# Patient Record
Sex: MALE | Race: WHITE | NOT HISPANIC OR LATINO | Employment: UNEMPLOYED | URBAN - METROPOLITAN AREA
[De-identification: names, ages, dates, MRNs, and addresses within clinical notes are randomized per-mention and may not be internally consistent; named-entity substitution may affect disease eponyms.]

---

## 2023-01-01 ENCOUNTER — APPOINTMENT (OUTPATIENT)
Dept: CARDIOLOGY | Facility: HOSPITAL | Age: 53
DRG: 276 | End: 2023-01-01
Payer: MEDICAID

## 2023-01-01 ENCOUNTER — HOSPITAL ENCOUNTER (INPATIENT)
Facility: HOSPITAL | Age: 53
LOS: 18 days | DRG: 276 | End: 2023-11-26
Attending: EMERGENCY MEDICINE | Admitting: INTERNAL MEDICINE
Payer: MEDICAID

## 2023-01-01 ENCOUNTER — ANESTHESIA (OUTPATIENT)
Dept: CARDIOLOGY | Facility: HOSPITAL | Age: 53
DRG: 276 | End: 2023-01-01
Payer: MEDICAID

## 2023-01-01 ENCOUNTER — APPOINTMENT (OUTPATIENT)
Dept: ULTRASOUND IMAGING | Facility: HOSPITAL | Age: 53
DRG: 276 | End: 2023-01-01
Payer: MEDICAID

## 2023-01-01 ENCOUNTER — APPOINTMENT (OUTPATIENT)
Dept: GENERAL RADIOLOGY | Facility: HOSPITAL | Age: 53
DRG: 276 | End: 2023-01-01
Payer: MEDICAID

## 2023-01-01 ENCOUNTER — APPOINTMENT (OUTPATIENT)
Dept: CT IMAGING | Facility: HOSPITAL | Age: 53
DRG: 276 | End: 2023-01-01
Payer: MEDICAID

## 2023-01-01 ENCOUNTER — ANESTHESIA EVENT (OUTPATIENT)
Dept: CARDIOLOGY | Facility: HOSPITAL | Age: 53
DRG: 276 | End: 2023-01-01
Payer: MEDICAID

## 2023-01-01 VITALS
BODY MASS INDEX: 24.55 KG/M2 | WEIGHT: 175.38 LBS | RESPIRATION RATE: 20 BRPM | SYSTOLIC BLOOD PRESSURE: 77 MMHG | HEART RATE: 103 BPM | OXYGEN SATURATION: 100 % | DIASTOLIC BLOOD PRESSURE: 43 MMHG | HEIGHT: 71 IN | TEMPERATURE: 99.9 F

## 2023-01-01 DIAGNOSIS — I48.91 ATRIAL FIBRILLATION WITH RVR: ICD-10-CM

## 2023-01-01 DIAGNOSIS — E87.8 HYPOCHLOREMIA: ICD-10-CM

## 2023-01-01 DIAGNOSIS — D72.829 LEUKOCYTOSIS, UNSPECIFIED TYPE: ICD-10-CM

## 2023-01-01 DIAGNOSIS — R79.89 ELEVATED SERUM CREATININE: ICD-10-CM

## 2023-01-01 DIAGNOSIS — I50.23 ACUTE ON CHRONIC SYSTOLIC CONGESTIVE HEART FAILURE: Primary | ICD-10-CM

## 2023-01-01 DIAGNOSIS — R79.89 ELEVATED BRAIN NATRIURETIC PEPTIDE (BNP) LEVEL: ICD-10-CM

## 2023-01-01 DIAGNOSIS — E80.6 HYPERBILIRUBINEMIA: ICD-10-CM

## 2023-01-01 DIAGNOSIS — J81.0 ACUTE PULMONARY EDEMA: ICD-10-CM

## 2023-01-01 DIAGNOSIS — R74.01 ELEVATED TRANSAMINASE LEVEL: ICD-10-CM

## 2023-01-01 DIAGNOSIS — R53.81 PHYSICAL DECONDITIONING: ICD-10-CM

## 2023-01-01 DIAGNOSIS — E87.1 HYPONATREMIA: ICD-10-CM

## 2023-01-01 DIAGNOSIS — I48.20 CHRONIC ATRIAL FIBRILLATION: ICD-10-CM

## 2023-01-01 LAB
ALBUMIN SERPL-MCNC: 1.8 G/DL (ref 3.5–5.2)
ALBUMIN SERPL-MCNC: 2.2 G/DL (ref 3.5–5.2)
ALBUMIN SERPL-MCNC: 2.3 G/DL (ref 3.5–5.2)
ALBUMIN SERPL-MCNC: 2.8 G/DL (ref 3.5–5.2)
ALBUMIN SERPL-MCNC: 2.9 G/DL (ref 3.5–5.2)
ALBUMIN SERPL-MCNC: 2.9 G/DL (ref 3.5–5.2)
ALBUMIN SERPL-MCNC: 3 G/DL (ref 3.5–5.2)
ALBUMIN SERPL-MCNC: 3 G/DL (ref 3.5–5.2)
ALBUMIN SERPL-MCNC: 3.3 G/DL (ref 3.5–5.2)
ALBUMIN SERPL-MCNC: 3.3 G/DL (ref 3.5–5.2)
ALBUMIN SERPL-MCNC: 3.4 G/DL (ref 3.5–5.2)
ALBUMIN/GLOB SERPL: 0.8 G/DL
ALBUMIN/GLOB SERPL: 0.9 G/DL
ALBUMIN/GLOB SERPL: 0.9 G/DL
ALBUMIN/GLOB SERPL: 1 G/DL
ALBUMIN/GLOB SERPL: 1.1 G/DL
ALBUMIN/GLOB SERPL: 1.2 G/DL
ALBUMIN/GLOB SERPL: 1.3 G/DL
ALBUMIN/GLOB SERPL: 1.3 G/DL
ALP SERPL-CCNC: 118 U/L (ref 39–117)
ALP SERPL-CCNC: 125 U/L (ref 39–117)
ALP SERPL-CCNC: 128 U/L (ref 39–117)
ALP SERPL-CCNC: 158 U/L (ref 39–117)
ALP SERPL-CCNC: 160 U/L (ref 39–117)
ALP SERPL-CCNC: 167 U/L (ref 39–117)
ALP SERPL-CCNC: 181 U/L (ref 39–117)
ALP SERPL-CCNC: 183 U/L (ref 39–117)
ALP SERPL-CCNC: 202 U/L (ref 39–117)
ALP SERPL-CCNC: 203 U/L (ref 39–117)
ALT SERPL W P-5'-P-CCNC: 122 U/L (ref 1–41)
ALT SERPL W P-5'-P-CCNC: 145 U/L (ref 1–41)
ALT SERPL W P-5'-P-CCNC: 146 U/L (ref 1–41)
ALT SERPL W P-5'-P-CCNC: 21 U/L (ref 1–41)
ALT SERPL W P-5'-P-CCNC: 25 U/L (ref 1–41)
ALT SERPL W P-5'-P-CCNC: 25 U/L (ref 1–41)
ALT SERPL W P-5'-P-CCNC: 53 U/L (ref 1–41)
ALT SERPL W P-5'-P-CCNC: 72 U/L (ref 1–41)
ALT SERPL W P-5'-P-CCNC: 89 U/L (ref 1–41)
ALT SERPL W P-5'-P-CCNC: 95 U/L (ref 1–41)
AMMONIA BLD-SCNC: 32 UMOL/L (ref 16–60)
ANION GAP SERPL CALCULATED.3IONS-SCNC: 10 MMOL/L (ref 5–15)
ANION GAP SERPL CALCULATED.3IONS-SCNC: 10 MMOL/L (ref 5–15)
ANION GAP SERPL CALCULATED.3IONS-SCNC: 11 MMOL/L (ref 5–15)
ANION GAP SERPL CALCULATED.3IONS-SCNC: 12 MMOL/L (ref 5–15)
ANION GAP SERPL CALCULATED.3IONS-SCNC: 12 MMOL/L (ref 5–15)
ANION GAP SERPL CALCULATED.3IONS-SCNC: 14 MMOL/L (ref 5–15)
ANION GAP SERPL CALCULATED.3IONS-SCNC: 16 MMOL/L (ref 5–15)
ANION GAP SERPL CALCULATED.3IONS-SCNC: 17 MMOL/L (ref 5–15)
ANION GAP SERPL CALCULATED.3IONS-SCNC: 17 MMOL/L (ref 5–15)
ANION GAP SERPL CALCULATED.3IONS-SCNC: 18 MMOL/L (ref 5–15)
ANION GAP SERPL CALCULATED.3IONS-SCNC: 19 MMOL/L (ref 5–15)
ANION GAP SERPL CALCULATED.3IONS-SCNC: 20 MMOL/L (ref 5–15)
ANION GAP SERPL CALCULATED.3IONS-SCNC: 21 MMOL/L (ref 5–15)
ANION GAP SERPL CALCULATED.3IONS-SCNC: 23 MMOL/L (ref 5–15)
ANION GAP SERPL CALCULATED.3IONS-SCNC: 9 MMOL/L (ref 5–15)
ANION GAP SERPL CALCULATED.3IONS-SCNC: 9 MMOL/L (ref 5–15)
ARTERIAL PATENCY WRIST A: ABNORMAL
ARTERIAL PATENCY WRIST A: ABNORMAL
AST SERPL-CCNC: 35 U/L (ref 1–40)
AST SERPL-CCNC: 40 U/L (ref 1–40)
AST SERPL-CCNC: 42 U/L (ref 1–40)
AST SERPL-CCNC: 48 U/L (ref 1–40)
AST SERPL-CCNC: 48 U/L (ref 1–40)
AST SERPL-CCNC: 52 U/L (ref 1–40)
AST SERPL-CCNC: 54 U/L (ref 1–40)
AST SERPL-CCNC: 55 U/L (ref 1–40)
AST SERPL-CCNC: 67 U/L (ref 1–40)
AST SERPL-CCNC: 83 U/L (ref 1–40)
ATMOSPHERIC PRESS: ABNORMAL MM[HG]
B PARAPERT DNA SPEC QL NAA+PROBE: NOT DETECTED
B PERT DNA SPEC QL NAA+PROBE: NOT DETECTED
BACTERIA UR QL AUTO: ABNORMAL /HPF
BASE EXCESS BLDA CALC-SCNC: -14.5 MMOL/L (ref 0–2)
BASE EXCESS BLDA CALC-SCNC: -4 MMOL/L (ref 0–2)
BASE EXCESS BLDV CALC-SCNC: 0.4 MMOL/L (ref -2–2)
BASOPHILS # BLD AUTO: 0.01 10*3/MM3 (ref 0–0.2)
BASOPHILS # BLD AUTO: 0.02 10*3/MM3 (ref 0–0.2)
BASOPHILS # BLD AUTO: 0.02 10*3/MM3 (ref 0–0.2)
BASOPHILS # BLD AUTO: 0.03 10*3/MM3 (ref 0–0.2)
BASOPHILS # BLD AUTO: 0.04 10*3/MM3 (ref 0–0.2)
BASOPHILS # BLD AUTO: 0.04 10*3/MM3 (ref 0–0.2)
BASOPHILS # BLD AUTO: 0.06 10*3/MM3 (ref 0–0.2)
BASOPHILS # BLD MANUAL: 0 10*3/MM3 (ref 0–0.2)
BASOPHILS NFR BLD AUTO: 0.1 % (ref 0–1.5)
BASOPHILS NFR BLD AUTO: 0.2 % (ref 0–1.5)
BASOPHILS NFR BLD AUTO: 0.3 % (ref 0–1.5)
BASOPHILS NFR BLD MANUAL: 0 % (ref 0–1.5)
BDY SITE: ABNORMAL
BH CV ECHO MEAS - AO MAX PG: 2.8 MMHG
BH CV ECHO MEAS - AO MEAN PG: 2 MMHG
BH CV ECHO MEAS - AO ROOT DIAM: 3 CM
BH CV ECHO MEAS - AO V2 MAX: 84.2 CM/SEC
BH CV ECHO MEAS - AO V2 VTI: 12.2 CM
BH CV ECHO MEAS - AVA(I,D): 2.29 CM2
BH CV ECHO MEAS - EDV(CUBED): 205.4 ML
BH CV ECHO MEAS - EDV(MOD-SP2): 234 ML
BH CV ECHO MEAS - EDV(MOD-SP4): 219 ML
BH CV ECHO MEAS - EF(MOD-BP): 8.7 %
BH CV ECHO MEAS - EF(MOD-SP2): 6 %
BH CV ECHO MEAS - EF(MOD-SP4): 13.7 %
BH CV ECHO MEAS - ESV(CUBED): 125 ML
BH CV ECHO MEAS - ESV(MOD-SP2): 220 ML
BH CV ECHO MEAS - ESV(MOD-SP4): 189 ML
BH CV ECHO MEAS - FS: 15.3 %
BH CV ECHO MEAS - IVS/LVPW: 1 CM
BH CV ECHO MEAS - IVSD: 0.9 CM
BH CV ECHO MEAS - LA DIMENSION: 4.9 CM
BH CV ECHO MEAS - LAT PEAK E' VEL: 16 CM/SEC
BH CV ECHO MEAS - LV MASS(C)D: 209.6 GRAMS
BH CV ECHO MEAS - LV MAX PG: 1.6 MMHG
BH CV ECHO MEAS - LV MEAN PG: 1 MMHG
BH CV ECHO MEAS - LV V1 MAX: 63.3 CM/SEC
BH CV ECHO MEAS - LV V1 VTI: 8.9 CM
BH CV ECHO MEAS - LVIDD: 5.9 CM
BH CV ECHO MEAS - LVIDS: 5 CM
BH CV ECHO MEAS - LVOT AREA: 3.1 CM2
BH CV ECHO MEAS - LVOT DIAM: 2 CM
BH CV ECHO MEAS - LVPWD: 0.9 CM
BH CV ECHO MEAS - MED PEAK E' VEL: 4.6 CM/SEC
BH CV ECHO MEAS - MV DEC SLOPE: 967 CM/SEC2
BH CV ECHO MEAS - MV DEC TIME: 0.11 SEC
BH CV ECHO MEAS - MV E MAX VEL: 130 CM/SEC
BH CV ECHO MEAS - MV MAX PG: 7.5 MMHG
BH CV ECHO MEAS - MV MEAN PG: 3 MMHG
BH CV ECHO MEAS - MV P1/2T: 39.7 MSEC
BH CV ECHO MEAS - MV V2 VTI: 13.4 CM
BH CV ECHO MEAS - MVA(P1/2T): 5.5 CM2
BH CV ECHO MEAS - MVA(VTI): 2.08 CM2
BH CV ECHO MEAS - PA ACC TIME: 0.07 SEC
BH CV ECHO MEAS - RAP SYSTOLE: 3 MMHG
BH CV ECHO MEAS - RVSP: 17.8 MMHG
BH CV ECHO MEAS - SV(LVOT): 27.9 ML
BH CV ECHO MEAS - SV(MOD-SP2): 14 ML
BH CV ECHO MEAS - SV(MOD-SP4): 30 ML
BH CV ECHO MEAS - TAPSE (>1.6): 2.46 CM
BH CV ECHO MEAS - TR MAX PG: 14.8 MMHG
BH CV ECHO MEAS - TR MAX VEL: 190 CM/SEC
BH CV ECHO MEASUREMENTS AVERAGE E/E' RATIO: 12.62
BH CV XLRA - RV BASE: 4.7 CM
BH CV XLRA - RV LENGTH: 7 CM
BH CV XLRA - RV MID: 4.1 CM
BH CV XLRA - TDI S': 8.1 CM/SEC
BILIRUB SERPL-MCNC: 2.1 MG/DL (ref 0–1.2)
BILIRUB SERPL-MCNC: 2.2 MG/DL (ref 0–1.2)
BILIRUB SERPL-MCNC: 2.3 MG/DL (ref 0–1.2)
BILIRUB SERPL-MCNC: 2.8 MG/DL (ref 0–1.2)
BILIRUB SERPL-MCNC: 2.9 MG/DL (ref 0–1.2)
BILIRUB SERPL-MCNC: 3 MG/DL (ref 0–1.2)
BILIRUB SERPL-MCNC: 3.6 MG/DL (ref 0–1.2)
BILIRUB SERPL-MCNC: 3.6 MG/DL (ref 0–1.2)
BILIRUB SERPL-MCNC: 4.5 MG/DL (ref 0–1.2)
BILIRUB SERPL-MCNC: 4.7 MG/DL (ref 0–1.2)
BILIRUB UR QL STRIP: ABNORMAL
BODY TEMPERATURE: 37
BODY TEMPERATURE: 37
BODY TEMPERATURE: 37 C
BUN SERPL-MCNC: 104 MG/DL (ref 6–20)
BUN SERPL-MCNC: 53 MG/DL (ref 6–20)
BUN SERPL-MCNC: 55 MG/DL (ref 6–20)
BUN SERPL-MCNC: 59 MG/DL (ref 6–20)
BUN SERPL-MCNC: 60 MG/DL (ref 6–20)
BUN SERPL-MCNC: 62 MG/DL (ref 6–20)
BUN SERPL-MCNC: 72 MG/DL (ref 6–20)
BUN SERPL-MCNC: 75 MG/DL (ref 6–20)
BUN SERPL-MCNC: 77 MG/DL (ref 6–20)
BUN SERPL-MCNC: 79 MG/DL (ref 6–20)
BUN SERPL-MCNC: 79 MG/DL (ref 6–20)
BUN SERPL-MCNC: 81 MG/DL (ref 6–20)
BUN SERPL-MCNC: 82 MG/DL (ref 6–20)
BUN SERPL-MCNC: 82 MG/DL (ref 6–20)
BUN SERPL-MCNC: 83 MG/DL (ref 6–20)
BUN SERPL-MCNC: 85 MG/DL (ref 6–20)
BUN SERPL-MCNC: 87 MG/DL (ref 6–20)
BUN SERPL-MCNC: 93 MG/DL (ref 6–20)
BUN SERPL-MCNC: 93 MG/DL (ref 6–20)
BUN/CREAT SERPL: 26.4 (ref 7–25)
BUN/CREAT SERPL: 29.4 (ref 7–25)
BUN/CREAT SERPL: 34 (ref 7–25)
BUN/CREAT SERPL: 34.8 (ref 7–25)
BUN/CREAT SERPL: 35.3 (ref 7–25)
BUN/CREAT SERPL: 36.2 (ref 7–25)
BUN/CREAT SERPL: 37.3 (ref 7–25)
BUN/CREAT SERPL: 37.7 (ref 7–25)
BUN/CREAT SERPL: 37.8 (ref 7–25)
BUN/CREAT SERPL: 38.1 (ref 7–25)
BUN/CREAT SERPL: 39.9 (ref 7–25)
BUN/CREAT SERPL: 40.1 (ref 7–25)
BUN/CREAT SERPL: 40.7 (ref 7–25)
BUN/CREAT SERPL: 43.2 (ref 7–25)
BUN/CREAT SERPL: 44.4 (ref 7–25)
BUN/CREAT SERPL: 44.5 (ref 7–25)
BUN/CREAT SERPL: 45.8 (ref 7–25)
BUN/CREAT SERPL: 45.9 (ref 7–25)
BUN/CREAT SERPL: 46.8 (ref 7–25)
BUN/CREAT SERPL: 46.8 (ref 7–25)
BUN/CREAT SERPL: 47.2 (ref 7–25)
BUN/CREAT SERPL: 53.7 (ref 7–25)
BURR CELLS BLD QL SMEAR: ABNORMAL
BURR CELLS BLD QL SMEAR: ABNORMAL
C PNEUM DNA NPH QL NAA+NON-PROBE: NOT DETECTED
CA-I SERPL ISE-MCNC: 1.26 MMOL/L (ref 1.12–1.32)
CALCIUM SPEC-SCNC: 10 MG/DL (ref 8.6–10.5)
CALCIUM SPEC-SCNC: 11.3 MG/DL (ref 8.6–10.5)
CALCIUM SPEC-SCNC: 7.6 MG/DL (ref 8.6–10.5)
CALCIUM SPEC-SCNC: 8.3 MG/DL (ref 8.6–10.5)
CALCIUM SPEC-SCNC: 8.5 MG/DL (ref 8.6–10.5)
CALCIUM SPEC-SCNC: 8.6 MG/DL (ref 8.6–10.5)
CALCIUM SPEC-SCNC: 8.6 MG/DL (ref 8.6–10.5)
CALCIUM SPEC-SCNC: 8.7 MG/DL (ref 8.6–10.5)
CALCIUM SPEC-SCNC: 8.7 MG/DL (ref 8.6–10.5)
CALCIUM SPEC-SCNC: 8.8 MG/DL (ref 8.6–10.5)
CALCIUM SPEC-SCNC: 8.9 MG/DL (ref 8.6–10.5)
CALCIUM SPEC-SCNC: 9 MG/DL (ref 8.6–10.5)
CALCIUM SPEC-SCNC: 9.1 MG/DL (ref 8.6–10.5)
CALCIUM SPEC-SCNC: 9.2 MG/DL (ref 8.6–10.5)
CALCIUM SPEC-SCNC: 9.2 MG/DL (ref 8.6–10.5)
CALCIUM SPEC-SCNC: 9.3 MG/DL (ref 8.6–10.5)
CALCIUM SPEC-SCNC: 9.3 MG/DL (ref 8.6–10.5)
CALCIUM SPEC-SCNC: 9.6 MG/DL (ref 8.6–10.5)
CALCIUM SPEC-SCNC: 9.9 MG/DL (ref 8.6–10.5)
CHLORIDE SERPL-SCNC: 80 MMOL/L (ref 98–107)
CHLORIDE SERPL-SCNC: 80 MMOL/L (ref 98–107)
CHLORIDE SERPL-SCNC: 81 MMOL/L (ref 98–107)
CHLORIDE SERPL-SCNC: 82 MMOL/L (ref 98–107)
CHLORIDE SERPL-SCNC: 82 MMOL/L (ref 98–107)
CHLORIDE SERPL-SCNC: 83 MMOL/L (ref 98–107)
CHLORIDE SERPL-SCNC: 84 MMOL/L (ref 98–107)
CHLORIDE SERPL-SCNC: 85 MMOL/L (ref 98–107)
CHLORIDE SERPL-SCNC: 85 MMOL/L (ref 98–107)
CHLORIDE SERPL-SCNC: 86 MMOL/L (ref 98–107)
CHLORIDE SERPL-SCNC: 87 MMOL/L (ref 98–107)
CHLORIDE SERPL-SCNC: 88 MMOL/L (ref 98–107)
CHLORIDE SERPL-SCNC: 89 MMOL/L (ref 98–107)
CHLORIDE SERPL-SCNC: 89 MMOL/L (ref 98–107)
CHLORIDE SERPL-SCNC: 90 MMOL/L (ref 98–107)
CHLORIDE SERPL-SCNC: 90 MMOL/L (ref 98–107)
CHLORIDE SERPL-SCNC: 91 MMOL/L (ref 98–107)
CHLORIDE SERPL-SCNC: 91 MMOL/L (ref 98–107)
CHLORIDE SERPL-SCNC: 92 MMOL/L (ref 98–107)
CHLORIDE SERPL-SCNC: 93 MMOL/L (ref 98–107)
CHLORIDE SERPL-SCNC: 93 MMOL/L (ref 98–107)
CHLORIDE SERPL-SCNC: 96 MMOL/L (ref 98–107)
CHOLEST SERPL-MCNC: 81 MG/DL (ref 0–200)
CLARITY UR: ABNORMAL
CO2 BLDA-SCNC: 15.9 MMOL/L (ref 22–33)
CO2 BLDA-SCNC: 21.5 MMOL/L (ref 22–33)
CO2 BLDA-SCNC: 24.3 MMOL/L (ref 22–33)
CO2 SERPL-SCNC: 17 MMOL/L (ref 22–29)
CO2 SERPL-SCNC: 19 MMOL/L (ref 22–29)
CO2 SERPL-SCNC: 21 MMOL/L (ref 22–29)
CO2 SERPL-SCNC: 23 MMOL/L (ref 22–29)
CO2 SERPL-SCNC: 25 MMOL/L (ref 22–29)
CO2 SERPL-SCNC: 26 MMOL/L (ref 22–29)
CO2 SERPL-SCNC: 26 MMOL/L (ref 22–29)
CO2 SERPL-SCNC: 27 MMOL/L (ref 22–29)
CO2 SERPL-SCNC: 28 MMOL/L (ref 22–29)
CO2 SERPL-SCNC: 30 MMOL/L (ref 22–29)
CO2 SERPL-SCNC: 31 MMOL/L (ref 22–29)
CO2 SERPL-SCNC: 31 MMOL/L (ref 22–29)
CO2 SERPL-SCNC: 32 MMOL/L (ref 22–29)
CO2 SERPL-SCNC: 32 MMOL/L (ref 22–29)
CO2 SERPL-SCNC: 33 MMOL/L (ref 22–29)
COHGB MFR BLD: 2.2 %
COHGB MFR BLD: 2.2 % (ref 0–2)
COHGB MFR BLD: 2.6 % (ref 0–2)
COLOR UR: ABNORMAL
CREAT SERPL-MCNC: 1.2 MG/DL (ref 0.76–1.27)
CREAT SERPL-MCNC: 1.42 MG/DL (ref 0.76–1.27)
CREAT SERPL-MCNC: 1.45 MG/DL (ref 0.76–1.27)
CREAT SERPL-MCNC: 1.47 MG/DL (ref 0.76–1.27)
CREAT SERPL-MCNC: 1.54 MG/DL (ref 0.76–1.27)
CREAT SERPL-MCNC: 1.69 MG/DL (ref 0.76–1.27)
CREAT SERPL-MCNC: 1.73 MG/DL (ref 0.76–1.27)
CREAT SERPL-MCNC: 1.76 MG/DL (ref 0.76–1.27)
CREAT SERPL-MCNC: 1.81 MG/DL (ref 0.76–1.27)
CREAT SERPL-MCNC: 1.92 MG/DL (ref 0.76–1.27)
CREAT SERPL-MCNC: 2.04 MG/DL (ref 0.76–1.27)
CREAT SERPL-MCNC: 2.09 MG/DL (ref 0.76–1.27)
CREAT SERPL-MCNC: 2.11 MG/DL (ref 0.76–1.27)
CREAT SERPL-MCNC: 2.13 MG/DL (ref 0.76–1.27)
CREAT SERPL-MCNC: 2.17 MG/DL (ref 0.76–1.27)
CREAT SERPL-MCNC: 2.27 MG/DL (ref 0.76–1.27)
CREAT SERPL-MCNC: 2.27 MG/DL (ref 0.76–1.27)
CREAT SERPL-MCNC: 2.29 MG/DL (ref 0.76–1.27)
CREAT SERPL-MCNC: 2.32 MG/DL (ref 0.76–1.27)
CREAT SERPL-MCNC: 2.41 MG/DL (ref 0.76–1.27)
CREAT SERPL-MCNC: 2.46 MG/DL (ref 0.76–1.27)
CREAT SERPL-MCNC: 2.73 MG/DL (ref 0.76–1.27)
CREAT UR-MCNC: 49.6 MG/DL
CREAT UR-MCNC: 72 MG/DL
D-LACTATE SERPL-SCNC: 10.6 MMOL/L (ref 0.5–2)
D-LACTATE SERPL-SCNC: 7.2 MMOL/L (ref 0.5–2)
DEPRECATED RDW RBC AUTO: 42.1 FL (ref 37–54)
DEPRECATED RDW RBC AUTO: 42.2 FL (ref 37–54)
DEPRECATED RDW RBC AUTO: 42.3 FL (ref 37–54)
DEPRECATED RDW RBC AUTO: 42.4 FL (ref 37–54)
DEPRECATED RDW RBC AUTO: 42.5 FL (ref 37–54)
DEPRECATED RDW RBC AUTO: 42.5 FL (ref 37–54)
DEPRECATED RDW RBC AUTO: 42.7 FL (ref 37–54)
DEPRECATED RDW RBC AUTO: 42.8 FL (ref 37–54)
DEPRECATED RDW RBC AUTO: 43.2 FL (ref 37–54)
DEPRECATED RDW RBC AUTO: 43.5 FL (ref 37–54)
DEPRECATED RDW RBC AUTO: 43.7 FL (ref 37–54)
DEPRECATED RDW RBC AUTO: 43.8 FL (ref 37–54)
DEPRECATED RDW RBC AUTO: 43.9 FL (ref 37–54)
DEPRECATED RDW RBC AUTO: 46.4 FL (ref 37–54)
DIGOXIN SERPL-MCNC: 1.17 NG/ML (ref 0.6–1.2)
DIGOXIN SERPL-MCNC: 1.29 NG/ML (ref 0.6–1.2)
EGFRCR SERPLBLD CKD-EPI 2021: 27.3 ML/MIN/1.73
EGFRCR SERPLBLD CKD-EPI 2021: 30.9 ML/MIN/1.73
EGFRCR SERPLBLD CKD-EPI 2021: 31.7 ML/MIN/1.73
EGFRCR SERPLBLD CKD-EPI 2021: 33.2 ML/MIN/1.73
EGFRCR SERPLBLD CKD-EPI 2021: 33.7 ML/MIN/1.73
EGFRCR SERPLBLD CKD-EPI 2021: 34.1 ML/MIN/1.73
EGFRCR SERPLBLD CKD-EPI 2021: 34.1 ML/MIN/1.73
EGFRCR SERPLBLD CKD-EPI 2021: 36 ML/MIN/1.73
EGFRCR SERPLBLD CKD-EPI 2021: 36.8 ML/MIN/1.73
EGFRCR SERPLBLD CKD-EPI 2021: 37.2 ML/MIN/1.73
EGFRCR SERPLBLD CKD-EPI 2021: 37.6 ML/MIN/1.73
EGFRCR SERPLBLD CKD-EPI 2021: 38.7 ML/MIN/1.73
EGFRCR SERPLBLD CKD-EPI 2021: 41.7 ML/MIN/1.73
EGFRCR SERPLBLD CKD-EPI 2021: 44.7 ML/MIN/1.73
EGFRCR SERPLBLD CKD-EPI 2021: 46.2 ML/MIN/1.73
EGFRCR SERPLBLD CKD-EPI 2021: 47.2 ML/MIN/1.73
EGFRCR SERPLBLD CKD-EPI 2021: 48.5 ML/MIN/1.73
EGFRCR SERPLBLD CKD-EPI 2021: 54.3 ML/MIN/1.73
EGFRCR SERPLBLD CKD-EPI 2021: 57.4 ML/MIN/1.73
EGFRCR SERPLBLD CKD-EPI 2021: 58.3 ML/MIN/1.73
EGFRCR SERPLBLD CKD-EPI 2021: 59.8 ML/MIN/1.73
EGFRCR SERPLBLD CKD-EPI 2021: 73.2 ML/MIN/1.73
EOSINOPHIL # BLD AUTO: 0 10*3/MM3 (ref 0–0.4)
EOSINOPHIL # BLD AUTO: 0.01 10*3/MM3 (ref 0–0.4)
EOSINOPHIL # BLD AUTO: 0.01 10*3/MM3 (ref 0–0.4)
EOSINOPHIL # BLD AUTO: 0.02 10*3/MM3 (ref 0–0.4)
EOSINOPHIL # BLD AUTO: 0.03 10*3/MM3 (ref 0–0.4)
EOSINOPHIL # BLD AUTO: 0.06 10*3/MM3 (ref 0–0.4)
EOSINOPHIL # BLD AUTO: 0.11 10*3/MM3 (ref 0–0.4)
EOSINOPHIL # BLD AUTO: 0.2 10*3/MM3 (ref 0–0.4)
EOSINOPHIL # BLD AUTO: 0.21 10*3/MM3 (ref 0–0.4)
EOSINOPHIL # BLD AUTO: 0.24 10*3/MM3 (ref 0–0.4)
EOSINOPHIL # BLD MANUAL: 0 10*3/MM3 (ref 0–0.4)
EOSINOPHIL NFR BLD AUTO: 0 % (ref 0.3–6.2)
EOSINOPHIL NFR BLD AUTO: 0 % (ref 0.3–6.2)
EOSINOPHIL NFR BLD AUTO: 0.1 % (ref 0.3–6.2)
EOSINOPHIL NFR BLD AUTO: 0.1 % (ref 0.3–6.2)
EOSINOPHIL NFR BLD AUTO: 0.2 % (ref 0.3–6.2)
EOSINOPHIL NFR BLD AUTO: 0.2 % (ref 0.3–6.2)
EOSINOPHIL NFR BLD AUTO: 0.6 % (ref 0.3–6.2)
EOSINOPHIL NFR BLD AUTO: 1.4 % (ref 0.3–6.2)
EOSINOPHIL NFR BLD AUTO: 1.6 % (ref 0.3–6.2)
EOSINOPHIL NFR BLD AUTO: 2.3 % (ref 0.3–6.2)
EOSINOPHIL NFR BLD MANUAL: 0 % (ref 0.3–6.2)
EPAP: 0
ERYTHROCYTE [DISTWIDTH] IN BLOOD BY AUTOMATED COUNT: 14.6 % (ref 12.3–15.4)
ERYTHROCYTE [DISTWIDTH] IN BLOOD BY AUTOMATED COUNT: 14.7 % (ref 12.3–15.4)
ERYTHROCYTE [DISTWIDTH] IN BLOOD BY AUTOMATED COUNT: 14.9 % (ref 12.3–15.4)
ERYTHROCYTE [DISTWIDTH] IN BLOOD BY AUTOMATED COUNT: 15.2 % (ref 12.3–15.4)
ERYTHROCYTE [DISTWIDTH] IN BLOOD BY AUTOMATED COUNT: 15.2 % (ref 12.3–15.4)
ERYTHROCYTE [DISTWIDTH] IN BLOOD BY AUTOMATED COUNT: 15.3 % (ref 12.3–15.4)
ERYTHROCYTE [DISTWIDTH] IN BLOOD BY AUTOMATED COUNT: 15.3 % (ref 12.3–15.4)
ERYTHROCYTE [DISTWIDTH] IN BLOOD BY AUTOMATED COUNT: 15.4 % (ref 12.3–15.4)
ERYTHROCYTE [DISTWIDTH] IN BLOOD BY AUTOMATED COUNT: 15.9 % (ref 12.3–15.4)
FLUAV SUBTYP SPEC NAA+PROBE: NOT DETECTED
FLUBV RNA ISLT QL NAA+PROBE: NOT DETECTED
GEN 5 2HR TROPONIN T REFLEX: 60 NG/L
GLOBULIN UR ELPH-MCNC: 2.2 GM/DL
GLOBULIN UR ELPH-MCNC: 2.3 GM/DL
GLOBULIN UR ELPH-MCNC: 2.4 GM/DL
GLOBULIN UR ELPH-MCNC: 2.4 GM/DL
GLOBULIN UR ELPH-MCNC: 2.5 GM/DL
GLOBULIN UR ELPH-MCNC: 2.6 GM/DL
GLOBULIN UR ELPH-MCNC: 2.7 GM/DL
GLOBULIN UR ELPH-MCNC: 2.7 GM/DL
GLOBULIN UR ELPH-MCNC: 2.8 GM/DL
GLOBULIN UR ELPH-MCNC: 2.9 GM/DL
GLUCOSE SERPL-MCNC: 106 MG/DL (ref 65–99)
GLUCOSE SERPL-MCNC: 106 MG/DL (ref 65–99)
GLUCOSE SERPL-MCNC: 108 MG/DL (ref 65–99)
GLUCOSE SERPL-MCNC: 108 MG/DL (ref 65–99)
GLUCOSE SERPL-MCNC: 112 MG/DL (ref 65–99)
GLUCOSE SERPL-MCNC: 113 MG/DL (ref 65–99)
GLUCOSE SERPL-MCNC: 115 MG/DL (ref 65–99)
GLUCOSE SERPL-MCNC: 116 MG/DL (ref 65–99)
GLUCOSE SERPL-MCNC: 118 MG/DL (ref 65–99)
GLUCOSE SERPL-MCNC: 118 MG/DL (ref 65–99)
GLUCOSE SERPL-MCNC: 125 MG/DL (ref 65–99)
GLUCOSE SERPL-MCNC: 127 MG/DL (ref 65–99)
GLUCOSE SERPL-MCNC: 132 MG/DL (ref 65–99)
GLUCOSE SERPL-MCNC: 146 MG/DL (ref 65–99)
GLUCOSE SERPL-MCNC: 198 MG/DL (ref 65–99)
GLUCOSE SERPL-MCNC: 72 MG/DL (ref 65–99)
GLUCOSE SERPL-MCNC: 78 MG/DL (ref 65–99)
GLUCOSE SERPL-MCNC: 82 MG/DL (ref 65–99)
GLUCOSE SERPL-MCNC: 88 MG/DL (ref 65–99)
GLUCOSE SERPL-MCNC: 90 MG/DL (ref 65–99)
GLUCOSE SERPL-MCNC: 92 MG/DL (ref 65–99)
GLUCOSE SERPL-MCNC: 95 MG/DL (ref 65–99)
GLUCOSE UR STRIP-MCNC: ABNORMAL MG/DL
HADV DNA SPEC NAA+PROBE: NOT DETECTED
HAV IGM SERPL QL IA: NORMAL
HBA1C MFR BLD: 6 % (ref 4.8–5.6)
HBV CORE IGM SERPL QL IA: NORMAL
HBV SURFACE AG SERPL QL IA: NORMAL
HCO3 BLDA-SCNC: 14.5 MMOL/L (ref 20–26)
HCO3 BLDA-SCNC: 20.4 MMOL/L (ref 20–26)
HCO3 BLDV-SCNC: 23.3 MMOL/L (ref 22–28)
HCOV 229E RNA SPEC QL NAA+PROBE: NOT DETECTED
HCOV HKU1 RNA SPEC QL NAA+PROBE: NOT DETECTED
HCOV NL63 RNA SPEC QL NAA+PROBE: NOT DETECTED
HCOV OC43 RNA SPEC QL NAA+PROBE: NOT DETECTED
HCT VFR BLD AUTO: 31 % (ref 37.5–51)
HCT VFR BLD AUTO: 31.5 % (ref 37.5–51)
HCT VFR BLD AUTO: 32.9 % (ref 37.5–51)
HCT VFR BLD AUTO: 33.5 % (ref 37.5–51)
HCT VFR BLD AUTO: 33.6 % (ref 37.5–51)
HCT VFR BLD AUTO: 34.9 % (ref 37.5–51)
HCT VFR BLD AUTO: 35.1 % (ref 37.5–51)
HCT VFR BLD AUTO: 35.8 % (ref 37.5–51)
HCT VFR BLD AUTO: 35.9 % (ref 37.5–51)
HCT VFR BLD AUTO: 36.7 % (ref 37.5–51)
HCT VFR BLD AUTO: 37.4 % (ref 37.5–51)
HCT VFR BLD AUTO: 37.4 % (ref 37.5–51)
HCT VFR BLD AUTO: 39.3 % (ref 37.5–51)
HCT VFR BLD AUTO: 39.7 % (ref 37.5–51)
HCT VFR BLD AUTO: 39.9 % (ref 37.5–51)
HCT VFR BLD AUTO: 40.3 % (ref 37.5–51)
HCT VFR BLD CALC: 30 % (ref 38–51)
HCT VFR BLD CALC: 34.4 % (ref 38–51)
HCV AB SER DONR QL: NORMAL
HDLC SERPL-MCNC: 22 MG/DL (ref 40–60)
HGB BLD-MCNC: 10.2 G/DL (ref 13–17.7)
HGB BLD-MCNC: 10.9 G/DL (ref 13–17.7)
HGB BLD-MCNC: 10.9 G/DL (ref 13–17.7)
HGB BLD-MCNC: 11 G/DL (ref 13–17.7)
HGB BLD-MCNC: 11.5 G/DL (ref 13–17.7)
HGB BLD-MCNC: 11.6 G/DL (ref 13–17.7)
HGB BLD-MCNC: 11.6 G/DL (ref 13–17.7)
HGB BLD-MCNC: 11.7 G/DL (ref 13–17.7)
HGB BLD-MCNC: 12 G/DL (ref 13–17.7)
HGB BLD-MCNC: 12.1 G/DL (ref 13–17.7)
HGB BLD-MCNC: 12.4 G/DL (ref 13–17.7)
HGB BLD-MCNC: 12.9 G/DL (ref 13–17.7)
HGB BLD-MCNC: 13.1 G/DL (ref 13–17.7)
HGB BLD-MCNC: 13.1 G/DL (ref 13–17.7)
HGB BLD-MCNC: 13.4 G/DL (ref 13–17.7)
HGB BLD-MCNC: 9.8 G/DL (ref 13–17.7)
HGB BLDA-MCNC: 11.2 G/DL (ref 13.5–17.5)
HGB BLDA-MCNC: 12.5 G/DL (ref 13.5–17.5)
HGB BLDA-MCNC: 9.8 G/DL (ref 13.5–17.5)
HGB UR QL STRIP.AUTO: ABNORMAL
HMPV RNA NPH QL NAA+NON-PROBE: NOT DETECTED
HOLD SPECIMEN: NORMAL
HPIV1 RNA ISLT QL NAA+PROBE: NOT DETECTED
HPIV2 RNA SPEC QL NAA+PROBE: NOT DETECTED
HPIV3 RNA NPH QL NAA+PROBE: NOT DETECTED
HPIV4 P GENE NPH QL NAA+PROBE: NOT DETECTED
HYALINE CASTS UR QL AUTO: ABNORMAL /LPF
HYPOCHROMIA BLD QL: ABNORMAL
IMM GRANULOCYTES # BLD AUTO: 0.06 10*3/MM3 (ref 0–0.05)
IMM GRANULOCYTES # BLD AUTO: 0.12 10*3/MM3 (ref 0–0.05)
IMM GRANULOCYTES # BLD AUTO: 0.12 10*3/MM3 (ref 0–0.05)
IMM GRANULOCYTES # BLD AUTO: 0.15 10*3/MM3 (ref 0–0.05)
IMM GRANULOCYTES # BLD AUTO: 0.17 10*3/MM3 (ref 0–0.05)
IMM GRANULOCYTES # BLD AUTO: 0.19 10*3/MM3 (ref 0–0.05)
IMM GRANULOCYTES # BLD AUTO: 0.22 10*3/MM3 (ref 0–0.05)
IMM GRANULOCYTES # BLD AUTO: 0.26 10*3/MM3 (ref 0–0.05)
IMM GRANULOCYTES # BLD AUTO: 0.42 10*3/MM3 (ref 0–0.05)
IMM GRANULOCYTES # BLD AUTO: 0.59 10*3/MM3 (ref 0–0.05)
IMM GRANULOCYTES NFR BLD AUTO: 0.5 % (ref 0–0.5)
IMM GRANULOCYTES NFR BLD AUTO: 0.5 % (ref 0–0.5)
IMM GRANULOCYTES NFR BLD AUTO: 0.7 % (ref 0–0.5)
IMM GRANULOCYTES NFR BLD AUTO: 0.8 % (ref 0–0.5)
IMM GRANULOCYTES NFR BLD AUTO: 1 % (ref 0–0.5)
IMM GRANULOCYTES NFR BLD AUTO: 1 % (ref 0–0.5)
IMM GRANULOCYTES NFR BLD AUTO: 1.5 % (ref 0–0.5)
IMM GRANULOCYTES NFR BLD AUTO: 2.1 % (ref 0–0.5)
IMM GRANULOCYTES NFR BLD AUTO: 2.3 % (ref 0–0.5)
IMM GRANULOCYTES NFR BLD AUTO: 2.4 % (ref 0–0.5)
INHALED O2 CONCENTRATION: 100 %
INHALED O2 CONCENTRATION: 60 %
INHALED O2 CONCENTRATION: 80 %
IPAP: 0
KETONES UR QL STRIP: NEGATIVE
LARGE PLATELETS: ABNORMAL
LDLC SERPL CALC-MCNC: 45 MG/DL (ref 0–100)
LDLC/HDLC SERPL: 2.14 {RATIO}
LEFT ATRIUM VOLUME INDEX: 54.5 ML/M2
LEUKOCYTE ESTERASE UR QL STRIP.AUTO: ABNORMAL
LYMPHOCYTES # BLD AUTO: 0.37 10*3/MM3 (ref 0.7–3.1)
LYMPHOCYTES # BLD AUTO: 0.42 10*3/MM3 (ref 0.7–3.1)
LYMPHOCYTES # BLD AUTO: 0.52 10*3/MM3 (ref 0.7–3.1)
LYMPHOCYTES # BLD AUTO: 0.64 10*3/MM3 (ref 0.7–3.1)
LYMPHOCYTES # BLD AUTO: 0.79 10*3/MM3 (ref 0.7–3.1)
LYMPHOCYTES # BLD AUTO: 0.89 10*3/MM3 (ref 0.7–3.1)
LYMPHOCYTES # BLD AUTO: 0.89 10*3/MM3 (ref 0.7–3.1)
LYMPHOCYTES # BLD AUTO: 0.94 10*3/MM3 (ref 0.7–3.1)
LYMPHOCYTES # BLD AUTO: 1.08 10*3/MM3 (ref 0.7–3.1)
LYMPHOCYTES # BLD AUTO: 1.09 10*3/MM3 (ref 0.7–3.1)
LYMPHOCYTES # BLD MANUAL: 0.25 10*3/MM3 (ref 0.7–3.1)
LYMPHOCYTES # BLD MANUAL: 0.48 10*3/MM3 (ref 0.7–3.1)
LYMPHOCYTES # BLD MANUAL: 1.67 10*3/MM3 (ref 0.7–3.1)
LYMPHOCYTES NFR BLD AUTO: 1.5 % (ref 19.6–45.3)
LYMPHOCYTES NFR BLD AUTO: 10.5 % (ref 19.6–45.3)
LYMPHOCYTES NFR BLD AUTO: 2 % (ref 19.6–45.3)
LYMPHOCYTES NFR BLD AUTO: 2.7 % (ref 19.6–45.3)
LYMPHOCYTES NFR BLD AUTO: 2.9 % (ref 19.6–45.3)
LYMPHOCYTES NFR BLD AUTO: 3.5 % (ref 19.6–45.3)
LYMPHOCYTES NFR BLD AUTO: 5.3 % (ref 19.6–45.3)
LYMPHOCYTES NFR BLD AUTO: 5.4 % (ref 19.6–45.3)
LYMPHOCYTES NFR BLD AUTO: 7.1 % (ref 19.6–45.3)
LYMPHOCYTES NFR BLD AUTO: 8.5 % (ref 19.6–45.3)
LYMPHOCYTES NFR BLD MANUAL: 0 % (ref 5–12)
LYMPHOCYTES NFR BLD MANUAL: 1 % (ref 5–12)
LYMPHOCYTES NFR BLD MANUAL: 2 % (ref 5–12)
Lab: ABNORMAL
M PNEUMO IGG SER IA-ACNC: NOT DETECTED
MAGNESIUM SERPL-MCNC: 1.7 MG/DL (ref 1.6–2.6)
MAGNESIUM SERPL-MCNC: 1.9 MG/DL (ref 1.6–2.6)
MAGNESIUM SERPL-MCNC: 1.9 MG/DL (ref 1.6–2.6)
MAGNESIUM SERPL-MCNC: 2 MG/DL (ref 1.6–2.6)
MAGNESIUM SERPL-MCNC: 2.1 MG/DL (ref 1.6–2.6)
MAGNESIUM SERPL-MCNC: 2.2 MG/DL (ref 1.6–2.6)
MAGNESIUM SERPL-MCNC: 2.2 MG/DL (ref 1.6–2.6)
MAGNESIUM SERPL-MCNC: 2.3 MG/DL (ref 1.6–2.6)
MAGNESIUM SERPL-MCNC: 2.4 MG/DL (ref 1.6–2.6)
MAGNESIUM SERPL-MCNC: 2.4 MG/DL (ref 1.6–2.6)
MAGNESIUM SERPL-MCNC: 2.5 MG/DL (ref 1.6–2.6)
MAGNESIUM SERPL-MCNC: 2.8 MG/DL (ref 1.6–2.6)
MAGNESIUM SERPL-MCNC: 2.9 MG/DL (ref 1.6–2.6)
MAGNESIUM SERPL-MCNC: 3.6 MG/DL (ref 1.6–2.6)
MCH RBC QN AUTO: 25.4 PG (ref 26.6–33)
MCH RBC QN AUTO: 25.7 PG (ref 26.6–33)
MCH RBC QN AUTO: 25.8 PG (ref 26.6–33)
MCH RBC QN AUTO: 25.9 PG (ref 26.6–33)
MCH RBC QN AUTO: 26 PG (ref 26.6–33)
MCH RBC QN AUTO: 26.2 PG (ref 26.6–33)
MCH RBC QN AUTO: 26.4 PG (ref 26.6–33)
MCH RBC QN AUTO: 26.5 PG (ref 26.6–33)
MCH RBC QN AUTO: 26.7 PG (ref 26.6–33)
MCH RBC QN AUTO: 26.7 PG (ref 26.6–33)
MCH RBC QN AUTO: 27.1 PG (ref 26.6–33)
MCH RBC QN AUTO: 27.3 PG (ref 26.6–33)
MCH RBC QN AUTO: 27.3 PG (ref 26.6–33)
MCH RBC QN AUTO: 27.4 PG (ref 26.6–33)
MCHC RBC AUTO-ENTMCNC: 31.1 G/DL (ref 31.5–35.7)
MCHC RBC AUTO-ENTMCNC: 32.3 G/DL (ref 31.5–35.7)
MCHC RBC AUTO-ENTMCNC: 32.4 G/DL (ref 31.5–35.7)
MCHC RBC AUTO-ENTMCNC: 32.5 G/DL (ref 31.5–35.7)
MCHC RBC AUTO-ENTMCNC: 32.7 G/DL (ref 31.5–35.7)
MCHC RBC AUTO-ENTMCNC: 32.8 G/DL (ref 31.5–35.7)
MCHC RBC AUTO-ENTMCNC: 32.8 G/DL (ref 31.5–35.7)
MCHC RBC AUTO-ENTMCNC: 32.9 G/DL (ref 31.5–35.7)
MCHC RBC AUTO-ENTMCNC: 33 G/DL (ref 31.5–35.7)
MCHC RBC AUTO-ENTMCNC: 33 G/DL (ref 31.5–35.7)
MCHC RBC AUTO-ENTMCNC: 33.2 G/DL (ref 31.5–35.7)
MCHC RBC AUTO-ENTMCNC: 33.3 G/DL (ref 31.5–35.7)
MCHC RBC AUTO-ENTMCNC: 33.3 G/DL (ref 31.5–35.7)
MCHC RBC AUTO-ENTMCNC: 33.4 G/DL (ref 31.5–35.7)
MCV RBC AUTO: 77.6 FL (ref 79–97)
MCV RBC AUTO: 78.3 FL (ref 79–97)
MCV RBC AUTO: 79.6 FL (ref 79–97)
MCV RBC AUTO: 79.9 FL (ref 79–97)
MCV RBC AUTO: 80 FL (ref 79–97)
MCV RBC AUTO: 80.1 FL (ref 79–97)
MCV RBC AUTO: 80.5 FL (ref 79–97)
MCV RBC AUTO: 80.6 FL (ref 79–97)
MCV RBC AUTO: 81.2 FL (ref 79–97)
MCV RBC AUTO: 81.6 FL (ref 79–97)
MCV RBC AUTO: 82 FL (ref 79–97)
MCV RBC AUTO: 82.2 FL (ref 79–97)
MCV RBC AUTO: 83.3 FL (ref 79–97)
MCV RBC AUTO: 83.5 FL (ref 79–97)
METHGB BLD QL: 0 % (ref 0–1.5)
METHGB BLD QL: 0.2 %
METHGB BLD QL: 0.5 % (ref 0–1.5)
MICROCYTES BLD QL: ABNORMAL
MODALITY: ABNORMAL
MONOCYTES # BLD AUTO: 0.25 10*3/MM3 (ref 0.1–0.9)
MONOCYTES # BLD AUTO: 0.3 10*3/MM3 (ref 0.1–0.9)
MONOCYTES # BLD AUTO: 0.48 10*3/MM3 (ref 0.1–0.9)
MONOCYTES # BLD AUTO: 0.52 10*3/MM3 (ref 0.1–0.9)
MONOCYTES # BLD AUTO: 0.58 10*3/MM3 (ref 0.1–0.9)
MONOCYTES # BLD AUTO: 0.59 10*3/MM3 (ref 0.1–0.9)
MONOCYTES # BLD AUTO: 0.65 10*3/MM3 (ref 0.1–0.9)
MONOCYTES # BLD AUTO: 0.78 10*3/MM3 (ref 0.1–0.9)
MONOCYTES # BLD AUTO: 0.78 10*3/MM3 (ref 0.1–0.9)
MONOCYTES # BLD AUTO: 1.09 10*3/MM3 (ref 0.1–0.9)
MONOCYTES # BLD: 0 10*3/MM3 (ref 0.1–0.9)
MONOCYTES # BLD: 0.24 10*3/MM3 (ref 0.1–0.9)
MONOCYTES # BLD: 0.42 10*3/MM3 (ref 0.1–0.9)
MONOCYTES NFR BLD AUTO: 1 % (ref 5–12)
MONOCYTES NFR BLD AUTO: 1.4 % (ref 5–12)
MONOCYTES NFR BLD AUTO: 3 % (ref 5–12)
MONOCYTES NFR BLD AUTO: 3.1 % (ref 5–12)
MONOCYTES NFR BLD AUTO: 3.1 % (ref 5–12)
MONOCYTES NFR BLD AUTO: 3.9 % (ref 5–12)
MONOCYTES NFR BLD AUTO: 4 % (ref 5–12)
MONOCYTES NFR BLD AUTO: 4.7 % (ref 5–12)
MONOCYTES NFR BLD AUTO: 6.2 % (ref 5–12)
MONOCYTES NFR BLD AUTO: 6.3 % (ref 5–12)
MRSA DNA SPEC QL NAA+PROBE: NEGATIVE
MYELOPEROXIDASE AB SER IA-ACNC: <0.2 UNITS (ref 0–0.9)
NEUTROPHILS # BLD AUTO: 18.74 10*3/MM3 (ref 1.7–7)
NEUTROPHILS # BLD AUTO: 23.34 10*3/MM3 (ref 1.7–7)
NEUTROPHILS # BLD AUTO: 25.1 10*3/MM3 (ref 1.7–7)
NEUTROPHILS NFR BLD AUTO: 10.64 10*3/MM3 (ref 1.7–7)
NEUTROPHILS NFR BLD AUTO: 11.17 10*3/MM3 (ref 1.7–7)
NEUTROPHILS NFR BLD AUTO: 12.96 10*3/MM3 (ref 1.7–7)
NEUTROPHILS NFR BLD AUTO: 14.4 10*3/MM3 (ref 1.7–7)
NEUTROPHILS NFR BLD AUTO: 15.05 10*3/MM3 (ref 1.7–7)
NEUTROPHILS NFR BLD AUTO: 21.12 10*3/MM3 (ref 1.7–7)
NEUTROPHILS NFR BLD AUTO: 23.21 10*3/MM3 (ref 1.7–7)
NEUTROPHILS NFR BLD AUTO: 24.02 10*3/MM3 (ref 1.7–7)
NEUTROPHILS NFR BLD AUTO: 24.53 10*3/MM3 (ref 1.7–7)
NEUTROPHILS NFR BLD AUTO: 78.5 % (ref 42.7–76)
NEUTROPHILS NFR BLD AUTO: 8.03 10*3/MM3 (ref 1.7–7)
NEUTROPHILS NFR BLD AUTO: 84.9 % (ref 42.7–76)
NEUTROPHILS NFR BLD AUTO: 85.3 % (ref 42.7–76)
NEUTROPHILS NFR BLD AUTO: 86.8 % (ref 42.7–76)
NEUTROPHILS NFR BLD AUTO: 88.1 % (ref 42.7–76)
NEUTROPHILS NFR BLD AUTO: 90.7 % (ref 42.7–76)
NEUTROPHILS NFR BLD AUTO: 93.1 % (ref 42.7–76)
NEUTROPHILS NFR BLD AUTO: 93.9 % (ref 42.7–76)
NEUTROPHILS NFR BLD AUTO: 95.1 % (ref 42.7–76)
NEUTROPHILS NFR BLD AUTO: 96.5 % (ref 42.7–76)
NEUTROPHILS NFR BLD MANUAL: 48 % (ref 42.7–76)
NEUTROPHILS NFR BLD MANUAL: 56 % (ref 42.7–76)
NEUTROPHILS NFR BLD MANUAL: 81 % (ref 42.7–76)
NEUTS BAND NFR BLD MANUAL: 16 % (ref 0–5)
NEUTS BAND NFR BLD MANUAL: 42 % (ref 0–5)
NEUTS BAND NFR BLD MANUAL: 43 % (ref 0–5)
NEUTS VAC BLD QL SMEAR: ABNORMAL
NITRITE UR QL STRIP: NEGATIVE
NOTIFIED BY: ABNORMAL
NOTIFIED WHO: ABNORMAL
NRBC BLD AUTO-RTO: 0.2 /100 WBC (ref 0–0.2)
NRBC BLD AUTO-RTO: 0.3 /100 WBC (ref 0–0.2)
NRBC BLD AUTO-RTO: 0.3 /100 WBC (ref 0–0.2)
NRBC BLD AUTO-RTO: 0.4 /100 WBC (ref 0–0.2)
NRBC BLD AUTO-RTO: 0.6 /100 WBC (ref 0–0.2)
NRBC BLD AUTO-RTO: 1.6 /100 WBC (ref 0–0.2)
NRBC BLD AUTO-RTO: 2 /100 WBC (ref 0–0.2)
NRBC BLD AUTO-RTO: 2.4 /100 WBC (ref 0–0.2)
NRBC SPEC MANUAL: 0 /100 WBC (ref 0–0.2)
NRBC SPEC MANUAL: 5 /100 WBC (ref 0–0.2)
NT-PROBNP SERPL-MCNC: ABNORMAL PG/ML (ref 0–900)
OSMOLALITY SERPL: 293 MOSM/KG (ref 275–295)
OSMOLALITY UR: 467 MOSM/KG (ref 300–1100)
OVALOCYTES BLD QL SMEAR: ABNORMAL
OXYHGB MFR BLDV: 79 %
OXYHGB MFR BLDV: 96.2 % (ref 94–99)
OXYHGB MFR BLDV: 96.8 % (ref 94–99)
PAW @ PEAK INSP FLOW SETTING VENT: 0 CMH2O
PCO2 BLDA: 34 MM HG (ref 35–45)
PCO2 BLDA: 46.5 MM HG (ref 35–45)
PCO2 BLDV: 31.3 MM HG (ref 41–51)
PCO2 TEMP ADJ BLD: 34 MM HG (ref 35–48)
PCO2 TEMP ADJ BLD: 46.5 MM HG (ref 35–48)
PH BLDA: 7.1 PH UNITS (ref 7.35–7.45)
PH BLDA: 7.39 PH UNITS (ref 7.35–7.45)
PH BLDV: 7.48 PH UNITS (ref 7.31–7.41)
PH UR STRIP.AUTO: <=5 [PH] (ref 5–8)
PH, TEMP CORRECTED: 7.1 PH UNITS
PH, TEMP CORRECTED: 7.39 PH UNITS
PHOSPHATE SERPL-MCNC: 3 MG/DL (ref 2.5–4.5)
PHOSPHATE SERPL-MCNC: 4.3 MG/DL (ref 2.5–4.5)
PHOSPHATE SERPL-MCNC: 4.7 MG/DL (ref 2.5–4.5)
PHOSPHATE SERPL-MCNC: 4.9 MG/DL (ref 2.5–4.5)
PHOSPHATE SERPL-MCNC: 5.5 MG/DL (ref 2.5–4.5)
PHOSPHATE SERPL-MCNC: 5.5 MG/DL (ref 2.5–4.5)
PLAT MORPH BLD: NORMAL
PLAT MORPH BLD: NORMAL
PLATELET # BLD AUTO: 124 10*3/MM3 (ref 140–450)
PLATELET # BLD AUTO: 124 10*3/MM3 (ref 140–450)
PLATELET # BLD AUTO: 149 10*3/MM3 (ref 140–450)
PLATELET # BLD AUTO: 149 10*3/MM3 (ref 140–450)
PLATELET # BLD AUTO: 163 10*3/MM3 (ref 140–450)
PLATELET # BLD AUTO: 219 10*3/MM3 (ref 140–450)
PLATELET # BLD AUTO: 238 10*3/MM3 (ref 140–450)
PLATELET # BLD AUTO: 253 10*3/MM3 (ref 140–450)
PLATELET # BLD AUTO: 268 10*3/MM3 (ref 140–450)
PLATELET # BLD AUTO: 278 10*3/MM3 (ref 140–450)
PLATELET # BLD AUTO: 300 10*3/MM3 (ref 140–450)
PLATELET # BLD AUTO: 304 10*3/MM3 (ref 140–450)
PLATELET # BLD AUTO: 334 10*3/MM3 (ref 140–450)
PLATELET # BLD AUTO: 398 10*3/MM3 (ref 140–450)
PLATELET # BLD AUTO: 54 10*3/MM3 (ref 140–450)
PLATELET # BLD AUTO: 78 10*3/MM3 (ref 140–450)
PMV BLD AUTO: 10.5 FL (ref 6–12)
PMV BLD AUTO: 10.6 FL (ref 6–12)
PMV BLD AUTO: 11.6 FL (ref 6–12)
PMV BLD AUTO: 11.6 FL (ref 6–12)
PMV BLD AUTO: 11.7 FL (ref 6–12)
PMV BLD AUTO: 11.8 FL (ref 6–12)
PMV BLD AUTO: 11.9 FL (ref 6–12)
PMV BLD AUTO: 12 FL (ref 6–12)
PMV BLD AUTO: 12.2 FL (ref 6–12)
PMV BLD AUTO: 12.5 FL (ref 6–12)
PMV BLD AUTO: 12.6 FL (ref 6–12)
PMV BLD AUTO: 12.6 FL (ref 6–12)
PO2 BLDA: 113 MM HG (ref 83–108)
PO2 BLDA: 147 MM HG (ref 83–108)
PO2 BLDV: 45.4 MM HG (ref 27–53)
PO2 TEMP ADJ BLD: 113 MM HG (ref 83–108)
PO2 TEMP ADJ BLD: 147 MM HG (ref 83–108)
POTASSIUM SERPL-SCNC: 2.7 MMOL/L (ref 3.5–5.2)
POTASSIUM SERPL-SCNC: 2.9 MMOL/L (ref 3.5–5.2)
POTASSIUM SERPL-SCNC: 3 MMOL/L (ref 3.5–5.2)
POTASSIUM SERPL-SCNC: 3.5 MMOL/L (ref 3.5–5.2)
POTASSIUM SERPL-SCNC: 3.5 MMOL/L (ref 3.5–5.2)
POTASSIUM SERPL-SCNC: 3.6 MMOL/L (ref 3.5–5.2)
POTASSIUM SERPL-SCNC: 3.7 MMOL/L (ref 3.5–5.2)
POTASSIUM SERPL-SCNC: 3.8 MMOL/L (ref 3.5–5.2)
POTASSIUM SERPL-SCNC: 3.9 MMOL/L (ref 3.5–5.2)
POTASSIUM SERPL-SCNC: 4 MMOL/L (ref 3.5–5.2)
POTASSIUM SERPL-SCNC: 4.1 MMOL/L (ref 3.5–5.2)
POTASSIUM SERPL-SCNC: 4.1 MMOL/L (ref 3.5–5.2)
POTASSIUM SERPL-SCNC: 4.4 MMOL/L (ref 3.5–5.2)
POTASSIUM SERPL-SCNC: 4.6 MMOL/L (ref 3.5–5.2)
POTASSIUM SERPL-SCNC: 4.6 MMOL/L (ref 3.5–5.2)
POTASSIUM SERPL-SCNC: 5.3 MMOL/L (ref 3.5–5.2)
POTASSIUM SERPL-SCNC: 5.3 MMOL/L (ref 3.5–5.2)
POTASSIUM SERPL-SCNC: 5.7 MMOL/L (ref 3.5–5.2)
PROCALCITONIN SERPL-MCNC: 17.88 NG/ML (ref 0–0.25)
PROT ?TM UR-MCNC: 8.6 MG/DL
PROT SERPL-MCNC: 4.1 G/DL (ref 6–8.5)
PROT SERPL-MCNC: 4.6 G/DL (ref 6–8.5)
PROT SERPL-MCNC: 4.9 G/DL (ref 6–8.5)
PROT SERPL-MCNC: 5 G/DL (ref 6–8.5)
PROT SERPL-MCNC: 5.3 G/DL (ref 6–8.5)
PROT SERPL-MCNC: 5.5 G/DL (ref 6–8.5)
PROT SERPL-MCNC: 5.6 G/DL (ref 6–8.5)
PROT SERPL-MCNC: 5.9 G/DL (ref 6–8.5)
PROT SERPL-MCNC: 6.1 G/DL (ref 6–8.5)
PROT SERPL-MCNC: 6.1 G/DL (ref 6–8.5)
PROT UR QL STRIP: ABNORMAL
PROTEINASE3 AB SER IA-ACNC: <0.2 UNITS (ref 0–0.9)
QT INTERVAL: 350 MS
QT INTERVAL: 356 MS
QT INTERVAL: 364 MS
QT INTERVAL: 380 MS
QT INTERVAL: 392 MS
QT INTERVAL: 414 MS
QT INTERVAL: 440 MS
QT INTERVAL: 448 MS
QT INTERVAL: 460 MS
QT INTERVAL: 462 MS
QT INTERVAL: 464 MS
QT INTERVAL: 478 MS
QT INTERVAL: 496 MS
QT INTERVAL: 516 MS
QTC INTERVAL: 460 MS
QTC INTERVAL: 479 MS
QTC INTERVAL: 511 MS
QTC INTERVAL: 546 MS
QTC INTERVAL: 559 MS
QTC INTERVAL: 571 MS
QTC INTERVAL: 573 MS
QTC INTERVAL: 580 MS
QTC INTERVAL: 587 MS
QTC INTERVAL: 589 MS
QTC INTERVAL: 596 MS
QTC INTERVAL: 605 MS
QTC INTERVAL: 607 MS
QTC INTERVAL: 634 MS
RBC # BLD AUTO: 3.86 10*6/MM3 (ref 4.14–5.8)
RBC # BLD AUTO: 3.87 10*6/MM3 (ref 4.14–5.8)
RBC # BLD AUTO: 4.16 10*6/MM3 (ref 4.14–5.8)
RBC # BLD AUTO: 4.2 10*6/MM3 (ref 4.14–5.8)
RBC # BLD AUTO: 4.24 10*6/MM3 (ref 4.14–5.8)
RBC # BLD AUTO: 4.38 10*6/MM3 (ref 4.14–5.8)
RBC # BLD AUTO: 4.46 10*6/MM3 (ref 4.14–5.8)
RBC # BLD AUTO: 4.48 10*6/MM3 (ref 4.14–5.8)
RBC # BLD AUTO: 4.51 10*6/MM3 (ref 4.14–5.8)
RBC # BLD AUTO: 4.52 10*6/MM3 (ref 4.14–5.8)
RBC # BLD AUTO: 4.64 10*6/MM3 (ref 4.14–5.8)
RBC # BLD AUTO: 4.67 10*6/MM3 (ref 4.14–5.8)
RBC # BLD AUTO: 4.72 10*6/MM3 (ref 4.14–5.8)
RBC # BLD AUTO: 4.78 10*6/MM3 (ref 4.14–5.8)
RBC # BLD AUTO: 4.84 10*6/MM3 (ref 4.14–5.8)
RBC # BLD AUTO: 4.9 10*6/MM3 (ref 4.14–5.8)
RBC # UR STRIP: ABNORMAL /HPF
REF LAB TEST METHOD: ABNORMAL
RHINOVIRUS RNA SPEC NAA+PROBE: NOT DETECTED
RSV RNA NPH QL NAA+NON-PROBE: NOT DETECTED
SARS-COV-2 RNA NPH QL NAA+NON-PROBE: NOT DETECTED
SCAN SLIDE: NORMAL
SMALL PLATELETS BLD QL SMEAR: ABNORMAL
SODIUM SERPL-SCNC: 122 MMOL/L (ref 136–145)
SODIUM SERPL-SCNC: 124 MMOL/L (ref 136–145)
SODIUM SERPL-SCNC: 124 MMOL/L (ref 136–145)
SODIUM SERPL-SCNC: 126 MMOL/L (ref 136–145)
SODIUM SERPL-SCNC: 127 MMOL/L (ref 136–145)
SODIUM SERPL-SCNC: 127 MMOL/L (ref 136–145)
SODIUM SERPL-SCNC: 128 MMOL/L (ref 136–145)
SODIUM SERPL-SCNC: 128 MMOL/L (ref 136–145)
SODIUM SERPL-SCNC: 129 MMOL/L (ref 136–145)
SODIUM SERPL-SCNC: 130 MMOL/L (ref 136–145)
SODIUM SERPL-SCNC: 131 MMOL/L (ref 136–145)
SODIUM SERPL-SCNC: 131 MMOL/L (ref 136–145)
SODIUM SERPL-SCNC: 132 MMOL/L (ref 136–145)
SODIUM SERPL-SCNC: 133 MMOL/L (ref 136–145)
SODIUM SERPL-SCNC: 133 MMOL/L (ref 136–145)
SODIUM SERPL-SCNC: 134 MMOL/L (ref 136–145)
SODIUM SERPL-SCNC: 134 MMOL/L (ref 136–145)
SODIUM SERPL-SCNC: 136 MMOL/L (ref 136–145)
SODIUM UR-SCNC: <20 MMOL/L
SODIUM UR-SCNC: <20 MMOL/L
SP GR UR STRIP: 1.02 (ref 1–1.03)
SQUAMOUS #/AREA URNS HPF: ABNORMAL /HPF
TOTAL RATE: 0 BREATHS/MINUTE
TOXIC GRANULATION: ABNORMAL
TRIGL SERPL-MCNC: 60 MG/DL (ref 0–150)
TROPONIN T DELTA: -9 NG/L
TROPONIN T SERPL HS-MCNC: 69 NG/L
TSH SERPL DL<=0.05 MIU/L-ACNC: 2.55 UIU/ML (ref 0.27–4.2)
UROBILINOGEN UR QL STRIP: ABNORMAL
UUN 24H UR-MCNC: 530 MG/DL
UUN 24H UR-MCNC: 757 MG/DL
VARIANT LYMPHS NFR BLD MANUAL: 1 % (ref 19.6–45.3)
VARIANT LYMPHS NFR BLD MANUAL: 2 % (ref 19.6–45.3)
VARIANT LYMPHS NFR BLD MANUAL: 8 % (ref 19.6–45.3)
VLDLC SERPL-MCNC: 14 MG/DL (ref 5–40)
WBC # UR STRIP: ABNORMAL /HPF
WBC NRBC COR # BLD AUTO: 10.25 10*3/MM3 (ref 3.4–10.8)
WBC NRBC COR # BLD AUTO: 12.53 10*3/MM3 (ref 3.4–10.8)
WBC NRBC COR # BLD AUTO: 14.72 10*3/MM3 (ref 3.4–10.8)
WBC NRBC COR # BLD AUTO: 15.47 10*3/MM3 (ref 3.4–10.8)
WBC NRBC COR # BLD AUTO: 17.65 10*3/MM3 (ref 3.4–10.8)
WBC NRBC COR # BLD AUTO: 20.82 10*3/MM3 (ref 3.4–10.8)
WBC NRBC COR # BLD AUTO: 22.22 10*3/MM3 (ref 3.4–10.8)
WBC NRBC COR # BLD AUTO: 24.06 10*3/MM3 (ref 3.4–10.8)
WBC NRBC COR # BLD AUTO: 24.89 10*3/MM3 (ref 3.4–10.8)
WBC NRBC COR # BLD AUTO: 25.35 10*3/MM3 (ref 3.4–10.8)
WBC NRBC COR # BLD AUTO: 25.57 10*3/MM3 (ref 3.4–10.8)
WBC NRBC COR # BLD: 12.86 10*3/MM3 (ref 3.4–10.8)
WBC NRBC COR # BLD: 26.12 10*3/MM3 (ref 3.4–10.8)
WBC NRBC COR # BLD: 8.37 10*3/MM3 (ref 3.4–10.8)
WBC NRBC COR # BLD: 9.28 10*3/MM3 (ref 3.4–10.8)
WBC NRBC COR # BLD: 9.92 10*3/MM3 (ref 3.4–10.8)
WHOLE BLOOD HOLD COAG: NORMAL
WHOLE BLOOD HOLD SPECIMEN: NORMAL

## 2023-01-01 PROCEDURE — 74177 CT ABD & PELVIS W/CONTRAST: CPT

## 2023-01-01 PROCEDURE — 92950 HEART/LUNG RESUSCITATION CPR: CPT

## 2023-01-01 PROCEDURE — 83735 ASSAY OF MAGNESIUM: CPT | Performed by: INTERNAL MEDICINE

## 2023-01-01 PROCEDURE — 99255 IP/OBS CONSLTJ NEW/EST HI 80: CPT | Performed by: INTERNAL MEDICINE

## 2023-01-01 PROCEDURE — 71046 X-RAY EXAM CHEST 2 VIEWS: CPT

## 2023-01-01 PROCEDURE — C1900 LEAD, CORONARY VENOUS: HCPCS | Performed by: INTERNAL MEDICINE

## 2023-01-01 PROCEDURE — 93005 ELECTROCARDIOGRAM TRACING: CPT | Performed by: PHYSICIAN ASSISTANT

## 2023-01-01 PROCEDURE — 99285 EMERGENCY DEPT VISIT HI MDM: CPT

## 2023-01-01 PROCEDURE — 99233 SBSQ HOSP IP/OBS HIGH 50: CPT | Performed by: INTERNAL MEDICINE

## 2023-01-01 PROCEDURE — 93005 ELECTROCARDIOGRAM TRACING: CPT | Performed by: INTERNAL MEDICINE

## 2023-01-01 PROCEDURE — 84145 PROCALCITONIN (PCT): CPT | Performed by: HOSPITALIST

## 2023-01-01 PROCEDURE — 83050 HGB METHEMOGLOBIN QUAN: CPT

## 2023-01-01 PROCEDURE — 93005 ELECTROCARDIOGRAM TRACING: CPT | Performed by: STUDENT IN AN ORGANIZED HEALTH CARE EDUCATION/TRAINING PROGRAM

## 2023-01-01 PROCEDURE — 97535 SELF CARE MNGMENT TRAINING: CPT

## 2023-01-01 PROCEDURE — 82805 BLOOD GASES W/O2 SATURATION: CPT

## 2023-01-01 PROCEDURE — 97530 THERAPEUTIC ACTIVITIES: CPT

## 2023-01-01 PROCEDURE — 93010 ELECTROCARDIOGRAM REPORT: CPT | Performed by: INTERNAL MEDICINE

## 2023-01-01 PROCEDURE — 36620 INSERTION CATHETER ARTERY: CPT

## 2023-01-01 PROCEDURE — 25010000002 FUROSEMIDE PER 20 MG: Performed by: HOSPITALIST

## 2023-01-01 PROCEDURE — 99223 1ST HOSP IP/OBS HIGH 75: CPT | Performed by: HOSPITALIST

## 2023-01-01 PROCEDURE — 83516 IMMUNOASSAY NONANTIBODY: CPT | Performed by: INTERNAL MEDICINE

## 2023-01-01 PROCEDURE — 29581 APPL MULTLAYER CMPRN SYS LEG: CPT

## 2023-01-01 PROCEDURE — 25010000002 PHENYLEPHRINE 10 MG/ML SOLUTION 1 ML VIAL: Performed by: NURSE ANESTHETIST, CERTIFIED REGISTERED

## 2023-01-01 PROCEDURE — 97165 OT EVAL LOW COMPLEX 30 MIN: CPT

## 2023-01-01 PROCEDURE — 25510000001 IOPAMIDOL PER 1 ML: Performed by: INTERNAL MEDICINE

## 2023-01-01 PROCEDURE — 25010000002 ALBUMIN HUMAN 25% PER 50 ML: Performed by: INTERNAL MEDICINE

## 2023-01-01 PROCEDURE — 85007 BL SMEAR W/DIFF WBC COUNT: CPT | Performed by: INTERNAL MEDICINE

## 2023-01-01 PROCEDURE — 83880 ASSAY OF NATRIURETIC PEPTIDE: CPT | Performed by: INTERNAL MEDICINE

## 2023-01-01 PROCEDURE — 80061 LIPID PANEL: CPT | Performed by: HOSPITALIST

## 2023-01-01 PROCEDURE — 74018 RADEX ABDOMEN 1 VIEW: CPT

## 2023-01-01 PROCEDURE — 83735 ASSAY OF MAGNESIUM: CPT | Performed by: HOSPITALIST

## 2023-01-01 PROCEDURE — 97164 PT RE-EVAL EST PLAN CARE: CPT

## 2023-01-01 PROCEDURE — 97110 THERAPEUTIC EXERCISES: CPT

## 2023-01-01 PROCEDURE — 94799 UNLISTED PULMONARY SVC/PX: CPT

## 2023-01-01 PROCEDURE — 25810000003 SODIUM CHLORIDE 0.9 % SOLUTION 250 ML FLEX CONT

## 2023-01-01 PROCEDURE — 99233 SBSQ HOSP IP/OBS HIGH 50: CPT | Performed by: FAMILY MEDICINE

## 2023-01-01 PROCEDURE — 25010000002 AMIODARONE IN DEXTROSE 5% 360-4.14 MG/200ML-% SOLUTION: Performed by: INTERNAL MEDICINE

## 2023-01-01 PROCEDURE — 80048 BASIC METABOLIC PNL TOTAL CA: CPT | Performed by: INTERNAL MEDICINE

## 2023-01-01 PROCEDURE — 99232 SBSQ HOSP IP/OBS MODERATE 35: CPT | Performed by: FAMILY MEDICINE

## 2023-01-01 PROCEDURE — 84443 ASSAY THYROID STIM HORMONE: CPT | Performed by: HOSPITALIST

## 2023-01-01 PROCEDURE — 80053 COMPREHEN METABOLIC PANEL: CPT | Performed by: FAMILY MEDICINE

## 2023-01-01 PROCEDURE — 25010000002 BUPIVACAINE 0.5 % SOLUTION: Performed by: INTERNAL MEDICINE

## 2023-01-01 PROCEDURE — 83880 ASSAY OF NATRIURETIC PEPTIDE: CPT

## 2023-01-01 PROCEDURE — 82375 ASSAY CARBOXYHB QUANT: CPT

## 2023-01-01 PROCEDURE — 80048 BASIC METABOLIC PNL TOTAL CA: CPT | Performed by: STUDENT IN AN ORGANIZED HEALTH CARE EDUCATION/TRAINING PROGRAM

## 2023-01-01 PROCEDURE — 94761 N-INVAS EAR/PLS OXIMETRY MLT: CPT

## 2023-01-01 PROCEDURE — 84100 ASSAY OF PHOSPHORUS: CPT | Performed by: INTERNAL MEDICINE

## 2023-01-01 PROCEDURE — 93005 ELECTROCARDIOGRAM TRACING: CPT

## 2023-01-01 PROCEDURE — C1892 INTRO/SHEATH,FIXED,PEEL-AWAY: HCPCS | Performed by: INTERNAL MEDICINE

## 2023-01-01 PROCEDURE — C1887 CATHETER, GUIDING: HCPCS | Performed by: INTERNAL MEDICINE

## 2023-01-01 PROCEDURE — P9047 ALBUMIN (HUMAN), 25%, 50ML: HCPCS

## 2023-01-01 PROCEDURE — 99232 SBSQ HOSP IP/OBS MODERATE 35: CPT | Performed by: INTERNAL MEDICINE

## 2023-01-01 PROCEDURE — 93005 ELECTROCARDIOGRAM TRACING: CPT | Performed by: EMERGENCY MEDICINE

## 2023-01-01 PROCEDURE — 84132 ASSAY OF SERUM POTASSIUM: CPT | Performed by: NURSE PRACTITIONER

## 2023-01-01 PROCEDURE — 63710000001 ONDANSETRON PER 8 MG: Performed by: INTERNAL MEDICINE

## 2023-01-01 PROCEDURE — 71045 X-RAY EXAM CHEST 1 VIEW: CPT

## 2023-01-01 PROCEDURE — 99232 SBSQ HOSP IP/OBS MODERATE 35: CPT | Performed by: NURSE PRACTITIONER

## 2023-01-01 PROCEDURE — 25010000002 MIDAZOLAM PER 1 MG: Performed by: INTERNAL MEDICINE

## 2023-01-01 PROCEDURE — 25010000002 PROPOFOL 10 MG/ML EMULSION: Performed by: INTERNAL MEDICINE

## 2023-01-01 PROCEDURE — 25010000002 VANCOMYCIN 10 G RECONSTITUTED SOLUTION

## 2023-01-01 PROCEDURE — 25010000002 EPINEPHRINE 1 MG/10ML SOLUTION PREFILLED SYRINGE: Performed by: STUDENT IN AN ORGANIZED HEALTH CARE EDUCATION/TRAINING PROGRAM

## 2023-01-01 PROCEDURE — 25810000003 SODIUM CHLORIDE 0.9 % SOLUTION 250 ML FLEX CONT: Performed by: NURSE ANESTHETIST, CERTIFIED REGISTERED

## 2023-01-01 PROCEDURE — 83735 ASSAY OF MAGNESIUM: CPT | Performed by: NURSE PRACTITIONER

## 2023-01-01 PROCEDURE — 25010000002 AMIODARONE IN DEXTROSE 5% 360-4.14 MG/200ML-% SOLUTION: Performed by: PHYSICIAN ASSISTANT

## 2023-01-01 PROCEDURE — 0 DEXTROSE 5 % SOLUTION 250 ML FLEX CONT: Performed by: NURSE ANESTHETIST, CERTIFIED REGISTERED

## 2023-01-01 PROCEDURE — 87147 CULTURE TYPE IMMUNOLOGIC: CPT | Performed by: HOSPITALIST

## 2023-01-01 PROCEDURE — 85025 COMPLETE CBC W/AUTO DIFF WBC: CPT | Performed by: INTERNAL MEDICINE

## 2023-01-01 PROCEDURE — 25010000002 LIDOCAINE 1 % SOLUTION: Performed by: INTERNAL MEDICINE

## 2023-01-01 PROCEDURE — 25010000002 MORPHINE PER 10 MG: Performed by: NURSE PRACTITIONER

## 2023-01-01 PROCEDURE — 0202U NFCT DS 22 TRGT SARS-COV-2: CPT | Performed by: HOSPITALIST

## 2023-01-01 PROCEDURE — 94660 CPAP INITIATION&MGMT: CPT

## 2023-01-01 PROCEDURE — 25010000002 PHENYLEPHRINE 10 MG/ML SOLUTION 5 ML VIAL: Performed by: NURSE PRACTITIONER

## 2023-01-01 PROCEDURE — 80162 ASSAY OF DIGOXIN TOTAL: CPT | Performed by: INTERNAL MEDICINE

## 2023-01-01 PROCEDURE — 80053 COMPREHEN METABOLIC PANEL: CPT | Performed by: INTERNAL MEDICINE

## 2023-01-01 PROCEDURE — 97597 DBRDMT OPN WND 1ST 20 CM/<: CPT

## 2023-01-01 PROCEDURE — 87186 SC STD MICRODIL/AGAR DIL: CPT | Performed by: HOSPITALIST

## 2023-01-01 PROCEDURE — 0JH609Z INSERTION OF CARDIAC RESYNCHRONIZATION DEFIBRILLATOR PULSE GENERATOR INTO CHEST SUBCUTANEOUS TISSUE AND FASCIA, OPEN APPROACH: ICD-10-PCS | Performed by: INTERNAL MEDICINE

## 2023-01-01 PROCEDURE — C1777 LEAD, AICD, ENDO SINGLE COIL: HCPCS | Performed by: INTERNAL MEDICINE

## 2023-01-01 PROCEDURE — 5A1935Z RESPIRATORY VENTILATION, LESS THAN 24 CONSECUTIVE HOURS: ICD-10-PCS | Performed by: NURSE PRACTITIONER

## 2023-01-01 PROCEDURE — 25010000002 VASOPRESSIN 0.2 UNIT/ML SOLUTION: Performed by: NURSE PRACTITIONER

## 2023-01-01 PROCEDURE — 71275 CT ANGIOGRAPHY CHEST: CPT

## 2023-01-01 PROCEDURE — 02H43KZ INSERTION OF DEFIBRILLATOR LEAD INTO CORONARY VEIN, PERCUTANEOUS APPROACH: ICD-10-PCS | Performed by: INTERNAL MEDICINE

## 2023-01-01 PROCEDURE — 92960 CARDIOVERSION ELECTRIC EXT: CPT

## 2023-01-01 PROCEDURE — 25810000003 SODIUM CHLORIDE 0.9 % SOLUTION: Performed by: INTERNAL MEDICINE

## 2023-01-01 PROCEDURE — B548ZZA ULTRASONOGRAPHY OF SUPERIOR VENA CAVA, GUIDANCE: ICD-10-PCS | Performed by: NURSE PRACTITIONER

## 2023-01-01 PROCEDURE — 25010000002 DOBUTAMINE PER 250 MG

## 2023-01-01 PROCEDURE — 25810000003 SODIUM CHLORIDE 0.9 % SOLUTION

## 2023-01-01 PROCEDURE — 81001 URINALYSIS AUTO W/SCOPE: CPT | Performed by: HOSPITALIST

## 2023-01-01 PROCEDURE — C1894 INTRO/SHEATH, NON-LASER: HCPCS | Performed by: INTERNAL MEDICINE

## 2023-01-01 PROCEDURE — 25010000002 AMIODARONE IN DEXTROSE 5% 150-4.21 MG/100ML-% SOLUTION: Performed by: INTERNAL MEDICINE

## 2023-01-01 PROCEDURE — 25810000003 LACTATED RINGERS PER 1000 ML: Performed by: NURSE ANESTHETIST, CERTIFIED REGISTERED

## 2023-01-01 PROCEDURE — 83935 ASSAY OF URINE OSMOLALITY: CPT | Performed by: INTERNAL MEDICINE

## 2023-01-01 PROCEDURE — 36556 INSERT NON-TUNNEL CV CATH: CPT | Performed by: NURSE PRACTITIONER

## 2023-01-01 PROCEDURE — 25010000002 POTASSIUM CHLORIDE 10 MEQ/100ML SOLUTION: Performed by: FAMILY MEDICINE

## 2023-01-01 PROCEDURE — 25010000002 VASOPRESSIN 20 UNIT/ML SOLUTION: Performed by: NURSE ANESTHETIST, CERTIFIED REGISTERED

## 2023-01-01 PROCEDURE — 85027 COMPLETE CBC AUTOMATED: CPT | Performed by: FAMILY MEDICINE

## 2023-01-01 PROCEDURE — 84132 ASSAY OF SERUM POTASSIUM: CPT | Performed by: FAMILY MEDICINE

## 2023-01-01 PROCEDURE — 25010000002 FENTANYL 10 MCG/1 ML NS: Performed by: INTERNAL MEDICINE

## 2023-01-01 PROCEDURE — 83036 HEMOGLOBIN GLYCOSYLATED A1C: CPT | Performed by: HOSPITALIST

## 2023-01-01 PROCEDURE — 25810000003 SODIUM CHLORIDE 0.9 % SOLUTION 250 ML FLEX CONT: Performed by: NURSE PRACTITIONER

## 2023-01-01 PROCEDURE — 25010000002 MIDAZOLAM PER 1 MG

## 2023-01-01 PROCEDURE — 5A2204Z RESTORATION OF CARDIAC RHYTHM, SINGLE: ICD-10-PCS | Performed by: INTERNAL MEDICINE

## 2023-01-01 PROCEDURE — 76775 US EXAM ABDO BACK WALL LIM: CPT

## 2023-01-01 PROCEDURE — 80048 BASIC METABOLIC PNL TOTAL CA: CPT

## 2023-01-01 PROCEDURE — 33225 L VENTRIC PACING LEAD ADD-ON: CPT | Performed by: INTERNAL MEDICINE

## 2023-01-01 PROCEDURE — C1882 AICD, OTHER THAN SING/DUAL: HCPCS | Performed by: INTERNAL MEDICINE

## 2023-01-01 PROCEDURE — 5A12012 PERFORMANCE OF CARDIAC OUTPUT, SINGLE, MANUAL: ICD-10-PCS | Performed by: INTERNAL MEDICINE

## 2023-01-01 PROCEDURE — 99232 SBSQ HOSP IP/OBS MODERATE 35: CPT

## 2023-01-01 PROCEDURE — 80074 ACUTE HEPATITIS PANEL: CPT | Performed by: HOSPITALIST

## 2023-01-01 PROCEDURE — 03HY32Z INSERTION OF MONITORING DEVICE INTO UPPER ARTERY, PERCUTANEOUS APPROACH: ICD-10-PCS

## 2023-01-01 PROCEDURE — 97116 GAIT TRAINING THERAPY: CPT

## 2023-01-01 PROCEDURE — 84132 ASSAY OF SERUM POTASSIUM: CPT | Performed by: INTERNAL MEDICINE

## 2023-01-01 PROCEDURE — 80069 RENAL FUNCTION PANEL: CPT | Performed by: NURSE PRACTITIONER

## 2023-01-01 PROCEDURE — 82140 ASSAY OF AMMONIA: CPT | Performed by: EMERGENCY MEDICINE

## 2023-01-01 PROCEDURE — 87641 MR-STAPH DNA AMP PROBE: CPT | Performed by: HOSPITALIST

## 2023-01-01 PROCEDURE — 25010000002 FUROSEMIDE PER 20 MG: Performed by: INTERNAL MEDICINE

## 2023-01-01 PROCEDURE — 5A09357 ASSISTANCE WITH RESPIRATORY VENTILATION, LESS THAN 24 CONSECUTIVE HOURS, CONTINUOUS POSITIVE AIRWAY PRESSURE: ICD-10-PCS | Performed by: HOSPITALIST

## 2023-01-01 PROCEDURE — C1769 GUIDE WIRE: HCPCS | Performed by: INTERNAL MEDICINE

## 2023-01-01 PROCEDURE — 0BH17EZ INSERTION OF ENDOTRACHEAL AIRWAY INTO TRACHEA, VIA NATURAL OR ARTIFICIAL OPENING: ICD-10-PCS | Performed by: NURSE PRACTITIONER

## 2023-01-01 PROCEDURE — 25010000002 ALBUMIN HUMAN 25% PER 50 ML: Performed by: ANESTHESIOLOGY

## 2023-01-01 PROCEDURE — 84540 ASSAY OF URINE/UREA-N: CPT | Performed by: INTERNAL MEDICINE

## 2023-01-01 PROCEDURE — 84156 ASSAY OF PROTEIN URINE: CPT | Performed by: INTERNAL MEDICINE

## 2023-01-01 PROCEDURE — 02HK3KZ INSERTION OF DEFIBRILLATOR LEAD INTO RIGHT VENTRICLE, PERCUTANEOUS APPROACH: ICD-10-PCS | Performed by: INTERNAL MEDICINE

## 2023-01-01 PROCEDURE — 82330 ASSAY OF CALCIUM: CPT | Performed by: INTERNAL MEDICINE

## 2023-01-01 PROCEDURE — 87040 BLOOD CULTURE FOR BACTERIA: CPT | Performed by: HOSPITALIST

## 2023-01-01 PROCEDURE — 93306 TTE W/DOPPLER COMPLETE: CPT | Performed by: INTERNAL MEDICINE

## 2023-01-01 PROCEDURE — 25010000002 MAGNESIUM SULFATE PER 500 MG OF MAGNESIUM: Performed by: STUDENT IN AN ORGANIZED HEALTH CARE EDUCATION/TRAINING PROGRAM

## 2023-01-01 PROCEDURE — 25010000002 DIGOXIN PER 500 MCG: Performed by: INTERNAL MEDICINE

## 2023-01-01 PROCEDURE — 25010000002 ALBUMIN HUMAN 25% PER 50 ML

## 2023-01-01 PROCEDURE — 82570 ASSAY OF URINE CREATININE: CPT | Performed by: INTERNAL MEDICINE

## 2023-01-01 PROCEDURE — 84300 ASSAY OF URINE SODIUM: CPT | Performed by: INTERNAL MEDICINE

## 2023-01-01 PROCEDURE — 02HV33Z INSERTION OF INFUSION DEVICE INTO SUPERIOR VENA CAVA, PERCUTANEOUS APPROACH: ICD-10-PCS | Performed by: NURSE PRACTITIONER

## 2023-01-01 PROCEDURE — P9047 ALBUMIN (HUMAN), 25%, 50ML: HCPCS | Performed by: ANESTHESIOLOGY

## 2023-01-01 PROCEDURE — 99232 SBSQ HOSP IP/OBS MODERATE 35: CPT | Performed by: PHYSICIAN ASSISTANT

## 2023-01-01 PROCEDURE — 99291 CRITICAL CARE FIRST HOUR: CPT | Performed by: INTERNAL MEDICINE

## 2023-01-01 PROCEDURE — 25010000002 HYDROMORPHONE PER 4 MG: Performed by: INTERNAL MEDICINE

## 2023-01-01 PROCEDURE — 97598 DBRDMT OPN WND ADDL 20CM/<: CPT

## 2023-01-01 PROCEDURE — P9047 ALBUMIN (HUMAN), 25%, 50ML: HCPCS | Performed by: INTERNAL MEDICINE

## 2023-01-01 PROCEDURE — 25010000002 PROPOFOL 10 MG/ML EMULSION: Performed by: NURSE ANESTHETIST, CERTIFIED REGISTERED

## 2023-01-01 PROCEDURE — 36600 WITHDRAWAL OF ARTERIAL BLOOD: CPT

## 2023-01-01 PROCEDURE — 36415 COLL VENOUS BLD VENIPUNCTURE: CPT

## 2023-01-01 PROCEDURE — 25010000002 PHENYLEPHRINE 10 MG/ML SOLUTION 5 ML VIAL

## 2023-01-01 PROCEDURE — 83735 ASSAY OF MAGNESIUM: CPT | Performed by: STUDENT IN AN ORGANIZED HEALTH CARE EDUCATION/TRAINING PROGRAM

## 2023-01-01 PROCEDURE — 80053 COMPREHEN METABOLIC PANEL: CPT | Performed by: PHYSICIAN ASSISTANT

## 2023-01-01 PROCEDURE — 83735 ASSAY OF MAGNESIUM: CPT

## 2023-01-01 PROCEDURE — 93306 TTE W/DOPPLER COMPLETE: CPT

## 2023-01-01 PROCEDURE — 93010 ELECTROCARDIOGRAM REPORT: CPT | Performed by: STUDENT IN AN ORGANIZED HEALTH CARE EDUCATION/TRAINING PROGRAM

## 2023-01-01 PROCEDURE — 92960 CARDIOVERSION ELECTRIC EXT: CPT | Performed by: INTERNAL MEDICINE

## 2023-01-01 PROCEDURE — C1898 LEAD, PMKR, OTHER THAN TRANS: HCPCS | Performed by: INTERNAL MEDICINE

## 2023-01-01 PROCEDURE — 25010000002 AMIODARONE IN DEXTROSE 5% 360-4.14 MG/200ML-% SOLUTION: Performed by: NURSE PRACTITIONER

## 2023-01-01 PROCEDURE — 97161 PT EVAL LOW COMPLEX 20 MIN: CPT

## 2023-01-01 PROCEDURE — 85027 COMPLETE CBC AUTOMATED: CPT | Performed by: HOSPITALIST

## 2023-01-01 PROCEDURE — 83930 ASSAY OF BLOOD OSMOLALITY: CPT | Performed by: INTERNAL MEDICINE

## 2023-01-01 PROCEDURE — 85025 COMPLETE CBC W/AUTO DIFF WBC: CPT | Performed by: EMERGENCY MEDICINE

## 2023-01-01 PROCEDURE — 80048 BASIC METABOLIC PNL TOTAL CA: CPT | Performed by: FAMILY MEDICINE

## 2023-01-01 PROCEDURE — 94002 VENT MGMT INPAT INIT DAY: CPT

## 2023-01-01 PROCEDURE — 97168 OT RE-EVAL EST PLAN CARE: CPT

## 2023-01-01 PROCEDURE — 02H63KZ INSERTION OF DEFIBRILLATOR LEAD INTO RIGHT ATRIUM, PERCUTANEOUS APPROACH: ICD-10-PCS | Performed by: INTERNAL MEDICINE

## 2023-01-01 PROCEDURE — 87086 URINE CULTURE/COLONY COUNT: CPT | Performed by: HOSPITALIST

## 2023-01-01 PROCEDURE — 80053 COMPREHEN METABOLIC PANEL: CPT | Performed by: EMERGENCY MEDICINE

## 2023-01-01 PROCEDURE — 74176 CT ABD & PELVIS W/O CONTRAST: CPT

## 2023-01-01 PROCEDURE — 83605 ASSAY OF LACTIC ACID: CPT | Performed by: HOSPITALIST

## 2023-01-01 PROCEDURE — 25010000002 PIPERACILLIN SOD-TAZOBACTAM PER 1 G: Performed by: HOSPITALIST

## 2023-01-01 PROCEDURE — 25010000002 CEFAZOLIN PER 500 MG: Performed by: INTERNAL MEDICINE

## 2023-01-01 PROCEDURE — 25010000002 ONDANSETRON PER 1 MG: Performed by: INTERNAL MEDICINE

## 2023-01-01 PROCEDURE — 33249 INSJ/RPLCMT DEFIB W/LEAD(S): CPT | Performed by: INTERNAL MEDICINE

## 2023-01-01 PROCEDURE — 25010000002 DIGOXIN PER 500 MCG

## 2023-01-01 PROCEDURE — 83880 ASSAY OF NATRIURETIC PEPTIDE: CPT | Performed by: EMERGENCY MEDICINE

## 2023-01-01 PROCEDURE — 76937 US GUIDE VASCULAR ACCESS: CPT | Performed by: NURSE PRACTITIONER

## 2023-01-01 PROCEDURE — 84484 ASSAY OF TROPONIN QUANT: CPT | Performed by: EMERGENCY MEDICINE

## 2023-01-01 PROCEDURE — 80053 COMPREHEN METABOLIC PANEL: CPT | Performed by: HOSPITALIST

## 2023-01-01 DEVICE — CARDIAC RESYNCHRONIZATION THERAPY DEFIBRILLATOR
Type: IMPLANTABLE DEVICE | Status: FUNCTIONAL
Brand: VIGILANT™ X4 CRT-D

## 2023-01-01 DEVICE — HEMOST ABS SURGICEL PWDR 3GM: Type: IMPLANTABLE DEVICE | Status: FUNCTIONAL

## 2023-01-01 DEVICE — PACE/SENSE LEAD
Type: IMPLANTABLE DEVICE | Status: FUNCTIONAL
Brand: INGEVITY™+

## 2023-01-01 DEVICE — PACE/SENSE LEAD
Type: IMPLANTABLE DEVICE | Status: FUNCTIONAL
Brand: ACUITY™ X4 SPIRAL S

## 2023-01-01 DEVICE — INTEGRATED BIPOLAR PACE/SENSE AND DEFIBRILLATION LEAD
Type: IMPLANTABLE DEVICE | Status: FUNCTIONAL
Brand: RELIANCE 4-FRONT™

## 2023-01-01 RX ORDER — PROPOFOL 10 MG/ML
VIAL (ML) INTRAVENOUS AS NEEDED
Status: DISCONTINUED | OUTPATIENT
Start: 2023-01-01 | End: 2023-01-01 | Stop reason: SURG

## 2023-01-01 RX ORDER — BUMETANIDE 0.25 MG/ML
0.5 INJECTION INTRAMUSCULAR; INTRAVENOUS ONCE
Status: COMPLETED | OUTPATIENT
Start: 2023-01-01 | End: 2023-01-01

## 2023-01-01 RX ORDER — ACETAMINOPHEN 160 MG/5ML
650 SOLUTION ORAL EVERY 4 HOURS PRN
Status: DISCONTINUED | OUTPATIENT
Start: 2023-01-01 | End: 2023-01-01 | Stop reason: HOSPADM

## 2023-01-01 RX ORDER — MIDAZOLAM HYDROCHLORIDE 1 MG/ML
INJECTION INTRAMUSCULAR; INTRAVENOUS
Status: COMPLETED
Start: 2023-01-01 | End: 2023-01-01

## 2023-01-01 RX ORDER — AMIODARONE HYDROCHLORIDE 200 MG/1
200 TABLET ORAL EVERY 12 HOURS
Status: DISCONTINUED | OUTPATIENT
Start: 2023-01-01 | End: 2023-01-01

## 2023-01-01 RX ORDER — SODIUM CHLORIDE 9 MG/ML
40 INJECTION, SOLUTION INTRAVENOUS AS NEEDED
Status: DISCONTINUED | OUTPATIENT
Start: 2023-01-01 | End: 2023-01-01 | Stop reason: HOSPADM

## 2023-01-01 RX ORDER — SODIUM CHLORIDE 0.9 % (FLUSH) 0.9 %
3-10 SYRINGE (ML) INJECTION AS NEEDED
Status: CANCELLED | OUTPATIENT
Start: 2023-01-01

## 2023-01-01 RX ORDER — CASTOR OIL AND BALSAM, PERU 788; 87 MG/G; MG/G
1 OINTMENT TOPICAL EVERY 12 HOURS SCHEDULED
Status: DISCONTINUED | OUTPATIENT
Start: 2023-01-01 | End: 2023-01-01

## 2023-01-01 RX ORDER — MIDAZOLAM HYDROCHLORIDE 1 MG/ML
INJECTION INTRAMUSCULAR; INTRAVENOUS
Status: DISCONTINUED
Start: 2023-01-01 | End: 2023-01-01

## 2023-01-01 RX ORDER — ALBUMIN (HUMAN) 12.5 G/50ML
25 SOLUTION INTRAVENOUS ONCE
Status: COMPLETED | OUTPATIENT
Start: 2023-01-01 | End: 2023-01-01

## 2023-01-01 RX ORDER — METOPROLOL SUCCINATE 25 MG/1
50 TABLET, EXTENDED RELEASE ORAL DAILY
Status: CANCELLED | OUTPATIENT
Start: 2023-01-01

## 2023-01-01 RX ORDER — DIGOXIN 0.25 MG/ML
500 INJECTION INTRAMUSCULAR; INTRAVENOUS ONCE
Status: COMPLETED | OUTPATIENT
Start: 2023-01-01 | End: 2023-01-01

## 2023-01-01 RX ORDER — ACETAMINOPHEN 160 MG/5ML
650 SOLUTION ORAL EVERY 4 HOURS PRN
Status: DISCONTINUED | OUTPATIENT
Start: 2023-01-01 | End: 2023-01-01

## 2023-01-01 RX ORDER — ETOMIDATE 2 MG/ML
INJECTION INTRAVENOUS
Status: COMPLETED
Start: 2023-01-01 | End: 2023-01-01

## 2023-01-01 RX ORDER — POTASSIUM CHLORIDE 20 MEQ/1
40 TABLET, EXTENDED RELEASE ORAL EVERY 4 HOURS
Status: DISCONTINUED | OUTPATIENT
Start: 2023-01-01 | End: 2023-01-01

## 2023-01-01 RX ORDER — ACETAMINOPHEN 650 MG/1
650 SUPPOSITORY RECTAL EVERY 4 HOURS PRN
Status: DISCONTINUED | OUTPATIENT
Start: 2023-01-01 | End: 2023-01-01

## 2023-01-01 RX ORDER — POTASSIUM CHLORIDE 750 MG/1
40 CAPSULE, EXTENDED RELEASE ORAL EVERY 4 HOURS
Status: COMPLETED | OUTPATIENT
Start: 2023-01-01 | End: 2023-01-01

## 2023-01-01 RX ORDER — AMOXICILLIN 250 MG
2 CAPSULE ORAL 2 TIMES DAILY
Status: DISCONTINUED | OUTPATIENT
Start: 2023-01-01 | End: 2023-01-01

## 2023-01-01 RX ORDER — LIDOCAINE HYDROCHLORIDE 10 MG/ML
INJECTION, SOLUTION EPIDURAL; INFILTRATION; INTRACAUDAL; PERINEURAL AS NEEDED
Status: DISCONTINUED | OUTPATIENT
Start: 2023-01-01 | End: 2023-01-01 | Stop reason: SURG

## 2023-01-01 RX ORDER — NOREPINEPHRINE BITARTRATE 0.03 MG/ML
INJECTION, SOLUTION INTRAVENOUS
Status: COMPLETED
Start: 2023-01-01 | End: 2023-01-01

## 2023-01-01 RX ORDER — LORAZEPAM 2 MG/ML
2 INJECTION INTRAMUSCULAR EVERY 4 HOURS
Status: DISCONTINUED | OUTPATIENT
Start: 2023-01-01 | End: 2023-01-01

## 2023-01-01 RX ORDER — ONDANSETRON 4 MG/1
4 TABLET, FILM COATED ORAL EVERY 6 HOURS PRN
Status: DISCONTINUED | OUTPATIENT
Start: 2023-01-01 | End: 2023-01-01

## 2023-01-01 RX ORDER — ONDANSETRON 2 MG/ML
4 INJECTION INTRAMUSCULAR; INTRAVENOUS ONCE AS NEEDED
Status: DISCONTINUED | OUTPATIENT
Start: 2023-01-01 | End: 2023-01-01 | Stop reason: HOSPADM

## 2023-01-01 RX ORDER — SODIUM CHLORIDE 0.9 % (FLUSH) 0.9 %
10 SYRINGE (ML) INJECTION AS NEEDED
Status: DISCONTINUED | OUTPATIENT
Start: 2023-01-01 | End: 2023-01-01 | Stop reason: HOSPADM

## 2023-01-01 RX ORDER — HYDROCODONE BITARTRATE AND ACETAMINOPHEN 5; 325 MG/1; MG/1
1 TABLET ORAL ONCE
Status: COMPLETED | OUTPATIENT
Start: 2023-01-01 | End: 2023-01-01

## 2023-01-01 RX ORDER — PANTOPRAZOLE SODIUM 40 MG/10ML
40 INJECTION, POWDER, LYOPHILIZED, FOR SOLUTION INTRAVENOUS
Status: DISCONTINUED | OUTPATIENT
Start: 2023-01-01 | End: 2023-01-01

## 2023-01-01 RX ORDER — NALOXONE HCL 0.4 MG/ML
0.4 VIAL (ML) INJECTION ONCE AS NEEDED
Status: DISCONTINUED | OUTPATIENT
Start: 2023-01-01 | End: 2023-01-01

## 2023-01-01 RX ORDER — MIDAZOLAM HYDROCHLORIDE 1 MG/ML
2-20 INJECTION INTRAMUSCULAR; INTRAVENOUS ONCE AS NEEDED
Status: DISCONTINUED | OUTPATIENT
Start: 2023-01-01 | End: 2023-01-01

## 2023-01-01 RX ORDER — HYDROMORPHONE HYDROCHLORIDE 1 MG/ML
0.25 INJECTION, SOLUTION INTRAMUSCULAR; INTRAVENOUS; SUBCUTANEOUS
Status: DISCONTINUED | OUTPATIENT
Start: 2023-01-01 | End: 2023-01-01 | Stop reason: HOSPADM

## 2023-01-01 RX ORDER — MIDAZOLAM HYDROCHLORIDE 1 MG/ML
2 INJECTION INTRAMUSCULAR; INTRAVENOUS ONCE
Status: COMPLETED | OUTPATIENT
Start: 2023-01-01 | End: 2023-01-01

## 2023-01-01 RX ORDER — SODIUM CHLORIDE, SODIUM LACTATE, POTASSIUM CHLORIDE, CALCIUM CHLORIDE 600; 310; 30; 20 MG/100ML; MG/100ML; MG/100ML; MG/100ML
INJECTION, SOLUTION INTRAVENOUS CONTINUOUS PRN
Status: DISCONTINUED | OUTPATIENT
Start: 2023-01-01 | End: 2023-01-01 | Stop reason: SURG

## 2023-01-01 RX ORDER — LISINOPRIL 20 MG/1
20 TABLET ORAL DAILY
COMMUNITY

## 2023-01-01 RX ORDER — VANCOMYCIN 2 GRAM/500 ML IN 0.9 % SODIUM CHLORIDE INTRAVENOUS
25 ONCE
Status: DISCONTINUED | OUTPATIENT
Start: 2023-01-01 | End: 2023-01-01

## 2023-01-01 RX ORDER — NITROGLYCERIN 0.4 MG/1
0.4 TABLET SUBLINGUAL
Status: DISCONTINUED | OUTPATIENT
Start: 2023-01-01 | End: 2023-01-01

## 2023-01-01 RX ORDER — BUMETANIDE 0.25 MG/ML
2 INJECTION INTRAMUSCULAR; INTRAVENOUS EVERY 12 HOURS
Status: DISCONTINUED | OUTPATIENT
Start: 2023-01-01 | End: 2023-01-01

## 2023-01-01 RX ORDER — BUMETANIDE 1 MG/1
2 TABLET ORAL DAILY
Status: DISCONTINUED | OUTPATIENT
Start: 2023-01-01 | End: 2023-01-01

## 2023-01-01 RX ORDER — IPRATROPIUM BROMIDE AND ALBUTEROL SULFATE 2.5; .5 MG/3ML; MG/3ML
3 SOLUTION RESPIRATORY (INHALATION) ONCE AS NEEDED
Status: DISCONTINUED | OUTPATIENT
Start: 2023-01-01 | End: 2023-01-01 | Stop reason: HOSPADM

## 2023-01-01 RX ORDER — DILTIAZEM HYDROCHLORIDE 5 MG/ML
10 INJECTION INTRAVENOUS ONCE
Status: COMPLETED | OUTPATIENT
Start: 2023-01-01 | End: 2023-01-01

## 2023-01-01 RX ORDER — DOBUTAMINE HYDROCHLORIDE 100 MG/100ML
2-20 INJECTION INTRAVENOUS
Status: DISCONTINUED | OUTPATIENT
Start: 2023-01-01 | End: 2023-01-01 | Stop reason: HOSPADM

## 2023-01-01 RX ORDER — CHLORHEXIDINE GLUCONATE ORAL RINSE 1.2 MG/ML
15 SOLUTION DENTAL EVERY 12 HOURS SCHEDULED
Status: DISCONTINUED | OUTPATIENT
Start: 2023-01-01 | End: 2023-01-01

## 2023-01-01 RX ORDER — MORPHINE SULFATE 2 MG/ML
2 INJECTION, SOLUTION INTRAMUSCULAR; INTRAVENOUS ONCE
Status: COMPLETED | OUTPATIENT
Start: 2023-01-01 | End: 2023-01-01

## 2023-01-01 RX ORDER — BISACODYL 5 MG/1
5 TABLET, DELAYED RELEASE ORAL DAILY PRN
Status: DISCONTINUED | OUTPATIENT
Start: 2023-01-01 | End: 2023-01-01

## 2023-01-01 RX ORDER — FENTANYL CITRATE 50 UG/ML
25 INJECTION, SOLUTION INTRAMUSCULAR; INTRAVENOUS
Status: DISCONTINUED | OUTPATIENT
Start: 2023-01-01 | End: 2023-01-01 | Stop reason: HOSPADM

## 2023-01-01 RX ORDER — MIDODRINE HYDROCHLORIDE 5 MG/1
5 TABLET ORAL
Status: DISCONTINUED | OUTPATIENT
Start: 2023-01-01 | End: 2023-01-01

## 2023-01-01 RX ORDER — MIDODRINE HYDROCHLORIDE 10 MG/1
10 TABLET ORAL
Status: DISCONTINUED | OUTPATIENT
Start: 2023-01-01 | End: 2023-01-01

## 2023-01-01 RX ORDER — BISACODYL 10 MG
10 SUPPOSITORY, RECTAL RECTAL DAILY PRN
Status: DISCONTINUED | OUTPATIENT
Start: 2023-01-01 | End: 2023-01-01

## 2023-01-01 RX ORDER — VASOPRESSIN 20 [USP'U]/ML
INJECTION, SOLUTION INTRAVENOUS AS NEEDED
Status: DISCONTINUED | OUTPATIENT
Start: 2023-01-01 | End: 2023-01-01 | Stop reason: SURG

## 2023-01-01 RX ORDER — SODIUM CHLORIDE 9 MG/ML
40 INJECTION, SOLUTION INTRAVENOUS AS NEEDED
Status: CANCELLED | OUTPATIENT
Start: 2023-01-01

## 2023-01-01 RX ORDER — AMIODARONE HYDROCHLORIDE 200 MG/1
200 TABLET ORAL 2 TIMES DAILY WITH MEALS
Status: DISCONTINUED | OUTPATIENT
Start: 2023-01-01 | End: 2023-01-01

## 2023-01-01 RX ORDER — DOCUSATE SODIUM 50 MG/5 ML
100 LIQUID (ML) ORAL 2 TIMES DAILY
Status: DISCONTINUED | OUTPATIENT
Start: 2023-01-01 | End: 2023-01-01

## 2023-01-01 RX ORDER — SODIUM CHLORIDE 0.9 % (FLUSH) 0.9 %
10 SYRINGE (ML) INJECTION AS NEEDED
Status: DISCONTINUED | OUTPATIENT
Start: 2023-01-01 | End: 2023-01-01

## 2023-01-01 RX ORDER — ACETAMINOPHEN 325 MG/1
650 TABLET ORAL EVERY 4 HOURS PRN
Status: DISCONTINUED | OUTPATIENT
Start: 2023-01-01 | End: 2023-01-01

## 2023-01-01 RX ORDER — MAGNESIUM SULFATE HEPTAHYDRATE 500 MG/ML
INJECTION, SOLUTION INTRAMUSCULAR; INTRAVENOUS
Status: COMPLETED | OUTPATIENT
Start: 2023-01-01 | End: 2023-01-01

## 2023-01-01 RX ORDER — METOPROLOL SUCCINATE 50 MG/1
50 TABLET, EXTENDED RELEASE ORAL DAILY
COMMUNITY

## 2023-01-01 RX ORDER — POTASSIUM CHLORIDE 7.45 MG/ML
10 INJECTION INTRAVENOUS
Status: DISCONTINUED | OUTPATIENT
Start: 2023-01-01 | End: 2023-01-01

## 2023-01-01 RX ORDER — CALCIUM CHLORIDE 100 MG/ML
INJECTION INTRAVENOUS; INTRAVENTRICULAR
Status: COMPLETED | OUTPATIENT
Start: 2023-01-01 | End: 2023-01-01

## 2023-01-01 RX ORDER — SODIUM CHLORIDE 0.9 % (FLUSH) 0.9 %
10 SYRINGE (ML) INJECTION EVERY 12 HOURS SCHEDULED
Status: DISCONTINUED | OUTPATIENT
Start: 2023-01-01 | End: 2023-01-01

## 2023-01-01 RX ORDER — POTASSIUM CHLORIDE 20 MEQ/1
40 TABLET, EXTENDED RELEASE ORAL EVERY 4 HOURS
Status: COMPLETED | OUTPATIENT
Start: 2023-01-01 | End: 2023-01-01

## 2023-01-01 RX ORDER — ONDANSETRON 2 MG/ML
4 INJECTION INTRAMUSCULAR; INTRAVENOUS EVERY 6 HOURS PRN
Status: DISCONTINUED | OUTPATIENT
Start: 2023-01-01 | End: 2023-01-01 | Stop reason: HOSPADM

## 2023-01-01 RX ORDER — ALBUMIN (HUMAN) 12.5 G/50ML
25 SOLUTION INTRAVENOUS ONCE
Status: DISCONTINUED | OUTPATIENT
Start: 2023-01-01 | End: 2023-01-01

## 2023-01-01 RX ORDER — NOREPINEPHRINE BITARTRATE 0.03 MG/ML
.02-.3 INJECTION, SOLUTION INTRAVENOUS
Status: DISCONTINUED | OUTPATIENT
Start: 2023-01-01 | End: 2023-01-01 | Stop reason: HOSPADM

## 2023-01-01 RX ORDER — AMIODARONE HYDROCHLORIDE 200 MG/1
400 TABLET ORAL 2 TIMES DAILY WITH MEALS
Status: COMPLETED | OUTPATIENT
Start: 2023-01-01 | End: 2023-01-01

## 2023-01-01 RX ORDER — DIGOXIN 125 MCG
125 TABLET ORAL
Status: DISCONTINUED | OUTPATIENT
Start: 2023-01-01 | End: 2023-01-01

## 2023-01-01 RX ORDER — ENALAPRIL MALEATE 2.5 MG/1
2.5 TABLET ORAL
Status: DISCONTINUED | OUTPATIENT
Start: 2023-01-01 | End: 2023-01-01

## 2023-01-01 RX ORDER — ETOMIDATE 2 MG/ML
30 INJECTION INTRAVENOUS ONCE
Status: COMPLETED | OUTPATIENT
Start: 2023-01-01 | End: 2023-01-01

## 2023-01-01 RX ORDER — ROCURONIUM BROMIDE 50 MG/5 ML
50 SYRINGE (ML) INTRAVENOUS ONCE
Status: COMPLETED | OUTPATIENT
Start: 2023-01-01 | End: 2023-01-01

## 2023-01-01 RX ORDER — BUPIVACAINE HYDROCHLORIDE 5 MG/ML
INJECTION, SOLUTION PERINEURAL
Status: DISCONTINUED | OUTPATIENT
Start: 2023-01-01 | End: 2023-01-01 | Stop reason: HOSPADM

## 2023-01-01 RX ORDER — NALOXONE HCL 0.4 MG/ML
0.4 VIAL (ML) INJECTION AS NEEDED
Status: DISCONTINUED | OUTPATIENT
Start: 2023-01-01 | End: 2023-01-01 | Stop reason: HOSPADM

## 2023-01-01 RX ORDER — OXYCODONE HYDROCHLORIDE AND ACETAMINOPHEN 5; 325 MG/1; MG/1
1 TABLET ORAL EVERY 4 HOURS PRN
Status: DISCONTINUED | OUTPATIENT
Start: 2023-01-01 | End: 2023-01-01

## 2023-01-01 RX ORDER — POLYETHYLENE GLYCOL 3350 17 G/17G
17 POWDER, FOR SOLUTION ORAL DAILY PRN
Status: DISCONTINUED | OUTPATIENT
Start: 2023-01-01 | End: 2023-01-01

## 2023-01-01 RX ORDER — MIDAZOLAM HYDROCHLORIDE 1 MG/ML
INJECTION INTRAMUSCULAR; INTRAVENOUS
Status: DISCONTINUED
Start: 2023-01-01 | End: 2023-01-01 | Stop reason: HOSPADM

## 2023-01-01 RX ORDER — AMIODARONE HYDROCHLORIDE 200 MG/1
400 TABLET ORAL EVERY 12 HOURS SCHEDULED
Status: DISCONTINUED | OUTPATIENT
Start: 2023-01-01 | End: 2023-01-01

## 2023-01-01 RX ORDER — FLUMAZENIL 0.1 MG/ML
0.5 INJECTION INTRAVENOUS ONCE AS NEEDED
Status: DISCONTINUED | OUTPATIENT
Start: 2023-01-01 | End: 2023-01-01

## 2023-01-01 RX ORDER — SODIUM CHLORIDE 0.9 % (FLUSH) 0.9 %
3 SYRINGE (ML) INJECTION EVERY 12 HOURS SCHEDULED
Status: CANCELLED | OUTPATIENT
Start: 2023-01-01

## 2023-01-01 RX ORDER — ACETAMINOPHEN 650 MG/1
650 SUPPOSITORY RECTAL EVERY 4 HOURS PRN
Status: DISCONTINUED | OUTPATIENT
Start: 2023-01-01 | End: 2023-01-01 | Stop reason: HOSPADM

## 2023-01-01 RX ORDER — AMIODARONE HYDROCHLORIDE 200 MG/1
400 TABLET ORAL 2 TIMES DAILY WITH MEALS
Status: DISCONTINUED | OUTPATIENT
Start: 2023-01-01 | End: 2023-01-01

## 2023-01-01 RX ORDER — BUMETANIDE 1 MG/1
1 TABLET ORAL DAILY
Status: DISCONTINUED | OUTPATIENT
Start: 2023-01-01 | End: 2023-01-01

## 2023-01-01 RX ORDER — AMIODARONE HYDROCHLORIDE 200 MG/1
200 TABLET ORAL EVERY 8 HOURS
Status: DISCONTINUED | OUTPATIENT
Start: 2023-01-01 | End: 2023-01-01

## 2023-01-01 RX ORDER — METOPROLOL TARTRATE 1 MG/ML
5 INJECTION, SOLUTION INTRAVENOUS
Status: DISCONTINUED | OUTPATIENT
Start: 2023-01-01 | End: 2023-01-01

## 2023-01-01 RX ORDER — SODIUM CHLORIDE 9 MG/ML
40 INJECTION, SOLUTION INTRAVENOUS AS NEEDED
Status: DISCONTINUED | OUTPATIENT
Start: 2023-01-01 | End: 2023-01-01

## 2023-01-01 RX ORDER — FUROSEMIDE 10 MG/ML
40 INJECTION INTRAMUSCULAR; INTRAVENOUS ONCE
Status: COMPLETED | OUTPATIENT
Start: 2023-01-01 | End: 2023-01-01

## 2023-01-01 RX ORDER — NOREPINEPHRINE BITARTRATE 1 MG/ML
INJECTION, SOLUTION INTRAVENOUS AS NEEDED
Status: DISCONTINUED | OUTPATIENT
Start: 2023-01-01 | End: 2023-01-01 | Stop reason: SURG

## 2023-01-01 RX ORDER — GLYCOPYRROLATE 0.2 MG/ML
0.1 INJECTION INTRAMUSCULAR; INTRAVENOUS ONCE
Status: DISCONTINUED | OUTPATIENT
Start: 2023-01-01 | End: 2023-01-01 | Stop reason: HOSPADM

## 2023-01-01 RX ORDER — POTASSIUM CHLORIDE 7.45 MG/ML
10 INJECTION INTRAVENOUS
Status: COMPLETED | OUTPATIENT
Start: 2023-01-01 | End: 2023-01-01

## 2023-01-01 RX ORDER — FENTANYL CITRATE 50 UG/ML
50-100 INJECTION, SOLUTION INTRAMUSCULAR; INTRAVENOUS ONCE AS NEEDED
Status: DISCONTINUED | OUTPATIENT
Start: 2023-01-01 | End: 2023-01-01

## 2023-01-01 RX ORDER — MIDODRINE HYDROCHLORIDE 10 MG/1
10 TABLET ORAL EVERY 8 HOURS SCHEDULED
Status: DISCONTINUED | OUTPATIENT
Start: 2023-01-01 | End: 2023-01-01

## 2023-01-01 RX ORDER — HYDROCODONE BITARTRATE AND ACETAMINOPHEN 7.5; 325 MG/1; MG/1
1 TABLET ORAL EVERY 4 HOURS PRN
Status: DISCONTINUED | OUTPATIENT
Start: 2023-01-01 | End: 2023-01-01

## 2023-01-01 RX ORDER — MIDAZOLAM HYDROCHLORIDE 1 MG/ML
INJECTION INTRAMUSCULAR; INTRAVENOUS
Status: COMPLETED | OUTPATIENT
Start: 2023-01-01 | End: 2023-01-01

## 2023-01-01 RX ORDER — AMIODARONE HYDROCHLORIDE 200 MG/1
400 TABLET ORAL
Status: DISCONTINUED | OUTPATIENT
Start: 2023-11-27 | End: 2023-01-01

## 2023-01-01 RX ORDER — DIGOXIN 0.25 MG/ML
250 INJECTION INTRAMUSCULAR; INTRAVENOUS ONCE
Status: COMPLETED | OUTPATIENT
Start: 2023-01-01 | End: 2023-01-01

## 2023-01-01 RX ORDER — LIDOCAINE HYDROCHLORIDE 10 MG/ML
INJECTION, SOLUTION INFILTRATION; PERINEURAL
Status: DISCONTINUED | OUTPATIENT
Start: 2023-01-01 | End: 2023-01-01 | Stop reason: HOSPADM

## 2023-01-01 RX ORDER — VANCOMYCIN/0.9 % SOD CHLORIDE 1.5G/250ML
20 PLASTIC BAG, INJECTION (ML) INTRAVENOUS ONCE
Status: DISCONTINUED | OUTPATIENT
Start: 2023-01-01 | End: 2023-01-01

## 2023-01-01 RX ORDER — OXYCODONE AND ACETAMINOPHEN 10; 325 MG/1; MG/1
1 TABLET ORAL EVERY 4 HOURS PRN
Status: DISCONTINUED | OUTPATIENT
Start: 2023-01-01 | End: 2023-01-01

## 2023-01-01 RX ORDER — BUMETANIDE 0.25 MG/ML
0.5 INJECTION INTRAMUSCULAR; INTRAVENOUS DAILY
Status: COMPLETED | OUTPATIENT
Start: 2023-01-01 | End: 2023-01-01

## 2023-01-01 RX ORDER — ALBUMIN (HUMAN) 12.5 G/50ML
SOLUTION INTRAVENOUS CONTINUOUS PRN
Status: DISCONTINUED | OUTPATIENT
Start: 2023-01-01 | End: 2023-01-01 | Stop reason: SURG

## 2023-01-01 RX ORDER — AMIODARONE HYDROCHLORIDE 200 MG/1
200 TABLET ORAL DAILY
Status: DISCONTINUED | OUTPATIENT
Start: 2023-11-30 | End: 2023-01-01

## 2023-01-01 RX ORDER — AMIODARONE HYDROCHLORIDE 200 MG/1
200 TABLET ORAL EVERY 12 HOURS SCHEDULED
Status: DISCONTINUED | OUTPATIENT
Start: 2023-01-01 | End: 2023-01-01

## 2023-01-01 RX ORDER — HYDROMORPHONE HYDROCHLORIDE 1 MG/ML
0.25 INJECTION, SOLUTION INTRAMUSCULAR; INTRAVENOUS; SUBCUTANEOUS
Status: DISCONTINUED | OUTPATIENT
Start: 2023-01-01 | End: 2023-01-01

## 2023-01-01 RX ADMIN — SENNOSIDES AND DOCUSATE SODIUM 2 TABLET: 8.6; 5 TABLET ORAL at 21:23

## 2023-01-01 RX ADMIN — VASOPRESSIN 0.03 UNITS/MIN: 0.2 INJECTION INTRAVENOUS at 16:17

## 2023-01-01 RX ADMIN — MIDODRINE HYDROCHLORIDE 10 MG: 10 TABLET ORAL at 11:52

## 2023-01-01 RX ADMIN — SENNOSIDES AND DOCUSATE SODIUM 2 TABLET: 8.6; 5 TABLET ORAL at 20:20

## 2023-01-01 RX ADMIN — Medication 10 ML: at 20:30

## 2023-01-01 RX ADMIN — AMIODARONE HYDROCHLORIDE 200 MG: 200 TABLET ORAL at 09:07

## 2023-01-01 RX ADMIN — ENALAPRIL MALEATE 2.5 MG: 2.5 TABLET ORAL at 11:13

## 2023-01-01 RX ADMIN — Medication 10 ML: at 21:23

## 2023-01-01 RX ADMIN — MIDODRINE HYDROCHLORIDE 5 MG: 5 TABLET ORAL at 08:38

## 2023-01-01 RX ADMIN — AMIODARONE HYDROCHLORIDE 200 MG: 200 TABLET ORAL at 21:29

## 2023-01-01 RX ADMIN — ACETAMINOPHEN 650 MG: 325 TABLET ORAL at 13:12

## 2023-01-01 RX ADMIN — CALCIUM CHLORIDE 1 G: 100 INJECTION INTRAVENOUS; INTRAVENTRICULAR at 21:42

## 2023-01-01 RX ADMIN — APIXABAN 5 MG: 5 TABLET, FILM COATED ORAL at 10:01

## 2023-01-01 RX ADMIN — AMIODARONE HYDROCHLORIDE 200 MG: 200 TABLET ORAL at 17:02

## 2023-01-01 RX ADMIN — DIGOXIN 500 MCG: 250 INJECTION, SOLUTION INTRAMUSCULAR; INTRAVENOUS at 14:22

## 2023-01-01 RX ADMIN — HYDROCODONE BITARTRATE AND ACETAMINOPHEN 1 TABLET: 5; 325 TABLET ORAL at 02:23

## 2023-01-01 RX ADMIN — AMIODARONE HYDROCHLORIDE 400 MG: 200 TABLET ORAL at 20:23

## 2023-01-01 RX ADMIN — POTASSIUM CHLORIDE 40 MEQ: 750 CAPSULE, EXTENDED RELEASE ORAL at 04:07

## 2023-01-01 RX ADMIN — AMIODARONE HYDROCHLORIDE 1 MG/MIN: 1.8 INJECTION, SOLUTION INTRAVENOUS at 14:33

## 2023-01-01 RX ADMIN — AMIODARONE HYDROCHLORIDE 150 MG: 1.5 INJECTION, SOLUTION INTRAVENOUS at 14:18

## 2023-01-01 RX ADMIN — MIDAZOLAM HYDROCHLORIDE 2 MG: 1 INJECTION, SOLUTION INTRAMUSCULAR; INTRAVENOUS at 13:32

## 2023-01-01 RX ADMIN — PHENYLEPHRINE HYDROCHLORIDE 1 MCG/KG/MIN: 10 INJECTION INTRAVENOUS at 11:37

## 2023-01-01 RX ADMIN — APIXABAN 5 MG: 5 TABLET, FILM COATED ORAL at 10:09

## 2023-01-01 RX ADMIN — EMPAGLIFLOZIN 10 MG: 10 TABLET, FILM COATED ORAL at 08:22

## 2023-01-01 RX ADMIN — Medication 10 ML: at 22:33

## 2023-01-01 RX ADMIN — ONDANSETRON 4 MG: 2 INJECTION INTRAMUSCULAR; INTRAVENOUS at 10:35

## 2023-01-01 RX ADMIN — OXYCODONE HYDROCHLORIDE AND ACETAMINOPHEN 1 TABLET: 10; 325 TABLET ORAL at 10:22

## 2023-01-01 RX ADMIN — AMIODARONE HYDROCHLORIDE 200 MG: 200 TABLET ORAL at 21:09

## 2023-01-01 RX ADMIN — SENNOSIDES AND DOCUSATE SODIUM 2 TABLET: 8.6; 5 TABLET ORAL at 09:07

## 2023-01-01 RX ADMIN — Medication 50 MCG/HR: at 14:41

## 2023-01-01 RX ADMIN — NOREPINEPHRINE BITARTRATE 4 MCG: 1 INJECTION, SOLUTION, CONCENTRATE INTRAVENOUS at 12:18

## 2023-01-01 RX ADMIN — OXYCODONE HYDROCHLORIDE AND ACETAMINOPHEN 1 TABLET: 5; 325 TABLET ORAL at 21:17

## 2023-01-01 RX ADMIN — SODIUM CHLORIDE 2 G: 900 INJECTION INTRAVENOUS at 11:58

## 2023-01-01 RX ADMIN — Medication 10 ML: at 10:55

## 2023-01-01 RX ADMIN — OXYCODONE HYDROCHLORIDE AND ACETAMINOPHEN 1 TABLET: 5; 325 TABLET ORAL at 08:11

## 2023-01-01 RX ADMIN — SODIUM CHLORIDE 500 ML: 9 INJECTION, SOLUTION INTRAVENOUS at 16:26

## 2023-01-01 RX ADMIN — MIDODRINE HYDROCHLORIDE 10 MG: 10 TABLET ORAL at 10:08

## 2023-01-01 RX ADMIN — ETOMIDATE 30 MG: 40 INJECTION, SOLUTION INTRAVENOUS at 14:21

## 2023-01-01 RX ADMIN — AMIODARONE HYDROCHLORIDE 0.5 MG/MIN: 1.8 INJECTION, SOLUTION INTRAVENOUS at 09:16

## 2023-01-01 RX ADMIN — NOREPINEPHRINE BITARTRATE 0.04 MCG/KG/MIN: 1 INJECTION, SOLUTION, CONCENTRATE INTRAVENOUS at 12:24

## 2023-01-01 RX ADMIN — MIDODRINE HYDROCHLORIDE 10 MG: 10 TABLET ORAL at 17:46

## 2023-01-01 RX ADMIN — APIXABAN 5 MG: 5 TABLET, FILM COATED ORAL at 20:41

## 2023-01-01 RX ADMIN — DILTIAZEM HYDROCHLORIDE 10 MG: 5 INJECTION INTRAVENOUS at 06:30

## 2023-01-01 RX ADMIN — Medication 10 ML: at 23:03

## 2023-01-01 RX ADMIN — CASTOR OIL AND BALSAM, PERU 1 APPLICATION: 788; 87 OINTMENT TOPICAL at 08:27

## 2023-01-01 RX ADMIN — Medication 10 ML: at 20:41

## 2023-01-01 RX ADMIN — AMIODARONE HYDROCHLORIDE 150 MG: 1.5 INJECTION, SOLUTION INTRAVENOUS at 23:46

## 2023-01-01 RX ADMIN — Medication 10 ML: at 08:28

## 2023-01-01 RX ADMIN — AMIODARONE HYDROCHLORIDE 400 MG: 200 TABLET ORAL at 08:15

## 2023-01-01 RX ADMIN — AMIODARONE HYDROCHLORIDE 400 MG: 200 TABLET ORAL at 17:27

## 2023-01-01 RX ADMIN — SENNOSIDES AND DOCUSATE SODIUM 2 TABLET: 8.6; 5 TABLET ORAL at 22:33

## 2023-01-01 RX ADMIN — HYDROMORPHONE HYDROCHLORIDE 0.25 MG: 1 INJECTION, SOLUTION INTRAMUSCULAR; INTRAVENOUS; SUBCUTANEOUS at 09:36

## 2023-01-01 RX ADMIN — AMIODARONE HYDROCHLORIDE 400 MG: 200 TABLET ORAL at 17:46

## 2023-01-01 RX ADMIN — EMPAGLIFLOZIN 10 MG: 10 TABLET, FILM COATED ORAL at 10:01

## 2023-01-01 RX ADMIN — AMIODARONE HYDROCHLORIDE 200 MG: 200 TABLET ORAL at 08:10

## 2023-01-01 RX ADMIN — APIXABAN 5 MG: 5 TABLET, FILM COATED ORAL at 08:11

## 2023-01-01 RX ADMIN — APIXABAN 5 MG: 5 TABLET, FILM COATED ORAL at 11:21

## 2023-01-01 RX ADMIN — APIXABAN 5 MG: 5 TABLET, FILM COATED ORAL at 20:34

## 2023-01-01 RX ADMIN — APIXABAN 5 MG: 5 TABLET, FILM COATED ORAL at 21:29

## 2023-01-01 RX ADMIN — AMIODARONE HYDROCHLORIDE 400 MG: 200 TABLET ORAL at 20:34

## 2023-01-01 RX ADMIN — OXYCODONE HYDROCHLORIDE AND ACETAMINOPHEN 1 TABLET: 5; 325 TABLET ORAL at 18:52

## 2023-01-01 RX ADMIN — CASTOR OIL AND BALSAM, PERU 1 APPLICATION: 788; 87 OINTMENT TOPICAL at 20:09

## 2023-01-01 RX ADMIN — OXYCODONE HYDROCHLORIDE AND ACETAMINOPHEN 1 TABLET: 10; 325 TABLET ORAL at 10:07

## 2023-01-01 RX ADMIN — APIXABAN 5 MG: 5 TABLET, FILM COATED ORAL at 20:47

## 2023-01-01 RX ADMIN — MIDODRINE HYDROCHLORIDE 10 MG: 10 TABLET ORAL at 17:40

## 2023-01-01 RX ADMIN — AMIODARONE HYDROCHLORIDE 400 MG: 200 TABLET ORAL at 10:01

## 2023-01-01 RX ADMIN — OXYCODONE HYDROCHLORIDE AND ACETAMINOPHEN 1 TABLET: 5; 325 TABLET ORAL at 16:02

## 2023-01-01 RX ADMIN — POTASSIUM CHLORIDE 40 MEQ: 1500 TABLET, EXTENDED RELEASE ORAL at 08:22

## 2023-01-01 RX ADMIN — Medication 10 ML: at 21:42

## 2023-01-01 RX ADMIN — Medication 10 ML: at 10:46

## 2023-01-01 RX ADMIN — APIXABAN 5 MG: 5 TABLET, FILM COATED ORAL at 08:27

## 2023-01-01 RX ADMIN — MIDAZOLAM HYDROCHLORIDE 2 MG: 1 INJECTION, SOLUTION INTRAMUSCULAR; INTRAVENOUS at 14:21

## 2023-01-01 RX ADMIN — MIDODRINE HYDROCHLORIDE 5 MG: 5 TABLET ORAL at 17:07

## 2023-01-01 RX ADMIN — Medication 10 ML: at 09:00

## 2023-01-01 RX ADMIN — POTASSIUM CHLORIDE 40 MEQ: 1500 TABLET, EXTENDED RELEASE ORAL at 00:57

## 2023-01-01 RX ADMIN — PROPOFOL 50 MG: 10 INJECTION, EMULSION INTRAVENOUS at 11:50

## 2023-01-01 RX ADMIN — MIDODRINE HYDROCHLORIDE 10 MG: 10 TABLET ORAL at 08:10

## 2023-01-01 RX ADMIN — AMIODARONE HYDROCHLORIDE 200 MG: 200 TABLET ORAL at 22:03

## 2023-01-01 RX ADMIN — APIXABAN 5 MG: 5 TABLET, FILM COATED ORAL at 08:19

## 2023-01-01 RX ADMIN — OXYCODONE HYDROCHLORIDE AND ACETAMINOPHEN 1 TABLET: 5; 325 TABLET ORAL at 13:26

## 2023-01-01 RX ADMIN — MIDODRINE HYDROCHLORIDE 5 MG: 5 TABLET ORAL at 08:22

## 2023-01-01 RX ADMIN — DIGOXIN 125 MCG: 125 TABLET ORAL at 11:52

## 2023-01-01 RX ADMIN — OXYCODONE HYDROCHLORIDE AND ACETAMINOPHEN 1 TABLET: 5; 325 TABLET ORAL at 00:27

## 2023-01-01 RX ADMIN — EMPAGLIFLOZIN 10 MG: 10 TABLET, FILM COATED ORAL at 08:39

## 2023-01-01 RX ADMIN — APIXABAN 5 MG: 5 TABLET, FILM COATED ORAL at 21:34

## 2023-01-01 RX ADMIN — AMIODARONE HYDROCHLORIDE 1 MG/MIN: 1.8 INJECTION, SOLUTION INTRAVENOUS at 17:42

## 2023-01-01 RX ADMIN — FUROSEMIDE 40 MG: 10 INJECTION, SOLUTION INTRAMUSCULAR; INTRAVENOUS at 09:47

## 2023-01-01 RX ADMIN — NITROGLYCERIN 1 INCH: 20 OINTMENT TOPICAL at 06:30

## 2023-01-01 RX ADMIN — OXYCODONE HYDROCHLORIDE AND ACETAMINOPHEN 1 TABLET: 5; 325 TABLET ORAL at 08:44

## 2023-01-01 RX ADMIN — Medication 10 ML: at 08:23

## 2023-01-01 RX ADMIN — CASTOR OIL AND BALSAM, PERU 1 APPLICATION: 788; 87 OINTMENT TOPICAL at 09:37

## 2023-01-01 RX ADMIN — Medication 10 ML: at 10:03

## 2023-01-01 RX ADMIN — MIDODRINE HYDROCHLORIDE 10 MG: 10 TABLET ORAL at 08:27

## 2023-01-01 RX ADMIN — ONDANSETRON HYDROCHLORIDE 4 MG: 4 TABLET, FILM COATED ORAL at 20:19

## 2023-01-01 RX ADMIN — AMIODARONE HYDROCHLORIDE 200 MG: 200 TABLET ORAL at 17:09

## 2023-01-01 RX ADMIN — VASOPRESSIN 4 UNITS: 20 INJECTION INTRAVENOUS at 12:51

## 2023-01-01 RX ADMIN — OXYCODONE HYDROCHLORIDE AND ACETAMINOPHEN 1 TABLET: 5; 325 TABLET ORAL at 22:49

## 2023-01-01 RX ADMIN — POTASSIUM CHLORIDE 10 MEQ: 7.46 INJECTION, SOLUTION INTRAVENOUS at 11:22

## 2023-01-01 RX ADMIN — DIGOXIN 250 MCG: 0.25 INJECTION INTRAMUSCULAR; INTRAVENOUS at 06:01

## 2023-01-01 RX ADMIN — POTASSIUM CHLORIDE 10 MEQ: 7.46 INJECTION, SOLUTION INTRAVENOUS at 09:14

## 2023-01-01 RX ADMIN — OXYCODONE HYDROCHLORIDE AND ACETAMINOPHEN 1 TABLET: 5; 325 TABLET ORAL at 08:59

## 2023-01-01 RX ADMIN — MIDODRINE HYDROCHLORIDE 10 MG: 10 TABLET ORAL at 11:48

## 2023-01-01 RX ADMIN — APIXABAN 5 MG: 5 TABLET, FILM COATED ORAL at 08:39

## 2023-01-01 RX ADMIN — MIDODRINE HYDROCHLORIDE 10 MG: 10 TABLET ORAL at 18:52

## 2023-01-01 RX ADMIN — SENNOSIDES AND DOCUSATE SODIUM 2 TABLET: 8.6; 5 TABLET ORAL at 08:54

## 2023-01-01 RX ADMIN — Medication 10 ML: at 20:02

## 2023-01-01 RX ADMIN — POTASSIUM CHLORIDE 40 MEQ: 1500 TABLET, EXTENDED RELEASE ORAL at 05:45

## 2023-01-01 RX ADMIN — AMIODARONE HYDROCHLORIDE 1 MG/MIN: 1.8 INJECTION, SOLUTION INTRAVENOUS at 15:44

## 2023-01-01 RX ADMIN — AMIODARONE HYDROCHLORIDE 200 MG: 200 TABLET ORAL at 08:58

## 2023-01-01 RX ADMIN — VASOPRESSIN 4 UNITS: 20 INJECTION INTRAVENOUS at 12:41

## 2023-01-01 RX ADMIN — BUMETANIDE 1 MG: 1 TABLET ORAL at 11:21

## 2023-01-01 RX ADMIN — EPINEPHRINE 1 MG: 0.1 INJECTION INTRACARDIAC; INTRAVENOUS at 21:38

## 2023-01-01 RX ADMIN — AMIODARONE HYDROCHLORIDE 0.5 MG/MIN: 1.8 INJECTION, SOLUTION INTRAVENOUS at 20:53

## 2023-01-01 RX ADMIN — CASTOR OIL AND BALSAM, PERU 1 APPLICATION: 788; 87 OINTMENT TOPICAL at 08:57

## 2023-01-01 RX ADMIN — AMIODARONE HYDROCHLORIDE 400 MG: 200 TABLET ORAL at 10:11

## 2023-01-01 RX ADMIN — BUMETANIDE 0.5 MG: 0.25 INJECTION, SOLUTION INTRAMUSCULAR; INTRAVENOUS at 17:27

## 2023-01-01 RX ADMIN — APIXABAN 5 MG: 5 TABLET, FILM COATED ORAL at 08:54

## 2023-01-01 RX ADMIN — AMIODARONE HYDROCHLORIDE 1 MG/MIN: 1.8 INJECTION, SOLUTION INTRAVENOUS at 05:21

## 2023-01-01 RX ADMIN — LIDOCAINE HYDROCHLORIDE 100 MG: 10 INJECTION, SOLUTION EPIDURAL; INFILTRATION; INTRACAUDAL; PERINEURAL at 11:50

## 2023-01-01 RX ADMIN — APIXABAN 5 MG: 5 TABLET, FILM COATED ORAL at 22:33

## 2023-01-01 RX ADMIN — SENNOSIDES AND DOCUSATE SODIUM 2 TABLET: 8.6; 5 TABLET ORAL at 20:41

## 2023-01-01 RX ADMIN — AMIODARONE HYDROCHLORIDE 1 MG/MIN: 1.8 INJECTION, SOLUTION INTRAVENOUS at 23:26

## 2023-01-01 RX ADMIN — APIXABAN 5 MG: 5 TABLET, FILM COATED ORAL at 08:10

## 2023-01-01 RX ADMIN — SENNOSIDES AND DOCUSATE SODIUM 2 TABLET: 8.6; 5 TABLET ORAL at 20:47

## 2023-01-01 RX ADMIN — OXYCODONE HYDROCHLORIDE AND ACETAMINOPHEN 1 TABLET: 10; 325 TABLET ORAL at 05:10

## 2023-01-01 RX ADMIN — APIXABAN 5 MG: 5 TABLET, FILM COATED ORAL at 08:22

## 2023-01-01 RX ADMIN — PROPOFOL 5 MCG/KG/MIN: 10 INJECTION, EMULSION INTRAVENOUS at 14:42

## 2023-01-01 RX ADMIN — Medication 10 ML: at 08:57

## 2023-01-01 RX ADMIN — EMPAGLIFLOZIN 10 MG: 10 TABLET, FILM COATED ORAL at 08:10

## 2023-01-01 RX ADMIN — HYDROMORPHONE HYDROCHLORIDE 0.25 MG: 1 INJECTION, SOLUTION INTRAMUSCULAR; INTRAVENOUS; SUBCUTANEOUS at 20:41

## 2023-01-01 RX ADMIN — AMIODARONE HYDROCHLORIDE 1 MG/MIN: 1.8 INJECTION, SOLUTION INTRAVENOUS at 08:48

## 2023-01-01 RX ADMIN — AMIODARONE HYDROCHLORIDE 0.5 MG/MIN: 1.8 INJECTION, SOLUTION INTRAVENOUS at 10:35

## 2023-01-01 RX ADMIN — ACETAMINOPHEN 650 MG: 325 TABLET ORAL at 21:33

## 2023-01-01 RX ADMIN — HYDROMORPHONE HYDROCHLORIDE 0.25 MG: 1 INJECTION, SOLUTION INTRAMUSCULAR; INTRAVENOUS; SUBCUTANEOUS at 16:21

## 2023-01-01 RX ADMIN — Medication 10 ML: at 21:01

## 2023-01-01 RX ADMIN — PANTOPRAZOLE SODIUM 40 MG: 40 INJECTION, POWDER, LYOPHILIZED, FOR SOLUTION INTRAVENOUS at 16:11

## 2023-01-01 RX ADMIN — AMIODARONE HYDROCHLORIDE 400 MG: 200 TABLET ORAL at 08:22

## 2023-01-01 RX ADMIN — AMIODARONE HYDROCHLORIDE 200 MG: 200 TABLET ORAL at 08:54

## 2023-01-01 RX ADMIN — Medication 10 ML: at 08:12

## 2023-01-01 RX ADMIN — AMIODARONE HYDROCHLORIDE 200 MG: 200 TABLET ORAL at 13:51

## 2023-01-01 RX ADMIN — ALBUMIN HUMAN: 0.25 SOLUTION INTRAVENOUS at 13:30

## 2023-01-01 RX ADMIN — CASTOR OIL AND BALSAM, PERU 1 APPLICATION: 788; 87 OINTMENT TOPICAL at 21:22

## 2023-01-01 RX ADMIN — AMIODARONE HYDROCHLORIDE 200 MG: 200 TABLET ORAL at 18:16

## 2023-01-01 RX ADMIN — HYDROCODONE BITARTRATE AND ACETAMINOPHEN 1 TABLET: 7.5; 325 TABLET ORAL at 13:11

## 2023-01-01 RX ADMIN — MIDODRINE HYDROCHLORIDE 10 MG: 10 TABLET ORAL at 11:14

## 2023-01-01 RX ADMIN — EMPAGLIFLOZIN 10 MG: 10 TABLET, FILM COATED ORAL at 08:49

## 2023-01-01 RX ADMIN — FUROSEMIDE 5 MG/HR: 10 INJECTION, SOLUTION INTRAMUSCULAR; INTRAVENOUS at 14:23

## 2023-01-01 RX ADMIN — Medication 10 ML: at 20:31

## 2023-01-01 RX ADMIN — MIDODRINE HYDROCHLORIDE 5 MG: 5 TABLET ORAL at 10:22

## 2023-01-01 RX ADMIN — PROPOFOL 15 MCG/KG/MIN: 10 INJECTION, EMULSION INTRAVENOUS at 15:59

## 2023-01-01 RX ADMIN — ETOMIDATE 30 MG: 2 INJECTION INTRAVENOUS at 14:21

## 2023-01-01 RX ADMIN — OXYCODONE HYDROCHLORIDE AND ACETAMINOPHEN 1 TABLET: 5; 325 TABLET ORAL at 14:52

## 2023-01-01 RX ADMIN — MIDODRINE HYDROCHLORIDE 10 MG: 10 TABLET ORAL at 08:49

## 2023-01-01 RX ADMIN — SODIUM CHLORIDE 1000 ML: 9 INJECTION, SOLUTION INTRAVENOUS at 14:15

## 2023-01-01 RX ADMIN — Medication 10 ML: at 09:20

## 2023-01-01 RX ADMIN — AMIODARONE HYDROCHLORIDE 400 MG: 200 TABLET ORAL at 17:24

## 2023-01-01 RX ADMIN — PIPERACILLIN SODIUM AND TAZOBACTAM SODIUM 3.38 G: 3; .375 INJECTION, SOLUTION INTRAVENOUS at 16:24

## 2023-01-01 RX ADMIN — APIXABAN 5 MG: 5 TABLET, FILM COATED ORAL at 20:19

## 2023-01-01 RX ADMIN — MIDODRINE HYDROCHLORIDE 5 MG: 5 TABLET ORAL at 17:09

## 2023-01-01 RX ADMIN — APIXABAN 5 MG: 5 TABLET, FILM COATED ORAL at 21:23

## 2023-01-01 RX ADMIN — Medication 10 ML: at 17:10

## 2023-01-01 RX ADMIN — CASTOR OIL AND BALSAM, PERU 1 APPLICATION: 788; 87 OINTMENT TOPICAL at 20:30

## 2023-01-01 RX ADMIN — APIXABAN 5 MG: 5 TABLET, FILM COATED ORAL at 09:19

## 2023-01-01 RX ADMIN — OXYCODONE HYDROCHLORIDE AND ACETAMINOPHEN 1 TABLET: 10; 325 TABLET ORAL at 01:10

## 2023-01-01 RX ADMIN — POTASSIUM CHLORIDE 10 MEQ: 7.46 INJECTION, SOLUTION INTRAVENOUS at 10:19

## 2023-01-01 RX ADMIN — EMPAGLIFLOZIN 10 MG: 10 TABLET, FILM COATED ORAL at 08:58

## 2023-01-01 RX ADMIN — AMIODARONE HYDROCHLORIDE 200 MG: 200 TABLET ORAL at 21:23

## 2023-01-01 RX ADMIN — SENNOSIDES AND DOCUSATE SODIUM 2 TABLET: 8.6; 5 TABLET ORAL at 21:09

## 2023-01-01 RX ADMIN — MIDODRINE HYDROCHLORIDE 10 MG: 10 TABLET ORAL at 11:51

## 2023-01-01 RX ADMIN — SENNOSIDES AND DOCUSATE SODIUM 2 TABLET: 8.6; 5 TABLET ORAL at 08:10

## 2023-01-01 RX ADMIN — SODIUM BICARBONATE 50 MEQ: 84 INJECTION INTRAVENOUS at 21:41

## 2023-01-01 RX ADMIN — APIXABAN 5 MG: 5 TABLET, FILM COATED ORAL at 21:09

## 2023-01-01 RX ADMIN — OXYCODONE HYDROCHLORIDE AND ACETAMINOPHEN 1 TABLET: 5; 325 TABLET ORAL at 20:01

## 2023-01-01 RX ADMIN — SENNOSIDES AND DOCUSATE SODIUM 2 TABLET: 8.6; 5 TABLET ORAL at 21:34

## 2023-01-01 RX ADMIN — PHENYLEPHRINE HYDROCHLORIDE 0.5 MCG/KG/MIN: 10 INJECTION INTRAVENOUS at 02:21

## 2023-01-01 RX ADMIN — OXYCODONE HYDROCHLORIDE AND ACETAMINOPHEN 1 TABLET: 5; 325 TABLET ORAL at 23:26

## 2023-01-01 RX ADMIN — BUMETANIDE 2 MG: 0.25 INJECTION, SOLUTION INTRAMUSCULAR; INTRAVENOUS at 22:05

## 2023-01-01 RX ADMIN — ALBUMIN (HUMAN) 25 G: 0.25 INJECTION, SOLUTION INTRAVENOUS at 17:42

## 2023-01-01 RX ADMIN — SENNOSIDES AND DOCUSATE SODIUM 2 TABLET: 8.6; 5 TABLET ORAL at 10:01

## 2023-01-01 RX ADMIN — AMIODARONE HYDROCHLORIDE 0.5 MG/MIN: 1.8 INJECTION, SOLUTION INTRAVENOUS at 05:30

## 2023-01-01 RX ADMIN — BUMETANIDE 0.5 MG: 0.25 INJECTION, SOLUTION INTRAMUSCULAR; INTRAVENOUS at 17:06

## 2023-01-01 RX ADMIN — HYDROMORPHONE HYDROCHLORIDE 0.25 MG: 1 INJECTION, SOLUTION INTRAMUSCULAR; INTRAVENOUS; SUBCUTANEOUS at 04:06

## 2023-01-01 RX ADMIN — OXYCODONE HYDROCHLORIDE AND ACETAMINOPHEN 1 TABLET: 10; 325 TABLET ORAL at 17:28

## 2023-01-01 RX ADMIN — APIXABAN 5 MG: 5 TABLET, FILM COATED ORAL at 20:54

## 2023-01-01 RX ADMIN — VASOPRESSIN 2 UNITS: 20 INJECTION INTRAVENOUS at 12:05

## 2023-01-01 RX ADMIN — SODIUM ZIRCONIUM CYCLOSILICATE 10 G: 10 POWDER, FOR SUSPENSION ORAL at 18:52

## 2023-01-01 RX ADMIN — AMIODARONE HYDROCHLORIDE 1 MG/MIN: 1.8 INJECTION, SOLUTION INTRAVENOUS at 23:57

## 2023-01-01 RX ADMIN — SENNOSIDES AND DOCUSATE SODIUM 2 TABLET: 8.6; 5 TABLET ORAL at 20:09

## 2023-01-01 RX ADMIN — SODIUM CHLORIDE, POTASSIUM CHLORIDE, SODIUM LACTATE AND CALCIUM CHLORIDE: 600; 310; 30; 20 INJECTION, SOLUTION INTRAVENOUS at 11:37

## 2023-01-01 RX ADMIN — EMPAGLIFLOZIN 10 MG: 10 TABLET, FILM COATED ORAL at 08:11

## 2023-01-01 RX ADMIN — POTASSIUM CHLORIDE 40 MEQ: 1500 TABLET, EXTENDED RELEASE ORAL at 08:19

## 2023-01-01 RX ADMIN — OXYCODONE HYDROCHLORIDE AND ACETAMINOPHEN 1 TABLET: 5; 325 TABLET ORAL at 17:46

## 2023-01-01 RX ADMIN — MIDAZOLAM HYDROCHLORIDE 2 MG: 1 INJECTION INTRAMUSCULAR; INTRAVENOUS at 14:21

## 2023-01-01 RX ADMIN — APIXABAN 5 MG: 5 TABLET, FILM COATED ORAL at 20:23

## 2023-01-01 RX ADMIN — DIGOXIN 125 MCG: 125 TABLET ORAL at 11:21

## 2023-01-01 RX ADMIN — EMPAGLIFLOZIN 10 MG: 10 TABLET, FILM COATED ORAL at 08:54

## 2023-01-01 RX ADMIN — APIXABAN 5 MG: 5 TABLET, FILM COATED ORAL at 08:00

## 2023-01-01 RX ADMIN — Medication 50 MG: at 16:00

## 2023-01-01 RX ADMIN — VASOPRESSIN 4 UNITS: 20 INJECTION INTRAVENOUS at 12:12

## 2023-01-01 RX ADMIN — SENNOSIDES AND DOCUSATE SODIUM 2 TABLET: 8.6; 5 TABLET ORAL at 08:22

## 2023-01-01 RX ADMIN — Medication 10 ML: at 22:05

## 2023-01-01 RX ADMIN — AMIODARONE HYDROCHLORIDE 0.5 MG/MIN: 1.8 INJECTION, SOLUTION INTRAVENOUS at 20:35

## 2023-01-01 RX ADMIN — MAGNESIUM SULFATE HEPTAHYDRATE 2 G: 500 INJECTION, SOLUTION INTRAMUSCULAR; INTRAVENOUS at 21:39

## 2023-01-01 RX ADMIN — POLYETHYLENE GLYCOL 3350 17 G: 17 POWDER, FOR SOLUTION ORAL at 21:42

## 2023-01-01 RX ADMIN — SENNOSIDES AND DOCUSATE SODIUM 2 TABLET: 8.6; 5 TABLET ORAL at 14:23

## 2023-01-01 RX ADMIN — Medication 10 ML: at 11:21

## 2023-01-01 RX ADMIN — POTASSIUM CHLORIDE 10 MEQ: 7.46 INJECTION, SOLUTION INTRAVENOUS at 13:57

## 2023-01-01 RX ADMIN — Medication 10 ML: at 08:20

## 2023-01-01 RX ADMIN — OXYCODONE HYDROCHLORIDE AND ACETAMINOPHEN 1 TABLET: 10; 325 TABLET ORAL at 21:08

## 2023-01-01 RX ADMIN — DOBUTAMINE HYDROCHLORIDE 2 MCG/KG/MIN: 100 INJECTION INTRAVENOUS at 16:16

## 2023-01-01 RX ADMIN — POTASSIUM CHLORIDE 40 MEQ: 750 CAPSULE, EXTENDED RELEASE ORAL at 23:49

## 2023-01-01 RX ADMIN — APIXABAN 5 MG: 5 TABLET, FILM COATED ORAL at 08:15

## 2023-01-01 RX ADMIN — EMPAGLIFLOZIN 10 MG: 10 TABLET, FILM COATED ORAL at 08:27

## 2023-01-01 RX ADMIN — OXYCODONE HYDROCHLORIDE AND ACETAMINOPHEN 1 TABLET: 5; 325 TABLET ORAL at 04:39

## 2023-01-01 RX ADMIN — METOPROLOL TARTRATE 5 MG: 5 INJECTION INTRAVENOUS at 20:41

## 2023-01-01 RX ADMIN — CASTOR OIL AND BALSAM, PERU 1 APPLICATION: 788; 87 OINTMENT TOPICAL at 18:10

## 2023-01-01 RX ADMIN — BUMETANIDE 2 MG: 0.25 INJECTION, SOLUTION INTRAMUSCULAR; INTRAVENOUS at 12:39

## 2023-01-01 RX ADMIN — AMIODARONE HYDROCHLORIDE 400 MG: 200 TABLET ORAL at 08:27

## 2023-01-01 RX ADMIN — BUMETANIDE 2 MG: 0.25 INJECTION, SOLUTION INTRAMUSCULAR; INTRAVENOUS at 11:13

## 2023-01-01 RX ADMIN — Medication 10 ML: at 14:33

## 2023-01-01 RX ADMIN — APIXABAN 5 MG: 5 TABLET, FILM COATED ORAL at 08:49

## 2023-01-01 RX ADMIN — APIXABAN 5 MG: 5 TABLET, FILM COATED ORAL at 20:29

## 2023-01-01 RX ADMIN — MIDODRINE HYDROCHLORIDE 10 MG: 10 TABLET ORAL at 17:24

## 2023-01-01 RX ADMIN — AMIODARONE HYDROCHLORIDE 1 MG/MIN: 1.8 INJECTION, SOLUTION INTRAVENOUS at 21:11

## 2023-01-01 RX ADMIN — MIDODRINE HYDROCHLORIDE 10 MG: 10 TABLET ORAL at 11:21

## 2023-01-01 RX ADMIN — VASOPRESSIN 2 UNITS: 20 INJECTION INTRAVENOUS at 12:19

## 2023-01-01 RX ADMIN — CASTOR OIL AND BALSAM, PERU 1 APPLICATION: 788; 87 OINTMENT TOPICAL at 15:38

## 2023-01-01 RX ADMIN — SENNOSIDES AND DOCUSATE SODIUM 2 TABLET: 8.6; 5 TABLET ORAL at 20:29

## 2023-01-01 RX ADMIN — MORPHINE SULFATE 2 MG: 2 INJECTION, SOLUTION INTRAMUSCULAR; INTRAVENOUS at 14:09

## 2023-01-01 RX ADMIN — VASOPRESSIN 2 UNITS: 20 INJECTION INTRAVENOUS at 12:15

## 2023-01-01 RX ADMIN — AMIODARONE HYDROCHLORIDE 200 MG: 200 TABLET ORAL at 10:55

## 2023-01-01 RX ADMIN — Medication 10 ML: at 08:49

## 2023-01-01 RX ADMIN — AMIODARONE HYDROCHLORIDE 400 MG: 200 TABLET ORAL at 17:06

## 2023-01-01 RX ADMIN — APIXABAN 5 MG: 5 TABLET, FILM COATED ORAL at 20:01

## 2023-01-01 RX ADMIN — ACETAMINOPHEN 650 MG: 325 TABLET ORAL at 14:34

## 2023-01-01 RX ADMIN — PHENYLEPHRINE HYDROCHLORIDE 3 MCG/KG/MIN: 10 INJECTION INTRAVENOUS at 15:59

## 2023-01-01 RX ADMIN — ACETAMINOPHEN 650 MG: 325 TABLET ORAL at 22:04

## 2023-01-01 RX ADMIN — OXYCODONE HYDROCHLORIDE AND ACETAMINOPHEN 1 TABLET: 10; 325 TABLET ORAL at 23:30

## 2023-01-01 RX ADMIN — AMIODARONE HYDROCHLORIDE 400 MG: 200 TABLET ORAL at 17:40

## 2023-01-01 RX ADMIN — BUMETANIDE 1 MG: 1 TABLET ORAL at 10:08

## 2023-01-01 RX ADMIN — CASTOR OIL AND BALSAM, PERU 1 APPLICATION: 788; 87 OINTMENT TOPICAL at 20:18

## 2023-01-01 RX ADMIN — POTASSIUM CHLORIDE 10 MEQ: 7.46 INJECTION, SOLUTION INTRAVENOUS at 12:35

## 2023-01-01 RX ADMIN — OXYCODONE HYDROCHLORIDE AND ACETAMINOPHEN 1 TABLET: 10; 325 TABLET ORAL at 04:38

## 2023-01-01 RX ADMIN — Medication 10 ML: at 21:09

## 2023-01-01 RX ADMIN — IOPAMIDOL 100 ML: 755 INJECTION, SOLUTION INTRAVENOUS at 15:51

## 2023-01-01 RX ADMIN — SENNOSIDES AND DOCUSATE SODIUM 2 TABLET: 8.6; 5 TABLET ORAL at 08:49

## 2023-01-01 RX ADMIN — DIGOXIN 125 MCG: 125 TABLET ORAL at 11:14

## 2023-01-01 RX ADMIN — AMIODARONE HYDROCHLORIDE 1 MG/MIN: 1.8 INJECTION, SOLUTION INTRAVENOUS at 10:43

## 2023-01-01 RX ADMIN — AMIODARONE HYDROCHLORIDE 200 MG: 200 TABLET ORAL at 10:47

## 2023-01-01 RX ADMIN — POTASSIUM CHLORIDE 40 MEQ: 1500 TABLET, EXTENDED RELEASE ORAL at 10:07

## 2023-01-01 RX ADMIN — APIXABAN 5 MG: 5 TABLET, FILM COATED ORAL at 08:58

## 2023-01-01 RX ADMIN — MIDODRINE HYDROCHLORIDE 10 MG: 10 TABLET ORAL at 06:35

## 2023-01-01 RX ADMIN — EPINEPHRINE 1 MG: 0.1 INJECTION INTRACARDIAC; INTRAVENOUS at 21:40

## 2023-01-01 RX ADMIN — POTASSIUM CHLORIDE 10 MEQ: 7.46 INJECTION, SOLUTION INTRAVENOUS at 08:00

## 2023-01-01 RX ADMIN — Medication 100 MCG/HR: at 15:59

## 2023-01-01 RX ADMIN — BUMETANIDE 2 MG: 1 TABLET ORAL at 17:46

## 2023-01-01 RX ADMIN — NOREPINEPHRINE BITARTRATE 0.02 MCG/KG/MIN: 0.03 INJECTION, SOLUTION INTRAVENOUS at 14:30

## 2023-01-01 RX ADMIN — AMIODARONE HYDROCHLORIDE 400 MG: 200 TABLET ORAL at 11:20

## 2023-01-01 RX ADMIN — Medication 10 ML: at 20:09

## 2023-01-01 RX ADMIN — ALBUMIN (HUMAN) 25 G: 0.25 INJECTION, SOLUTION INTRAVENOUS at 22:59

## 2023-01-01 RX ADMIN — OXYCODONE HYDROCHLORIDE AND ACETAMINOPHEN 1 TABLET: 5; 325 TABLET ORAL at 13:12

## 2023-01-01 RX ADMIN — MIDODRINE HYDROCHLORIDE 5 MG: 5 TABLET ORAL at 17:28

## 2023-01-01 RX ADMIN — Medication 10 ML: at 22:20

## 2023-01-01 RX ADMIN — HYDROMORPHONE HYDROCHLORIDE 0.25 MG: 1 INJECTION, SOLUTION INTRAMUSCULAR; INTRAVENOUS; SUBCUTANEOUS at 09:29

## 2023-01-01 RX ADMIN — APIXABAN 5 MG: 5 TABLET, FILM COATED ORAL at 22:03

## 2023-01-01 RX ADMIN — AMIODARONE HYDROCHLORIDE 400 MG: 200 TABLET ORAL at 08:38

## 2023-01-01 RX ADMIN — NOREPINEPHRINE BITARTRATE 0.3 MCG/KG/MIN: 0.03 INJECTION, SOLUTION INTRAVENOUS at 15:54

## 2023-01-01 RX ADMIN — VASOPRESSIN 2 UNITS: 20 INJECTION INTRAVENOUS at 12:07

## 2023-01-01 RX ADMIN — OXYCODONE HYDROCHLORIDE AND ACETAMINOPHEN 1 TABLET: 10; 325 TABLET ORAL at 06:28

## 2023-01-01 RX ADMIN — SENNOSIDES AND DOCUSATE SODIUM 2 TABLET: 8.6; 5 TABLET ORAL at 20:54

## 2023-01-01 RX ADMIN — CASTOR OIL AND BALSAM, PERU 1 APPLICATION: 788; 87 OINTMENT TOPICAL at 08:23

## 2023-01-01 RX ADMIN — AMIODARONE HYDROCHLORIDE 200 MG: 200 TABLET ORAL at 05:45

## 2023-01-01 RX ADMIN — CASTOR OIL AND BALSAM, PERU 1 APPLICATION: 788; 87 OINTMENT TOPICAL at 20:31

## 2023-01-01 RX ADMIN — AMIODARONE HYDROCHLORIDE 400 MG: 200 TABLET ORAL at 08:11

## 2023-01-01 RX ADMIN — DIGOXIN 500 MCG: 0.25 INJECTION INTRAMUSCULAR; INTRAVENOUS at 11:51

## 2023-01-01 RX ADMIN — APIXABAN 5 MG: 5 TABLET, FILM COATED ORAL at 14:23

## 2023-01-01 RX ADMIN — AMIODARONE HYDROCHLORIDE 200 MG: 200 TABLET ORAL at 17:15

## 2023-01-01 RX ADMIN — PHENYLEPHRINE HYDROCHLORIDE 0.5 MCG/KG/MIN: 10 INJECTION INTRAVENOUS at 14:10

## 2023-01-01 RX ADMIN — HYDROCODONE BITARTRATE AND ACETAMINOPHEN 1 TABLET: 7.5; 325 TABLET ORAL at 17:11

## 2023-01-01 RX ADMIN — EPINEPHRINE 1 MG: 0.1 INJECTION INTRACARDIAC; INTRAVENOUS at 21:35

## 2023-01-01 RX ADMIN — Medication 10 ML: at 21:28

## 2023-01-01 RX ADMIN — OXYCODONE HYDROCHLORIDE AND ACETAMINOPHEN 1 TABLET: 5; 325 TABLET ORAL at 10:12

## 2023-01-01 RX ADMIN — APIXABAN 5 MG: 5 TABLET, FILM COATED ORAL at 20:31

## 2023-01-01 RX ADMIN — AMIODARONE HYDROCHLORIDE 400 MG: 200 TABLET ORAL at 08:49

## 2023-01-01 RX ADMIN — SODIUM CHLORIDE, POTASSIUM CHLORIDE, SODIUM LACTATE AND CALCIUM CHLORIDE: 600; 310; 30; 20 INJECTION, SOLUTION INTRAVENOUS at 13:30

## 2023-01-01 RX ADMIN — MIDODRINE HYDROCHLORIDE 5 MG: 5 TABLET ORAL at 11:22

## 2023-01-01 RX ADMIN — AMIODARONE HYDROCHLORIDE 400 MG: 200 TABLET ORAL at 18:52

## 2023-01-01 RX ADMIN — MIDODRINE HYDROCHLORIDE 5 MG: 5 TABLET ORAL at 12:12

## 2023-01-01 RX ADMIN — APIXABAN 5 MG: 5 TABLET, FILM COATED ORAL at 20:08

## 2023-01-01 RX ADMIN — ACETAMINOPHEN 650 MG: 325 TABLET ORAL at 08:52

## 2023-01-01 RX ADMIN — AMIODARONE HYDROCHLORIDE 1 MG/MIN: 1.8 INJECTION, SOLUTION INTRAVENOUS at 03:04

## 2023-01-01 RX ADMIN — DILTIAZEM HYDROCHLORIDE 5 MG/HR: 5 INJECTION INTRAVENOUS at 06:41

## 2023-01-01 RX ADMIN — CASTOR OIL AND BALSAM, PERU 1 APPLICATION: 788; 87 OINTMENT TOPICAL at 08:39

## 2023-01-01 RX ADMIN — OXYCODONE HYDROCHLORIDE AND ACETAMINOPHEN 1 TABLET: 5; 325 TABLET ORAL at 00:45

## 2023-01-01 RX ADMIN — Medication 10 ML: at 08:01

## 2023-01-01 RX ADMIN — VANCOMYCIN HYDROCHLORIDE 2000 MG: 10 INJECTION, POWDER, LYOPHILIZED, FOR SOLUTION INTRAVENOUS at 16:11

## 2023-11-08 PROBLEM — I10 ESSENTIAL HYPERTENSION: Status: ACTIVE | Noted: 2023-01-01

## 2023-11-08 PROBLEM — E80.6 HYPERBILIRUBINEMIA: Status: ACTIVE | Noted: 2023-01-01

## 2023-11-08 PROBLEM — I50.33 ACUTE ON CHRONIC HEART FAILURE WITH PRESERVED EJECTION FRACTION: Status: ACTIVE | Noted: 2023-01-01

## 2023-11-08 PROBLEM — R74.8 ELEVATED LIVER ENZYMES: Status: ACTIVE | Noted: 2023-01-01

## 2023-11-08 PROBLEM — N17.9 AKI (ACUTE KIDNEY INJURY): Status: ACTIVE | Noted: 2023-01-01

## 2023-11-08 PROBLEM — F41.8 ANXIETY ASSOCIATED WITH DEPRESSION: Status: ACTIVE | Noted: 2023-01-01

## 2023-11-08 PROBLEM — I48.91 ATRIAL FIBRILLATION WITH RVR: Status: ACTIVE | Noted: 2023-01-01

## 2023-11-08 PROBLEM — E87.1 HYPONATREMIA: Status: ACTIVE | Noted: 2023-01-01

## 2023-11-08 PROBLEM — E88.09 HYPOALBUMINEMIA: Status: ACTIVE | Noted: 2023-01-01

## 2023-11-08 PROBLEM — I50.9 ACUTE EXACERBATION OF CONGESTIVE HEART FAILURE: Status: ACTIVE | Noted: 2023-01-01

## 2023-11-08 NOTE — CASE MANAGEMENT/SOCIAL WORK
Discharge Planning Assessment  [facility]     Patient Name: Sundar Reeder  MRN: [id number]  Today's Date: 11/8/2023    Admit Date: 11/8/2023    Plan: Home   Discharge Needs Assessment       Row Name 11/08/23 1011       Living Environment    People in Home alone    Current Living Arrangements home    Primary Care Provided by self    Provides Primary Care For no one    Family Caregiver if Needed sibling(s)    Family Caregiver Names Patient states that he can call on his sister if he needs assistance    Quality of Family Relationships involved    Able to Return to Prior Arrangements yes       Transition Planning    Patient/Family Anticipates Transition to home    Patient/Family Anticipated Services at Transition        Discharge Needs Assessment    Equipment Currently Used at Home none    Concerns to be Addressed denies needs/concerns at this time    Discharge Coordination/Progress Lives in a house alone in [place] [facility], can call on his sister if needed.  No DME or history of home health. Has an advanced directive.                   Discharge Plan       Row Name 11/08/23 1012       Plan    Plan Home    Patient/Family in Agreement with Plan yes    Plan Comments I spoke with Mr Reeder at bedside.  Mr Reeder resides in a house alone in [place] [facility] [place] [facility] [place], but states that he can call his sister if he needs assistance.  He does not use any DME and has not had home health in the past.  He does have an advanced directive.  He does not have any insurance, does not have a PCP.  If he does get any prescriptions, he would like them to be sent to [facility] off of [facility].  At this time, he denies any discharge needs.    Final Discharge Disposition Code 01 - home or self-care                  Continued Care and Services - Admitted Since 11/8/2023    Coordination has not been started for this encounter.          Demographic Summary    No documentation.                  Functional Status       Row Name 11/08/23 1011        Functional Status    Usual Activity Tolerance good    Current Activity Tolerance moderate       Functional Status, IADL    Medications independent    Meal Preparation independent    Housekeeping independent    Laundry independent    Shopping independent       Mental Status    General Appearance WDL WDL                   Psychosocial    No documentation.                  Abuse/Neglect    No documentation.                  Legal    No documentation.                  Substance Abuse    No documentation.                  Patient Forms    No documentation.                     Lynne Mckeon RN

## 2023-11-08 NOTE — Clinical Note
Level of Care: Telemetry [5]   Diagnosis: Acute exacerbation of congestive heart failure [434870]   Admitting Physician: MOE MARRERO [[id number]]

## 2023-11-08 NOTE — Clinical Note
using micropuncture technique with ultrasound guidance into the left subclavian vein. Sheath insertion not delayed.

## 2023-11-08 NOTE — CONSULTS
[facility]   Consult Note    Patient Name: Sundar Reeder  : 1970  MRN: [id number]  Primary Care Physician:  Provider, No Known  Referring Physician: No ref. provider found  Date of admission: 2023    Inpatient Cardiology Consult  Consult performed by: Jacques Basurto MD  Consult ordered by: Jimena Kwok DO  Reason for consult: afib, chf        Subjective   Subjective     Problem List  -Acute systolic heart failure unknown etiology (almost assuredly severe systolic dysfunction given chest x ray findings)  -afib with RVR, on chronic anticoagulation  -adin nearly certainly from renal congestion from heart failure  -expected mild elevation in troponin from CHF and afib with RVR, BNP elevated  -congested hepatopathy with elevated lfts  -LBBB    Mr. Reeder was diagnosed with CHF 10 years ago.  He really has not had issue with it.  Also diagnosed with afib and was on BB, eliquis and Ace.  Meds all stopped with loss of insurance when he left his job about 11 months ago.  He has continued eliquis faithfully and has not missed dose in months.  Over past year worsening fatigue.  Over past few weeks his dyspnea and leg edema and scrotal edema have been worsening.  ROS negative except for the above.      Personal History     See above    Family History: family history is not on file. Otherwise pertinent FHx was reviewed and not pertinent to current issue.    Social History:      Home Medications:   apixaban, lisinopril, and metoprolol succinate XL    Allergies:  Allergies   Allergen Reactions    Sulfa Antibiotics Unknown - High Severity       Objective    Objective     Vitals:  Temp:  [97.7 °F (36.5 °C)] 97.7 °F (36.5 °C)  Heart Rate:  [102-144] 111  Resp:  [24] 24  BP: ()/() 107/93    Physical Exam  HENT:      Head: Normocephalic.   Eyes:      Extraocular Movements: Extraocular movements intact.   Cardiovascular:      Rate and Rhythm: Tachycardia present. Rhythm irregular.      Heart sounds:  No murmur heard.     No gallop.   Pulmonary:      Breath sounds: Normal breath sounds.   Abdominal:      Palpations: Abdomen is soft.   Musculoskeletal:      Right lower leg: Edema present.      Left lower leg: Edema present.   Skin:     General: Skin is warm and dry.   Neurological:      General: No focal deficit present.      Mental Status: He is alert.   Psychiatric:         Mood and Affect: Mood normal.             Assessment & Plan   Assessment / Plan     Assessment  -Acute systolic heart failure unknown etiology (almost assuredly severe systolic dysfunction given chest x ray findings)  -afib with RVR, on chronic anticoagulation  -adin nearly certainly from renal congestion from heart failure  -expected mild elevation in troponin from CHF and afib with RVR, BNP elevated  -congested hepatopathy with elevated lfts  -LBBB    Plan  -stop diltiazem immediately (discussed with nurse).  Likely will potentate cardiogenic shock which he is borderline currently.   -continue eliquis.  Will likely cardiovert in few days when he can lay flat  -start amiodarone by typical protocol.  Rhythm control reasonable strategy given compliance with anticoagulation  -digoxin load over next 24 hours.  Would given 500 mcg now, 250mcg in 12 hours, 250mcg in 24 hours.  Will likely not do chronic dosing tomorrow given adin  -has been given lasix 40mg, would start lasix drip  - echo to confirm systolic dysfunction  -can allow to eat, no plan for procedures.  Expect cardioversion Friday given his fluid status  -monitor QTC  -will follow as consult, he will need admission      Jacques Basurto MD

## 2023-11-08 NOTE — H&P
[facility] Medicine Services  HISTORY AND PHYSICAL    Patient Name: Sundar Reeder  : 1970  MRN: [id number]  Primary Care Physician: Chaka, No Known  Date of admission: 2023      Subjective   Subjective     Chief Complaint:  LE edema and shortness of air    HPI:  Sundar Reeder is a 53 y.o. male with a past medical history of HTN, atrial fibrillation on Eliquis, CHF and CKD presented to the ED complaining of elevated heart rate, worsened lower extremity swelling and scrotal swelling with shortness of breath. The patient states he is compliant with his Eliquis but hasn't been compliant with most of his other medications. He states he has felt weak and tired for the past year and it made it difficult to work. He worked for [employer] for 19 years and states he had to quit 11 months ago due to his health. He states he sees 2 cardiologists at [place] but would like to transfer his care here. He states he has been very depressed due to his ongoing worsening chronic medical conditions and stopped refilling all his home meds except for his Eliquis. He states he has no family other than his sister. He states his leg swelling has gotten so bad that his legs are now weeping and he is having skin breakdown with bleeding. He states he cannot walk well on his own. The patient's lab work indicates an exacerbation of CHF with an elevated BNP and cardiomegaly on his chest xray. His heart rate was elevated into the 150s on arrival in the ED and he was found to be in atrial fibrillation, which has improved s/p Cardizem. The patient will be admitted to the hospitalist service for further evaluation and treatment.      Personal History     PMH: HTN, atrial fibrillation, CHF, CKD      No past surgical history on file.    Family History: family history is not on file.     Social History:    Social History     Social History Narrative    Not on file       Medications:  Available home medication information  reviewed.  (Not in a hospital admission)      Allergies   Allergen Reactions    Sulfa Antibiotics Unknown - High Severity       Objective   Objective     Vital Signs:   Temp:  [97.7 °F (36.5 °C)] 97.7 °F (36.5 °C)  Heart Rate:  [102-144] 111  Resp:  [24] 24  BP: ()/() 107/93       Physical Exam   Constitutional: Awake, alert  Eyes: PERRLA, sclerae anicteric, no conjunctival injection  HENT: NCAT, mucous membranes moist  Neck: Supple, no thyromegaly, no lymphadenopathy, trachea midline  Respiratory: decreased to auscultation bilaterally with wheezing in b/l bases, nonlabored respirations on room air  Cardiovascular: tachycardic, irregularly irregular rhythm, no murmurs, rubs, or gallops, palpable pedal pulses bilaterally  Gastrointestinal: Positive bowel sounds, soft, nontender, nondistended  Musculoskeletal: 3+ pitting bilateral LE edema, no clubbing or cyanosis to extremities  Psychiatric: Appropriate affect, cooperative  Neurologic: Oriented x 3, strength symmetric in all extremities, Cranial Nerves grossly intact to confrontation, speech clear  Skin: No rashes, significant skin breakdown to b/l calves with erythema and weeping    Result Review:  I have personally reviewed the results from the time of this admission to 11/8/2023 11:18 EST and agree with these findings:  [x]  Laboratory list / accordion  []  Microbiology  [x]  Radiology  []  EKG/Telemetry   []  Cardiology/Vascular   []  Pathology  []  Old records  []  Other:    LAB RESULTS:      Lab 11/08/23  0551   WBC 12.86*   HEMOGLOBIN 12.9*   HEMATOCRIT 39.3   PLATELETS 398   NEUTROS ABS 11.17*   IMMATURE GRANS (ABS) 0.06*   LYMPHS ABS 1.09   MONOS ABS 0.52   EOS ABS 0.01   MCV 83.3         Lab 11/08/23  0551   SODIUM 126*   POTASSIUM 4.4   CHLORIDE 85*   CO2 23.0   ANION GAP 18.0*   BUN 87*   CREATININE 2.17*   EGFR 36.0*   GLUCOSE 132*   CALCIUM 9.3         Lab 11/08/23  0551   TOTAL PROTEIN 6.1   ALBUMIN 3.3*   GLOBULIN 2.8   ALT (SGPT) 145*    AST (SGOT) 67*   BILIRUBIN 2.9*   ALK PHOS 203*         Lab 11/08/23  0804 11/08/23  0551   PROBNP  --  35,697.0*   HSTROP T 60* 69*                     Microbiology Results (last 10 days)       ** No results found for the last 240 hours. **            CT Abdomen Pelvis Without Contrast    Result Date: 11/8/2023  CT ABDOMEN PELVIS WO CONTRAST Date of Exam: 11/8/2023 10:00 AM EST Indication: elevated AST/ALT, elevated bilirubin. Comparison: None available. Technique: Axial CT images were obtained of the abdomen and pelvis without the administration of contrast. Reconstructed coronal and sagittal images were also obtained. Automated exposure control and iterative construction methods were used. Findings: Liver: The liver is grossly unremarkable in morphology and heterogeneously decreased in attenuation. Evaluation for focal liver lesions is limited due to lack of IV contrast administration. No biliary dilation is seen. Gallbladder: Intermediate density within the gallbladder may be due to presence of stones or sludge. Gallbladder wall is mildly indistinct. Gallbladder does not appear distended. Pancreas: Unremarkable. Spleen: Unremarkable. Adrenal glands: Unremarkable. Genitourinary tract: Atrophic left kidney. Right kidney appears unremarkable. No hydronephrosis is seen. No urinary tract calculi are seen. The ureters appear unremarkable. Urinary bladder and prostate gland appear unremarkable. Gastrointestinal tract: Visualized hollow viscera demonstrate no focal lesion. There is no evidence of bowel obstruction. Appendix: No findings to suggest acute appendicitis. Other findings: Mild pelvic free fluid. No free air is identified. No pathologically enlarged lymph nodes are seen. Mild vascular calcifications are present. Bones and soft tissues: No acute or suspicious osseous or soft tissue lesion is identified. Sclerotic focus within the right iliac bone may represent a bone island. Large left and small right  hydroceles are noted. Lung bases: Right lower lobe airspace opacity is concerning for infiltrate. Suggestion of cardiomegaly.     Impression: Impression: 1.No acute abnormality identified within the abdomen or pelvis. 2.Heterogeneous hypoattenuation of the liver may be related to differential fatty infiltration. Further evaluation is limited on this noncontrast study. No biliary dilation is seen. 3.Intermediate density within the gallbladder may indicate the presence of stones or sludge. The gallbladder is somewhat indistinct but nondistended. If patient has right upper quadrant pain, further evaluation with right upper quadrant ultrasound may be  considered. 4.Right lower lobe airspace disease is concerning for infiltrate. Please correlate clinically. 5.Additional findings as detailed above. Electronically Signed: Kirk Saleem MD  11/8/2023 10:10 AM EST  Workstation ID: [id number]    XR Chest 1 View    Result Date: 11/8/2023  XR CHEST 1 VW Date of Exam: 11/8/2023 5:57 AM EST Indication: SOA triage protocol Comparison: None available. Findings: There is indistinctness of the pulmonary vasculature. The heart appears significantly enlarged. No significant pleural effusion identified. No significant consolidation. No pneumothorax. No acute osseous abnormality.     Impression: Impression: Mild pulmonary edema pattern with significant cardiomegaly. No significant effusion. Electronically Signed: Violet Hi MD  11/8/2023 6:08 AM EST  Workstation ID: [id number]         Assessment & Plan   Assessment & Plan     Active Hospital Problems    Diagnosis  POA    **Hyponatremia [E87.1]  Yes    STUART (acute kidney injury) [N17.9]  Yes    Acute on chronic heart failure with preserved ejection fraction [I50.33]  Yes    Atrial fibrillation with RVR [I48.91]  Yes    Essential hypertension [I10]  Yes    Anxiety associated with depression [F41.8]  Yes    Hypoalbuminemia [E88.09]  Yes    Hyperbilirubinemia [E80.6]  Yes    Elevated liver  enzymes [R74.8]  Yes    Acute exacerbation of congestive heart failure [I50.9]  Yes     Sundar Reeder is a 53 y.o. male with a past medical history of HTN, atrial fibrillation on Eliquis, CHF and CKD presented to the ED complaining of elevated heart rate, worsened lower extremity swelling and scrotal swelling with shortness of breath.     Atrial Fibrillation with RVR  - heart rate improved after Cardizem in ED  - heart rate now around 102- patient still in a fib  - continue home Metoprolol  - continue home Eliquis  - patient has not been compliant with home meds bedsides Eliquis for the last few months  - consult cardiology- patient has been following with cardiology at [place] but wants to switch his care to [facility].  - start Amiodarone per cardiology   - load digoxin  - cardioversion Friday if patient doesn't convert before then    Elevated troponin  - likely type 2 troponin leak from acute heart failure and atrial fibrillation with RVR  - EKG reviewed- LBBB    Acute exacerbation of heart failure  LE Swelling/skin breakdown  - patient followed with cardiology at [place] but wants to switch care to [facility]  - Lasix 40mg IV in ED  - BNP 35,000 in setting of renal failuure  - significant LE and scrotal edema with weeping  - cardiology consult  - ECHO pending  - lasix gtt started  - strict intake/output  - 1500ml fluid restriction  - daily weights  - WOC for LE weeping/skin breakdown    STUART  - secondary to renal congestion from acute heart failure  - monitor in setting of diuresis  - recheck labs in am  - consult nephrology    Hyponatremia  - Na of 126 on admission  - consult nephrology in setting of ongoing diuresis  - patient with poor diet and overall health since quitting his job 11 months ago    Hyperbilirubinemia  Elevated AST/ALT  - slight jaundice present  - CT A/P without contrast pending  - hepatitis panel pending    HTN  - hold home Lisinopril in setting of STUART  - cardiac meds as above    Depression  - start  antidepressant inpatient once sodium and heart failure are improved    DVT prophylaxis:  Eliquis      CODE STATUS:  Full/CPR  Code Status and Medical Interventions:   Ordered at: 11/08/23 0839     Level Of Support Discussed With:    Patient     Code Status (Patient has no pulse and is not breathing):    CPR (Attempt to Resuscitate)     Medical Interventions (Patient has pulse or is breathing):    Full Support       Expected Discharge   Expected Discharge Date: 11/14/2023; Expected Discharge Time:      Jimena Kwok DO  11/08/23

## 2023-11-08 NOTE — PLAN OF CARE
Goal Outcome Evaluation:        Problem: Adult Inpatient Plan of Care  Goal: Plan of Care Review  Outcome: Ongoing, Progressing  Goal: Patient-Specific Goal (Individualized)  Outcome: Ongoing, Progressing  Goal: Absence of Hospital-Acquired Illness or Injury  Outcome: Ongoing, Progressing  Intervention: Identify and Manage Fall Risk  Recent Flowsheet Documentation  Taken 11/8/2023 1600 by Yessenia Barrios RN  Safety Promotion/Fall Prevention:   activity supervised   assistive device/personal items within reach   clutter free environment maintained   room organization consistent   safety round/check completed   toileting scheduled  Taken 11/8/2023 1400 by Yessenia Barrios RN  Safety Promotion/Fall Prevention:   activity supervised   assistive device/personal items within reach   clutter free environment maintained   room organization consistent   safety round/check completed   toileting scheduled  Taken 11/8/2023 1200 by Yessenia Barrios RN  Safety Promotion/Fall Prevention:   activity supervised   assistive device/personal items within reach   clutter free environment maintained   room organization consistent   safety round/check completed   toileting scheduled  Intervention: Prevent Skin Injury  Recent Flowsheet Documentation  Taken 11/8/2023 1600 by Yessenia Barrios RN  Body Position: position changed independently  Skin Protection:   incontinence pads utilized   transparent dressing maintained  Taken 11/8/2023 1400 by Yessenia Barrios RN  Body Position: position changed independently  Taken 11/8/2023 1200 by Yessenia Barrios RN  Body Position: position changed independently  Skin Protection:   incontinence pads utilized   transparent dressing maintained  Intervention: Prevent and Manage VTE (Venous Thromboembolism) Risk  Recent Flowsheet Documentation  Taken 11/8/2023 1600 by Yessenia Barrios RN  Activity Management: activity encouraged  Taken 11/8/2023 1400 by Yessenia Barrios RN  Activity Management: activity  encouraged  Taken 11/8/2023 1200 by Yessenia Barrios RN  Activity Management: activity encouraged  Range of Motion: active ROM (range of motion) encouraged  Goal: Optimal Comfort and Wellbeing  Outcome: Ongoing, Progressing  Intervention: Monitor Pain and Promote Comfort  Recent Flowsheet Documentation  Taken 11/8/2023 1200 by Yessenia Barrios RN  Pain Management Interventions:   care clustered   see MAR  Intervention: Provide Person-Centered Care  Recent Flowsheet Documentation  Taken 11/8/2023 1200 by Yessenia Barrios RN  Trust Relationship/Rapport:   care explained   choices provided   emotional support provided  Goal: Readiness for Transition of Care  Outcome: Ongoing, Progressing  Intervention: Mutually Develop Transition Plan  Recent Flowsheet Documentation  Taken 11/8/2023 1153 by Yessenia Barrios RN  Transportation Anticipated: family or friend will provide  Patient/Family Anticipated Services at Transition:   Patient/Family Anticipates Transition to: home  Taken 11/8/2023 1151 by Yessenia Barrios RN  Equipment Currently Used at Home: none     Problem: Asthma Comorbidity  Goal: Maintenance of Asthma Control  Outcome: Ongoing, Progressing     Problem: Behavioral Health Comorbidity  Goal: Maintenance of Behavioral Health Symptom Control  Outcome: Ongoing, Progressing     Problem: COPD (Chronic Obstructive Pulmonary Disease) Comorbidity  Goal: Maintenance of COPD Symptom Control  Outcome: Ongoing, Progressing     Problem: Diabetes Comorbidity  Goal: Blood Glucose Level Within Targeted Range  Outcome: Ongoing, Progressing     Problem: Heart Failure Comorbidity  Goal: Maintenance of Heart Failure Symptom Control  Outcome: Ongoing, Progressing     Problem: Hypertension Comorbidity  Goal: Blood Pressure in Desired Range  Outcome: Ongoing, Progressing     Problem: Obstructive Sleep Apnea Risk or Actual Comorbidity Management  Goal: Unobstructed Breathing During Sleep  Outcome: Ongoing, Progressing      Problem: Osteoarthritis Comorbidity  Goal: Maintenance of Osteoarthritis Symptom Control  Outcome: Ongoing, Progressing  Intervention: Maintain Osteoarthritis Symptom Control  Recent Flowsheet Documentation  Taken 11/8/2023 1600 by Yessenia Barrios RN  Activity Management: activity encouraged  Taken 11/8/2023 1400 by Yessenia Barrios RN  Activity Management: activity encouraged  Taken 11/8/2023 1200 by Yessenia Barrios RN  Activity Management: activity encouraged     Problem: Pain Chronic (Persistent) (Comorbidity Management)  Goal: Acceptable Pain Control and Functional Ability  Outcome: Ongoing, Progressing  Intervention: Develop Pain Management Plan  Recent Flowsheet Documentation  Taken 11/8/2023 1200 by Yessenia Barrios RN  Pain Management Interventions:   care clustered   see MAR     Problem: Seizure Disorder Comorbidity  Goal: Maintenance of Seizure Control  Outcome: Ongoing, Progressing     Problem: Skin Injury Risk Increased  Goal: Skin Health and Integrity  Outcome: Ongoing, Progressing  Intervention: Optimize Skin Protection  Recent Flowsheet Documentation  Taken 11/8/2023 1600 by Yessenia Barrios RN  Pressure Reduction Devices:   chair cushion utilized   positioning supports utilized  Skin Protection:   incontinence pads utilized   transparent dressing maintained  Taken 11/8/2023 1400 by Yessenia Barrios RN  Pressure Reduction Devices:   chair cushion utilized   positioning supports utilized  Taken 11/8/2023 1200 by Yessenia Barrios RN  Pressure Reduction Techniques:   frequent weight shift encouraged   weight shift assistance provided  Pressure Reduction Devices:   chair cushion utilized   positioning supports utilized  Skin Protection:   incontinence pads utilized   transparent dressing maintained

## 2023-11-08 NOTE — Clinical Note
Dr. Bhagat notified that EKG leads are not reading. Attempt was made to fix EKG leads, and Dr. Bhagat did not want this to be fixed at this time. VSS and are visible on anesthesia screen.

## 2023-11-08 NOTE — ED PROVIDER NOTES
Subjective   History of Present Illness  This patient is a chronically debilitated 53-year-old gentleman who appears somewhat older than his stated age.  He indicates that he has a history of CHF, kidney failure and atrial fibrillation.  Patient indicates that he is noncompliant with nearly all of his medications.  He does claim that he is relatively compliant with his Eliquis.  He tells me he has atrial fibrillation chronically.  He tells me that over the last several weeks, he has had a marked increase in his edema of the lower extremities as well as shortness of breath.  He was found to be in A-fib with RVR when EMS was called today.  Heart rate was in the 150s upon initial pickup.  Heart rate in the 120s to 130s when he arrived here.  Patient is oriented x4.  Denies any syncope.  He tells me he has felt weak and tired over the last year or so and is had difficulty working because of this.  He worked at [employer] for more than 15 years and had to quit because of his chronic medical conditions.  He sees 2 different cardiologist, both at [facility].  He has not been to [facility] before.  He comes in requesting to transition and Cardiologic care.  Patient denies any chest pain.  Denies any hematemesis.  Denies any nausea vomiting or diarrhea of any type.  He tells me he simply feels winded and tired.  He is able to speak in complete sentences.  He is quite emotional.  He tells me he has no history of anxiety or depression.  He tells me he has not really had any of his medications filled other than Eliquis.  Patient comes in unaccompanied and tells me he has no family other than his sister who is dealing with her own issues.    In summary, we have a 53-year-old gentleman with A-fib and CHF who comes in for shortness of breath, generalized fatigue and weakness as well as an increase in peripheral edema over the last several months, rapidly increasing over the last couple of days.    Past medical  history  CHF, hypertension, atrial fibrillation.  Patient denies CAD or CVA    Family history  Patient denies CVA      Review of Systems   Constitutional:  Negative for chills, fatigue, fever and unexpected weight change.   HENT:  Negative for dental problem, ear pain, hearing loss and sinus pressure.    Eyes:  Negative for pain and visual disturbance.   Respiratory:  Positive for shortness of breath. Negative for chest tightness.    Cardiovascular:  Positive for palpitations and leg swelling. Negative for chest pain.   Gastrointestinal:  Negative for blood in stool, diarrhea, nausea and vomiting.   Genitourinary:  Negative for difficulty urinating, dysuria, frequency, hematuria and urgency.   Musculoskeletal:  Negative for myalgias, neck pain and neck stiffness.   Neurological:  Positive for weakness. Negative for seizures, syncope, speech difficulty, light-headedness and headaches.   Psychiatric/Behavioral:  Negative for confusion.    All other systems reviewed and are negative.      History reviewed. No pertinent past medical history.    Allergies   Allergen Reactions    Sulfa Antibiotics Unknown - High Severity       History reviewed. No pertinent surgical history.    History reviewed. No pertinent family history.    Social History     Socioeconomic History    Marital status: Single   Tobacco Use    Smoking status: Never    Smokeless tobacco: Never   Vaping Use    Vaping Use: Never used   Substance and Sexual Activity    Alcohol use: Never    Drug use: Never    Sexual activity: Defer           Objective   Physical Exam  Vitals and nursing note reviewed.   Constitutional:       General: He is not in acute distress.     Appearance: He is well-developed. He is not toxic-appearing.      Comments: Somewhat emotional sad appearing gentleman who is oriented x4, pleasant   HENT:      Head: Normocephalic and atraumatic.      Jaw: No trismus.      Right Ear: External ear normal.      Left Ear: External ear normal.       Nose: Nose normal.      Mouth/Throat:      Mouth: Mucous membranes are moist. Mucous membranes are not dry. No oral lesions.      Dentition: No dental abscesses.      Pharynx: Oropharynx is clear. No posterior oropharyngeal erythema or uvula swelling.      Tonsils: No tonsillar exudate or tonsillar abscesses.   Eyes:      General:         Right eye: No discharge.         Left eye: No discharge.      Extraocular Movements: Extraocular movements intact.      Conjunctiva/sclera: Conjunctivae normal.      Right eye: Right conjunctiva is not injected.      Left eye: Left conjunctiva is not injected.      Pupils: Pupils are equal, round, and reactive to light.   Neck:      Vascular: No JVD.      Trachea: No tracheal tenderness.   Cardiovascular:      Rate and Rhythm: Tachycardia present. Rhythm irregular.      Heart sounds: Normal heart sounds.      No friction rub. No gallop.   Pulmonary:      Effort: Pulmonary effort is normal.      Breath sounds: Wheezing present. No rales.   Chest:      Chest wall: No tenderness.   Abdominal:      General: Bowel sounds are normal. There is no distension.      Palpations: Abdomen is soft. Abdomen is not rigid. There is no mass or pulsatile mass.      Tenderness: There is no abdominal tenderness. There is no guarding or rebound. Negative signs include McBurney's sign.      Comments: No signs of acute abdomen.  No pain at McBurney's point.  No pulsatile abdominal mass.   Musculoskeletal:         General: No tenderness or deformity. Normal range of motion.      Cervical back: Normal range of motion and neck supple. No rigidity. Normal range of motion.      Right lower leg: Edema present.      Left lower leg: Edema present.      Comments: 3+ edema bilateral lower extremities.  Asymmetric swelling.   Lymphadenopathy:      Cervical: No cervical adenopathy.   Skin:     General: Skin is warm and dry.      Capillary Refill: Capillary refill takes less than 2 seconds.      Findings: No erythema  or rash.      Comments: No diaphoresis, lesions, nevi, petechia, purpura   Neurological:      Mental Status: He is alert and oriented to person, place, and time.      Cranial Nerves: No cranial nerve deficit.      Sensory: No sensory deficit.      Motor: No tremor or abnormal muscle tone.      Comments: 5/5 strength bilaterally with flexion and extension of fingers, wrist, elbows, knees and hips as well as plantar and dorsiflexion of the foot.   Psychiatric:         Attention and Perception: He is attentive.         Speech: Speech normal.         Behavior: Behavior normal.         Thought Content: Thought content normal.         Judgment: Judgment normal.         Critical Care    Performed by: Remington Crawley MD  Authorized by: Remington Crawley MD    Critical care provider statement:     Critical care time (minutes):  60    Critical care start time:  11/8/2023 6:30 AM    Critical care end time:  11/8/2023 7:30 AM    Critical care was necessary to treat or prevent imminent or life-threatening deterioration of the following conditions:  Circulatory failure    Critical care was time spent personally by me on the following activities:  Discussions with consultants, evaluation of patient's response to treatment, examination of patient, ordering and performing treatments and interventions, ordering and review of laboratory studies, ordering and review of radiographic studies, pulse oximetry, re-evaluation of patient's condition and review of old charts    I assumed direction of critical care for this patient from another provider in my specialty: no      Care discussed with: admitting provider      Care discussed with comment:  Sarahi Kwok             ED Course  ED Course as of 11/08/23 1450   Wed Nov 08, 2023   0619 I told the patient we will take good care of him.  My suspicion is that the patient is in atrial fibrillation quite often.  This does not appear to be new for him.  Heart rate is in the 120s.  I have  ordered Cardizem.  We will be gentle with his kidneys given the history of CKD.  I have no labs or comparison.  All of the patient's Cardiologic care has been at [facility].  Patient does appear to be fluid overloaded clinically.  Anticipate his chest x-ray will show edema or effusion.  He certainly has edema peripherally.  Will order nitro paste in addition to Cardizem.  We will get chest x-ray and labs.  I anticipate the patient will need to come into the hospital and I have shared this with him.  I told him we would take good care of him.  He continued to apologize for being here and I told him to stop worrying about anything other than getting better.  Final impression and plan following completion of work-up. [JUSTIN]   0701 Have reevaluated the patient twice.  Most recently, I saw the patient approximate 7 AM Eastern time.  Heart rate is down to 95-1 05 and still irregularly irregular.  Patient is feeling much better.  Cardizem has been provided.  Labs are starting to come back.  Patient resting comfortably and speaking in complete sentences. [JUSTIN]   0727 Labs are starting to come back and look concerning as expected.  BNP is markedly elevated at 35,000 with no comparison.  White count high at 12,000.  Marked lab derangement across the CMP with a creatinine of 2.17, hyponatremia 126 and hypochloremia of 85.  Transaminase elevation noted in addition to hyperbilirubinemia 2.9.  Patient continues to look much better and tells me he feels better as well.  We have to be gentle fluid rehydration even with the patient's blood pressure slightly low.  I have started by removing Nitropaste.  We will keep the Cardizem on board.  Current heart rate 105.  I talked to the patient on his abnormal labs.  I have added an ammonia and a second troponin.  We will be contacting the hospitalist, Dr. Stern for admission for marked lab derangement, medication noncompliance, generalized deconditioning, medication management and  Cardiologic consultation. [JUSTIN]      ED Course User Index  [JUSTIN] Remington Crawley MD     Recent Results (from the past 24 hour(s))   Comprehensive Metabolic Panel    Collection Time: 11/08/23  5:51 AM    Specimen: Blood   Result Value Ref Range    Glucose 132 (H) 65 - 99 mg/dL    BUN 87 (H) 6 - 20 mg/dL    Creatinine 2.17 (H) 0.76 - 1.27 mg/dL    Sodium 126 (L) 136 - 145 mmol/L    Potassium 4.4 3.5 - 5.2 mmol/L    Chloride 85 (L) 98 - 107 mmol/L    CO2 23.0 22.0 - 29.0 mmol/L    Calcium 9.3 8.6 - 10.5 mg/dL    Total Protein 6.1 6.0 - 8.5 g/dL    Albumin 3.3 (L) 3.5 - 5.2 g/dL    ALT (SGPT) 145 (H) 1 - 41 U/L    AST (SGOT) 67 (H) 1 - 40 U/L    Alkaline Phosphatase 203 (H) 39 - 117 U/L    Total Bilirubin 2.9 (H) 0.0 - 1.2 mg/dL    Globulin 2.8 gm/dL    A/G Ratio 1.2 g/dL    BUN/Creatinine Ratio 40.1 (H) 7.0 - 25.0    Anion Gap 18.0 (H) 5.0 - 15.0 mmol/L    eGFR 36.0 (L) >60.0 mL/min/1.73   BNP    Collection Time: 11/08/23  5:51 AM    Specimen: Blood   Result Value Ref Range    proBNP 35,697.0 (H) 0.0 - 900.0 pg/mL   Single High Sensitivity Troponin T    Collection Time: 11/08/23  5:51 AM    Specimen: Blood   Result Value Ref Range    HS Troponin T 69 (C) <22 ng/L   Green Top (Gel)    Collection Time: 11/08/23  5:51 AM   Result Value Ref Range    Extra Tube Hold for add-ons.    Lavender Top    Collection Time: 11/08/23  5:51 AM   Result Value Ref Range    Extra Tube hold for add-on    Gold Top - SST    Collection Time: 11/08/23  5:51 AM   Result Value Ref Range    Extra Tube Hold for add-ons.    Gray Top    Collection Time: 11/08/23  5:51 AM   Result Value Ref Range    Extra Tube Hold for add-ons.    Light Blue Top    Collection Time: 11/08/23  5:51 AM   Result Value Ref Range    Extra Tube Hold for add-ons.    CBC Auto Differential    Collection Time: 11/08/23  5:51 AM    Specimen: Blood   Result Value Ref Range    WBC 12.86 (H) 3.40 - 10.80 10*3/mm3    RBC 4.72 4.14 - 5.80 10*6/mm3    Hemoglobin 12.9 (L) 13.0 - 17.7  g/dL    Hematocrit 39.3 37.5 - 51.0 %    MCV 83.3 79.0 - 97.0 fL    MCH 27.3 26.6 - 33.0 pg    MCHC 32.8 31.5 - 35.7 g/dL    RDW 14.7 12.3 - 15.4 %    RDW-SD 43.8 37.0 - 54.0 fl    MPV 11.7 6.0 - 12.0 fL    Platelets 398 140 - 450 10*3/mm3    Neutrophil % 86.8 (H) 42.7 - 76.0 %    Lymphocyte % 8.5 (L) 19.6 - 45.3 %    Monocyte % 4.0 (L) 5.0 - 12.0 %    Eosinophil % 0.1 (L) 0.3 - 6.2 %    Basophil % 0.1 0.0 - 1.5 %    Immature Grans % 0.5 0.0 - 0.5 %    Neutrophils, Absolute 11.17 (H) 1.70 - 7.00 10*3/mm3    Lymphocytes, Absolute 1.09 0.70 - 3.10 10*3/mm3    Monocytes, Absolute 0.52 0.10 - 0.90 10*3/mm3    Eosinophils, Absolute 0.01 0.00 - 0.40 10*3/mm3    Basophils, Absolute 0.01 0.00 - 0.20 10*3/mm3    Immature Grans, Absolute 0.06 (H) 0.00 - 0.05 10*3/mm3    nRBC 2.4 (H) 0.0 - 0.2 /100 WBC   Hepatitis Panel, Acute    Collection Time: 11/08/23  5:51 AM    Specimen: Blood   Result Value Ref Range    Hepatitis B Surface Ag Non-Reactive Non-Reactive    Hep A IgM Non-Reactive Non-Reactive    Hep B C IgM Non-Reactive Non-Reactive    Hepatitis C Ab Non-Reactive Non-Reactive   ECG 12 Lead ED Triage Standing Order; SOA    Collection Time: 11/08/23  5:55 AM   Result Value Ref Range    QT Interval 364 ms    QTC Interval 559 ms   High Sensitivity Troponin T 2Hr    Collection Time: 11/08/23  8:04 AM    Specimen: Blood   Result Value Ref Range    HS Troponin T 60 (C) <22 ng/L    Troponin T Delta -9 (L) >=-4 - <+4 ng/L   Ammonia    Collection Time: 11/08/23  8:04 AM    Specimen: Blood   Result Value Ref Range    Ammonia 32 16 - 60 umol/L   Magnesium    Collection Time: 11/08/23  8:04 AM    Specimen: Blood   Result Value Ref Range    Magnesium 2.5 1.6 - 2.6 mg/dL   Protein, Urine, Random - Urine, Clean Catch    Collection Time: 11/08/23  1:33 PM    Specimen: Urine, Clean Catch   Result Value Ref Range    Total Protein, Urine 8.6 mg/dL   Sodium, Urine, Random - Urine, Clean Catch    Collection Time: 11/08/23  1:33 PM    Specimen:  Urine, Clean Catch   Result Value Ref Range    Sodium, Urine <20 mmol/L   Adult Transthoracic Echo Complete W/ Cont if Necessary Per Protocol    Collection Time: 11/08/23  2:22 PM   Result Value Ref Range    EF(MOD-bp) 8.7 %    LVIDd 5.9 cm    LVIDs 5.0 cm    IVSd 0.90 cm    LVPWd 0.90 cm    FS 15.3 %    IVS/LVPW 1.00 cm    ESV(cubed) 125.0 ml    EDV(cubed) 205.4 ml    LV mass(C)d 209.6 grams    LVOT area 3.1 cm2    LVOT diam 2.00 cm    EDV(MOD-sp2) 234.0 ml    EDV(MOD-sp4) 219.0 ml    ESV(MOD-sp2) 220.0 ml    ESV(MOD-sp4) 189.0 ml    SV(MOD-sp2) 14.0 ml    SV(MOD-sp4) 30.0 ml    EF(MOD-sp2) 6.0 %    EF(MOD-sp4) 13.7 %    MV E max morris 130.0 cm/sec    MV dec time 0.11 sec    LA ESV Index (BP) 54.5 ml/m2    Med Peak E' Morris 4.6 cm/sec    Lat Peak E' Morris 16.0 cm/sec    Avg E/e' ratio 12.62     SV(LVOT) 27.9 ml    RV Base 4.7 cm    RV Mid 4.1 cm    RV Length 7.0 cm    TAPSE (>1.6) 2.46 cm    RV S' 8.1 cm/sec    LA dimension (2D)  4.9 cm    LV V1 max 63.3 cm/sec    LV V1 max PG 1.60 mmHg    LV V1 mean PG 1.00 mmHg    LV V1 VTI 8.9 cm    Ao pk morris 84.2 cm/sec    Ao max PG 2.8 mmHg    Ao mean PG 2.00 mmHg    Ao V2 VTI 12.2 cm    ROULA(I,D) 2.29 cm2    MV max PG 7.5 mmHg    MV mean PG 3.0 mmHg    MV V2 VTI 13.4 cm    MV P1/2t 39.7 msec    MVA(P1/2t) 5.5 cm2    MVA(VTI) 2.08 cm2    MV dec slope 967.0 cm/sec2    TR max morris 190.0 cm/sec    TR max PG 14.8 mmHg    RVSP(TR) 17.8 mmHg    RAP systole 3.0 mmHg    PA acc time 0.07 sec    Ao root diam 3.0 cm     Note: In addition to lab results from this visit, the labs listed above may include labs taken at another facility or during a different encounter within the last 24 hours. Please correlate lab times with ED admission and discharge times for further clarification of the services performed during this visit.    CT Abdomen Pelvis Without Contrast   Final Result   Impression:   1.No acute abnormality identified within the abdomen or pelvis.   2.Heterogeneous hypoattenuation of the  "liver may be related to differential fatty infiltration. Further evaluation is limited on this noncontrast study. No biliary dilation is seen.   3.Intermediate density within the gallbladder may indicate the presence of stones or sludge. The gallbladder is somewhat indistinct but nondistended. If patient has right upper quadrant pain, further evaluation with right upper quadrant ultrasound may be    considered.    4.Right lower lobe airspace disease is concerning for infiltrate. Please correlate clinically.   5.Additional findings as detailed above.      Electronically Signed: Kirk Saleem MD     11/8/2023 10:10 AM EST     Workstation ID: [id number]      XR Chest 1 View   Final Result   Impression:   Mild pulmonary edema pattern with significant cardiomegaly. No significant effusion.            Electronically Signed: Violet Hi MD     11/8/2023 6:08 AM EST     Workstation ID: [id number]       Renal Limited    (Results Pending)     Vitals:    11/08/23 1115 11/08/23 1130 11/08/23 1145 11/08/23 1150   BP: 98/76 123/89 95/68    BP Location:  Right arm Left arm    Patient Position:   Sitting    Pulse: (!) 125  115    Resp:   20    Temp:   97 °F (36.1 °C) 95 °F (35 °C)   TempSrc:   Oral Oral   SpO2: 97%  96%    Weight:   86 kg (189 lb 8 oz) 82.5 kg (181 lb 14.4 oz)   Height:   180.3 cm (71\")      Medications   sodium chloride 0.9 % flush 10 mL (has no administration in time range)   sodium chloride 0.9 % flush 10 mL (10 mL Intravenous Given 11/8/23 1433)   sodium chloride 0.9 % flush 10 mL (has no administration in time range)   sodium chloride 0.9 % infusion 40 mL (has no administration in time range)   sennosides-docusate (PERICOLACE) 8.6-50 MG per tablet 2 tablet (2 tablets Oral Given 11/8/23 1423)     And   polyethylene glycol (MIRALAX) packet 17 g (has no administration in time range)     And   bisacodyl (DULCOLAX) EC tablet 5 mg (has no administration in time range)     And   bisacodyl (DULCOLAX) suppository 10 " mg (has no administration in time range)   nitroglycerin (NITROSTAT) SL tablet 0.4 mg (has no administration in time range)   acetaminophen (TYLENOL) tablet 650 mg (has no administration in time range)     Or   acetaminophen (TYLENOL) 160 MG/5ML oral solution 650 mg (has no administration in time range)     Or   acetaminophen (TYLENOL) suppository 650 mg (has no administration in time range)   ondansetron (ZOFRAN) tablet 4 mg (has no administration in time range)     Or   ondansetron (ZOFRAN) injection 4 mg (has no administration in time range)   amiodarone 150 mg in 100 mL D5W (loading dose) (150 mg Intravenous New Bag 11/8/23 1418)     Followed by   amiodarone 360 mg in 200 mL D5W infusion (1 mg/min Intravenous New Bag 11/8/23 1433)     Followed by   amiodarone 360 mg in 200 mL D5W infusion (has no administration in time range)   Pharmacy Consult (has no administration in time range)   apixaban (ELIQUIS) tablet 5 mg (5 mg Oral Given 11/8/23 1423)   furosemide (LASIX) 500 mg in 50mL infusion (5 mg/hr Intravenous New Bag 11/8/23 1423)   Pharmacy Consult - MTM (has no administration in time range)   influenza vac split quad (FLUZONE,FLUARIX,AFLURIA,FLULAVAL) injection 0.5 mL (has no administration in time range)   nitroglycerin (NITROSTAT) ointment 1 inch (1 inch Topical Given 11/8/23 0630)   dilTIAZem (CARDIZEM) injection 10 mg (10 mg Intravenous Given 11/8/23 0630)   furosemide (LASIX) injection 40 mg (40 mg Intravenous Given 11/8/23 0947)   digoxin (LANOXIN) injection 500 mcg (500 mcg Intravenous Given 11/8/23 1422)     ECG/EMG Results (last 24 hours)       Procedure Component Value Units Date/Time    ECG 12 Lead ED Triage Standing Order; SOA [[id number]] Collected: 11/08/23 0555     Updated: 11/08/23 1413     QT Interval 364 ms      QTC Interval 559 ms     Narrative:      Test Reason : ED Triage Standing Order~  Blood Pressure :   */*   mmHG  Vent. Rate : 142 BPM     Atrial Rate : 141 BPM     P-R Int :   * ms           QRS Dur : 146 ms      QT Int : 364 ms       P-R-T Axes :   * -51 115 degrees     QTc Int : 559 ms    Undetermined rhythm  Left axis deviation  Left bundle branch block  Abnormal ECG  No previous ECGs available  Confirmed by JOANNA ASH MD ([id number]) on 11/8/2023 2:12:42 PM    Referred By: EDMD           Confirmed By: JOANNA ASH MD    Adult Transthoracic Echo Complete W/ Cont if Necessary Per Protocol [[id number]] Resulted: 11/08/23 1442     Updated: 11/08/23 1448     EF(MOD-bp) 8.7 %      LVIDd 5.9 cm      LVIDs 5.0 cm      IVSd 0.90 cm      LVPWd 0.90 cm      FS 15.3 %      IVS/LVPW 1.00 cm      ESV(cubed) 125.0 ml      EDV(cubed) 205.4 ml      LV mass(C)d 209.6 grams      LVOT area 3.1 cm2      LVOT diam 2.00 cm      EDV(MOD-sp2) 234.0 ml      EDV(MOD-sp4) 219.0 ml      ESV(MOD-sp2) 220.0 ml      ESV(MOD-sp4) 189.0 ml      SV(MOD-sp2) 14.0 ml      SV(MOD-sp4) 30.0 ml      EF(MOD-sp2) 6.0 %      EF(MOD-sp4) 13.7 %      MV E max morris 130.0 cm/sec      MV dec time 0.11 sec      LA ESV Index (BP) 54.5 ml/m2      Med Peak E' Morris 4.6 cm/sec      Lat Peak E' Morris 16.0 cm/sec      Avg E/e' ratio 12.62     SV(LVOT) 27.9 ml      RV Base 4.7 cm      RV Mid 4.1 cm      RV Length 7.0 cm      TAPSE (>1.6) 2.46 cm      RV S' 8.1 cm/sec      LA dimension (2D)  4.9 cm      LV V1 max 63.3 cm/sec      LV V1 max PG 1.60 mmHg      LV V1 mean PG 1.00 mmHg      LV V1 VTI 8.9 cm      Ao pk morris 84.2 cm/sec      Ao max PG 2.8 mmHg      Ao mean PG 2.00 mmHg      Ao V2 VTI 12.2 cm      ROULA(I,D) 2.29 cm2      MV max PG 7.5 mmHg      MV mean PG 3.0 mmHg      MV V2 VTI 13.4 cm      MV P1/2t 39.7 msec      MVA(P1/2t) 5.5 cm2      MVA(VTI) 2.08 cm2      MV dec slope 967.0 cm/sec2      TR max morris 190.0 cm/sec      TR max PG 14.8 mmHg      RVSP(TR) 17.8 mmHg      RAP systole 3.0 mmHg      PA acc time 0.07 sec      Ao root diam 3.0 cm     Narrative:        Left ventricular ejection fraction appears to be less than 20%.    consider limited  contrasted echo to assess for LV thrombus    The left ventricular cavity is dilated.    The right ventricular cavity is mildly dilated.    The left atrial cavity is mildly dilated.    Left atrial volume is moderately increased.    severe MR present    Estimated right ventricular systolic pressure from tricuspid   regurgitation is normal (<35 mmHg).            ECG 12 Lead ED Triage Standing Order; SOA   Final Result   Test Reason : ED Triage Standing Order~   Blood Pressure :   */*   mmHG   Vent. Rate : 142 BPM     Atrial Rate : 141 BPM      P-R Int :   * ms          QRS Dur : 146 ms       QT Int : 364 ms       P-R-T Axes :   * -51 115 degrees      QTc Int : 559 ms      Undetermined rhythm   Left axis deviation   Left bundle branch block   Abnormal ECG   No previous ECGs available   Confirmed by JOANNA ASH MD ([id number]) on 11/8/2023 2:12:42 PM      Referred By: KEVIN           Confirmed By: JOANNA ASH MD                                                  Medical Decision Making  Certainly must consider CHF exacerbation, acute coronary syndrome, atrial fibrillation, atrial flutter and consequences of these.  Must consider pulmonary embolus, DVT as well.  I am concerned about the patient's medication compliance.  I do not believe that cardioversion here would be reasonable given the history.  We will keep a close eye on the patient, get his heart rate slowed down with Cardizem.  Decrease preload with nitroglycerin.  I am worried about Lasix given the history of kidney failure and will get labs to assess.    Problems Addressed:  Acute on chronic systolic congestive heart failure: complicated acute illness or injury  Acute pulmonary edema: complicated acute illness or injury  Atrial fibrillation with RVR: complicated acute illness or injury  Chronic atrial fibrillation: complicated acute illness or injury  Elevated brain natriuretic peptide (BNP) level: complicated acute illness or injury  Elevated serum creatinine:  complicated acute illness or injury  Hyperbilirubinemia: complicated acute illness or injury  Hypochloremia: complicated acute illness or injury  Hyponatremia: complicated acute illness or injury  Physical deconditioning: complicated acute illness or injury    Amount and/or Complexity of Data Reviewed  Labs: ordered. Decision-making details documented in ED Course.  Radiology: ordered. Decision-making details documented in ED Course.  ECG/medicine tests: ordered. Decision-making details documented in ED Course.    Risk  OTC drugs.  Prescription drug management.  Decision regarding hospitalization.        Final diagnoses:   Acute on chronic systolic congestive heart failure   Chronic atrial fibrillation   Hyperbilirubinemia   Atrial fibrillation with RVR   Hyponatremia   Hypochloremia   Elevated serum creatinine   Physical deconditioning   Elevated brain natriuretic peptide (BNP) level   Acute pulmonary edema   Leukocytosis, unspecified type   Elevated transaminase level       ED Disposition  ED Disposition       ED Disposition   Decision to Admit    Condition   --    Comment   Level of Care: Telemetry [5]   Diagnosis: Acute exacerbation of congestive heart failure [009140]   Admitting Physician: MOE MARRERO [[id number]]   Certification: I Certify That Inpatient Hospital Services Are Medically Necessary For Greater Than 2 Midnights                 No follow-up provider specified.       Medication List      No changes were made to your prescriptions during this visit.            Remington Crawley MD  11/08/23 1258

## 2023-11-08 NOTE — CONSULTS
[facility]  Renal Consult Note    Sundar Reeder  1970  [id number]    Date of Admit:  11/8/2023    Date of Consult: 11/8/2023    Requesting Provider: Dr. Hess    Evaluating Physician: Johnson Galicia MD    Reason for Consultation: Elevated creatinine with hyponatremia    Chief Complaint: SOB    History of present illness:    Patient is a 53 y.o.  Yr old male with multiple underlying medical problems including CHF, CKD and atrial fibrillation.  Per report patient is poorly compliant with medications.  Patient comes in with increased edema and shortness of breath.  Was found to be in A-fib RVR on evaluation in ER.  Patient was started on Cardizem for rate control along with nitroglycerin.  Patient admitted for further work-up.  Nephrology consulted to assist with renal failure.  Patient reports he may have seen a kidney doctor many years ago but is not had any medical care for quite some times.  Patient reports he had previously worked at [employer] but noticed increased weakness about a year ago.  Stopped working at that time but never fully recovered.  Patient reports increased swelling about a week or so ago including scrotal edema which prompted evaluation.  Edema remains significant.  Patient was given diuretics this morning.  Urine output starting to improve.      Past Medical History  History reviewed. No pertinent past medical history.    History reviewed. No pertinent surgical history.    Allergies:  Allergies   Allergen Reactions    Sulfa Antibiotics Unknown - High Severity       Medication:   See electronic record    Soc Hx:   Social History     Socioeconomic History    Marital status: Single   Tobacco Use    Smoking status: Never    Smokeless tobacco: Never   Vaping Use    Vaping Use: Never used   Substance and Sexual Activity    Alcohol use: Never    Drug use: Never    Sexual activity: Defer       Fam Hx:  No congenital renal disease      Review of Systems:  Full  "review of systems reviewed and are as above  In HPI or per admitting H&P,otherwise negative for acute complaints    Physical Exam:   Vital Signs   Blood pressure 95/68, pulse 115, temperature 95 °F (35 °C), temperature source Oral, resp. rate 20, height 180.3 cm (71\"), weight 82.5 kg (181 lb 14.4 oz), SpO2 96%.  No intake/output data recorded.    GENERAL: WD WM NAD.  NEURO: Awake and alert, oriented. No focal deficit  PSYCHIATRIC: NMA. Cooperative with PE  EYE: PE, no icterus, no conjunctivitis  ENT: ommm, dentition intact,  Hearing intact  NECK: Supple , No JVD discernable,  Trachea midline  CV: RRR; + edema  LUNGS:  Quiet,  Nonlabored resp.  Symmetrical expansion  ABDOMEN: Soft, NTND.  : no palp bladder, no olivarez + scrotal edema  SKIN: Warm and dry without     Laboratory Data  Results from last 7 days   Lab Units 11/08/23  0551   HEMOGLOBIN g/dL 12.9*   HEMATOCRIT % 39.3     Results from last 7 days   Lab Units 11/08/23  0551   SODIUM mmol/L 126*   POTASSIUM mmol/L 4.4   CHLORIDE mmol/L 85*   CO2 mmol/L 23.0   BUN mg/dL 87*   CREATININE mg/dL 2.17*   CALCIUM mg/dL 9.3   ALBUMIN g/dL 3.3*         Results from last 7 days   Lab Units 11/08/23  0551   ALK PHOS U/L 203*   BILIRUBIN mg/dL 2.9*   ALT (SGPT) U/L 145*   AST (SGOT) U/L 67*         Estimated Creatinine Clearance: 47 mL/min (A) (by C-G formula based on SCr of 2.17 mg/dL (H)).  No results found for: \"CREATININEUR\", \"PROTEINUR\", \"NAUR\", \"UREAUR\"    A/P:      ARF: Creatinine elevated at 2.2.  Unsure of baseline.  Monitor for now.  Continue supportive care.  Strict I's and O's.    CKD: Unsure of baseline creatinine.    HTN: Blood pressure low on Cardizem for rate control.  Support blood pressure for now.    Hyponatremia: Sodium 126 on arrival.  We will get repeat labs.    CHF: Continue diuresis.  Strict I's and O's.  Cardiology evaluating.    Thank you consulting us on Sundar Reeder who is of high risk and complexity.  We will follow along closely    Johnson" Fernando Galicia MD  11/8/2023  12:25 EST

## 2023-11-09 NOTE — PROGRESS NOTES
[facility] Medicine Services  PROGRESS NOTE    Patient Name: Sundar Reeder  : 1970  MRN: [id number]    Date of Admission: 2023  Primary Care Physician: Provider, No Known    Subjective   Subjective     CC:  F/U CHF, A fib    HPI:  Patient seen and examined. No issues overnight. Legs were sore this AM so compression wraps removed. Patient feels much better.       Objective   Objective     Vital Signs:   Temp:  [95 °F (35 °C)-97 °F (36.1 °C)] 96.9 °F (36.1 °C)  Heart Rate:  [] 96  Resp:  [16-20] 16  BP: ()/(58-81) 108/79     Physical Exam:  Constitutional: No acute distress, awake, alert, pleasant   HENT: NCAT, mucous membranes moist  Respiratory: Decreased in bases bilaterally, respiratory effort normal RA  Cardiovascular: Irregularly irregular, no murmurs, rubs, or gallops  Gastrointestinal: Positive bowel sounds, soft, nontender, nondistended  Musculoskeletal: 2-3+ pitting edema LE/feet   Psychiatric: Appropriate affect, cooperative  Neurologic: Oriented x 3, speech clear  Skin: posterior leg excoriations     Results Reviewed:  LAB RESULTS:      Lab 23  0551   WBC 9.92 12.86*   HEMOGLOBIN 13.1 12.9*   HEMATOCRIT 39.7 39.3   PLATELETS 334 398   NEUTROS ABS  --  11.17*   IMMATURE GRANS (ABS)  --  0.06*   LYMPHS ABS  --  1.09   MONOS ABS  --  0.52   EOS ABS  --  0.01   MCV 82.0 83.3         Lab 23  0804 23  0551   SODIUM 130*  --  126*   POTASSIUM 3.8  --  4.4   CHLORIDE 87*  --  85*   CO2 27.0  --  23.0   ANION GAP 16.0*  --  18.0*   BUN 93*  --  87*   CREATININE 2.46*  --  2.17*   EGFR 30.9*  --  36.0*   GLUCOSE 108*  --  132*   CALCIUM 9.2  --  9.3   MAGNESIUM 2.3 2.5  --    HEMOGLOBIN A1C 6.00*  --   --    TSH 2.550  --   --          Lab 23  0551   TOTAL PROTEIN 6.1 6.1   ALBUMIN 3.4* 3.3*   GLOBULIN 2.7 2.8   ALT (SGPT) 122* 145*   AST (SGOT) 52* 67*   BILIRUBIN 2.3* 2.9*   ALK PHOS 202* 203*          Lab 11/08/23  0804 11/08/23  0551   PROBNP  --  35,697.0*   HSTROP T 60* 69*         Lab 11/09/23  0410   CHOLESTEROL 81   LDL CHOL 45   HDL CHOL 22*   TRIGLYCERIDES 60             Brief Urine Lab Results  (Last result in the past 365 days)        Color   Clarity   Blood   Leuk Est   Nitrite   Protein   CREAT   Urine HCG        11/08/23 1333             49.6                 Microbiology Results Abnormal       None            US Renal Limited    Result Date: 11/8/2023  US RENAL LIMITED Date of Exam: 11/8/2023 4:30 PM EST Indication: STUART. Comparison: CT abdomen pelvis 11/8/2023 Technique: Grayscale and color Doppler ultrasound evaluation of the kidneys and urinary bladder was performed. Findings: No hydronephrosis. Right kidney measures 11.4 x 4.2 x 4.9 cm. Left kidney measures 8.3 x 3.6 x 3.7 cm. The cortex thickness on the right is normal. There is increased echogenicity of the renal cortex on the right. The left kidney is small. The renal cortex is thinned and certain areas. There are bladder has minimal distention. Wall thickening could be due to incomplete distention.     Impression: Impression: No hydronephrosis. Small left kidney. Increased echogenicity renal cortex bilaterally with mild thinning on the left. Findings most consistent with chronic renal disease. Electronically Signed: La Whitfield MD  11/8/2023 8:32 PM EST  Workstation ID: [id number]    Adult Transthoracic Echo Complete W/ Cont if Necessary Per Protocol    Result Date: 11/8/2023    Left ventricular ejection fraction appears to be less than 20%.  consider limited contrasted echo to assess for LV thrombus   The left ventricular cavity is dilated.   The right ventricular cavity is mildly dilated.   The left atrial cavity is mildly dilated.   Left atrial volume is moderately increased.   severe MR present   Estimated right ventricular systolic pressure from tricuspid regurgitation is normal (<35 mmHg).     CT Abdomen Pelvis Without  Contrast    Result Date: 11/8/2023  CT ABDOMEN PELVIS WO CONTRAST Date of Exam: 11/8/2023 10:00 AM EST Indication: elevated AST/ALT, elevated bilirubin. Comparison: None available. Technique: Axial CT images were obtained of the abdomen and pelvis without the administration of contrast. Reconstructed coronal and sagittal images were also obtained. Automated exposure control and iterative construction methods were used. Findings: Liver: The liver is grossly unremarkable in morphology and heterogeneously decreased in attenuation. Evaluation for focal liver lesions is limited due to lack of IV contrast administration. No biliary dilation is seen. Gallbladder: Intermediate density within the gallbladder may be due to presence of stones or sludge. Gallbladder wall is mildly indistinct. Gallbladder does not appear distended. Pancreas: Unremarkable. Spleen: Unremarkable. Adrenal glands: Unremarkable. Genitourinary tract: Atrophic left kidney. Right kidney appears unremarkable. No hydronephrosis is seen. No urinary tract calculi are seen. The ureters appear unremarkable. Urinary bladder and prostate gland appear unremarkable. Gastrointestinal tract: Visualized hollow viscera demonstrate no focal lesion. There is no evidence of bowel obstruction. Appendix: No findings to suggest acute appendicitis. Other findings: Mild pelvic free fluid. No free air is identified. No pathologically enlarged lymph nodes are seen. Mild vascular calcifications are present. Bones and soft tissues: No acute or suspicious osseous or soft tissue lesion is identified. Sclerotic focus within the right iliac bone may represent a bone island. Large left and small right hydroceles are noted. Lung bases: Right lower lobe airspace opacity is concerning for infiltrate. Suggestion of cardiomegaly.     Impression: Impression: 1.No acute abnormality identified within the abdomen or pelvis. 2.Heterogeneous hypoattenuation of the liver may be related to  differential fatty infiltration. Further evaluation is limited on this noncontrast study. No biliary dilation is seen. 3.Intermediate density within the gallbladder may indicate the presence of stones or sludge. The gallbladder is somewhat indistinct but nondistended. If patient has right upper quadrant pain, further evaluation with right upper quadrant ultrasound may be  considered. 4.Right lower lobe airspace disease is concerning for infiltrate. Please correlate clinically. 5.Additional findings as detailed above. Electronically Signed: Kirk Saleem MD  11/8/2023 10:10 AM EST  Workstation ID: [id number]    XR Chest 1 View    Result Date: 11/8/2023  XR CHEST 1 VW Date of Exam: 11/8/2023 5:57 AM EST Indication: SOA triage protocol Comparison: None available. Findings: There is indistinctness of the pulmonary vasculature. The heart appears significantly enlarged. No significant pleural effusion identified. No significant consolidation. No pneumothorax. No acute osseous abnormality.     Impression: Impression: Mild pulmonary edema pattern with significant cardiomegaly. No significant effusion. Electronically Signed: Violet Hi MD  11/8/2023 6:08 AM EST  Workstation ID: [id number]     Results for orders placed during the hospital encounter of 11/08/23    Adult Transthoracic Echo Complete W/ Cont if Necessary Per Protocol    Interpretation Summary    Left ventricular ejection fraction appears to be less than 20%.  consider limited contrasted echo to assess for LV thrombus    The left ventricular cavity is dilated.    The right ventricular cavity is mildly dilated.    The left atrial cavity is mildly dilated.    Left atrial volume is moderately increased.    severe MR present    Estimated right ventricular systolic pressure from tricuspid regurgitation is normal (<35 mmHg).      Current medications:  Scheduled Meds:amiodarone, 200 mg, Oral, Q8H   Followed by  [START ON 11/16/2023] amiodarone, 200 mg, Oral, Q12H    Followed by  [START ON 11/30/2023] amiodarone, 200 mg, Oral, Daily  apixaban, 5 mg, Oral, Q12H  pharmacy consult - MTM, , Does not apply, Daily  senna-docusate sodium, 2 tablet, Oral, BID  sodium chloride, 10 mL, Intravenous, Q12H      Continuous Infusions:amiodarone, 0.5 mg/min, Last Rate: 0.5 mg/min (11/09/23 0916)  furosemide, 5 mg/hr, Last Rate: 5 mg/hr (11/08/23 1423)      PRN Meds:.  acetaminophen **OR** acetaminophen **OR** acetaminophen    senna-docusate sodium **AND** polyethylene glycol **AND** bisacodyl **AND** bisacodyl    influenza vaccine    nitroglycerin    ondansetron **OR** ondansetron    sodium chloride    sodium chloride    sodium chloride    Assessment & Plan   Assessment & Plan     Active Hospital Problems    Diagnosis  POA    **Hyponatremia [E87.1]  Yes    STUART (acute kidney injury) [N17.9]  Yes    Acute on chronic heart failure with preserved ejection fraction [I50.33]  Yes    Atrial fibrillation with RVR [I48.91]  Yes    Essential hypertension [I10]  Yes    Anxiety associated with depression [F41.8]  Yes    Hypoalbuminemia [E88.09]  Yes    Hyperbilirubinemia [E80.6]  Yes    Elevated liver enzymes [R74.8]  Yes    Acute exacerbation of congestive heart failure [I50.9]  Yes      Resolved Hospital Problems   No resolved problems to display.        Brief Hospital Course to date:  Sundar Reeder is a 53 y.o. male with a past medical history of HTN, atrial fibrillation on Eliquis, CHF and CKD presented to the ED complaining of elevated heart rate, worsened lower extremity swelling and scrotal swelling with shortness of breath.     This patient's problems and plans were partially entered by my partner and updated as appropriate by me 11/09/23.   All problems are new to me today     Atrial Fibrillation with RVR  -S/P Digoxin 500mcg x1   -Transition IV Amiodarone to PO  -Continue Eliquis  -NPO after midnight for ECV  -Cards follows     CHFrEF  LE Swelling/skin breakdown  -Continue IV Lasix gtt  -Strict  I's/O's, daily weights  -Echo reviewed: LVEF <20%, severe MR  -Did not tolerate compression wraps due to pain     STUART  -Nephrology following, appreciate assistance   -Renal US negative for hydro. Chronic renal dz      Hyponatremia  -Improving      Hyperbilirubinemia  Elevated AST/ALT  - CT with fatty liver, GB stones/sludge  -Hepatitis panel negative  -Trending down  -Likely due to congestive hepatopathy     HTN  - holding home Lisinopril in setting of STUART  - cardiac meds as above     Depression  - start antidepressant inpatient once sodium and heart failure are improved    Expected Discharge Location and Transportation: Home vs Rehab pending clinical course   Expected Discharge   Expected Discharge Date: 11/14/2023; Expected Discharge Time:      DVT prophylaxis:  Medical DVT prophylaxis orders are present.     AM-PAC 6 Clicks Score (PT): 21 (11/09/23 0800)    CODE STATUS:   Code Status and Medical Interventions:   Ordered at: 11/08/23 0839     Level Of Support Discussed With:    Patient     Code Status (Patient has no pulse and is not breathing):    CPR (Attempt to Resuscitate)     Medical Interventions (Patient has pulse or is breathing):    Full Support       Sheron Robertson,   11/09/23

## 2023-11-09 NOTE — PROGRESS NOTES
[place] [facility]  PROGRESS NOTE    Sundar Reeder   [id number]   1970    LOS: 1 day .  Date of Admission: 11/8/2023  Date of Service: 11/09/23    Primary Care Physician: Provider, No Known    Chief Complaint: f/u Acute systolic heart failure, afib with RVR, STUART, elevated BNP, LBBB    Subjective      Patient sitting up in bed. He states that he is feeling much better today and in good spirits. His breathing is improved, his abdomen is not as tight, and his legs are not bothering him as much. They tried to wrap his legs, but he was unable to tolerate it. Denies chest pain. Rate is controlled. No other complaints.     ROS  All systems have been reviewed and are negative with the exception of those mentioned in the HPI and problem list above.     Objective   Vital Sign Min/Max for last 24 hours  Temp  Min: 95 °F (35 °C)  Max: 97 °F (36.1 °C)   BP  Min: 87/69  Max: 123/89   Pulse  Min: 96  Max: 125   Resp  Min: 16  Max: 20   SpO2  Min: 92 %  Max: 100 %   No data recorded   Weight  Min: 82.5 kg (181 lb 14.4 oz)  Max: 86 kg (189 lb 8 oz)     Physical Exam:  GENERAL: Alert, cooperative, in no acute distress.   HEENT: Normocephalic, no jugular venous distention  HEART: irregular irregular, and no murmurs, gallops, or rubs.   LUNGS: Clear to auscultation bilaterally. No wheezing, rales or rhonchi. Room air  NEUROLOGIC: No focal abnormalities involving strength or sensation are noted.   EXTREMITIES: No clubbing, cyanosis. 2+ pitting edema bilaterally and legs are weeping     Results:  Results from last 7 days   Lab Units 11/09/23 0410 11/08/23  0551   WBC 10*3/mm3 9.92 12.86*   HEMOGLOBIN g/dL 13.1 12.9*   HEMATOCRIT % 39.7 39.3   PLATELETS 10*3/mm3 334 398     Results from last 7 days   Lab Units 11/09/23 0410 11/08/23  0551   SODIUM mmol/L 130* 126*   POTASSIUM mmol/L 3.8 4.4   CHLORIDE mmol/L 87* 85*   CO2 mmol/L 27.0 23.0   BUN mg/dL 93* 87*   CREATININE mg/dL 2.46* 2.17*   GLUCOSE  mg/dL 108* 132*      Lab Results   Component Value Date    CHOL 81 11/09/2023    TRIG 60 11/09/2023    HDL 22 (L) 11/09/2023    LDL 45 11/09/2023    AST 52 (H) 11/09/2023     (H) 11/09/2023     Results from last 7 days   Lab Units 11/09/23  0410   HEMOGLOBIN A1C % 6.00*     Results from last 7 days   Lab Units 11/09/23  0410   CHOLESTEROL mg/dL 81   TRIGLYCERIDES mg/dL 60   HDL CHOL mg/dL 22*   LDL CHOL mg/dL 45     Results from last 7 days   Lab Units 11/09/23  0410   TSH uIU/mL 2.550             Results from last 7 days   Lab Units 11/08/23  0804 11/08/23  0551   HSTROP T ng/L 60* 69*     Results from last 7 days   Lab Units 11/08/23  0551   PROBNP pg/mL 35,697.0*       Intake/Output Summary (Last 24 hours) at 11/9/2023 0831  Last data filed at 11/8/2023 2330  Gross per 24 hour   Intake 565.26 ml   Output 725 ml   Net -159.74 ml       EKG/TELE: atrial fibrillation    Radiology Data:   US Renal Limited    Result Date: 11/8/2023  Impression: No hydronephrosis. Small left kidney. Increased echogenicity renal cortex bilaterally with mild thinning on the left. Findings most consistent with chronic renal disease. Electronically Signed: La Whitfield MD  11/8/2023 8:32 PM EST  Workstation ID: [id number]    CT Abdomen Pelvis Without Contrast    Result Date: 11/8/2023  Impression: 1.No acute abnormality identified within the abdomen or pelvis. 2.Heterogeneous hypoattenuation of the liver may be related to differential fatty infiltration. Further evaluation is limited on this noncontrast study. No biliary dilation is seen. 3.Intermediate density within the gallbladder may indicate the presence of stones or sludge. The gallbladder is somewhat indistinct but nondistended. If patient has right upper quadrant pain, further evaluation with right upper quadrant ultrasound may be  considered. 4.Right lower lobe airspace disease is concerning for infiltrate. Please correlate clinically. 5.Additional findings as detailed above.  Electronically Signed: Kirk Saleem MD  11/8/2023 10:10 AM EST  Workstation ID: [id number]    XR Chest 1 View    Result Date: 11/8/2023  Impression: Mild pulmonary edema pattern with significant cardiomegaly. No significant effusion. Electronically Signed: Violet Hi MD  11/8/2023 6:08 AM EST  Workstation ID: [id number]    Results for orders placed during the hospital encounter of 11/08/23    Adult Transthoracic Echo Complete W/ Cont if Necessary Per Protocol    Interpretation Summary    Left ventricular ejection fraction appears to be less than 20%.  consider limited contrasted echo to assess for LV thrombus    The left ventricular cavity is dilated.    The right ventricular cavity is mildly dilated.    The left atrial cavity is mildly dilated.    Left atrial volume is moderately increased.    severe MR present    Estimated right ventricular systolic pressure from tricuspid regurgitation is normal (<35 mmHg).     Current Medications:  apixaban, 5 mg, Oral, Q12H  pharmacy consult - [place], , Does not apply, Daily  senna-docusate sodium, 2 tablet, Oral, BID  sodium chloride, 10 mL, Intravenous, Q12H      amiodarone, 0.5 mg/min, Last Rate: 0.5 mg/min (11/08/23 2053)  furosemide, 5 mg/hr, Last Rate: 5 mg/hr (11/08/23 1423)      Assessment and Plan:   Acute systolic heart failure unknown etiology  ECHO 11/8/23: EF <20%, RV mildly dilated, severe MR  Atrial fibrillation with RVR- anticoagulated with Eliquis  [id number]  STUART   Hyponatremia  LBBB    Plan:  - Continue eliquis, patient has not missed any doses in the last 30 days.   - Cardioversion scheduled for tomorrow at 10am. NPO at midnight. Procedure, risks, and benefits have been discussed with the patient. Patient is able to lie flat for short periods of time.   - Amiodarone protocol. Monitor QTC and liver function.  - Digoxin load . Will defer chronic dosing at this time due to worsening renal function.  - Continue lasix gtt, deferring diuresis to nephrology.  Appreciate their input.   - We will continue to follow.    Electronically signed by NOELLE Boston, 11/09/23, 8:31 AM EST.     Please note that portions of this note were dictated utilizing Dragon dictation.

## 2023-11-09 NOTE — PROGRESS NOTES
"                  Clinical Nutrition   Nutrition Support Assessment  Reason for Visit: MST score 2+, \"Unsure\" unintentional weight loss      Patient Name: Sundar Reeder  YOB: 1970  MRN: [id number]  Date of Encounter: 11/09/23 11:39 EST  Admission date: 11/8/2023    Comments:    Nutrition Assessment   Admission Diagnosis:  Hyponatremia [E87.1]  Acute exacerbation of congestive heart failure [I50.9]      Problem List:    Hyponatremia    STUART (acute kidney injury)    Acute on chronic heart failure with preserved ejection fraction    Atrial fibrillation with RVR    Essential hypertension    Anxiety associated with depression    Hypoalbuminemia    Hyperbilirubinemia    Elevated liver enzymes    Acute exacerbation of congestive heart failure        PMH:   He  has no past medical history on file.    PSH:  He  has no past surgical history on file.    Applicable Nutrition Concerns:   Skin:  Oral:  GI:    Applicable Interval History:         Reported/Observed/Food/Nutrition Related History:     Spoke w/pt at bedside, very pleasant. Pt reports decreased po intake x 2 days PTA, eating <50% of usual 2/2 fluid retention and breathing difficulty. Pt reports doing better now, states he ate well for breakfast this am. Pt reports (dry) UBW of 186-190lbs. Pt declines need for ONS at this time. NKFA.     Anthropometrics     Flowsheet Rows      Flowsheet Row First Filed Value   Admission Height 180.3 cm (71\") Documented at 11/08/2023 0552   Admission Weight 88.5 kg (195 lb) Documented at 11/08/2023 0552          Height: Height: 180.3 cm (71\")  Last Filed Weight: Weight: 82.5 kg (181 lb 14.4 oz) (11/08/23 1150)  Method: Weight Method: Bed scale (bed at zero before PT got to the room)  BMI: BMI (Calculated): 25.4  BMI classification: Overweight: 25.0-29.9kg/m2   IBW:  75kg    UBW:  186-190lbs  Weight change:      Weight      Weight (kg) Weight (lbs) Weight Method   11/8/2023 82.509 kg  181 lb 14.4 oz  Bed scale     85.957 kg  " 189 lb 8 oz  Bed scale     88.451 kg  195 lb  Estimated        Nutrition Focused Physical Exam     Date:     11/9    NFPE completed, patient does not meet criteria for MSA at this time. Some moderate UE muscle wasting, suspect body habitus?    Current Nutrition Prescription   PO: Diet: Regular/House Diet, Fluid Restriction (240 mL/tray) Diets; 1500 mL/day; Texture: Regular Texture (IDDSI 7); Fluid Consistency: Thin (IDDSI 0)  NPO Diet NPO Type: Strict NPO  Oral Nutrition Supplement:   Intake: 75% x 1 meal documented      Nutrition Diagnosis   Date:  11/9            Updated:    Problem No nutrition diagnosis at this time   Etiology    Signs/Symptoms    Status:     Goal:   General: Maintain nutrition  PO: Establish PO  EN/PN: N/A    Nutrition Intervention      Follow treatment progress, Care plan reviewed, Encourage intake, Supplement offered/refused      Monitoring/Evaluation:   Per protocol, PO intake, Weight      Celia Jones, MS,RD,LD  Time Spent: 15

## 2023-11-09 NOTE — PLAN OF CARE
Goal Outcome Evaluation:      Pt is a pleasant 53-y/o that is A&Ox4, on RA. Pt has good urine output on furosemide drip. Reports to this RN that he is already feeling improvements in ability to breath and activity. Pt able to walk from bed to bathroom with stand-by assist and walker. [place] not appear t have got much sleep during this RN's shift.           Problem: Adult Inpatient Plan of Care  Goal: Plan of Care Review  Outcome: Ongoing, Progressing  Goal: Patient-Specific Goal (Individualized)  Outcome: Ongoing, Progressing  Goal: Absence of Hospital-Acquired Illness or Injury  Outcome: Ongoing, Progressing  Intervention: Identify and Manage Fall Risk  Recent Flowsheet Documentation  Taken 11/9/2023 0400 by Aminta Jensen RN  Safety Promotion/Fall Prevention:   activity supervised   assistive device/personal items within reach   clutter free environment maintained   fall prevention program maintained   room organization consistent   safety round/check completed  Taken 11/9/2023 0200 by Aminta Jensen, ANANYA  Safety Promotion/Fall Prevention:   activity supervised   assistive device/personal items within reach   clutter free environment maintained   fall prevention program maintained   nonskid shoes/slippers when out of bed   room organization consistent   safety round/check completed  Taken 11/9/2023 0000 by Aminta Jensen RN  Safety Promotion/Fall Prevention:   activity supervised   assistive device/personal items within reach   clutter free environment maintained   fall prevention program maintained   room organization consistent   safety round/check completed  Taken 11/8/2023 2200 by Aminta Jensen, ANANYA  Safety Promotion/Fall Prevention:   activity supervised   assistive device/personal items within reach   clutter free environment maintained   fall prevention program maintained   nonskid shoes/slippers when out of bed   room organization consistent   safety round/check completed  Taken 11/8/2023 2050 by Aminta Jensen,  RN  Safety Promotion/Fall Prevention:   activity supervised   assistive device/personal items within reach   clutter free environment maintained   fall prevention program maintained   room organization consistent   safety round/check completed  Intervention: Prevent Skin Injury  Recent Flowsheet Documentation  Taken 11/9/2023 0400 by Aminta Jensen RN  Body Position: position changed independently  Skin Protection:   adhesive use limited   incontinence pads utilized  Taken 11/9/2023 0200 by Aminta Jensen RN  Body Position: position changed independently  Skin Protection:   adhesive use limited   incontinence pads utilized  Taken 11/9/2023 0000 by Aminta Jensen RN  Body Position: position changed independently  Skin Protection:   adhesive use limited   incontinence pads utilized  Taken 11/8/2023 2200 by Aminta Jensen RN  Body Position: position changed independently  Skin Protection:   adhesive use limited   incontinence pads utilized  Taken 11/8/2023 2050 by Aminta Jensen RN  Body Position: position changed independently  Skin Protection:   adhesive use limited   incontinence pads utilized  Intervention: Prevent and Manage VTE (Venous Thromboembolism) Risk  Recent Flowsheet Documentation  Taken 11/9/2023 0400 by Aminta Jensen RN  Activity Management:   ambulated to bathroom   back to bed  Taken 11/9/2023 0200 by Aminta Jensen RN  Activity Management: activity encouraged  Taken 11/9/2023 0000 by Aminta Jensen RN  Activity Management: activity encouraged  Taken 11/8/2023 2200 by Aminta Jensen RN  Activity Management: activity encouraged  Taken 11/8/2023 2050 by Aminta Jensen RN  Activity Management: activity encouraged  VTE Prevention/Management: (see MAR) other (see comments)  Intervention: Prevent Infection  Recent Flowsheet Documentation  Taken 11/9/2023 0400 by Aminta Jensen RN  Infection Prevention: environmental surveillance performed  Taken 11/9/2023 0200 by Aminta Jensen RN  Infection Prevention:  environmental surveillance performed  Taken 11/9/2023 0000 by Aminta Jensen RN  Infection Prevention: environmental surveillance performed  Taken 11/8/2023 2200 by Aminta Jensen RN  Infection Prevention: environmental surveillance performed  Taken 11/8/2023 2050 by Aminta Jensen RN  Infection Prevention: environmental surveillance performed  Goal: Optimal Comfort and Wellbeing  Outcome: Ongoing, Progressing  Intervention: Provide Person-Centered Care  Recent Flowsheet Documentation  Taken 11/8/2023 2050 by Aminta Jensen RN  Trust Relationship/Rapport:   care explained   choices provided  Goal: Readiness for Transition of Care  Outcome: Ongoing, Progressing     Problem: Asthma Comorbidity  Goal: Maintenance of Asthma Control  Outcome: Ongoing, Progressing  Intervention: Maintain Asthma Symptom Control  Recent Flowsheet Documentation  Taken 11/9/2023 0400 by Aminta Jensen RN  Medication Review/Management: medications reviewed  Taken 11/9/2023 0200 by Aminta Jensen RN  Medication Review/Management: medications reviewed  Taken 11/9/2023 0000 by Aminta Jensen RN  Medication Review/Management: medications reviewed  Taken 11/8/2023 2200 by Aminta Jensen RN  Medication Review/Management: medications reviewed  Taken 11/8/2023 2050 by Aminta Jensen RN  Medication Review/Management: medications reviewed     Problem: Behavioral Health Comorbidity  Goal: Maintenance of Behavioral Health Symptom Control  Outcome: Ongoing, Progressing  Intervention: Maintain Behavioral Health Symptom Control  Recent Flowsheet Documentation  Taken 11/9/2023 0400 by Aminta Jensen RN  Medication Review/Management: medications reviewed  Taken 11/9/2023 0200 by Aminta Jensen RN  Medication Review/Management: medications reviewed  Taken 11/9/2023 0000 by Aminta Jensen RN  Medication Review/Management: medications reviewed  Taken 11/8/2023 2200 by Aminta Jensne RN  Medication Review/Management: medications reviewed  Taken 11/8/2023 2050 by  Aminta Jensen RN  Medication Review/Management: medications reviewed     Problem: COPD (Chronic Obstructive Pulmonary Disease) Comorbidity  Goal: Maintenance of COPD Symptom Control  Outcome: Ongoing, Progressing  Intervention: Maintain COPD-Symptom Control  Recent Flowsheet Documentation  Taken 11/9/2023 0400 by Aminta Jensen RN  Medication Review/Management: medications reviewed  Taken 11/9/2023 0200 by Aminta Jensen RN  Medication Review/Management: medications reviewed  Taken 11/9/2023 0000 by Aminta Jensen RN  Medication Review/Management: medications reviewed  Taken 11/8/2023 2200 by Aminta Jensen RN  Medication Review/Management: medications reviewed  Taken 11/8/2023 2050 by Aminta Jensen RN  Medication Review/Management: medications reviewed     Problem: Diabetes Comorbidity  Goal: Blood Glucose Level Within Targeted Range  Outcome: Ongoing, Progressing     Problem: Heart Failure Comorbidity  Goal: Maintenance of Heart Failure Symptom Control  Outcome: Ongoing, Progressing  Intervention: Maintain Heart Failure-Management  Recent Flowsheet Documentation  Taken 11/9/2023 0400 by Aminta Jensen RN  Medication Review/Management: medications reviewed  Taken 11/9/2023 0200 by Aminta Jensen RN  Medication Review/Management: medications reviewed  Taken 11/9/2023 0000 by Aminta Jensen RN  Medication Review/Management: medications reviewed  Taken 11/8/2023 2200 by Aminta Jensen RN  Medication Review/Management: medications reviewed  Taken 11/8/2023 2050 by Aminta Jensen RN  Medication Review/Management: medications reviewed     Problem: Hypertension Comorbidity  Goal: Blood Pressure in Desired Range  Outcome: Ongoing, Progressing  Intervention: Maintain Blood Pressure Management  Recent Flowsheet Documentation  Taken 11/9/2023 0400 by Aminta Jensen RN  Medication Review/Management: medications reviewed  Taken 11/9/2023 0200 by Aminta Jensen RN  Medication Review/Management: medications reviewed  Taken  11/9/2023 0000 by Aminta Jensen RN  Medication Review/Management: medications reviewed  Taken 11/8/2023 2200 by Aminta Jensen RN  Medication Review/Management: medications reviewed  Taken 11/8/2023 2050 by Aminta Jensen RN  Medication Review/Management: medications reviewed     Problem: Obstructive Sleep Apnea Risk or Actual Comorbidity Management  Goal: Unobstructed Breathing During Sleep  Outcome: Ongoing, Progressing     Problem: Osteoarthritis Comorbidity  Goal: Maintenance of Osteoarthritis Symptom Control  Outcome: Ongoing, Progressing  Intervention: Maintain Osteoarthritis Symptom Control  Recent Flowsheet Documentation  Taken 11/9/2023 0400 by Aminta Jensen RN  Activity Management:   ambulated to bathroom   back to bed  Assistive Device Utilized: walker  Medication Review/Management: medications reviewed  Taken 11/9/2023 0200 by Aminta Jensen RN  Activity Management: activity encouraged  Medication Review/Management: medications reviewed  Taken 11/9/2023 0000 by Aminta Jensen RN  Activity Management: activity encouraged  Medication Review/Management: medications reviewed  Taken 11/8/2023 2200 by Aminta Jensen RN  Activity Management: activity encouraged  Medication Review/Management: medications reviewed  Taken 11/8/2023 2050 by Aminta Jensen RN  Activity Management: activity encouraged  Assistive Device Utilized: walker  Medication Review/Management: medications reviewed     Problem: Pain Chronic (Persistent) (Comorbidity Management)  Goal: Acceptable Pain Control and Functional Ability  Outcome: Ongoing, Progressing  Intervention: Manage Persistent Pain  Recent Flowsheet Documentation  Taken 11/9/2023 0400 by Aminta Jensen RN  Medication Review/Management: medications reviewed  Taken 11/9/2023 0200 by Aminta Jensen RN  Medication Review/Management: medications reviewed  Taken 11/9/2023 0000 by Aminta Jensen RN  Medication Review/Management: medications reviewed  Taken 11/8/2023 2200 by Camila  ANANYA Lemos  Medication Review/Management: medications reviewed  Taken 11/8/2023 2050 by Aminta Jensen RN  Medication Review/Management: medications reviewed  Intervention: Optimize Psychosocial Wellbeing  Recent Flowsheet Documentation  Taken 11/8/2023 2050 by Aminta Jensen RN  Diversional Activities: television  Family/Support System Care: presence promoted     Problem: Seizure Disorder Comorbidity  Goal: Maintenance of Seizure Control  Outcome: Ongoing, Progressing     Problem: Skin Injury Risk Increased  Goal: Skin Health and Integrity  Outcome: Ongoing, Progressing  Intervention: Optimize Skin Protection  Recent Flowsheet Documentation  Taken 11/9/2023 0400 by Aminta Jensen RN  Pressure Reduction Techniques: frequent weight shift encouraged  Head of Bed (HOB) Positioning: HOB elevated  Pressure Reduction Devices:   positioning supports utilized   pressure-redistributing mattress utilized  Skin Protection:   adhesive use limited   incontinence pads utilized  Taken 11/9/2023 0200 by Aminta Jensen RN  Pressure Reduction Techniques: frequent weight shift encouraged  Head of Bed (HOB) Positioning: HOB elevated  Pressure Reduction Devices: positioning supports utilized  Skin Protection:   adhesive use limited   incontinence pads utilized  Taken 11/9/2023 0000 by Aminta Jensen RN  Pressure Reduction Techniques: frequent weight shift encouraged  Head of Bed (HOB) Positioning: HOB elevated  Pressure Reduction Devices: positioning supports utilized  Skin Protection:   adhesive use limited   incontinence pads utilized  Taken 11/8/2023 2200 by Aminta Jensen RN  Pressure Reduction Techniques: frequent weight shift encouraged  Head of Bed (HOB) Positioning: HOB elevated  Pressure Reduction Devices: positioning supports utilized  Skin Protection:   adhesive use limited   incontinence pads utilized  Taken 11/8/2023 2050 by Aminta Jensen RN  Pressure Reduction Techniques: frequent weight shift encouraged  Head of Bed  (HOB) Positioning: [facility] elevated  Pressure Reduction Devices: positioning supports utilized  Skin Protection:   adhesive use limited   incontinence pads utilized

## 2023-11-09 NOTE — THERAPY EVALUATION
Patient Name: Sundar Reeder  : 1970    MRN: [id number]                              Today's Date: 2023       Admit Date: 2023    Visit Dx:     ICD-10-CM ICD-9-CM   1. Acute on chronic systolic congestive heart failure  I50.23 428.23     428.0   2. Chronic atrial fibrillation  I48.20 427.31   3. Hyperbilirubinemia  E80.6 782.4   4. Atrial fibrillation with RVR  I48.91 427.31   5. Hyponatremia  E87.1 276.1   6. Hypochloremia  E87.8 276.9   7. Elevated serum creatinine  R79.89 790.99   8. Physical deconditioning  R53.81 799.3   9. Elevated brain natriuretic peptide (BNP) level  R79.89 790.99   10. Acute pulmonary edema  J81.0 518.4   11. Leukocytosis, unspecified type  D72.829 288.60   12. Elevated transaminase level  R74.01 790.4     Patient Active Problem List   Diagnosis    Hyponatremia    STUART (acute kidney injury)    Acute on chronic heart failure with preserved ejection fraction    Atrial fibrillation with RVR    Essential hypertension    Anxiety associated with depression    Hypoalbuminemia    Hyperbilirubinemia    Elevated liver enzymes    Acute exacerbation of congestive heart failure     History reviewed. No pertinent past medical history.  History reviewed. No pertinent surgical history.   General Information       Row Name 23 163          Physical Therapy Time and Intention    Document Type evaluation  -KW     Mode of Treatment individual therapy;physical therapy  -       Row Name 23 163          General Information    Patient Profile Reviewed yes  -KW     Prior Level of Function independent:;all household mobility;community mobility;transfer;gait;bed mobility  -KW     Barriers to Rehab none identified  -       Row Name 23 163          Cognition    Orientation Status (Cognition) oriented x 4  -KW               User Key  (r) = Recorded By, (t) = Taken By, (c) = Cosigned By      Initials Name Provider Type    Medina Bridges PT Physical Therapist                    Mobility       Row Name 11/09/23 1630          Bed Mobility    Bed Mobility supine-sit-supine  -KW     Supine-Sit-Supine [place] (Bed Mobility) independent  -KW       Row Name 11/09/23 1630          Bed-Chair Transfer    Bed-Chair [place] (Transfers) independent  -KW       Row Name 11/09/23 1630          Sit-Stand Transfer    Sit-Stand [place] (Transfers) independent  -KW       Row Name 11/09/23 1630          Gait/Stairs (Locomotion)    [place] Level (Gait) independent  -KW     Distance in Feet (Gait) 400  -KW               User Key  (r) = Recorded By, (t) = Taken By, (c) = Cosigned By      Initials Name Provider Type    KW Medina Fernandez PT Physical Therapist                   Obj/Interventions       Row Name 11/09/23 1630          Strength Comprehensive (MMT)    General Manual Muscle Testing (MMT) Assessment no strength deficits identified  -KW       Row Name 11/09/23 1630          Balance    Balance Assessment sitting static balance;sitting dynamic balance;sit to stand dynamic balance;standing static balance;standing dynamic balance  -KW     Static Sitting Balance independent  -KW     Dynamic Sitting Balance independent  -KW     Position, Sitting Balance unsupported;sitting edge of bed  -KW     Sit to Stand Dynamic Balance independent  -KW     Static Standing Balance independent  -KW     Dynamic Standing Balance independent  -KW     Position/Device Used, Standing Balance unsupported  -KW     Balance Interventions sitting;standing;sit to stand;narrowed base of support;single limb stance;tandem standing;eyes closed during activity  -KW               User Key  (r) = Recorded By, (t) = Taken By, (c) = Cosigned By      Initials Name Provider Type    KW Medina Fernandez PT Physical Therapist                   Goals/Plan    No documentation.                  Clinical Impression       Row Name 11/09/23 1630          Pain    Pretreatment Pain Rating 0/10 - no pain  -KW       Row Name  11/09/23 1630          Plan of Care Review    Plan of Care Reviewed With patient  -KW     Progress no change  -KW     Outcome Evaluation PT eval complete. Patient demonstrating complete independence with bed mobility, transfers and gait. Patient demonstrates adequate balance and activity tolerance. Patient does not demonstrate need for skilled PT services at this time as he is completely independent. PT will sign off at this time. Patient provided education about importance of continued mobility to prevent deconditioning. If any changes noted please re-consult PT.  -KW       Row Name 11/09/23 1630          Therapy Assessment/Plan (PT)    Criteria for Skilled Interventions Met (PT) no;no problems identified which require skilled intervention  -KW     Therapy Frequency (PT) evaluation only  -KW       Row Name 11/09/23 1630          Vital Signs    Pre Systolic BP Rehab 87  -KW     Pre Treatment Diastolic BP 63  -KW     Pretreatment Heart Rate (beats/min) 107  -KW     Pre SpO2 (%) 96  -KW       Row Name 11/09/23 1630          Positioning and Restraints    Pre-Treatment Position in bed  -KW     Post Treatment Position bed  -KW     In Bed sitting EOB  -KW               User Key  (r) = Recorded By, (t) = Taken By, (c) = Cosigned By      Initials Name Provider Type    Medina Bridges, PT Physical Therapist                   Outcome Measures       Row Name 11/09/23 1630 11/09/23 0800       How much help from another person do you currently need...    Turning from your back to your side while in flat bed without using bedrails? 4  -KW 4  -SW    Moving from lying on back to sitting on the side of a flat bed without bedrails? 4  -KW 4  -SW    Moving to and from a bed to a chair (including a wheelchair)? 4  -KW 3  -SW    Standing up from a chair using your arms (e.g., wheelchair, bedside chair)? 4  -KW 4  -SW    Climbing 3-5 steps with a railing? 4  -KW 3  -SW    To walk in hospital room? 4  -KW 3  -SW    AM-PAC 6 Clicks  Score (PT) 24  -KW 21  -SW    Highest level of mobility 8 --> Walked 250 feet or more  -KW 6 --> Walked 10 steps or more  -SW      Row Name 11/09/23 1630 11/09/23 0913       Functional Assessment    Outcome Measure Options AM-PAC 6 Clicks Basic Mobility (PT)  -KW AM-PAC 6 Clicks Daily Activity (OT)  -AC      Row Name 11/09/23 1630          Zeng Balance Scale    Sitting to Standing 4  -KW     Standing Unsupported 4  -KW     Sitting with Back Unsupported but Feet Supported on Floor or on Stool 4  -KW     Standing to Sitting 4  -KW     Transfers 4  -KW     Standing Unsupported with Eyes Closed 4  -KW     Standing Unsupported with Feet Together 4  -KW     Reaching Forward with Outstretched Arm While Standing 3  -KW      Object From the Floor From a Standing Position 4  -KW     Turning to Look Behind Over Left and Right Shoulders While Standing 4  -KW     Turn 360 Degrees 4  -KW     Place Alternate Foot on Step or Stool While Standing Unsupported 4  -KW     Standing Unsupported with One Foot in Front 4  -KW     Standing on One Leg 4  -KW     Zeng Total Score 55  -KW               User Key  (r) = Recorded By, (t) = Taken By, (c) = Cosigned By      Initials Name Provider Type    Charla Lin, OT Occupational Therapist    Medina Bridges, PT Physical Therapist    Cherelle Cnatu, RN Registered Nurse                                 Physical Therapy Education       Title: PT OT SLP Therapies (In Progress)       Topic: Physical Therapy (Not Started)       Point: Mobility training (Not Started)       Learner Progress:  Not documented in this visit.              Point: Home exercise program (Not Started)       Learner Progress:  Not documented in this visit.              Point: Body mechanics (Not Started)       Learner Progress:  Not documented in this visit.              Point: Precautions (Not Started)       Learner Progress:  Not documented in this visit.                                  PT  Recommendation and Plan     Plan of Care Reviewed With: patient  Progress: no change  Outcome Evaluation: PT eval complete. Patient demonstrating complete independence with bed mobility, transfers and gait. Patient demonstrates adequate balance and activity tolerance. Patient does not demonstrate need for skilled PT services at this time as he is completely independent. PT will sign off at this time. Patient provided education about importance of continued mobility to prevent deconditioning. If any changes noted please re-consult PT.     Time Calculation:   PT Evaluation Complexity  History, PT Evaluation Complexity: 3 or more personal factors and/or comorbidities  Examination of Body Systems (PT Eval Complexity): total of 3 or more elements  Clinical Presentation (PT Evaluation Complexity): stable  Clinical Decision Making (PT Evaluation Complexity): low complexity  Overall Complexity (PT Evaluation Complexity): low complexity     PT Charges       Row Name 11/09/23 1630             Time Calculation    Start Time 1630  -KW      PT Received On 11/09/23  -KW         Untimed Charges    PT Eval/Re-eval Minutes 48  -KW         Total Minutes    Untimed Charges Total Minutes 48  -KW       Total Minutes 48  -KW                User Key  (r) = Recorded By, (t) = Taken By, (c) = Cosigned By      Initials Name Provider Type    Medina Bridges PT Physical Therapist                  Therapy Charges for Today       Code Description Service Date Service Provider Modifiers Qty    93283429711 HC PT EVAL LOW COMPLEXITY 4 11/9/2023 Medina Fernandez PT GP 1            PT G-Codes  Outcome Measure Options: AM-PAC 6 Clicks Basic Mobility (PT)  AM-PAC 6 Clicks Score (PT): 24  AM-PAC 6 Clicks Score (OT): 24  Zeng Total Score: 55  PT Discharge Summary  Anticipated Discharge Disposition (PT): home    Medina Fernandez PT  11/9/2023

## 2023-11-09 NOTE — PROGRESS NOTES
"   LOS: 1 day    Patient Care Team:  Provider, No Known as PCP - General    Chief Complaint: Shortness of breath    Subjective   Consulted for STUART and hyponatremia.  53-year-old with history of CKD, CHF, atrial fibrillation, poor compliant with medication, increasing edema and shortness of breath, presented with atrial fibs RVR on evaluation in ED started on nitroglycerin and Cardizem.  Renal consulted for STUART and hyponatremia.  Serum sodium was 126  Interval History:   Chart reviewed.  Overall condition improving.  Edema is better.  Electrolytes.  Renal function slightly worse.    Review of Systems:   Lower extremity edema.  Mild shortness of breath which is improved.  Patient denies shortness of breath, chest pain, dysuria, hematuria, nausea, vomiting.      Objective     Vital Sign Min/Max for last 24 hours  Temp  Min: 95.5 °F (35.3 °C)  Max: 96.9 °F (36.1 °C)   BP  Min: 87/69  Max: 108/79   Pulse  Min: 96  Max: 96   Resp  Min: 16  Max: 18   SpO2  Min: 92 %  Max: 98 %   No data recorded   No data recorded     Flowsheet Rows      Flowsheet Row First Filed Value   Admission Height 180.3 cm (71\") Documented at 11/08/2023 0552   Admission Weight 88.5 kg (195 lb) Documented at 11/08/2023 0552            I/O this shift:  In: -   Out: 250 [Urine:250]  I/O last 3 completed shifts:  In: 565.3 [P.O.:360; I.V.:203.8; IV Piggyback:1.5]  Out: 725 [Urine:725]    Physical Exam:  General Appearance: Alert, oriented, no obvious distress.   male.  Eyes: PER, EOMI.  Neck: Supple no JVD.  Lungs: Clear auscultation, no rales rhonchi's, equal chest movement, nonlabored.  Heart: No gallop, murmur, rub, RRR.  Abdomen: Soft, nontender, positive bowel sounds, no organomegaly.  Extremities: Positive lower extremity edema edema, no cyanosis.  Neuro: No focal deficit, moving all extremities, alert oriented X 3  : No suprapubic fullness or tenderness  Skin: Lower extremity skin changes are noted.  WBC WBC   Date Value Ref Range " "Status   11/09/2023 9.92 3.40 - 10.80 10*3/mm3 Final   11/08/2023 12.86 (H) 3.40 - 10.80 10*3/mm3 Final      HGB Hemoglobin   Date Value Ref Range Status   11/09/2023 13.1 13.0 - 17.7 g/dL Final   11/08/2023 12.9 (L) 13.0 - 17.7 g/dL Final      HCT Hematocrit   Date Value Ref Range Status   11/09/2023 39.7 37.5 - 51.0 % Final   11/08/2023 39.3 37.5 - 51.0 % Final      Platlets No results found for: \"LABPLAT\"   MCV MCV   Date Value Ref Range Status   11/09/2023 82.0 79.0 - 97.0 fL Final   11/08/2023 83.3 79.0 - 97.0 fL Final          Sodium Sodium   Date Value Ref Range Status   11/09/2023 130 (L) 136 - 145 mmol/L Final   11/08/2023 126 (L) 136 - 145 mmol/L Final      Potassium Potassium   Date Value Ref Range Status   11/09/2023 3.8 3.5 - 5.2 mmol/L Final   11/08/2023 4.4 3.5 - 5.2 mmol/L Final     Comment:     Slight hemolysis detected by analyzer. Result may be falsely elevated.      Chloride Chloride   Date Value Ref Range Status   11/09/2023 87 (L) 98 - 107 mmol/L Final   11/08/2023 85 (L) 98 - 107 mmol/L Final      CO2 CO2   Date Value Ref Range Status   11/09/2023 27.0 22.0 - 29.0 mmol/L Final   11/08/2023 23.0 22.0 - 29.0 mmol/L Final      BUN BUN   Date Value Ref Range Status   11/09/2023 93 (H) 6 - 20 mg/dL Final   11/08/2023 87 (H) 6 - 20 mg/dL Final      Creatinine Creatinine   Date Value Ref Range Status   11/09/2023 2.46 (H) 0.76 - 1.27 mg/dL Final   11/08/2023 2.17 (H) 0.76 - 1.27 mg/dL Final      Calcium Calcium   Date Value Ref Range Status   11/09/2023 9.2 8.6 - 10.5 mg/dL Final   11/08/2023 9.3 8.6 - 10.5 mg/dL Final      PO4 No results found for: \"CAPO4\"   Albumin Albumin   Date Value Ref Range Status   11/09/2023 3.4 (L) 3.5 - 5.2 g/dL Final   11/08/2023 3.3 (L) 3.5 - 5.2 g/dL Final      Magnesium Magnesium   Date Value Ref Range Status   11/09/2023 2.3 1.6 - 2.6 mg/dL Final   11/08/2023 2.5 1.6 - 2.6 mg/dL Final      Uric Acid No results found for: \"URICACID\"        Results Review:     I " reviewed the patient's new clinical results.    amiodarone, 200 mg, Oral, Q8H   Followed by  [START ON 11/16/2023] amiodarone, 200 mg, Oral, Q12H   Followed by  [START ON 11/30/2023] amiodarone, 200 mg, Oral, Daily  apixaban, 5 mg, Oral, Q12H  pharmacy consult - [facility], , Does not apply, Daily  senna-docusate sodium, 2 tablet, Oral, BID  sodium chloride, 10 mL, Intravenous, Q12H      amiodarone, 0.5 mg/min, Last Rate: 0.5 mg/min (11/09/23 0916)  furosemide, 5 mg/hr, Last Rate: 5 mg/hr (11/08/23 1423)        Medication Review: Reviewed    Assessment & Plan       Hyponatremia    STUART (acute kidney injury)    Acute on chronic heart failure with preserved ejection fraction    Atrial fibrillation with RVR    Essential hypertension    Anxiety associated with depression    Hypoalbuminemia    Hyperbilirubinemia    Elevated liver enzymes    Acute exacerbation of congestive heart failure    1.  STUART: Yesterday creatinine 2.2, baseline unknown.  Acute rise in creatinine most likely secondary to prerenal state.  FENa less than 1%.  2 CKD: Unsure of the baseline.  3.  Hyponatremia: Sodium 126 on arrival.  4.  CHF: Cardiology evaluating.  5.  Volume overload:  6.  Hypertension: Blood pressure low on Cardizem drip yesterday.  7.  Abnormal liver enzymes: Likely secondary to low blood pressure    Recommendations:  STUART most likely prerenal.  Hypotension noted, renal function improved with improvement in cardiac output with control heart rate.  Heart rate control on amiodarone  Serum sodium improved 130 from yesterday.  Normal TSH, magnesium level.  Creatinine remains high at 2.46 with a BUN 93 likely with low blood pressure and heart failure.  Urine labs include U protein 8.6, U creatinine 49.6, U sodium<20  Edema most likely cardiac.  Patient has no proteinuria.  High risk complex patient with multiple medical problems.  Continue with the diuretics.  Juan Blanco MD  11/09/23  12:00 EST

## 2023-11-09 NOTE — THERAPY DISCHARGE NOTE
Acute Care - Occupational Therapy Discharge  [facility]    Patient Name: Sundar Reeder  : 1970    MRN: [id number]                              Today's Date: 2023       Admit Date: 2023    Visit Dx:     ICD-10-CM ICD-9-CM   1. Acute on chronic systolic congestive heart failure  I50.23 428.23     428.0   2. Chronic atrial fibrillation  I48.20 427.31   3. Hyperbilirubinemia  E80.6 782.4   4. Atrial fibrillation with RVR  I48.91 427.31   5. Hyponatremia  E87.1 276.1   6. Hypochloremia  E87.8 276.9   7. Elevated serum creatinine  R79.89 790.99   8. Physical deconditioning  R53.81 799.3   9. Elevated brain natriuretic peptide (BNP) level  R79.89 790.99   10. Acute pulmonary edema  J81.0 518.4   11. Leukocytosis, unspecified type  D72.829 288.60   12. Elevated transaminase level  R74.01 790.4     Patient Active Problem List   Diagnosis    Hyponatremia    STUART (acute kidney injury)    Acute on chronic heart failure with preserved ejection fraction    Atrial fibrillation with RVR    Essential hypertension    Anxiety associated with depression    Hypoalbuminemia    Hyperbilirubinemia    Elevated liver enzymes    Acute exacerbation of congestive heart failure     History reviewed. No pertinent past medical history.  History reviewed. No pertinent surgical history.   General Information       Row Name 23 0735          OT Time and Intention    Document Type evaluation  -AC     Mode of Treatment occupational therapy  -AC       Row Name 23 0735          General Information    Patient Profile Reviewed yes  -AC     Prior Level of Function independent:;all household mobility;community mobility;home management;driving;shopping  -AC     Existing Precautions/Restrictions --  LE edema/weeping  -AC     Barriers to Rehab medically complex  CHF, afib  -AC       Row Name 23 0735          Occupational Profile    Environmental Supports and Barriers (Occupational Profile) tub shower, does not use DME, but has  RW available if needed  -       Row Name 11/09/23 0735          Living Environment    People in Home alone  supportive sister and brother-in-law if needed  -       Row Name 11/09/23 0735          Home Main Entrance    Number of Stairs, Main Entrance other (see comments)  ~20  -AC     Stair Railings, Main Entrance railings on both sides of stairs  -       Row Name 11/09/23 0735          Stairs Within Home, Primary    Number of Stairs, Within Home, Primary twelve  -AC     Stair Railings, Within Home, Primary railing on left side (ascending)  -       Row Name 11/09/23 0735          Cognition    Orientation Status (Cognition) oriented x 4  -       Row Name 11/09/23 0735          Safety Issues, Functional Mobility    Impairments Affecting Function (Mobility) other (see comments)  LE edema/weeping  -               User Key  (r) = Recorded By, (t) = Taken By, (c) = Cosigned By      Initials Name Provider Type     Charla Rausch OT Occupational Therapist                   Mobility/ADL's       Row Name 11/09/23 0821          Bed Mobility    Bed Mobility supine-sit;sit-supine  -AC     Supine-Sit [place] (Bed Mobility) modified independence  -AC     Sit-Supine [place] (Bed Mobility) modified independence  -     Assistive Device (Bed Mobility) head of bed elevated  -       Row Name 11/09/23 0821          Transfers    Transfers sit-stand transfer  -       Row Name 11/09/23 0821          Sit-Stand Transfer    Sit-Stand [place] (Transfers) independent  -       Row Name 11/09/23 0821          Functional Mobility    Functional Mobility- Ind. Level independent  -     Functional Mobility- Device other (see comments)  No AD  -AC     Functional Mobility-Distance (Feet) 24  -       Row Name 11/09/23 0821          Activities of Daily Living    BADL Assessment/Intervention lower body dressing;feeding  -       Row Name 11/09/23 0821          Lower Body Dressing Assessment/Training    [place]  Level (Lower Body Dressing) doff;don;socks;independent  -AC     Position (Lower Body Dressing) edge of bed sitting  -AC       Row Name 11/09/23 0821          Self-Feeding Assessment/Training    [place] Level (Feeding) liquids to mouth;independent  -AC     Position (Self-Feeding) edge of bed sitting  -AC               User Key  (r) = Recorded By, (t) = Taken By, (c) = Cosigned By      Initials Name Provider Type    AC Charla Rausch, OT Occupational Therapist                   Obj/Interventions       Row Name 11/09/23 0823          Sensory Assessment (Somatosensory)    Sensory Assessment (Somatosensory) UE sensation intact  -       Row Name 11/09/23 0823          Vision Assessment/Intervention    Visual Impairment/Limitations corrective lenses full-time  -       Row Name 11/09/23 0823          Range of Motion Comprehensive    General Range of Motion bilateral upper extremity ROM WNL  -[facility] Name 11/09/23 0823          Strength Comprehensive (MMT)    Comment, General Manual Muscle Testing (MMT) Assessment BUE grossly 4+/5  -       Row Name 11/09/23 0823          Balance    Balance Assessment sitting static balance;sitting dynamic balance;standing static balance;standing dynamic balance  -AC     Static Sitting Balance independent  -AC     Dynamic Sitting Balance independent  -AC     Position, Sitting Balance unsupported  -AC     Static Standing Balance independent  -AC     Dynamic Standing Balance independent  -AC     Position/Device Used, Standing Balance unsupported  -AC               User Key  (r) = Recorded By, (t) = Taken By, (c) = Cosigned By      Initials Name Provider Type    AC Charla Rausch, OT Occupational Therapist                   Goals/Plan    No documentation.                  Clinical Impression       Row Name 11/09/23 0824          Pain Assessment    Pretreatment Pain Rating 0/10 - no pain  -AC     Posttreatment Pain Rating 0/10 - no pain  -AC     Pre/Posttreatment Pain Comment  reports back of legs hurt at times  -     Pain Intervention(s) Repositioned  -       Row Name 11/09/23 0824          Plan of Care Review    Plan of Care Reviewed With patient  -AC     Outcome Evaluation Pt with LE edema/ weeping, but presents very close to baseline with self care/mobility.  Pt is ind with LBD, Ind to ambulate in room without AD.  No further skilled OT indicated at this time. Recommend home upon d/c.  -       Row Name 11/09/23 0824          Therapy Assessment/Plan (OT)    Criteria for Skilled Therapeutic Interventions Met (OT) no;no problems identified which require skilled intervention  -     Therapy Frequency (OT) evaluation only  -       Row Name 11/09/23 0824          Therapy Plan Review/Discharge Plan (OT)    Anticipated Discharge Disposition (OT) home  -       Row Name 11/09/23 0824          Vital Signs    Pre Systolic BP Rehab 93  -AC     Pre Treatment Diastolic BP 58  -AC     Post Systolic BP Rehab 112  -AC     Post Treatment Diastolic BP 62  -AC     Pretreatment Heart Rate (beats/min) 96  -AC     Posttreatment Heart Rate (beats/min) 113  -AC     Pre SpO2 (%) 98  -AC     O2 Delivery Pre Treatment room air  -AC     O2 Delivery Intra Treatment room air  -AC     Post SpO2 (%) 98  -AC     O2 Delivery Post Treatment room air  -AC     Pre Patient Position Supine  -AC     Post Patient Position Supine  -       Row Name 11/09/23 0824          Positioning and Restraints    Pre-Treatment Position in bed  -AC     Post Treatment Position bed  -AC     In Bed notified nsg;fowlers;call light within reach  -AC               User Key  (r) = Recorded By, (t) = Taken By, (c) = Cosigned By      Initials Name Provider Type    Charla Lin, OT Occupational Therapist                   Outcome Measures       Row Name 11/09/23 0913          How much help from another is currently needed...    Putting on and taking off regular lower body clothing? 4  -AC     Bathing (including washing, rinsing, and  drying) 4  -AC     Toileting (which includes using toilet bed pan or urinal) 4  -AC     Putting on and taking off regular upper body clothing 4  -AC     Taking care of personal grooming (such as brushing teeth) 4  -AC     Eating meals 4  -AC     AM-PAC 6 Clicks Score (OT) 24  -AC       Row Name 11/09/23 0500          How much help from another person do you currently need...    Turning from your back to your side while in flat bed without using bedrails? 4  -RB     Moving from lying on back to sitting on the side of a flat bed without bedrails? 4  -RB     Moving to and from a bed to a chair (including a wheelchair)? 3  -RB     Standing up from a chair using your arms (e.g., wheelchair, bedside chair)? 4  -RB     Climbing 3-5 steps with a railing? 3  -RB     To walk in hospital room? 3  -RB     AM-PAC 6 Clicks Score (PT) 21  -RB     Highest level of mobility 6 --> Walked 10 steps or more  -RB       Row Name 11/09/23 0913          Functional Assessment    Outcome Measure Options AM-PAC 6 Clicks Daily Activity (OT)  -               User Key  (r) = Recorded By, (t) = Taken By, (c) = Cosigned By      Initials Name Provider Type    Charla Lin, OT Occupational Therapist    Aminta Liu, RN Registered Nurse                  Occupational Therapy Education       Title: PT OT SLP Therapies (In Progress)       Topic: Occupational Therapy (In Progress)       Point: ADL training (Done)       Description:   Instruct learner(s) on proper safety adaptation and remediation techniques during self care or transfers.   Instruct in proper use of assistive devices.                  Learning Progress Summary             Patient Acceptance, E, VU by JOSE E at 11/9/2023 0913                         Point: Home exercise program (Not Started)       Description:   Instruct learner(s) on appropriate technique for monitoring, assisting and/or progressing therapeutic exercises/activities.                  Learner Progress:  Not documented  in this visit.              Point: Precautions (Not Started)       Description:   Instruct learner(s) on prescribed precautions during self-care and functional transfers.                  Learner Progress:  Not documented in this visit.              Point: Body mechanics (Not Started)       Description:   Instruct learner(s) on proper positioning and spine alignment during self-care, functional mobility activities and/or exercises.                  Learner Progress:  Not documented in this visit.                              User Key       Initials Effective Dates Name Provider Type Discipline     02/03/23 -  Charla Rausch, OT Occupational Therapist OT                  OT Recommendation and Plan  Therapy Frequency (OT): evaluation only  Plan of Care Review  Plan of Care Reviewed With: patient  Outcome Evaluation: Pt with LE edema/ weeping, but presents very close to baseline with self care/mobility.  Pt is ind with LBD, Ind to ambulate in room without AD.  No further skilled OT indicated at this time. Recommend home upon d/c.  Plan of Care Reviewed With: patient  Outcome Evaluation: Pt with LE edema/ weeping, but presents very close to baseline with self care/mobility.  Pt is ind with LBD, Ind to ambulate in room without AD.  No further skilled OT indicated at this time. Recommend home upon d/c.     Time Calculation:   Evaluation Complexity (OT)  Review Occupational Profile/Medical/Therapy History Complexity: brief/low complexity  Assessment, Occupational Performance/Identification of Deficit Complexity: 1-3 performance deficits  Clinical Decision Making Complexity (OT): problem focused assessment/low complexity  Overall Complexity of Evaluation (OT): low complexity     Time Calculation- OT       Row Name 11/09/23 0735             Time Calculation- OT    OT Start Time 0735  -AC      OT Received On 11/09/23  -AC         Untimed Charges    OT Eval/Re-eval Minutes 50  -AC         Total Minutes    Untimed Charges  Total Minutes 50  -AC       Total Minutes 50  -AC                User Key  (r) = Recorded By, (t) = Taken By, (c) = Cosigned By      Initials Name Provider Type    AC Charla Rausch, OT Occupational Therapist                  Therapy Charges for Today       Code Description Service Date Service Provider Modifiers Qty    [id number]  OT EVAL LOW COMPLEXITY 4 11/9/2023 Charla Rausch OT GO 1               OT Discharge Summary  Anticipated Discharge Disposition (OT): home    Charla Rausch OT  11/9/2023

## 2023-11-09 NOTE — PLAN OF CARE
Problem: Adult Inpatient Plan of Care  Goal: Plan of Care Review  Outcome: Ongoing, Progressing  Goal: Patient-Specific Goal (Individualized)  Outcome: Ongoing, Progressing  Goal: Absence of Hospital-Acquired Illness or Injury  Outcome: Ongoing, Progressing  Intervention: Identify and Manage Fall Risk  Recent Flowsheet Documentation  Taken 11/9/2023 1400 by Cherelle Aleman RN  Safety Promotion/Fall Prevention:   activity supervised   assistive device/personal items within reach   clutter free environment maintained   toileting scheduled   safety round/check completed   room organization consistent  Taken 11/9/2023 1200 by Cherelle Aleman RN  Safety Promotion/Fall Prevention:   activity supervised   assistive device/personal items within reach   clutter free environment maintained   toileting scheduled   safety round/check completed   room organization consistent  Taken 11/9/2023 1000 by Cherelle Aleman RN  Safety Promotion/Fall Prevention:   activity supervised   assistive device/personal items within reach   clutter free environment maintained   toileting scheduled   safety round/check completed   room organization consistent  Taken 11/9/2023 0800 by Cherelle Aleman RN  Safety Promotion/Fall Prevention:   activity supervised   assistive device/personal items within reach   clutter free environment maintained   toileting scheduled   safety round/check completed   room organization consistent  Intervention: Prevent Skin Injury  Recent Flowsheet Documentation  Taken 11/9/2023 1400 by Cherelle Aleman RN  Body Position: position changed independently  Skin Protection:   adhesive use limited   tubing/devices free from skin contact   transparent dressing maintained   skin-to-skin areas padded   skin-to-device areas padded  Taken 11/9/2023 1200 by Cherelle Aleman RN  Body Position: position changed independently  Skin Protection:   adhesive use limited   tubing/devices free from skin contact   transparent  dressing maintained   skin-to-skin areas padded   skin-to-device areas padded  Taken 11/9/2023 1000 by Cherelle Aleman RN  Body Position: position changed independently  Skin Protection:   adhesive use limited   tubing/devices free from skin contact   transparent dressing maintained   skin-to-skin areas padded   skin-to-device areas padded  Taken 11/9/2023 0800 by Cherelle Aleman RN  Body Position: position changed independently  Skin Protection:   adhesive use limited   tubing/devices free from skin contact   transparent dressing maintained   skin-to-skin areas padded   skin-to-device areas padded  Intervention: Prevent and Manage VTE (Venous Thromboembolism) Risk  Recent Flowsheet Documentation  Taken 11/9/2023 1400 by Cherelle Aleman RN  Activity Management: activity encouraged  Taken 11/9/2023 1200 by Cherelle Aleman RN  Activity Management: activity encouraged  Taken 11/9/2023 1000 by Cherelle Aleman RN  Activity Management: activity encouraged  Taken 11/9/2023 0800 by Cherelle Aleman RN  Activity Management: activity encouraged  Goal: Optimal Comfort and Wellbeing  Outcome: Ongoing, Progressing  Intervention: Provide Person-Centered Care  Recent Flowsheet Documentation  Taken 11/9/2023 0800 by Cherelle Aleman RN  Trust Relationship/Rapport: care explained  Goal: Readiness for Transition of Care  Outcome: Ongoing, Progressing     Problem: Asthma Comorbidity  Goal: Maintenance of Asthma Control  Outcome: Ongoing, Progressing     Problem: Behavioral Health Comorbidity  Goal: Maintenance of Behavioral Health Symptom Control  Outcome: Ongoing, Progressing     Problem: COPD (Chronic Obstructive Pulmonary Disease) Comorbidity  Goal: Maintenance of COPD Symptom Control  Outcome: Ongoing, Progressing     Problem: Diabetes Comorbidity  Goal: Blood Glucose Level Within Targeted Range  Outcome: Ongoing, Progressing     Problem: Heart Failure Comorbidity  Goal: Maintenance of Heart Failure Symptom  Control  Outcome: Ongoing, Progressing     Problem: Hypertension Comorbidity  Goal: Blood Pressure in Desired Range  Outcome: Ongoing, Progressing     Problem: Obstructive Sleep Apnea Risk or Actual Comorbidity Management  Goal: Unobstructed Breathing During Sleep  Outcome: Ongoing, Progressing     Problem: Osteoarthritis Comorbidity  Goal: Maintenance of Osteoarthritis Symptom Control  Outcome: Ongoing, Progressing  Intervention: Maintain Osteoarthritis Symptom Control  Recent Flowsheet Documentation  Taken 11/9/2023 1400 by Cherelle Aleman RN  Activity Management: activity encouraged  Taken 11/9/2023 1200 by Cherelle Aleman RN  Activity Management: activity encouraged  Taken 11/9/2023 1000 by Cherelle Aleman RN  Activity Management: activity encouraged  Taken 11/9/2023 0800 by Cherelle Aleman RN  Activity Management: activity encouraged     Problem: Pain Chronic (Persistent) (Comorbidity Management)  Goal: Acceptable Pain Control and Functional Ability  Outcome: Ongoing, Progressing     Problem: Seizure Disorder Comorbidity  Goal: Maintenance of Seizure Control  Outcome: Ongoing, Progressing     Problem: Skin Injury Risk Increased  Goal: Skin Health and Integrity  Outcome: Ongoing, Progressing  Intervention: Optimize Skin Protection  Recent Flowsheet Documentation  Taken 11/9/2023 1400 by Cherelle Aleman RN  Pressure Reduction Techniques: frequent weight shift encouraged  Head of Bed (HOB) Positioning: HOB elevated  Pressure Reduction Devices: positioning supports utilized  Skin Protection:   adhesive use limited   tubing/devices free from skin contact   transparent dressing maintained   skin-to-skin areas padded   skin-to-device areas padded  Taken 11/9/2023 1200 by Cherelle Aleman RN  Pressure Reduction Techniques: frequent weight shift encouraged  Head of Bed (HOB) Positioning: HOB elevated  Pressure Reduction Devices: positioning supports utilized  Skin Protection:   adhesive use limited    tubing/devices free from skin contact   transparent dressing maintained   skin-to-skin areas padded   skin-to-device areas padded  Taken 11/9/2023 1000 by Cherelle Aleman RN  Pressure Reduction Techniques:   frequent weight shift encouraged   positioned off wounds  Head of Bed ([facility]) Positioning: [facility] elevated  Pressure Reduction Devices: positioning supports utilized  Skin Protection:   adhesive use limited   tubing/devices free from skin contact   transparent dressing maintained   skin-to-skin areas padded   skin-to-device areas padded  Taken 11/9/2023 0800 by Cherelle Aleman RN  Pressure Reduction Techniques:   frequent weight shift encouraged   positioned off wounds  Head of Bed ([facility]) Positioning: [facility] elevated  Pressure Reduction Devices: positioning supports utilized  Skin Protection:   adhesive use limited   tubing/devices free from skin contact   transparent dressing maintained   skin-to-skin areas padded   skin-to-device areas padded     Problem: Fall Injury Risk  Goal: Absence of Fall and Fall-Related Injury  Outcome: Ongoing, Progressing  Intervention: Promote Injury-Free Environment  Recent Flowsheet Documentation  Taken 11/9/2023 1400 by Cherelle Aleman RN  Safety Promotion/Fall Prevention:   activity supervised   assistive device/personal items within reach   clutter free environment maintained   toileting scheduled   safety round/check completed   room organization consistent  Taken 11/9/2023 1200 by Cherelle Aleman RN  Safety Promotion/Fall Prevention:   activity supervised   assistive device/personal items within reach   clutter free environment maintained   toileting scheduled   safety round/check completed   room organization consistent  Taken 11/9/2023 1000 by Cherelle Aleman RN  Safety Promotion/Fall Prevention:   activity supervised   assistive device/personal items within reach   clutter free environment maintained   toileting scheduled   safety round/check completed   room organization  consistent  Taken 11/9/2023 0800 by Cherelle Aleman RN  Safety Promotion/Fall Prevention:   activity supervised   assistive device/personal items within reach   clutter free environment maintained   toileting scheduled   safety round/check completed   room organization consistent   Goal Outcome Evaluation:

## 2023-11-09 NOTE — PLAN OF CARE
Goal Outcome Evaluation:  Plan of Care Reviewed With: patient           Outcome Evaluation: Pt with LE edema/ weeping, but presents very close to baseline with self care/mobility.  Pt is ind with LBD, Ind to ambulate in room without AD.  No further skilled OT indicated at this time. Recommend home upon d/c.      Anticipated Discharge Disposition (OT): home

## 2023-11-09 NOTE — CASE MANAGEMENT/SOCIAL WORK
Continued Stay Note   Peg[facility]     Patient Name: Sundar Reeder  MRN: [id number]  Today's Date: 11/9/2023    Admit Date: 11/8/2023    Plan: home   Discharge Plan       Row Name 11/09/23 0746       Plan    Plan home    Patient/Family in Agreement with Plan yes    Plan Comments I met with this patient at the bedside. He indicated that his plan is to discharge home with family to transport. He stated that he has been set up with Medicaid Pending, but will need a PCP set up for him upon discharge. CM will continue to follow.    Final Discharge Disposition Code 01 - home or self-care                   Discharge Codes    No documentation.                 Expected Discharge Date and Time       Expected Discharge Date Expected Discharge Time    Nov 14, 2023               Angie Walter RN

## 2023-11-10 NOTE — PROGRESS NOTES
Patient tearfully expressed gratitude for the care he's received at our facility, specifically from his nurses and from [facility].    headache

## 2023-11-10 NOTE — PROGRESS NOTES
[facility] Medicine Services  PROGRESS NOTE    Patient Name: Sundar Reeder  : 1970  MRN: [id number]    Date of Admission: 2023  Primary Care Physician: Provider, No Known    Subjective   Subjective     CC:  F/U CHF, A fib    HPI:  Patient seen and examined. No issues overnight. Feeling so much better per his report. Legs less swollen. Good UOP on Lasix gtt. Plan for ECV today.       Objective   Objective     Vital Signs:   Temp:  [96 °F (35.6 °C)-97.7 °F (36.5 °C)] 96.5 °F (35.8 °C)  Heart Rate:  [] 103  Resp:  [16-18] 18  BP: ()/(63-88) 93/82     Physical Exam:  Constitutional: No acute distress, awake, alert, pleasant   HENT: NCAT, mucous membranes moist  Respiratory: Decreased in bases bilaterally, respiratory effort normal RA  Cardiovascular: Irregularly irregular, no murmurs, rubs, or gallops  Gastrointestinal: Positive bowel sounds, soft, nontender, nondistended  Musculoskeletal: 2+ pitting edema LE/feet   Psychiatric: Appropriate affect, cooperative  Neurologic: Oriented x 3, speech clear  Skin: leg excoriations noted b/l     Results Reviewed:  LAB RESULTS:      Lab 11/10/23  0340 11/09/23  0410 11/08/23  0551   WBC 8.37 9.92 12.86*   HEMOGLOBIN 13.1 13.1 12.9*   HEMATOCRIT 39.9 39.7 39.3   PLATELETS 300 334 398   NEUTROS ABS  --   --  11.17*   IMMATURE GRANS (ABS)  --   --  0.06*   LYMPHS ABS  --   --  1.09   MONOS ABS  --   --  0.52   EOS ABS  --   --  0.01   MCV 83.5 82.0 83.3         Lab 11/10/23  0340 11/09/23  0410 11/08/23  0804 23  0551   SODIUM 132* 130*  --  126*   POTASSIUM 2.7* 3.8  --  4.4   CHLORIDE 89* 87*  --  85*   CO2 25.0 27.0  --  23.0   ANION GAP 18.0* 16.0*  --  18.0*   BUN 93* 93*  --  87*   CREATININE 2.09* 2.46*  --  2.17*   EGFR 37.6* 30.9*  --  36.0*   GLUCOSE 92 108*  --  132*   CALCIUM 8.9 9.2  --  9.3   MAGNESIUM  --  2.3 2.5  --    HEMOGLOBIN A1C  --  6.00*  --   --    TSH  --  2.550  --   --          Lab 11/10/23  3662  11/09/23  0410 11/08/23  0551   TOTAL PROTEIN 5.9* 6.1 6.1   ALBUMIN 3.0* 3.4* 3.3*   GLOBULIN 2.9 2.7 2.8   ALT (SGPT) 95* 122* 145*   AST (SGOT) 40 52* 67*   BILIRUBIN 2.1* 2.3* 2.9*   ALK PHOS 183* 202* 203*         Lab 11/08/23  0804 11/08/23  0551   PROBNP  --  35,697.0*   HSTROP T 60* 69*         Lab 11/09/23  0410   CHOLESTEROL 81   LDL CHOL 45   HDL CHOL 22*   TRIGLYCERIDES 60             Brief Urine Lab Results  (Last result in the past 365 days)        Color   Clarity   Blood   Leuk Est   Nitrite   Protein   CREAT   Urine HCG        11/08/23 1333             49.6                 Microbiology Results Abnormal       None            US Renal Limited    Result Date: 11/8/2023  US RENAL LIMITED Date of Exam: 11/8/2023 4:30 PM EST Indication: STUART. Comparison: CT abdomen pelvis 11/8/2023 Technique: Grayscale and color Doppler ultrasound evaluation of the kidneys and urinary bladder was performed. Findings: No hydronephrosis. Right kidney measures 11.4 x 4.2 x 4.9 cm. Left kidney measures 8.3 x 3.6 x 3.7 cm. The cortex thickness on the right is normal. There is increased echogenicity of the renal cortex on the right. The left kidney is small. The renal cortex is thinned and certain areas. There are bladder has minimal distention. Wall thickening could be due to incomplete distention.     Impression: Impression: No hydronephrosis. Small left kidney. Increased echogenicity renal cortex bilaterally with mild thinning on the left. Findings most consistent with chronic renal disease. Electronically Signed: La Whitfield MD  11/8/2023 8:32 PM EST  Workstation ID: [id number]    Adult Transthoracic Echo Complete W/ Cont if Necessary Per Protocol    Result Date: 11/8/2023    Left ventricular ejection fraction appears to be less than 20%.  consider limited contrasted echo to assess for LV thrombus   The left ventricular cavity is dilated.   The right ventricular cavity is mildly dilated.   The left atrial cavity is mildly  dilated.   Left atrial volume is moderately increased.   severe MR present   Estimated right ventricular systolic pressure from tricuspid regurgitation is normal (<35 mmHg).      Results for orders placed during the hospital encounter of 11/08/23    Adult Transthoracic Echo Complete W/ Cont if Necessary Per Protocol    Interpretation Summary    Left ventricular ejection fraction appears to be less than 20%.  consider limited contrasted echo to assess for LV thrombus    The left ventricular cavity is dilated.    The right ventricular cavity is mildly dilated.    The left atrial cavity is mildly dilated.    Left atrial volume is moderately increased.    severe MR present    Estimated right ventricular systolic pressure from tricuspid regurgitation is normal (<35 mmHg).      Current medications:  Scheduled Meds:amiodarone, 200 mg, Oral, Q8H   Followed by  [START ON 11/16/2023] amiodarone, 200 mg, Oral, Q12H   Followed by  [START ON 11/30/2023] amiodarone, 200 mg, Oral, Daily  apixaban, 5 mg, Oral, Q12H  pharmacy consult - MTM, , Does not apply, Daily  potassium chloride, 10 mEq, Intravenous, Q1H  senna-docusate sodium, 2 tablet, Oral, BID  sodium chloride, 10 mL, Intravenous, Q12H      Continuous Infusions:furosemide, 5 mg/hr, Last Rate: 5 mg/hr (11/08/23 1423)      PRN Meds:.  acetaminophen **OR** acetaminophen **OR** acetaminophen    senna-docusate sodium **AND** polyethylene glycol **AND** bisacodyl **AND** bisacodyl    Calcium Replacement - Follow Nurse / BPA Driven Protocol    influenza vaccine    Magnesium Low Dose Replacement - Follow Nurse / BPA Driven Protocol    nitroglycerin    ondansetron **OR** ondansetron    Phosphorus Replacement - Follow Nurse / BPA Driven Protocol    Potassium Replacement - Follow Nurse / BPA Driven Protocol    sodium chloride    sodium chloride    sodium chloride    Assessment & Plan   Assessment & Plan     Active Hospital Problems    Diagnosis  POA    **Hyponatremia [E87.1]  Yes     STUART (acute kidney injury) [N17.9]  Yes    Acute on chronic heart failure with preserved ejection fraction [I50.33]  Yes    Atrial fibrillation with RVR [I48.91]  Yes    Essential hypertension [I10]  Yes    Anxiety associated with depression [F41.8]  Yes    Hypoalbuminemia [E88.09]  Yes    Hyperbilirubinemia [E80.6]  Yes    Elevated liver enzymes [R74.8]  Yes    Acute exacerbation of congestive heart failure [I50.9]  Yes      Resolved Hospital Problems   No resolved problems to display.        Brief Hospital Course to date:  Sundar Reeder is a 53 y.o. male with a past medical history of HTN, atrial fibrillation on Eliquis, CHF and CKD presented to the ED complaining of elevated heart rate, worsened lower extremity swelling and scrotal swelling with shortness of breath.     This patient's problems and plans were partially entered by my partner and updated as appropriate by me 11/10/23.      Atrial Fibrillation with RVR  -S/P Digoxin 500mcg x1 11/9  -Amiodarone PO taper   -Continue Eliquis  -Plan for ECV today  -Cards follows     CHFrEF  LE Swelling/skin breakdown  -Continue IV Lasix gtt  -Strict I's/O's, daily weights  -Echo reviewed: LVEF <20%, severe MR  -Did not tolerate compression wraps due to pain     STUART  -Nephrology following, appreciate assistance   -Renal US negative for hydro. Chronic renal dz   -Cr trending down     Hyponatremia  -Trending up     Hyperbilirubinemia  Elevated AST/ALT  - CT with fatty liver, GB stones/sludge  -Hepatitis panel negative  -Trending down  -Likely due to congestive hepatopathy     HTN  - holding home Lisinopril in setting of STUART  - cardiac meds as above     Depression  - start antidepressant inpatient once sodium and heart failure are improved    Expected Discharge Location and Transportation: Home   Expected Discharge   Expected Discharge Date: 11/14/2023; Expected Discharge Time:      DVT prophylaxis:  Medical DVT prophylaxis orders are present.     AM-PAC 6 Clicks Score (PT): 22  (11/10/23 0800)    CODE STATUS:   Code Status and Medical Interventions:   Ordered at: 11/08/23 0839     Level Of Support Discussed With:    Patient     Code Status (Patient has no pulse and is not breathing):    CPR (Attempt to Resuscitate)     Medical Interventions (Patient has pulse or is breathing):    Full Support       Sheron Robertson, DO  11/10/23

## 2023-11-10 NOTE — CASE MANAGEMENT/SOCIAL WORK
Continued Stay Note   Peggy     Patient Name: Sundar Reeder  MRN: [id number]  Today's Date: 11/10/2023    Admit Date: 11/8/2023    Plan: home   Discharge Plan       Row Name 11/10/23 0812       Plan    Plan home    Patient/Family in Agreement with Plan yes    Plan Comments I met with this patient at the bedside. He is having cardioversion today. His plan is home at discharge with his family to transport. He needs a PCP set up upon discharge. CM will continue to follow.    Final Discharge Disposition Code 01 - home or self-care                   Discharge Codes    No documentation.                 Expected Discharge Date and Time       Expected Discharge Date Expected Discharge Time    Nov 14, 2023               Angie Walter RN

## 2023-11-10 NOTE — PROGRESS NOTES
[facility]  PROGRESS NOTE    Sundar Reeder   [id number]   1970    LOS: 2 days .  Date of Admission: 11/8/2023  Date of Service: 11/10/23    Primary Care Physician: Provider, No Known    Chief Complaint: f/u Acute systolic heart failure, afib with RVR, STUART, elevated BNP, LBBB    Subjective      Feeling much better today.  Shortness of breath improving, responding well to diuresis.  Denies chest pain orthopnea or PND.  Status post successful ECV.    ROS  All systems have been reviewed and are negative with the exception of those mentioned in the HPI and problem list above.     Objective   Vital Sign Min/Max for last 24 hours  Temp  Min: 96 °F (35.6 °C)  Max: 97.7 °F (36.5 °C)   BP  Min: 81/56  Max: 108/88   Pulse  Min: 86  Max: 118   Resp  Min: 16  Max: 18   SpO2  Min: 86 %  Max: 100 %   No data recorded   Weight  Min: 77.7 kg (171 lb 4 oz)  Max: 77.7 kg (171 lb 4 oz)     Physical Exam:  GENERAL: Alert, cooperative, in no acute distress.   HEENT: Normocephalic, no jugular venous distention  HEART: Regular rate and rhythm, and no murmurs, gallops, or rubs.   LUNGS: Clear to auscultation bilaterally. No wheezing, rales or rhonchi. Room air  NEUROLOGIC: No focal abnormalities involving strength or sensation are noted.   EXTREMITIES: No clubbing, cyanosis. 2+ pitting edema bilaterally and legs are weeping     Results:  Results from last 7 days   Lab Units 11/10/23  0340 11/09/23  0410 11/08/23  0551   WBC 10*3/mm3 8.37 9.92 12.86*   HEMOGLOBIN g/dL 13.1 13.1 12.9*   HEMATOCRIT % 39.9 39.7 39.3   PLATELETS 10*3/mm3 300 334 398     Results from last 7 days   Lab Units 11/10/23  0340 11/09/23  0410 11/08/23  0551   SODIUM mmol/L 132* 130* 126*   POTASSIUM mmol/L 2.7* 3.8 4.4   CHLORIDE mmol/L 89* 87* 85*   CO2 mmol/L 25.0 27.0 23.0   BUN mg/dL 93* 93* 87*   CREATININE mg/dL 2.09* 2.46* 2.17*   GLUCOSE mg/dL 92 108* 132*      Lab Results   Component Value Date    CHOL 81 11/09/2023     TRIG 60 11/09/2023    HDL 22 (L) 11/09/2023    LDL 45 11/09/2023    AST 40 11/10/2023    ALT 95 (H) 11/10/2023     Results from last 7 days   Lab Units 11/09/23  0410   HEMOGLOBIN A1C % 6.00*     Results from last 7 days   Lab Units 11/09/23  0410   CHOLESTEROL mg/dL 81   TRIGLYCERIDES mg/dL 60   HDL CHOL mg/dL 22*   LDL CHOL mg/dL 45     Results from last 7 days   Lab Units 11/09/23  0410   TSH uIU/mL 2.550             Results from last 7 days   Lab Units 11/08/23  0804 11/08/23  0551   HSTROP T ng/L 60* 69*     Results from last 7 days   Lab Units 11/08/23  0551   PROBNP pg/mL 35,697.0*       Intake/Output Summary (Last 24 hours) at 11/10/2023 1358  Last data filed at 11/10/2023 1154  Gross per 24 hour   Intake 0 ml   Output 3100 ml   Net -3100 ml       EKG/TELE: atrial fibrillation    Radiology Data:   US Renal Limited    Result Date: 11/8/2023  Impression: No hydronephrosis. Small left kidney. Increased echogenicity renal cortex bilaterally with mild thinning on the left. Findings most consistent with chronic renal disease. Electronically Signed: La Whitfield MD  11/8/2023 8:32 PM EST  Workstation ID: [id number]    Results for orders placed during the hospital encounter of 11/08/23    Adult Transthoracic Echo Complete W/ Cont if Necessary Per Protocol    Interpretation Summary    Left ventricular ejection fraction appears to be less than 20%.  consider limited contrasted echo to assess for LV thrombus    The left ventricular cavity is dilated.    The right ventricular cavity is mildly dilated.    The left atrial cavity is mildly dilated.    Left atrial volume is moderately increased.    severe MR present    Estimated right ventricular systolic pressure from tricuspid regurgitation is normal (<35 mmHg).     Current Medications:  amiodarone, 200 mg, Oral, Q8H   Followed by  [START ON 11/16/2023] amiodarone, 200 mg, Oral, Q12H   Followed by  [START ON 11/30/2023] amiodarone, 200 mg, Oral, Daily  apixaban, 5 mg, Oral,  Q12H  midazolam, , ,   pharmacy consult - [place], , Does not apply, Daily  potassium chloride, 10 mEq, Intravenous, Q1H  senna-docusate sodium, 2 tablet, Oral, BID  sodium chloride, 10 mL, Intravenous, Q12H      furosemide, 5 mg/hr, Last Rate: 5 mg/hr (11/08/23 1423)      Assessment and Plan:   Acute systolic heart failure unknown etiology  ECHO 11/8/23: EF <20%, RV mildly dilated, severe MR  Atrial fibrillation with RVR- anticoagulated with Eliquis  [id number]  Successful ECV 11/10  STUART   Hyponatremia  LBBB    Plan:  - Continue eliquis 5 mg p.o. twice daily for stroke prophylaxis  - Continue amiodarone load per protocol.  - Continue lasix gtt, deferring diuresis to nephrology.  Replacing electrolytes per protocol.  - Starting SGLT2 inhibitor, further titration of guideline directed medical therapy currently being limited by borderline hypotension and renal insufficiency.

## 2023-11-10 NOTE — PROGRESS NOTES
"   LOS: 2 days    Patient Care Team:  Provider, No Known as PCP - General    Chief Complaint: Shortness of breath    Subjective   Consulted for STUART and hyponatremia.  53-year-old with history of CKD, CHF, atrial fibrillation, poor compliant with medication, increasing edema and shortness of breath, presented with atrial fibs RVR on evaluation in ED started on nitroglycerin and Cardizem.  Renal consulted for STUART and hyponatremia.  Serum sodium was 126  Interval History:   Chart reviewed.  Overall condition improving, creatinine improved from 2.46 down to 2.09, sodium up from 130 to 132.  Low potassium has been supplemented at this time.    Review of Systems:   Improve edema and shortness of breath patient denies shortness of breath, chest pain, dysuria, hematuria, nausea, vomiting.      Objective     Vital Sign Min/Max for last 24 hours  Temp  Min: 96 °F (35.6 °C)  Max: 97.5 °F (36.4 °C)   BP  Min: 81/56  Max: 110/83   Pulse  Min: 86  Max: 125   Resp  Min: 16  Max: 18   SpO2  Min: 86 %  Max: 100 %   Flow (L/min)  Min: 2  Max: 2   Weight  Min: 77.7 kg (171 lb 4 oz)  Max: 77.7 kg (171 lb 4 oz)     Flowsheet Rows      Flowsheet Row First Filed Value   Admission Height 180.3 cm (71\") Documented at 11/08/2023 0552   Admission Weight 88.5 kg (195 lb) Documented at 11/08/2023 0552            I/O this shift:  In: 0   Out: 1675 [Urine:1675]  I/O last 3 completed shifts:  In: -   Out: 2600 [Urine:2600]    Physical Exam:  General Appearance:  male awake alert oriented x4.  Eyes: PER, EOMI.  Neck: Supple no JVD.  Lungs: Clear auscultation, no rales rhonchi's, equal chest movement, nonlabored.  Heart: No gallop, murmur, rub, RRR.  Abdomen: Soft, nontender, positive bowel sounds, no organomegaly.  Extremities: Positive lower extremity edema edema, no cyanosis.  Neuro: No focal deficit, moving all extremities, alert oriented X 4  : No suprapubic fullness or tenderness  Skin: Lower extremity skin changes are noted.  WBC " "WBC   Date Value Ref Range Status   11/10/2023 8.37 3.40 - 10.80 10*3/mm3 Final   11/09/2023 9.92 3.40 - 10.80 10*3/mm3 Final   11/08/2023 12.86 (H) 3.40 - 10.80 10*3/mm3 Final      HGB Hemoglobin   Date Value Ref Range Status   11/10/2023 13.1 13.0 - 17.7 g/dL Final   11/09/2023 13.1 13.0 - 17.7 g/dL Final   11/08/2023 12.9 (L) 13.0 - 17.7 g/dL Final      HCT Hematocrit   Date Value Ref Range Status   11/10/2023 39.9 37.5 - 51.0 % Final   11/09/2023 39.7 37.5 - 51.0 % Final   11/08/2023 39.3 37.5 - 51.0 % Final      Platlets No results found for: \"LABPLAT\"   MCV MCV   Date Value Ref Range Status   11/10/2023 83.5 79.0 - 97.0 fL Final   11/09/2023 82.0 79.0 - 97.0 fL Final   11/08/2023 83.3 79.0 - 97.0 fL Final          Sodium Sodium   Date Value Ref Range Status   11/10/2023 132 (L) 136 - 145 mmol/L Final   11/09/2023 130 (L) 136 - 145 mmol/L Final   11/08/2023 126 (L) 136 - 145 mmol/L Final      Potassium Potassium   Date Value Ref Range Status   11/10/2023 2.7 (L) 3.5 - 5.2 mmol/L Final     Comment:     Slight hemolysis detected by analyzer. Result may be falsely elevated.   11/09/2023 3.8 3.5 - 5.2 mmol/L Final   11/08/2023 4.4 3.5 - 5.2 mmol/L Final     Comment:     Slight hemolysis detected by analyzer. Result may be falsely elevated.      Chloride Chloride   Date Value Ref Range Status   11/10/2023 89 (L) 98 - 107 mmol/L Final   11/09/2023 87 (L) 98 - 107 mmol/L Final   11/08/2023 85 (L) 98 - 107 mmol/L Final      CO2 CO2   Date Value Ref Range Status   11/10/2023 25.0 22.0 - 29.0 mmol/L Final   11/09/2023 27.0 22.0 - 29.0 mmol/L Final   11/08/2023 23.0 22.0 - 29.0 mmol/L Final      BUN BUN   Date Value Ref Range Status   11/10/2023 93 (H) 6 - 20 mg/dL Final   11/09/2023 93 (H) 6 - 20 mg/dL Final   11/08/2023 87 (H) 6 - 20 mg/dL Final      Creatinine Creatinine   Date Value Ref Range Status   11/10/2023 2.09 (H) 0.76 - 1.27 mg/dL Final   11/09/2023 2.46 (H) 0.76 - 1.27 mg/dL Final   11/08/2023 2.17 (H) 0.76 - " "1.27 mg/dL Final      Calcium Calcium   Date Value Ref Range Status   11/10/2023 8.9 8.6 - 10.5 mg/dL Final   11/09/2023 9.2 8.6 - 10.5 mg/dL Final   11/08/2023 9.3 8.6 - 10.5 mg/dL Final      PO4 No results found for: \"CAPO4\"   Albumin Albumin   Date Value Ref Range Status   11/10/2023 3.0 (L) 3.5 - 5.2 g/dL Final   11/09/2023 3.4 (L) 3.5 - 5.2 g/dL Final   11/08/2023 3.3 (L) 3.5 - 5.2 g/dL Final      Magnesium Magnesium   Date Value Ref Range Status   11/09/2023 2.3 1.6 - 2.6 mg/dL Final   11/08/2023 2.5 1.6 - 2.6 mg/dL Final      Uric Acid No results found for: \"URICACID\"        Results Review:     I reviewed the patient's new clinical results.    amiodarone, 200 mg, Oral, Q8H   Followed by  [START ON 11/16/2023] amiodarone, 200 mg, Oral, Q12H   Followed by  [START ON 11/30/2023] amiodarone, 200 mg, Oral, Daily  apixaban, 5 mg, Oral, Q12H  midazolam, , ,   pharmacy consult - MTM, , Does not apply, Daily  senna-docusate sodium, 2 tablet, Oral, BID  sodium chloride, 10 mL, Intravenous, Q12H      furosemide, 5 mg/hr, Last Rate: 5 mg/hr (11/08/23 1423)        Medication Review: Reviewed    Assessment & Plan       Hyponatremia    STUART (acute kidney injury)    Acute on chronic heart failure with preserved ejection fraction    Atrial fibrillation with RVR    Essential hypertension    Anxiety associated with depression    Hypoalbuminemia    Hyperbilirubinemia    Elevated liver enzymes    Acute exacerbation of congestive heart failure    1.  STUART: creatinine 2.02, baseline unknown.  Acute rise in creatinine most likely secondary to prerenal state.  FENa less than 1%.  2 CKD: Unsure of the baseline.  3.  Hyponatremia: Sodium 126 on arrival.  4.  CHF: Cardiology evaluating.  5.  Volume overload:  6.  Hypertension: Blood pressure low on Cardizem drip yesterday.  7.  Abnormal liver enzymes: Likely secondary to low blood pressure    Recommendations:  Creatinine slightly improved at this time.  STUART most likely prerenal.  " Hypotension noted, renal function improved with improvement in cardiac output with control heart rate.  Heart rate control on amiodarone  Sodium 132, normal TSH, magnesium level.  Creatinine 2.02 with a BUN 93 likely with low blood pressure and heart failure.  Urine labs include U protein 8.6, U creatinine 49.6, U sodium<20  Edema most likely cardiac.  Patient has no proteinuria.  High risk complex patient with multiple medical problems.  Continue with the diuretics.  Juan Blanco MD  11/10/23  16:53 EST

## 2023-11-10 NOTE — PLAN OF CARE
Problem: Adult Inpatient Plan of Care  Goal: Plan of Care Review  Outcome: Ongoing, Progressing  Goal: Patient-Specific Goal (Individualized)  Outcome: Ongoing, Progressing  Goal: Absence of Hospital-Acquired Illness or Injury  Outcome: Ongoing, Progressing  Intervention: Identify and Manage Fall Risk  Recent Flowsheet Documentation  Taken 11/10/2023 1400 by Cherelle Aleman RN  Safety Promotion/Fall Prevention: patient off unit  Taken 11/10/2023 1200 by Cherelle Aleman RN  Safety Promotion/Fall Prevention:   activity supervised   assistive device/personal items within reach   clutter free environment maintained   toileting scheduled   safety round/check completed   room organization consistent  Taken 11/10/2023 1000 by Cherelle Aleman RN  Safety Promotion/Fall Prevention:   activity supervised   assistive device/personal items within reach   clutter free environment maintained   toileting scheduled   safety round/check completed   room organization consistent  Taken 11/10/2023 0800 by Cherelle Aleman RN  Safety Promotion/Fall Prevention:   activity supervised   assistive device/personal items within reach   clutter free environment maintained   toileting scheduled   safety round/check completed   room organization consistent  Intervention: Prevent Skin Injury  Recent Flowsheet Documentation  Taken 11/10/2023 1200 by Cherelle Aleman RN  Body Position: position changed independently  Skin Protection:   adhesive use limited   tubing/devices free from skin contact   transparent dressing maintained   skin-to-skin areas padded   skin-to-device areas padded  Taken 11/10/2023 1000 by Cherelle Aleman RN  Body Position: position changed independently  Skin Protection:   adhesive use limited   tubing/devices free from skin contact   transparent dressing maintained   skin-to-skin areas padded   skin-to-device areas padded  Taken 11/10/2023 0800 by Cherelle Aleman RN  Body Position: position changed  independently  Skin Protection:   adhesive use limited   tubing/devices free from skin contact   transparent dressing maintained   skin-to-skin areas padded   skin-to-device areas padded  Intervention: Prevent and Manage VTE (Venous Thromboembolism) Risk  Recent Flowsheet Documentation  Taken 11/10/2023 1200 by Cherelle Aleman, RN  Activity Management: activity encouraged  Taken 11/10/2023 1000 by Cherelle Aleman, RN  Activity Management: activity encouraged  Taken 11/10/2023 0800 by Cherelle Aleman RN  Activity Management: activity encouraged  Goal: Optimal Comfort and Wellbeing  Outcome: Ongoing, Progressing  Goal: Readiness for Transition of Care  Outcome: Ongoing, Progressing     Problem: Asthma Comorbidity  Goal: Maintenance of Asthma Control  Outcome: Ongoing, Progressing     Problem: Behavioral Health Comorbidity  Goal: Maintenance of Behavioral Health Symptom Control  Outcome: Ongoing, Progressing     Problem: COPD (Chronic Obstructive Pulmonary Disease) Comorbidity  Goal: Maintenance of COPD Symptom Control  Outcome: Ongoing, Progressing     Problem: Diabetes Comorbidity  Goal: Blood Glucose Level Within Targeted Range  Outcome: Ongoing, Progressing     Problem: Heart Failure Comorbidity  Goal: Maintenance of Heart Failure Symptom Control  Outcome: Ongoing, Progressing     Problem: Hypertension Comorbidity  Goal: Blood Pressure in Desired Range  Outcome: Ongoing, Progressing     Problem: Obstructive Sleep Apnea Risk or Actual Comorbidity Management  Goal: Unobstructed Breathing During Sleep  Outcome: Ongoing, Progressing     Problem: Osteoarthritis Comorbidity  Goal: Maintenance of Osteoarthritis Symptom Control  Outcome: Ongoing, Progressing  Intervention: Maintain Osteoarthritis Symptom Control  Recent Flowsheet Documentation  Taken 11/10/2023 1200 by Cherelle Aleman, RN  Activity Management: activity encouraged  Taken 11/10/2023 1000 by Cherelle Aleman, RN  Activity Management: activity  encouraged  Taken 11/10/2023 0800 by Cherelle Aleman RN  Activity Management: activity encouraged     Problem: Pain Chronic (Persistent) (Comorbidity Management)  Goal: Acceptable Pain Control and Functional Ability  Outcome: Ongoing, Progressing     Problem: Seizure Disorder Comorbidity  Goal: Maintenance of Seizure Control  Outcome: Ongoing, Progressing     Problem: Skin Injury Risk Increased  Goal: Skin Health and Integrity  Outcome: Ongoing, Progressing  Intervention: Optimize Skin Protection  Recent Flowsheet Documentation  Taken 11/10/2023 1200 by Cherelle Aleman RN  Pressure Reduction Techniques: frequent weight shift encouraged  Head of Bed (HOB) Positioning: HOB elevated  Pressure Reduction Devices: positioning supports utilized  Skin Protection:   adhesive use limited   tubing/devices free from skin contact   transparent dressing maintained   skin-to-skin areas padded   skin-to-device areas padded  Taken 11/10/2023 1000 by Cherelle Aleman RN  Pressure Reduction Techniques: frequent weight shift encouraged  Head of Bed (HOB) Positioning: HOB elevated  Pressure Reduction Devices: positioning supports utilized  Skin Protection:   adhesive use limited   tubing/devices free from skin contact   transparent dressing maintained   skin-to-skin areas padded   skin-to-device areas padded  Taken 11/10/2023 0800 by Cherelle Aleman RN  Pressure Reduction Techniques: frequent weight shift encouraged  Head of Bed (HOB) Positioning: HOB elevated  Pressure Reduction Devices: positioning supports utilized  Skin Protection:   adhesive use limited   tubing/devices free from skin contact   transparent dressing maintained   skin-to-skin areas padded   skin-to-device areas padded     Problem: Fall Injury Risk  Goal: Absence of Fall and Fall-Related Injury  Outcome: Ongoing, Progressing  Intervention: Promote Injury-Free Environment  Recent Flowsheet Documentation  Taken 11/10/2023 1400 by Cherelle Aleman RN  Safety  Promotion/Fall Prevention: patient off unit  Taken 11/10/2023 1200 by Cherelle Aleman RN  Safety Promotion/Fall Prevention:   activity supervised   assistive device/personal items within reach   clutter free environment maintained   toileting scheduled   safety round/check completed   room organization consistent  Taken 11/10/2023 1000 by Cherelle Aleman RN  Safety Promotion/Fall Prevention:   activity supervised   assistive device/personal items within reach   clutter free environment maintained   toileting scheduled   safety round/check completed   room organization consistent  Taken 11/10/2023 0800 by Cherelle Aleman RN  Safety Promotion/Fall Prevention:   activity supervised   assistive device/personal items within reach   clutter free environment maintained   toileting scheduled   safety round/check completed   room organization consistent   Goal Outcome Evaluation:

## 2023-11-10 NOTE — PLAN OF CARE
Problem: Adult Inpatient Plan of Care  Goal: Plan of Care Review  Outcome: Ongoing, Progressing  Goal: Patient-Specific Goal (Individualized)  Outcome: Ongoing, Progressing  Goal: Absence of Hospital-Acquired Illness or Injury  Outcome: Ongoing, Progressing  Intervention: Identify and Manage Fall Risk  Recent Flowsheet Documentation  Taken 11/10/2023 0600 by Hannah Rivas, RN  Safety Promotion/Fall Prevention:   activity supervised   assistive device/personal items within reach   clutter free environment maintained   toileting scheduled   safety round/check completed  Taken 11/10/2023 0400 by Hannah Rivas, RN  Safety Promotion/Fall Prevention:   activity supervised   assistive device/personal items within reach   clutter free environment maintained   safety round/check completed   toileting scheduled  Taken 11/10/2023 0200 by Hannah Rivas RN  Safety Promotion/Fall Prevention:   activity supervised   assistive device/personal items within reach   clutter free environment maintained   safety round/check completed   toileting scheduled  Taken 11/10/2023 0000 by Hannah Rivas, RN  Safety Promotion/Fall Prevention:   assistive device/personal items within reach   activity supervised   clutter free environment maintained   safety round/check completed   toileting scheduled  Taken 11/9/2023 2000 by Hannah Rivas RN  Safety Promotion/Fall Prevention:   activity supervised   assistive device/personal items within reach   clutter free environment maintained   safety round/check completed   toileting scheduled  Intervention: Prevent Skin Injury  Recent Flowsheet Documentation  Taken 11/10/2023 0600 by Hannah Rivas, RN  Body Position: position changed independently  Skin Protection:   adhesive use limited   incontinence pads utilized   tubing/devices free from skin contact   transparent dressing maintained  Taken 11/10/2023 0400 by Hannah Rivas, RN  Body Position: position  changed independently  Skin Protection:   adhesive use limited   incontinence pads utilized   transparent dressing maintained   tubing/devices free from skin contact  Taken 11/10/2023 0200 by Hannah Rivas RN  Body Position: position changed independently  Skin Protection:   adhesive use limited   incontinence pads utilized   transparent dressing maintained   tubing/devices free from skin contact  Taken 11/10/2023 0000 by Hannah Rivas, RN  Body Position: position changed independently  Skin Protection:   adhesive use limited   incontinence pads utilized   transparent dressing maintained   tubing/devices free from skin contact  Taken 11/9/2023 2200 by Hannah Rivas RN  Body Position: position changed independently  Skin Protection:   adhesive use limited   incontinence pads utilized   tubing/devices free from skin contact   transparent dressing maintained  Taken 11/9/2023 2000 by Hannah Rivas RN  Body Position: position changed independently  Skin Protection:   adhesive use limited   incontinence pads utilized   tubing/devices free from skin contact   transparent dressing maintained  Intervention: Prevent and Manage VTE (Venous Thromboembolism) Risk  Recent Flowsheet Documentation  Taken 11/10/2023 0600 by Hannah Rivas, RN  Activity Management: activity encouraged  Taken 11/10/2023 0400 by Hannah Rivas RN  Activity Management: activity encouraged  Taken 11/10/2023 0200 by Hannah Rivas, RN  Activity Management: activity encouraged  Taken 11/10/2023 0000 by Hannah Rivas, RN  Activity Management: activity encouraged  Taken 11/9/2023 2200 by Hannah Rivas, RN  Activity Management: activity encouraged  Taken 11/9/2023 2000 by Hannah Rivas, RN  Activity Management: activity encouraged  Intervention: Prevent Infection  Recent Flowsheet Documentation  Taken 11/10/2023 0600 by Hannah Rivas, RN  Infection Prevention: environmental surveillance  performed  Taken 11/10/2023 0400 by Hannah Rivas, RN  Infection Prevention: environmental surveillance performed  Taken 11/10/2023 0200 by Hannah Rivas RN  Infection Prevention: environmental surveillance performed  Taken 11/10/2023 0000 by Hannah Rivas, RN  Infection Prevention: environmental surveillance performed  Goal: Optimal Comfort and Wellbeing  Outcome: Ongoing, Progressing  Goal: Readiness for Transition of Care  Outcome: Ongoing, Progressing   Goal Outcome Evaluation:

## 2023-11-10 NOTE — PAYOR COMM NOTE
"Ref# [id number]  11/8/23 Admission  Medicaid was pending & added today    Utilization Review  Phone [phone]  Fax [phone]    [place] [address]         Barbie Lennon (53 y.o. Male)       Date of Birth   1970    Social Security Number       Address   [address] [place] [address]    Home Phone   [phone]    MRN   [id number]       Mu-ism   Restorationism    Marital Status   Single                            Admission Date   11/8/23    Admission Type   Emergency    Admitting Provider   Sheron Robertson DO    Attending Provider   Sheron Robertson DO    Department, Room/Bed   [facility] 5H, S575/1       Discharge Date       Discharge Disposition       Discharge Destination                                 Attending Provider: Sheron Robertson DO    Allergies: Sulfa Antibiotics    Isolation: None   Infection: None   Code Status: CPR    Ht: 180.3 cm (71\")   Wt: 77.7 kg (171 lb 4 oz)    Admission Cmt: None   Principal Problem: Hyponatremia [E87.1]                   Active Insurance as of 11/8/2023       Primary Coverage       Payor Plan Insurance Group Employer/Plan Group    MEDICAID PENDING MEDICAID PENDING        Payor Plan Address Payor Plan Phone Number Payor Plan Fax Number Effective Dates       11/7/2023 - None Entered      Subscriber Name Subscriber Birth Date Member ID       BARBIE LENNON 1970 PENDING                     Emergency Contacts        (Rel.) Home Phone Work Phone Mobile Phone    Vera Ruvalcaba (Sister) -- -- [phone]    Chapin Ruvalcaba (Brother) -- -- [phone]              [place]: [facility] [id number] Tax ID [id number]  Insurance Information                  MEDICAID PENDING/MEDICAID PENDING Phone: --    Subscriber: Barbie Lennon Subscriber#: PENDING    Group#: -- Precert#: [id number]             History & Physical        Jimena Kwok DO at 11/08/23 [address] [place] [address] " [facility] Medicine Services  HISTORY AND PHYSICAL    Patient Name: Sundar Reeder  : 1970  MRN: [id number]  Primary Care Physician: Provider, No Known  Date of admission: 2023      Subjective  Subjective     Chief Complaint:  LE edema and shortness of air    HPI:  Sundar Reeder is a 53 y.o. male with a past medical history of HTN, atrial fibrillation on Eliquis, CHF and CKD presented to the ED complaining of elevated heart rate, worsened lower extremity swelling and scrotal swelling with shortness of breath. The patient states he is compliant with his Eliquis but hasn't been compliant with most of his other medications. He states he has felt weak and tired for the past year and it made it difficult to work. He worked for [employer] for 19 years and states he had to quit 11 months ago due to his health. He states he sees 2 cardiologists at [facility] but would like to transfer his care here. He states he has been very depressed due to his ongoing worsening chronic medical conditions and stopped refilling all his home meds except for his Eliquis. He states he has no family other than his sister. He states his leg swelling has gotten so bad that his legs are now weeping and he is having skin breakdown with bleeding. He states he cannot walk well on his own. The patient's lab work indicates an exacerbation of CHF with an elevated BNP and cardiomegaly on his chest xray. His heart rate was elevated into the 150s on arrival in the ED and he was found to be in atrial fibrillation, which has improved s/p Cardizem. The patient will be admitted to the hospitalist service for further evaluation and treatment.      Personal History     PMH: HTN, atrial fibrillation, CHF, CKD      No past surgical history on file.    Family History: family history is not on file.     Social History:    Social History     Social History Narrative    Not on file       Medications:  Available home medication information reviewed.  (Not in a  hospital admission)      Allergies   Allergen Reactions    Sulfa Antibiotics Unknown - High Severity       Objective  Objective     Vital Signs:   Temp:  [97.7 °F (36.5 °C)] 97.7 °F (36.5 °C)  Heart Rate:  [102-144] 111  Resp:  [24] 24  BP: ()/() 107/93       Physical Exam   Constitutional: Awake, alert  Eyes: PERRLA, sclerae anicteric, no conjunctival injection  HENT: NCAT, mucous membranes moist  Neck: Supple, no thyromegaly, no lymphadenopathy, trachea midline  Respiratory: decreased to auscultation bilaterally with wheezing in b/l bases, nonlabored respirations on room air  Cardiovascular: tachycardic, irregularly irregular rhythm, no murmurs, rubs, or gallops, palpable pedal pulses bilaterally  Gastrointestinal: Positive bowel sounds, soft, nontender, nondistended  Musculoskeletal: 3+ pitting bilateral LE edema, no clubbing or cyanosis to extremities  Psychiatric: Appropriate affect, cooperative  Neurologic: Oriented x 3, strength symmetric in all extremities, Cranial Nerves grossly intact to confrontation, speech clear  Skin: No rashes, significant skin breakdown to b/l calves with erythema and weeping    Result Review:  I have personally reviewed the results from the time of this admission to 11/8/2023 11:18 EST and agree with these findings:  [x]  Laboratory list / accordion  []  Microbiology  [x]  Radiology  []  EKG/Telemetry   []  Cardiology/Vascular   []  Pathology  []  Old records  []  Other:    LAB RESULTS:      Lab 11/08/23  0551   WBC 12.86*   HEMOGLOBIN 12.9*   HEMATOCRIT 39.3   PLATELETS 398   NEUTROS ABS 11.17*   IMMATURE GRANS (ABS) 0.06*   LYMPHS ABS 1.09   MONOS ABS 0.52   EOS ABS 0.01   MCV 83.3         Lab 11/08/23  0551   SODIUM 126*   POTASSIUM 4.4   CHLORIDE 85*   CO2 23.0   ANION GAP 18.0*   BUN 87*   CREATININE 2.17*   EGFR 36.0*   GLUCOSE 132*   CALCIUM 9.3         Lab 11/08/23  0551   TOTAL PROTEIN 6.1   ALBUMIN 3.3*   GLOBULIN 2.8   ALT (SGPT) 145*   AST (SGOT) 67*    BILIRUBIN 2.9*   ALK PHOS 203*         Lab 11/08/23  0804 11/08/23  0551   PROBNP  --  35,697.0*   HSTROP T 60* 69*                     Microbiology Results (last 10 days)       ** No results found for the last 240 hours. **            CT Abdomen Pelvis Without Contrast    Result Date: 11/8/2023  CT ABDOMEN PELVIS WO CONTRAST Date of Exam: 11/8/2023 10:00 AM EST Indication: elevated AST/ALT, elevated bilirubin. Comparison: None available. Technique: Axial CT images were obtained of the abdomen and pelvis without the administration of contrast. Reconstructed coronal and sagittal images were also obtained. Automated exposure control and iterative construction methods were used. Findings: Liver: The liver is grossly unremarkable in morphology and heterogeneously decreased in attenuation. Evaluation for focal liver lesions is limited due to lack of IV contrast administration. No biliary dilation is seen. Gallbladder: Intermediate density within the gallbladder may be due to presence of stones or sludge. Gallbladder wall is mildly indistinct. Gallbladder does not appear distended. Pancreas: Unremarkable. Spleen: Unremarkable. Adrenal glands: Unremarkable. Genitourinary tract: Atrophic left kidney. Right kidney appears unremarkable. No hydronephrosis is seen. No urinary tract calculi are seen. The ureters appear unremarkable. Urinary bladder and prostate gland appear unremarkable. Gastrointestinal tract: Visualized hollow viscera demonstrate no focal lesion. There is no evidence of bowel obstruction. Appendix: No findings to suggest acute appendicitis. Other findings: Mild pelvic free fluid. No free air is identified. No pathologically enlarged lymph nodes are seen. Mild vascular calcifications are present. Bones and soft tissues: No acute or suspicious osseous or soft tissue lesion is identified. Sclerotic focus within the right iliac bone may represent a bone island. Large left and small right hydroceles are noted.  Lung bases: Right lower lobe airspace opacity is concerning for infiltrate. Suggestion of cardiomegaly.     Impression: Impression: 1.No acute abnormality identified within the abdomen or pelvis. 2.Heterogeneous hypoattenuation of the liver may be related to differential fatty infiltration. Further evaluation is limited on this noncontrast study. No biliary dilation is seen. 3.Intermediate density within the gallbladder may indicate the presence of stones or sludge. The gallbladder is somewhat indistinct but nondistended. If patient has right upper quadrant pain, further evaluation with right upper quadrant ultrasound may be  considered. 4.Right lower lobe airspace disease is concerning for infiltrate. Please correlate clinically. 5.Additional findings as detailed above. Electronically Signed: Kirk Saleem MD  11/8/2023 10:10 AM EST  Workstation ID: [id number]    XR Chest 1 View    Result Date: 11/8/2023  XR CHEST 1 VW Date of Exam: 11/8/2023 5:57 AM EST Indication: SOA triage protocol Comparison: None available. Findings: There is indistinctness of the pulmonary vasculature. The heart appears significantly enlarged. No significant pleural effusion identified. No significant consolidation. No pneumothorax. No acute osseous abnormality.     Impression: Impression: Mild pulmonary edema pattern with significant cardiomegaly. No significant effusion. Electronically Signed: Violet Hi MD  11/8/2023 6:08 AM EST  Workstation ID: [id number]         Assessment & Plan  Assessment & Plan     Active Hospital Problems    Diagnosis  POA    **Hyponatremia [E87.1]  Yes    STUART (acute kidney injury) [N17.9]  Yes    Acute on chronic heart failure with preserved ejection fraction [I50.33]  Yes    Atrial fibrillation with RVR [I48.91]  Yes    Essential hypertension [I10]  Yes    Anxiety associated with depression [F41.8]  Yes    Hypoalbuminemia [E88.09]  Yes    Hyperbilirubinemia [E80.6]  Yes    Elevated liver enzymes [R74.8]  Yes     Acute exacerbation of congestive heart failure [I50.9]  Yes     Sundar Reeder is a 53 y.o. male with a past medical history of HTN, atrial fibrillation on Eliquis, CHF and CKD presented to the ED complaining of elevated heart rate, worsened lower extremity swelling and scrotal swelling with shortness of breath.     Atrial Fibrillation with RVR  - heart rate improved after Cardizem in ED  - heart rate now around 102- patient still in a fib  - continue home Metoprolol  - continue home Eliquis  - patient has not been compliant with home meds bedsides Eliquis for the last few months  - consult cardiology- patient has been following with cardiology at [facility] but wants to switch his care to [facility].  - start Amiodarone per cardiology   - load digoxin  - cardioversion Friday if patient doesn't convert before then    Elevated troponin  - likely type 2 troponin leak from acute heart failure and atrial fibrillation with RVR  - EKG reviewed- LBBB    Acute exacerbation of heart failure  LE Swelling/skin breakdown  - patient followed with cardiology at [facility] but wants to switch care to [facility]  - Lasix 40mg IV in ED  - BNP 35,000 in setting of renal failuure  - significant LE and scrotal edema with weeping  - cardiology consult  - ECHO pending  - lasix gtt started  - strict intake/output  - 1500ml fluid restriction  - daily weights  - WOC for LE weeping/skin breakdown    STUART  - secondary to renal congestion from acute heart failure  - monitor in setting of diuresis  - recheck labs in am  - consult nephrology    Hyponatremia  - Na of 126 on admission  - consult nephrology in setting of ongoing diuresis  - patient with poor diet and overall health since quitting his job 11 months ago    Hyperbilirubinemia  Elevated AST/ALT  - slight jaundice present  - CT A/P without contrast pending  - hepatitis panel pending    HTN  - hold home Lisinopril in setting of STUART  - cardiac meds as above    Depression  - start antidepressant inpatient  once sodium and heart failure are improved    DVT prophylaxis:  Eliquis      CODE STATUS:  Full/CPR  Code Status and Medical Interventions:   Ordered at: 11/08/23 0839     Level Of Support Discussed With:    Patient     Code Status (Patient has no pulse and is not breathing):    CPR (Attempt to Resuscitate)     Medical Interventions (Patient has pulse or is breathing):    Full Support       Expected Discharge   Expected Discharge Date: 11/14/2023; Expected Discharge Time:      Jimena Kwok DO  11/08/23      Electronically signed by Jimena Kwok DO at 11/08/23 1120          Emergency Department Notes        Remington Crawley MD at 11/08/23 0620        Procedure Orders    1. Critical Care [[id number]] ordered by Remington Crawley MD                 Subjective   History of Present Illness  This patient is a chronically debilitated 53-year-old gentleman who appears somewhat older than his stated age.  He indicates that he has a history of CHF, kidney failure and atrial fibrillation.  Patient indicates that he is noncompliant with nearly all of his medications.  He does claim that he is relatively compliant with his Eliquis.  He tells me he has atrial fibrillation chronically.  He tells me that over the last several weeks, he has had a marked increase in his edema of the lower extremities as well as shortness of breath.  He was found to be in A-fib with RVR when EMS was called today.  Heart rate was in the 150s upon initial pickup.  Heart rate in the 120s to 130s when he arrived here.  Patient is oriented x4.  Denies any syncope.  He tells me he has felt weak and tired over the last year or so and is had difficulty working because of this.  He worked at [employer] for more than 15 years and had to quit because of his chronic medical conditions.  He sees 2 different cardiologist, both at [facility].  He has not been to [facility] before.  He comes in requesting to transition and Cardiologic care.   Patient denies any chest pain.  Denies any hematemesis.  Denies any nausea vomiting or diarrhea of any type.  He tells me he simply feels winded and tired.  He is able to speak in complete sentences.  He is quite emotional.  He tells me he has no history of anxiety or depression.  He tells me he has not really had any of his medications filled other than Eliquis.  Patient comes in unaccompanied and tells me he has no family other than his sister who is dealing with her own issues.    In summary, we have a 53-year-old gentleman with A-fib and CHF who comes in for shortness of breath, generalized fatigue and weakness as well as an increase in peripheral edema over the last several months, rapidly increasing over the last couple of days.    Past medical history  CHF, hypertension, atrial fibrillation.  Patient denies CAD or CVA    Family history  Patient denies CVA      Review of Systems   Constitutional:  Negative for chills, fatigue, fever and unexpected weight change.   HENT:  Negative for dental problem, ear pain, hearing loss and sinus pressure.    Eyes:  Negative for pain and visual disturbance.   Respiratory:  Positive for shortness of breath. Negative for chest tightness.    Cardiovascular:  Positive for palpitations and leg swelling. Negative for chest pain.   Gastrointestinal:  Negative for blood in stool, diarrhea, nausea and vomiting.   Genitourinary:  Negative for difficulty urinating, dysuria, frequency, hematuria and urgency.   Musculoskeletal:  Negative for myalgias, neck pain and neck stiffness.   Neurological:  Positive for weakness. Negative for seizures, syncope, speech difficulty, light-headedness and headaches.   Psychiatric/Behavioral:  Negative for confusion.    All other systems reviewed and are negative.      History reviewed. No pertinent past medical history.    Allergies   Allergen Reactions    Sulfa Antibiotics Unknown - High Severity       History reviewed. No pertinent surgical  history.    History reviewed. No pertinent family history.    Social History     Socioeconomic History    Marital status: Single   Tobacco Use    Smoking status: Never    Smokeless tobacco: Never   Vaping Use    Vaping Use: Never used   Substance and Sexual Activity    Alcohol use: Never    Drug use: Never    Sexual activity: Defer           Objective   Physical Exam  Vitals and nursing note reviewed.   Constitutional:       General: He is not in acute distress.     Appearance: He is well-developed. He is not toxic-appearing.      Comments: Somewhat emotional sad appearing gentleman who is oriented x4, pleasant   HENT:      Head: Normocephalic and atraumatic.      Jaw: No trismus.      Right Ear: External ear normal.      Left Ear: External ear normal.      Nose: Nose normal.      Mouth/Throat:      Mouth: Mucous membranes are moist. Mucous membranes are not dry. No oral lesions.      Dentition: No dental abscesses.      Pharynx: Oropharynx is clear. No posterior oropharyngeal erythema or uvula swelling.      Tonsils: No tonsillar exudate or tonsillar abscesses.   Eyes:      General:         Right eye: No discharge.         Left eye: No discharge.      Extraocular Movements: Extraocular movements intact.      Conjunctiva/sclera: Conjunctivae normal.      Right eye: Right conjunctiva is not injected.      Left eye: Left conjunctiva is not injected.      Pupils: Pupils are equal, round, and reactive to light.   Neck:      Vascular: No JVD.      Trachea: No tracheal tenderness.   Cardiovascular:      Rate and Rhythm: Tachycardia present. Rhythm irregular.      Heart sounds: Normal heart sounds.      No friction rub. No gallop.   Pulmonary:      Effort: Pulmonary effort is normal.      Breath sounds: Wheezing present. No rales.   Chest:      Chest wall: No tenderness.   Abdominal:      General: Bowel sounds are normal. There is no distension.      Palpations: Abdomen is soft. Abdomen is not rigid. There is no mass or  pulsatile mass.      Tenderness: There is no abdominal tenderness. There is no guarding or rebound. Negative signs include McBurney's sign.      Comments: No signs of acute abdomen.  No pain at McBurney's point.  No pulsatile abdominal mass.   Musculoskeletal:         General: No tenderness or deformity. Normal range of motion.      Cervical back: Normal range of motion and neck supple. No rigidity. Normal range of motion.      Right lower leg: Edema present.      Left lower leg: Edema present.      Comments: 3+ edema bilateral lower extremities.  Asymmetric swelling.   Lymphadenopathy:      Cervical: No cervical adenopathy.   Skin:     General: Skin is warm and dry.      Capillary Refill: Capillary refill takes less than 2 seconds.      Findings: No erythema or rash.      Comments: No diaphoresis, lesions, nevi, petechia, purpura   Neurological:      Mental Status: He is alert and oriented to person, place, and time.      Cranial Nerves: No cranial nerve deficit.      Sensory: No sensory deficit.      Motor: No tremor or abnormal muscle tone.      Comments: 5/5 strength bilaterally with flexion and extension of fingers, wrist, elbows, knees and hips as well as plantar and dorsiflexion of the foot.   Psychiatric:         Attention and Perception: He is attentive.         Speech: Speech normal.         Behavior: Behavior normal.         Thought Content: Thought content normal.         Judgment: Judgment normal.         Critical Care    Performed by: Remington Crawley MD  Authorized by: Remington Crawley MD    Critical care provider statement:     Critical care time (minutes):  60    Critical care start time:  11/8/2023 6:30 AM    Critical care end time:  11/8/2023 7:30 AM    Critical care was necessary to treat or prevent imminent or life-threatening deterioration of the following conditions:  Circulatory failure    Critical care was time spent personally by me on the following activities:  Discussions with  consultants, evaluation of patient's response to treatment, examination of patient, ordering and performing treatments and interventions, ordering and review of laboratory studies, ordering and review of radiographic studies, pulse oximetry, re-evaluation of patient's condition and review of old charts    I assumed direction of critical care for this patient from another provider in my specialty: no      Care discussed with: admitting provider      Care discussed with comment:  Sarahi Sundar            ED Course  ED Course as of 11/08/23 1450   Wed Nov 08, 2023   0619 I told the patient we will take good care of him.  My suspicion is that the patient is in atrial fibrillation quite often.  This does not appear to be new for him.  Heart rate is in the 120s.  I have ordered Cardizem.  We will be gentle with his kidneys given the history of CKD.  I have no labs or comparison.  All of the patient's Cardiologic care has been at [facility].  Patient does appear to be fluid overloaded clinically.  Anticipate his chest x-ray will show edema or effusion.  He certainly has edema peripherally.  Will order nitro paste in addition to Cardizem.  We will get chest x-ray and labs.  I anticipate the patient will need to come into the hospital and I have shared this with him.  I told him we would take good care of him.  He continued to apologize for being here and I told him to stop worrying about anything other than getting better.  Final impression and plan following completion of work-up. [JUSTIN]   0701 Have reevaluated the patient twice.  Most recently, I saw the patient approximate 7 AM Eastern time.  Heart rate is down to 95-1 05 and still irregularly irregular.  Patient is feeling much better.  Cardizem has been provided.  Labs are starting to come back.  Patient resting comfortably and speaking in complete sentences. [JUSTIN]   0727 Labs are starting to come back and look concerning as expected.  BNP is markedly elevated at  35,000 with no comparison.  White count high at 12,000.  Marked lab derangement across the CMP with a creatinine of 2.17, hyponatremia 126 and hypochloremia of 85.  Transaminase elevation noted in addition to hyperbilirubinemia 2.9.  Patient continues to look much better and tells me he feels better as well.  We have to be gentle fluid rehydration even with the patient's blood pressure slightly low.  I have started by removing Nitropaste.  We will keep the Cardizem on board.  Current heart rate 105.  I talked to the patient on his abnormal labs.  I have added an ammonia and a second troponin.  We will be contacting the hospitalist, Dr. Stern for admission for marked lab derangement, medication noncompliance, generalized deconditioning, medication management and Cardiologic consultation. [JUSTIN]      ED Course User Index  [JUSTIN] Remington Crawley MD     Recent Results (from the past 24 hour(s))   Comprehensive Metabolic Panel    Collection Time: 11/08/23  5:51 AM    Specimen: Blood   Result Value Ref Range    Glucose 132 (H) 65 - 99 mg/dL    BUN 87 (H) 6 - 20 mg/dL    Creatinine 2.17 (H) 0.76 - 1.27 mg/dL    Sodium 126 (L) 136 - 145 mmol/L    Potassium 4.4 3.5 - 5.2 mmol/L    Chloride 85 (L) 98 - 107 mmol/L    CO2 23.0 22.0 - 29.0 mmol/L    Calcium 9.3 8.6 - 10.5 mg/dL    Total Protein 6.1 6.0 - 8.5 g/dL    Albumin 3.3 (L) 3.5 - 5.2 g/dL    ALT (SGPT) 145 (H) 1 - 41 U/L    AST (SGOT) 67 (H) 1 - 40 U/L    Alkaline Phosphatase 203 (H) 39 - 117 U/L    Total Bilirubin 2.9 (H) 0.0 - 1.2 mg/dL    Globulin 2.8 gm/dL    A/G Ratio 1.2 g/dL    BUN/Creatinine Ratio 40.1 (H) 7.0 - 25.0    Anion Gap 18.0 (H) 5.0 - 15.0 mmol/L    eGFR 36.0 (L) >60.0 mL/min/1.73   BNP    Collection Time: 11/08/23  5:51 AM    Specimen: Blood   Result Value Ref Range    proBNP 35,697.0 (H) 0.0 - 900.0 pg/mL   Single High Sensitivity Troponin T    Collection Time: 11/08/23  5:51 AM    Specimen: Blood   Result Value Ref Range    HS Troponin T 69 (C) <22  ng/L   Green Top (Gel)    Collection Time: 11/08/23  5:51 AM   Result Value Ref Range    Extra Tube Hold for add-ons.    Lavender Top    Collection Time: 11/08/23  5:51 AM   Result Value Ref Range    Extra Tube hold for add-on    Gold Top - SST    Collection Time: 11/08/23  5:51 AM   Result Value Ref Range    Extra Tube Hold for add-ons.    Gray Top    Collection Time: 11/08/23  5:51 AM   Result Value Ref Range    Extra Tube Hold for add-ons.    Light Blue Top    Collection Time: 11/08/23  5:51 AM   Result Value Ref Range    Extra Tube Hold for add-ons.    CBC Auto Differential    Collection Time: 11/08/23  5:51 AM    Specimen: Blood   Result Value Ref Range    WBC 12.86 (H) 3.40 - 10.80 10*3/mm3    RBC 4.72 4.14 - 5.80 10*6/mm3    Hemoglobin 12.9 (L) 13.0 - 17.7 g/dL    Hematocrit 39.3 37.5 - 51.0 %    MCV 83.3 79.0 - 97.0 fL    MCH 27.3 26.6 - 33.0 pg    MCHC 32.8 31.5 - 35.7 g/dL    RDW 14.7 12.3 - 15.4 %    RDW-SD 43.8 37.0 - 54.0 fl    MPV 11.7 6.0 - 12.0 fL    Platelets 398 140 - 450 10*3/mm3    Neutrophil % 86.8 (H) 42.7 - 76.0 %    Lymphocyte % 8.5 (L) 19.6 - 45.3 %    Monocyte % 4.0 (L) 5.0 - 12.0 %    Eosinophil % 0.1 (L) 0.3 - 6.2 %    Basophil % 0.1 0.0 - 1.5 %    Immature Grans % 0.5 0.0 - 0.5 %    Neutrophils, Absolute 11.17 (H) 1.70 - 7.00 10*3/mm3    Lymphocytes, Absolute 1.09 0.70 - 3.10 10*3/mm3    Monocytes, Absolute 0.52 0.10 - 0.90 10*3/mm3    Eosinophils, Absolute 0.01 0.00 - 0.40 10*3/mm3    Basophils, Absolute 0.01 0.00 - 0.20 10*3/mm3    Immature Grans, Absolute 0.06 (H) 0.00 - 0.05 10*3/mm3    nRBC 2.4 (H) 0.0 - 0.2 /100 WBC   Hepatitis Panel, Acute    Collection Time: 11/08/23  5:51 AM    Specimen: Blood   Result Value Ref Range    Hepatitis B Surface Ag Non-Reactive Non-Reactive    Hep A IgM Non-Reactive Non-Reactive    Hep B C IgM Non-Reactive Non-Reactive    Hepatitis C Ab Non-Reactive Non-Reactive   ECG 12 Lead ED Triage Standing Order; SOA    Collection Time: 11/08/23  5:55 AM    Result Value Ref Range    QT Interval 364 ms    QTC Interval 559 ms   High Sensitivity Troponin T 2Hr    Collection Time: 11/08/23  8:04 AM    Specimen: Blood   Result Value Ref Range    HS Troponin T 60 (C) <22 ng/L    Troponin T Delta -9 (L) >=-4 - <+4 ng/L   Ammonia    Collection Time: 11/08/23  8:04 AM    Specimen: Blood   Result Value Ref Range    Ammonia 32 16 - 60 umol/L   Magnesium    Collection Time: 11/08/23  8:04 AM    Specimen: Blood   Result Value Ref Range    Magnesium 2.5 1.6 - 2.6 mg/dL   Protein, Urine, Random - Urine, Clean Catch    Collection Time: 11/08/23  1:33 PM    Specimen: Urine, Clean Catch   Result Value Ref Range    Total Protein, Urine 8.6 mg/dL   Sodium, Urine, Random - Urine, Clean Catch    Collection Time: 11/08/23  1:33 PM    Specimen: Urine, Clean Catch   Result Value Ref Range    Sodium, Urine <20 mmol/L   Adult Transthoracic Echo Complete W/ Cont if Necessary Per Protocol    Collection Time: 11/08/23  2:22 PM   Result Value Ref Range    EF(MOD-bp) 8.7 %    LVIDd 5.9 cm    LVIDs 5.0 cm    IVSd 0.90 cm    LVPWd 0.90 cm    FS 15.3 %    IVS/LVPW 1.00 cm    ESV(cubed) 125.0 ml    EDV(cubed) 205.4 ml    LV mass(C)d 209.6 grams    LVOT area 3.1 cm2    LVOT diam 2.00 cm    EDV(MOD-sp2) 234.0 ml    EDV(MOD-sp4) 219.0 ml    ESV(MOD-sp2) 220.0 ml    ESV(MOD-sp4) 189.0 ml    SV(MOD-sp2) 14.0 ml    SV(MOD-sp4) 30.0 ml    EF(MOD-sp2) 6.0 %    EF(MOD-sp4) 13.7 %    MV E max morris 130.0 cm/sec    MV dec time 0.11 sec    LA ESV Index (BP) 54.5 ml/m2    Med Peak E' Morris 4.6 cm/sec    Lat Peak E' Morris 16.0 cm/sec    Avg E/e' ratio 12.62     SV(LVOT) 27.9 ml    RV Base 4.7 cm    RV Mid 4.1 cm    RV Length 7.0 cm    TAPSE (>1.6) 2.46 cm    RV S' 8.1 cm/sec    LA dimension (2D)  4.9 cm    LV V1 max 63.3 cm/sec    LV V1 max PG 1.60 mmHg    LV V1 mean PG 1.00 mmHg    LV V1 VTI 8.9 cm    Ao pk morris 84.2 cm/sec    Ao max PG 2.8 mmHg    Ao mean PG 2.00 mmHg    Ao V2 VTI 12.2 cm    ROULA(I,D) 2.29 cm2    MV max  PG 7.5 mmHg    MV mean PG 3.0 mmHg    MV V2 VTI 13.4 cm    MV P1/2t 39.7 msec    MVA(P1/2t) 5.5 cm2    MVA(VTI) 2.08 cm2    MV dec slope 967.0 cm/sec2    TR max sara 190.0 cm/sec    TR max PG 14.8 mmHg    RVSP(TR) 17.8 mmHg    RAP systole 3.0 mmHg    PA acc time 0.07 sec    Ao root diam 3.0 cm     Note: In addition to lab results from this visit, the labs listed above may include labs taken at another facility or during a different encounter within the last 24 hours. Please correlate lab times with ED admission and discharge times for further clarification of the services performed during this visit.    CT Abdomen Pelvis Without Contrast   Final Result   Impression:   1.No acute abnormality identified within the abdomen or pelvis.   2.Heterogeneous hypoattenuation of the liver may be related to differential fatty infiltration. Further evaluation is limited on this noncontrast study. No biliary dilation is seen.   3.Intermediate density within the gallbladder may indicate the presence of stones or sludge. The gallbladder is somewhat indistinct but nondistended. If patient has right upper quadrant pain, further evaluation with right upper quadrant ultrasound may be    considered.    4.Right lower lobe airspace disease is concerning for infiltrate. Please correlate clinically.   5.Additional findings as detailed above.      Electronically Signed: Kirk Saleem MD     11/8/2023 10:10 AM EST     Workstation ID: [id number]      XR Chest 1 View   Final Result   Impression:   Mild pulmonary edema pattern with significant cardiomegaly. No significant effusion.            Electronically Signed: Violet Hi MD     11/8/2023 6:08 AM EST     Workstation ID: [id number]      US Renal Limited    (Results Pending)     Vitals:    11/08/23 1115 11/08/23 1130 11/08/23 1145 11/08/23 1150   BP: 98/76 123/89 95/68    BP Location:  Right arm Left arm    Patient Position:   Sitting    Pulse: (!) 125  115    Resp:   20    Temp:   97 °F (36.1  "°C) 95 °F (35 °C)   TempSrc:   Oral Oral   SpO2: 97%  96%    Weight:   86 kg (189 lb 8 oz) 82.5 kg (181 lb 14.4 oz)   Height:   180.3 cm (71\")      Medications   sodium chloride 0.9 % flush 10 mL (has no administration in time range)   sodium chloride 0.9 % flush 10 mL (10 mL Intravenous Given 11/8/23 1433)   sodium chloride 0.9 % flush 10 mL (has no administration in time range)   sodium chloride 0.9 % infusion 40 mL (has no administration in time range)   sennosides-docusate (PERICOLACE) 8.6-50 MG per tablet 2 tablet (2 tablets Oral Given 11/8/23 1423)     And   polyethylene glycol (MIRALAX) packet 17 g (has no administration in time range)     And   bisacodyl (DULCOLAX) EC tablet 5 mg (has no administration in time range)     And   bisacodyl (DULCOLAX) suppository 10 mg (has no administration in time range)   nitroglycerin (NITROSTAT) SL tablet 0.4 mg (has no administration in time range)   acetaminophen (TYLENOL) tablet 650 mg (has no administration in time range)     Or   acetaminophen (TYLENOL) 160 MG/5ML oral solution 650 mg (has no administration in time range)     Or   acetaminophen (TYLENOL) suppository 650 mg (has no administration in time range)   ondansetron (ZOFRAN) tablet 4 mg (has no administration in time range)     Or   ondansetron (ZOFRAN) injection 4 mg (has no administration in time range)   amiodarone 150 mg in 100 mL D5W (loading dose) (150 mg Intravenous New Bag 11/8/23 1418)     Followed by   amiodarone 360 mg in 200 mL D5W infusion (1 mg/min Intravenous New Bag 11/8/23 1433)     Followed by   amiodarone 360 mg in 200 mL D5W infusion (has no administration in time range)   Pharmacy Consult (has no administration in time range)   apixaban (ELIQUIS) tablet 5 mg (5 mg Oral Given 11/8/23 1423)   furosemide (LASIX) 500 mg in 50mL infusion (5 mg/hr Intravenous New Bag 11/8/23 1423)   Pharmacy Consult - MTM (has no administration in time range)   influenza vac split quad " (FLUZONE,FLUARIX,AFLURIA,FLULAVAL) injection 0.5 mL (has no administration in time range)   nitroglycerin (NITROSTAT) ointment 1 inch (1 inch Topical Given 11/8/23 0630)   dilTIAZem (CARDIZEM) injection 10 mg (10 mg Intravenous Given 11/8/23 0630)   furosemide (LASIX) injection 40 mg (40 mg Intravenous Given 11/8/23 0947)   digoxin (LANOXIN) injection 500 mcg (500 mcg Intravenous Given 11/8/23 1422)     ECG/EMG Results (last 24 hours)       Procedure Component Value Units Date/Time    ECG 12 Lead ED Triage Standing Order; SOA [[id number]] Collected: 11/08/23 0555     Updated: 11/08/23 1413     QT Interval 364 ms      QTC Interval 559 ms     Narrative:      Test Reason : ED Triage Standing Order~  Blood Pressure :   */*   mmHG  Vent. Rate : 142 BPM     Atrial Rate : 141 BPM     P-R Int :   * ms          QRS Dur : 146 ms      QT Int : 364 ms       P-R-T Axes :   * -51 115 degrees     QTc Int : 559 ms    Undetermined rhythm  Left axis deviation  Left bundle branch block  Abnormal ECG  No previous ECGs available  Confirmed by JOANNA ASH MD ([id number]) on 11/8/2023 2:12:42 PM    Referred By: EDMD           Confirmed By: JOANNA ASH MD    Adult Transthoracic Echo Complete W/ Cont if Necessary Per Protocol [[id number]] Resulted: 11/08/23 1442     Updated: 11/08/23 1448     EF(MOD-bp) 8.7 %      LVIDd 5.9 cm      LVIDs 5.0 cm      IVSd 0.90 cm      LVPWd 0.90 cm      FS 15.3 %      IVS/LVPW 1.00 cm      ESV(cubed) 125.0 ml      EDV(cubed) 205.4 ml      LV mass(C)d 209.6 grams      LVOT area 3.1 cm2      LVOT diam 2.00 cm      EDV(MOD-sp2) 234.0 ml      EDV(MOD-sp4) 219.0 ml      ESV(MOD-sp2) 220.0 ml      ESV(MOD-sp4) 189.0 ml      SV(MOD-sp2) 14.0 ml      SV(MOD-sp4) 30.0 ml      EF(MOD-sp2) 6.0 %      EF(MOD-sp4) 13.7 %      MV E max morris 130.0 cm/sec      MV dec time 0.11 sec      LA ESV Index (BP) 54.5 ml/m2      Med Peak E' Morris 4.6 cm/sec      Lat Peak E' Morris 16.0 cm/sec      Avg E/e' ratio 12.62     SV(LVOT) 27.9 ml       RV Base 4.7 cm      RV Mid 4.1 cm      RV Length 7.0 cm      TAPSE (>1.6) 2.46 cm      RV S' 8.1 cm/sec      LA dimension (2D)  4.9 cm      LV V1 max 63.3 cm/sec      LV V1 max PG 1.60 mmHg      LV V1 mean PG 1.00 mmHg      LV V1 VTI 8.9 cm      Ao pk sara 84.2 cm/sec      Ao max PG 2.8 mmHg      Ao mean PG 2.00 mmHg      Ao V2 VTI 12.2 cm      ROULA(I,D) 2.29 cm2      MV max PG 7.5 mmHg      MV mean PG 3.0 mmHg      MV V2 VTI 13.4 cm      MV P1/2t 39.7 msec      MVA(P1/2t) 5.5 cm2      MVA(VTI) 2.08 cm2      MV dec slope 967.0 cm/sec2      TR max sara 190.0 cm/sec      TR max PG 14.8 mmHg      RVSP(TR) 17.8 mmHg      RAP systole 3.0 mmHg      PA acc time 0.07 sec      Ao root diam 3.0 cm     Narrative:        Left ventricular ejection fraction appears to be less than 20%.    consider limited contrasted echo to assess for LV thrombus    The left ventricular cavity is dilated.    The right ventricular cavity is mildly dilated.    The left atrial cavity is mildly dilated.    Left atrial volume is moderately increased.    severe MR present    Estimated right ventricular systolic pressure from tricuspid   regurgitation is normal (<35 mmHg).            ECG 12 Lead ED Triage Standing Order; SOA   Final Result   Test Reason : ED Triage Standing Order~   Blood Pressure :   */*   mmHG   Vent. Rate : 142 BPM     Atrial Rate : 141 BPM      P-R Int :   * ms          QRS Dur : 146 ms       QT Int : 364 ms       P-R-T Axes :   * -51 115 degrees      QTc Int : 559 ms      Undetermined rhythm   Left axis deviation   Left bundle branch block   Abnormal ECG   No previous ECGs available   Confirmed by JOANNA ASH MD ([id number]) on 11/8/2023 2:12:42 PM      Referred By: EDMD           Confirmed By: JOANNA ASH MD                                                  Medical Decision Making  Certainly must consider CHF exacerbation, acute coronary syndrome, atrial fibrillation, atrial flutter and consequences of these.  Must consider  pulmonary embolus, DVT as well.  I am concerned about the patient's medication compliance.  I do not believe that cardioversion here would be reasonable given the history.  We will keep a close eye on the patient, get his heart rate slowed down with Cardizem.  Decrease preload with nitroglycerin.  I am worried about Lasix given the history of kidney failure and will get labs to assess.    Problems Addressed:  Acute on chronic systolic congestive heart failure: complicated acute illness or injury  Acute pulmonary edema: complicated acute illness or injury  Atrial fibrillation with RVR: complicated acute illness or injury  Chronic atrial fibrillation: complicated acute illness or injury  Elevated brain natriuretic peptide (BNP) level: complicated acute illness or injury  Elevated serum creatinine: complicated acute illness or injury  Hyperbilirubinemia: complicated acute illness or injury  Hypochloremia: complicated acute illness or injury  Hyponatremia: complicated acute illness or injury  Physical deconditioning: complicated acute illness or injury    Amount and/or Complexity of Data Reviewed  Labs: ordered. Decision-making details documented in ED Course.  Radiology: ordered. Decision-making details documented in ED Course.  ECG/medicine tests: ordered. Decision-making details documented in ED Course.    Risk  OTC drugs.  Prescription drug management.  Decision regarding hospitalization.        Final diagnoses:   Acute on chronic systolic congestive heart failure   Chronic atrial fibrillation   Hyperbilirubinemia   Atrial fibrillation with RVR   Hyponatremia   Hypochloremia   Elevated serum creatinine   Physical deconditioning   Elevated brain natriuretic peptide (BNP) level   Acute pulmonary edema   Leukocytosis, unspecified type   Elevated transaminase level       ED Disposition  ED Disposition       ED Disposition   Decision to Admit    Condition   --    Comment   Level of Care: Telemetry [5]   Diagnosis: Acute  "exacerbation of congestive heart failure [881535]   Admitting Physician: MOE MARRERO [[id number]]   Certification: I Certify That Inpatient Hospital Services Are Medically Necessary For Greater Than 2 Midnights                 No follow-up provider specified.       Medication List      No changes were made to your prescriptions during this visit.            Remington Crawley MD  11/08/23 1450      Electronically signed by Remington Crawley MD at 11/08/23 1450          Physician Progress Notes (all)        Juan Blanco MD at 11/09/23 1200             LOS: 1 day    Patient Care Team:  Provider, No Known as PCP - General    Chief Complaint: Shortness of breath    Subjective   Consulted for STUART and hyponatremia.  53-year-old with history of CKD, CHF, atrial fibrillation, poor compliant with medication, increasing edema and shortness of breath, presented with atrial fibs RVR on evaluation in ED started on nitroglycerin and Cardizem.  Renal consulted for STUART and hyponatremia.  Serum sodium was 126  Interval History:   Chart reviewed.  Overall condition improving.  Edema is better.  Electrolytes.  Renal function slightly worse.    Review of Systems:   Lower extremity edema.  Mild shortness of breath which is improved.  Patient denies shortness of breath, chest pain, dysuria, hematuria, nausea, vomiting.      Objective     Vital Sign Min/Max for last 24 hours  Temp  Min: 95.5 °F (35.3 °C)  Max: 96.9 °F (36.1 °C)   BP  Min: 87/69  Max: 108/79   Pulse  Min: 96  Max: 96   Resp  Min: 16  Max: 18   SpO2  Min: 92 %  Max: 98 %   No data recorded   No data recorded     Flowsheet Rows      Flowsheet Row First Filed Value   Admission Height 180.3 cm (71\") Documented at 11/08/2023 0552   Admission Weight 88.5 kg (195 lb) Documented at 11/08/2023 0552            I/O this shift:  In: -   Out: 250 [Urine:250]  I/O last 3 completed shifts:  In: 565.3 [P.O.:360; I.V.:203.8; IV Piggyback:1.5]  Out: 725 [Urine:725]    Physical " "Exam:  General Appearance: Alert, oriented, no obvious distress.   male.  Eyes: PER, EOMI.  Neck: Supple no JVD.  Lungs: Clear auscultation, no rales rhonchi's, equal chest movement, nonlabored.  Heart: No gallop, murmur, rub, RRR.  Abdomen: Soft, nontender, positive bowel sounds, no organomegaly.  Extremities: Positive lower extremity edema edema, no cyanosis.  Neuro: No focal deficit, moving all extremities, alert oriented X 3  : No suprapubic fullness or tenderness  Skin: Lower extremity skin changes are noted.  WBC WBC   Date Value Ref Range Status   11/09/2023 9.92 3.40 - 10.80 10*3/mm3 Final   11/08/2023 12.86 (H) 3.40 - 10.80 10*3/mm3 Final      HGB Hemoglobin   Date Value Ref Range Status   11/09/2023 13.1 13.0 - 17.7 g/dL Final   11/08/2023 12.9 (L) 13.0 - 17.7 g/dL Final      HCT Hematocrit   Date Value Ref Range Status   11/09/2023 39.7 37.5 - 51.0 % Final   11/08/2023 39.3 37.5 - 51.0 % Final      Platlets No results found for: \"LABPLAT\"   MCV MCV   Date Value Ref Range Status   11/09/2023 82.0 79.0 - 97.0 fL Final   11/08/2023 83.3 79.0 - 97.0 fL Final          Sodium Sodium   Date Value Ref Range Status   11/09/2023 130 (L) 136 - 145 mmol/L Final   11/08/2023 126 (L) 136 - 145 mmol/L Final      Potassium Potassium   Date Value Ref Range Status   11/09/2023 3.8 3.5 - 5.2 mmol/L Final   11/08/2023 4.4 3.5 - 5.2 mmol/L Final     Comment:     Slight hemolysis detected by analyzer. Result may be falsely elevated.      Chloride Chloride   Date Value Ref Range Status   11/09/2023 87 (L) 98 - 107 mmol/L Final   11/08/2023 85 (L) 98 - 107 mmol/L Final      CO2 CO2   Date Value Ref Range Status   11/09/2023 27.0 22.0 - 29.0 mmol/L Final   11/08/2023 23.0 22.0 - 29.0 mmol/L Final      BUN BUN   Date Value Ref Range Status   11/09/2023 93 (H) 6 - 20 mg/dL Final   11/08/2023 87 (H) 6 - 20 mg/dL Final      Creatinine Creatinine   Date Value Ref Range Status   11/09/2023 2.46 (H) 0.76 - 1.27 mg/dL Final " "  11/08/2023 2.17 (H) 0.76 - 1.27 mg/dL Final      Calcium Calcium   Date Value Ref Range Status   11/09/2023 9.2 8.6 - 10.5 mg/dL Final   11/08/2023 9.3 8.6 - 10.5 mg/dL Final      PO4 No results found for: \"CAPO4\"   Albumin Albumin   Date Value Ref Range Status   11/09/2023 3.4 (L) 3.5 - 5.2 g/dL Final   11/08/2023 3.3 (L) 3.5 - 5.2 g/dL Final      Magnesium Magnesium   Date Value Ref Range Status   11/09/2023 2.3 1.6 - 2.6 mg/dL Final   11/08/2023 2.5 1.6 - 2.6 mg/dL Final      Uric Acid No results found for: \"URICACID\"        Results Review:     I reviewed the patient's new clinical results.    amiodarone, 200 mg, Oral, Q8H   Followed by  [START ON 11/16/2023] amiodarone, 200 mg, Oral, Q12H   Followed by  [START ON 11/30/2023] amiodarone, 200 mg, Oral, Daily  apixaban, 5 mg, Oral, Q12H  pharmacy consult - MTM, , Does not apply, Daily  senna-docusate sodium, 2 tablet, Oral, BID  sodium chloride, 10 mL, Intravenous, Q12H      amiodarone, 0.5 mg/min, Last Rate: 0.5 mg/min (11/09/23 0916)  furosemide, 5 mg/hr, Last Rate: 5 mg/hr (11/08/23 1423)        Medication Review: Reviewed    Assessment & Plan       Hyponatremia    STUART (acute kidney injury)    Acute on chronic heart failure with preserved ejection fraction    Atrial fibrillation with RVR    Essential hypertension    Anxiety associated with depression    Hypoalbuminemia    Hyperbilirubinemia    Elevated liver enzymes    Acute exacerbation of congestive heart failure    1.  STUART: Yesterday creatinine 2.2, baseline unknown.  Acute rise in creatinine most likely secondary to prerenal state.  FENa less than 1%.  2 CKD: Unsure of the baseline.  3.  Hyponatremia: Sodium 126 on arrival.  4.  CHF: Cardiology evaluating.  5.  Volume overload:  6.  Hypertension: Blood pressure low on Cardizem drip yesterday.  7.  Abnormal liver enzymes: Likely secondary to low blood pressure    Recommendations:  STUART most likely prerenal.  Hypotension noted, renal function improved with " improvement in cardiac output with control heart rate.  Heart rate control on amiodarone  Serum sodium improved 130 from yesterday.  Normal TSH, magnesium level.  Creatinine remains high at 2.46 with a BUN 93 likely with low blood pressure and heart failure.  Urine labs include U protein 8.6, U creatinine 49.6, U sodium<20  Edema most likely cardiac.  Patient has no proteinuria.  High risk complex patient with multiple medical problems.  Continue with the diuretics.  Juan Blanco MD  23  12:00 EST        Electronically signed by Juan Blanco MD at 23 1216       Sheron Robertson DO at 23 1135              [facility] Medicine Services  PROGRESS NOTE    Patient Name: Sundar Reeder  : 1970  MRN: [id number]    Date of Admission: 2023  Primary Care Physician: Provider, No Known    Subjective   Subjective     CC:  F/U CHF, A fib    HPI:  Patient seen and examined. No issues overnight. Legs were sore this AM so compression wraps removed. Patient feels much better.       Objective   Objective     Vital Signs:   Temp:  [95 °F (35 °C)-97 °F (36.1 °C)] 96.9 °F (36.1 °C)  Heart Rate:  [] 96  Resp:  [16-20] 16  BP: ()/(58-81) 108/79     Physical Exam:  Constitutional: No acute distress, awake, alert, pleasant   HENT: NCAT, mucous membranes moist  Respiratory: Decreased in bases bilaterally, respiratory effort normal RA  Cardiovascular: Irregularly irregular, no murmurs, rubs, or gallops  Gastrointestinal: Positive bowel sounds, soft, nontender, nondistended  Musculoskeletal: 2-3+ pitting edema LE/feet   Psychiatric: Appropriate affect, cooperative  Neurologic: Oriented x 3, speech clear  Skin: posterior leg excoriations     Results Reviewed:  LAB RESULTS:      Lab 23  0410 23  0551   WBC 9.92 12.86*   HEMOGLOBIN 13.1 12.9*   HEMATOCRIT 39.7 39.3   PLATELETS 334 398   NEUTROS ABS  --  11.17*   IMMATURE GRANS (ABS)  --  0.06*   LYMPHS ABS  --   1.09   MONOS ABS  --  0.52   EOS ABS  --  0.01   MCV 82.0 83.3         Lab 11/09/23  0410 11/08/23  0804 11/08/23  0551   SODIUM 130*  --  126*   POTASSIUM 3.8  --  4.4   CHLORIDE 87*  --  85*   CO2 27.0  --  23.0   ANION GAP 16.0*  --  18.0*   BUN 93*  --  87*   CREATININE 2.46*  --  2.17*   EGFR 30.9*  --  36.0*   GLUCOSE 108*  --  132*   CALCIUM 9.2  --  9.3   MAGNESIUM 2.3 2.5  --    HEMOGLOBIN A1C 6.00*  --   --    TSH 2.550  --   --          Lab 11/09/23 0410 11/08/23  0551   TOTAL PROTEIN 6.1 6.1   ALBUMIN 3.4* 3.3*   GLOBULIN 2.7 2.8   ALT (SGPT) 122* 145*   AST (SGOT) 52* 67*   BILIRUBIN 2.3* 2.9*   ALK PHOS 202* 203*         Lab 11/08/23  0804 11/08/23  0551   PROBNP  --  35,697.0*   HSTROP T 60* 69*         Lab 11/09/23  0410   CHOLESTEROL 81   LDL CHOL 45   HDL CHOL 22*   TRIGLYCERIDES 60             Brief Urine Lab Results  (Last result in the past 365 days)        Color   Clarity   Blood   Leuk Est   Nitrite   Protein   CREAT   Urine HCG        11/08/23 1333             49.6                 Microbiology Results Abnormal       None            US Renal Limited    Result Date: 11/8/2023  US RENAL LIMITED Date of Exam: 11/8/2023 4:30 PM EST Indication: STUART. Comparison: CT abdomen pelvis 11/8/2023 Technique: Grayscale and color Doppler ultrasound evaluation of the kidneys and urinary bladder was performed. Findings: No hydronephrosis. Right kidney measures 11.4 x 4.2 x 4.9 cm. Left kidney measures 8.3 x 3.6 x 3.7 cm. The cortex thickness on the right is normal. There is increased echogenicity of the renal cortex on the right. The left kidney is small. The renal cortex is thinned and certain areas. There are bladder has minimal distention. Wall thickening could be due to incomplete distention.     Impression: Impression: No hydronephrosis. Small left kidney. Increased echogenicity renal cortex bilaterally with mild thinning on the left. Findings most consistent with chronic renal disease. Electronically  Signed: La Whitfield MD  11/8/2023 8:32 PM EST  Workstation ID: [id number]    Adult Transthoracic Echo Complete W/ Cont if Necessary Per Protocol    Result Date: 11/8/2023    Left ventricular ejection fraction appears to be less than 20%.  consider limited contrasted echo to assess for LV thrombus   The left ventricular cavity is dilated.   The right ventricular cavity is mildly dilated.   The left atrial cavity is mildly dilated.   Left atrial volume is moderately increased.   severe MR present   Estimated right ventricular systolic pressure from tricuspid regurgitation is normal (<35 mmHg).     CT Abdomen Pelvis Without Contrast    Result Date: 11/8/2023  CT ABDOMEN PELVIS WO CONTRAST Date of Exam: 11/8/2023 10:00 AM EST Indication: elevated AST/ALT, elevated bilirubin. Comparison: None available. Technique: Axial CT images were obtained of the abdomen and pelvis without the administration of contrast. Reconstructed coronal and sagittal images were also obtained. Automated exposure control and iterative construction methods were used. Findings: Liver: The liver is grossly unremarkable in morphology and heterogeneously decreased in attenuation. Evaluation for focal liver lesions is limited due to lack of IV contrast administration. No biliary dilation is seen. Gallbladder: Intermediate density within the gallbladder may be due to presence of stones or sludge. Gallbladder wall is mildly indistinct. Gallbladder does not appear distended. Pancreas: Unremarkable. Spleen: Unremarkable. Adrenal glands: Unremarkable. Genitourinary tract: Atrophic left kidney. Right kidney appears unremarkable. No hydronephrosis is seen. No urinary tract calculi are seen. The ureters appear unremarkable. Urinary bladder and prostate gland appear unremarkable. Gastrointestinal tract: Visualized hollow viscera demonstrate no focal lesion. There is no evidence of bowel obstruction. Appendix: No findings to suggest acute appendicitis. Other  findings: Mild pelvic free fluid. No free air is identified. No pathologically enlarged lymph nodes are seen. Mild vascular calcifications are present. Bones and soft tissues: No acute or suspicious osseous or soft tissue lesion is identified. Sclerotic focus within the right iliac bone may represent a bone island. Large left and small right hydroceles are noted. Lung bases: Right lower lobe airspace opacity is concerning for infiltrate. Suggestion of cardiomegaly.     Impression: Impression: 1.No acute abnormality identified within the abdomen or pelvis. 2.Heterogeneous hypoattenuation of the liver may be related to differential fatty infiltration. Further evaluation is limited on this noncontrast study. No biliary dilation is seen. 3.Intermediate density within the gallbladder may indicate the presence of stones or sludge. The gallbladder is somewhat indistinct but nondistended. If patient has right upper quadrant pain, further evaluation with right upper quadrant ultrasound may be  considered. 4.Right lower lobe airspace disease is concerning for infiltrate. Please correlate clinically. 5.Additional findings as detailed above. Electronically Signed: Kirk Saleem MD  11/8/2023 10:10 AM EST  Workstation ID: [id number]    XR Chest 1 View    Result Date: 11/8/2023  XR CHEST 1 VW Date of Exam: 11/8/2023 5:57 AM EST Indication: SOA triage protocol Comparison: None available. Findings: There is indistinctness of the pulmonary vasculature. The heart appears significantly enlarged. No significant pleural effusion identified. No significant consolidation. No pneumothorax. No acute osseous abnormality.     Impression: Impression: Mild pulmonary edema pattern with significant cardiomegaly. No significant effusion. Electronically Signed: Violet Hi MD  11/8/2023 6:08 AM EST  Workstation ID: [id number]     Results for orders placed during the hospital encounter of 11/08/23    Adult Transthoracic Echo Complete W/ Cont if  Necessary Per Protocol    Interpretation Summary    Left ventricular ejection fraction appears to be less than 20%.  consider limited contrasted echo to assess for LV thrombus    The left ventricular cavity is dilated.    The right ventricular cavity is mildly dilated.    The left atrial cavity is mildly dilated.    Left atrial volume is moderately increased.    severe MR present    Estimated right ventricular systolic pressure from tricuspid regurgitation is normal (<35 mmHg).      Current medications:  Scheduled Meds:amiodarone, 200 mg, Oral, Q8H   Followed by  [START ON 11/16/2023] amiodarone, 200 mg, Oral, Q12H   Followed by  [START ON 11/30/2023] amiodarone, 200 mg, Oral, Daily  apixaban, 5 mg, Oral, Q12H  pharmacy consult - [place], , Does not apply, Daily  senna-docusate sodium, 2 tablet, Oral, BID  sodium chloride, 10 mL, Intravenous, Q12H      Continuous Infusions:amiodarone, 0.5 mg/min, Last Rate: 0.5 mg/min (11/09/23 0916)  furosemide, 5 mg/hr, Last Rate: 5 mg/hr (11/08/23 1423)      PRN Meds:.  acetaminophen **OR** acetaminophen **OR** acetaminophen    senna-docusate sodium **AND** polyethylene glycol **AND** bisacodyl **AND** bisacodyl    influenza vaccine    nitroglycerin    ondansetron **OR** ondansetron    sodium chloride    sodium chloride    sodium chloride    Assessment & Plan   Assessment & Plan     Active Hospital Problems    Diagnosis  POA    **Hyponatremia [E87.1]  Yes    STUART (acute kidney injury) [N17.9]  Yes    Acute on chronic heart failure with preserved ejection fraction [I50.33]  Yes    Atrial fibrillation with RVR [I48.91]  Yes    Essential hypertension [I10]  Yes    Anxiety associated with depression [F41.8]  Yes    Hypoalbuminemia [E88.09]  Yes    Hyperbilirubinemia [E80.6]  Yes    Elevated liver enzymes [R74.8]  Yes    Acute exacerbation of congestive heart failure [I50.9]  Yes      Resolved Hospital Problems   No resolved problems to display.        Brief Hospital Course to date:  Sundar  Varinder is a 53 y.o. male with a past medical history of HTN, atrial fibrillation on Eliquis, CHF and CKD presented to the ED complaining of elevated heart rate, worsened lower extremity swelling and scrotal swelling with shortness of breath.     This patient's problems and plans were partially entered by my partner and updated as appropriate by me 11/09/23.   All problems are new to me today     Atrial Fibrillation with RVR  -S/P Digoxin 500mcg x1   -Transition IV Amiodarone to PO  -Continue Eliquis  -NPO after midnight for ECV  -Cards follows     CHFrEF  LE Swelling/skin breakdown  -Continue IV Lasix gtt  -Strict I's/O's, daily weights  -Echo reviewed: LVEF <20%, severe MR  -Did not tolerate compression wraps due to pain     STUART  -Nephrology following, appreciate assistance   -Renal US negative for hydro. Chronic renal dz      Hyponatremia  -Improving      Hyperbilirubinemia  Elevated AST/ALT  - CT with fatty liver, GB stones/sludge  -Hepatitis panel negative  -Trending down  -Likely due to congestive hepatopathy     HTN  - holding home Lisinopril in setting of STUART  - cardiac meds as above     Depression  - start antidepressant inpatient once sodium and heart failure are improved    Expected Discharge Location and Transportation: Home vs Rehab pending clinical course   Expected Discharge   Expected Discharge Date: 11/14/2023; Expected Discharge Time:      DVT prophylaxis:  Medical DVT prophylaxis orders are present.     AM-PAC 6 Clicks Score (PT): 21 (11/09/23 0800)    CODE STATUS:   Code Status and Medical Interventions:   Ordered at: 11/08/23 0839     Level Of Support Discussed With:    Patient     Code Status (Patient has no pulse and is not breathing):    CPR (Attempt to Resuscitate)     Medical Interventions (Patient has pulse or is breathing):    Full Support       Sheron Robertson DO  11/09/23        Electronically signed by Sheron Robertson DO at 11/09/23 8552       Amy Pascual APRN at 11/09/23 8063             [facility]  PROGRESS NOTE    Sundar Reeder   [id number]   1970    LOS: 1 day .  Date of Admission: 11/8/2023  Date of Service: 11/09/23    Primary Care Physician: Provider, No Known    Chief Complaint: f/u Acute systolic heart failure, afib with RVR, STUART, elevated BNP, LBBB    Subjective      Patient sitting up in bed. He states that he is feeling much better today and in good spirits. His breathing is improved, his abdomen is not as tight, and his legs are not bothering him as much. They tried to wrap his legs, but he was unable to tolerate it. Denies chest pain. Rate is controlled. No other complaints.     ROS  All systems have been reviewed and are negative with the exception of those mentioned in the HPI and problem list above.     Objective   Vital Sign Min/Max for last 24 hours  Temp  Min: 95 °F (35 °C)  Max: 97 °F (36.1 °C)   BP  Min: 87/69  Max: 123/89   Pulse  Min: 96  Max: 125   Resp  Min: 16  Max: 20   SpO2  Min: 92 %  Max: 100 %   No data recorded   Weight  Min: 82.5 kg (181 lb 14.4 oz)  Max: 86 kg (189 lb 8 oz)     Physical Exam:  GENERAL: Alert, cooperative, in no acute distress.   HEENT: Normocephalic, no jugular venous distention  HEART: irregular irregular, and no murmurs, gallops, or rubs.   LUNGS: Clear to auscultation bilaterally. No wheezing, rales or rhonchi. Room air  NEUROLOGIC: No focal abnormalities involving strength or sensation are noted.   EXTREMITIES: No clubbing, cyanosis. 2+ pitting edema bilaterally and legs are weeping     Results:  Results from last 7 days   Lab Units 11/09/23 0410 11/08/23  0551   WBC 10*3/mm3 9.92 12.86*   HEMOGLOBIN g/dL 13.1 12.9*   HEMATOCRIT % 39.7 39.3   PLATELETS 10*3/mm3 334 398     Results from last 7 days   Lab Units 11/09/23 0410 11/08/23  0551   SODIUM mmol/L 130* 126*   POTASSIUM mmol/L 3.8 4.4   CHLORIDE mmol/L 87* 85*   CO2 mmol/L 27.0 23.0   BUN mg/dL 93* 87*   CREATININE mg/dL 2.46* 2.17*    GLUCOSE mg/dL 108* 132*      Lab Results   Component Value Date    CHOL 81 11/09/2023    TRIG 60 11/09/2023    HDL 22 (L) 11/09/2023    LDL 45 11/09/2023    AST 52 (H) 11/09/2023     (H) 11/09/2023     Results from last 7 days   Lab Units 11/09/23  0410   HEMOGLOBIN A1C % 6.00*     Results from last 7 days   Lab Units 11/09/23  0410   CHOLESTEROL mg/dL 81   TRIGLYCERIDES mg/dL 60   HDL CHOL mg/dL 22*   LDL CHOL mg/dL 45     Results from last 7 days   Lab Units 11/09/23  0410   TSH uIU/mL 2.550             Results from last 7 days   Lab Units 11/08/23  0804 11/08/23  0551   HSTROP T ng/L 60* 69*     Results from last 7 days   Lab Units 11/08/23  0551   PROBNP pg/mL 35,697.0*       Intake/Output Summary (Last 24 hours) at 11/9/2023 0831  Last data filed at 11/8/2023 2330  Gross per 24 hour   Intake 565.26 ml   Output 725 ml   Net -159.74 ml       EKG/TELE: atrial fibrillation    Radiology Data:   US Renal Limited    Result Date: 11/8/2023  Impression: No hydronephrosis. Small left kidney. Increased echogenicity renal cortex bilaterally with mild thinning on the left. Findings most consistent with chronic renal disease. Electronically Signed: La Whitfield MD  11/8/2023 8:32 PM EST  Workstation ID: [id number]    CT Abdomen Pelvis Without Contrast    Result Date: 11/8/2023  Impression: 1.No acute abnormality identified within the abdomen or pelvis. 2.Heterogeneous hypoattenuation of the liver may be related to differential fatty infiltration. Further evaluation is limited on this noncontrast study. No biliary dilation is seen. 3.Intermediate density within the gallbladder may indicate the presence of stones or sludge. The gallbladder is somewhat indistinct but nondistended. If patient has right upper quadrant pain, further evaluation with right upper quadrant ultrasound may be  considered. 4.Right lower lobe airspace disease is concerning for infiltrate. Please correlate clinically. 5.Additional findings as  detailed above. Electronically Signed: Kirk Saleem MD  11/8/2023 10:10 AM EST  Workstation ID: [id number]    XR Chest 1 View    Result Date: 11/8/2023  Impression: Mild pulmonary edema pattern with significant cardiomegaly. No significant effusion. Electronically Signed: Violet Hi MD  11/8/2023 6:08 AM EST  Workstation ID: [id number]    Results for orders placed during the hospital encounter of 11/08/23    Adult Transthoracic Echo Complete W/ Cont if Necessary Per Protocol    Interpretation Summary    Left ventricular ejection fraction appears to be less than 20%.  consider limited contrasted echo to assess for LV thrombus    The left ventricular cavity is dilated.    The right ventricular cavity is mildly dilated.    The left atrial cavity is mildly dilated.    Left atrial volume is moderately increased.    severe MR present    Estimated right ventricular systolic pressure from tricuspid regurgitation is normal (<35 mmHg).     Current Medications:  apixaban, 5 mg, Oral, Q12H  pharmacy consult - [place], , Does not apply, Daily  senna-docusate sodium, 2 tablet, Oral, BID  sodium chloride, 10 mL, Intravenous, Q12H      amiodarone, 0.5 mg/min, Last Rate: 0.5 mg/min (11/08/23 2053)  furosemide, 5 mg/hr, Last Rate: 5 mg/hr (11/08/23 1423)      Assessment and Plan:   Acute systolic heart failure unknown etiology  ECHO 11/8/23: EF <20%, RV mildly dilated, severe MR  Atrial fibrillation with RVR- anticoagulated with Eliquis  [id number]  STUART   Hyponatremia  LBBB    Plan:  - Continue eliquis, patient has not missed any doses in the last 30 days.   - Cardioversion scheduled for tomorrow at 10am. NPO at midnight. Procedure, risks, and benefits have been discussed with the patient. Patient is able to lie flat for short periods of time.   - Amiodarone protocol. Monitor QTC and liver function.  - Digoxin load . Will defer chronic dosing at this time due to worsening renal function.  - Continue lasix gtt, deferring diuresis to  nephrology. Appreciate their input.   - We will continue to follow.    Electronically signed by NOELLE Boston, 11/09/23, 8:31 AM EST.     Please note that portions of this note were dictated utilizing Dragon dictation.     Electronically signed by Amy Pascual APRN at 11/09/23 1121          Consult Notes (all)        Johnson Galicia MD at 11/08/23 1225        Consult Orders    1. Inpatient Nephrology Consult [[id number]] ordered by Jimena Kwok DO at 11/08/23 0836                       [facility]  Renal Consult Note    Sundar Reeder  1970  [id number]    Date of Admit:  11/8/2023    Date of Consult: 11/8/2023    Requesting Provider: Dr. Hess    Evaluating Physician: Johnson Galicia MD    Reason for Consultation: Elevated creatinine with hyponatremia    Chief Complaint: SOB    History of present illness:    Patient is a 53 y.o.  Yr old male with multiple underlying medical problems including CHF, CKD and atrial fibrillation.  Per report patient is poorly compliant with medications.  Patient comes in with increased edema and shortness of breath.  Was found to be in A-fib RVR on evaluation in ER.  Patient was started on Cardizem for rate control along with nitroglycerin.  Patient admitted for further work-up.  Nephrology consulted to assist with renal failure.  Patient reports he may have seen a kidney doctor many years ago but is not had any medical care for quite some times.  Patient reports he had previously worked at [employer] but noticed increased weakness about a year ago.  Stopped working at that time but never fully recovered.  Patient reports increased swelling about a week or so ago including scrotal edema which prompted evaluation.  Edema remains significant.  Patient was given diuretics this morning.  Urine output starting to improve.      Past Medical History  History reviewed. No pertinent past medical history.    History reviewed. No pertinent surgical  "history.    Allergies:  Allergies   Allergen Reactions    Sulfa Antibiotics Unknown - High Severity       Medication:   See electronic record    Soc Hx:   Social History     Socioeconomic History    Marital status: Single   Tobacco Use    Smoking status: Never    Smokeless tobacco: Never   Vaping Use    Vaping Use: Never used   Substance and Sexual Activity    Alcohol use: Never    Drug use: Never    Sexual activity: Defer       Fam Hx:  No congenital renal disease      Review of Systems:  Full review of systems reviewed and are as above  In HPI or per admitting H&P,otherwise negative for acute complaints    Physical Exam:   Vital Signs   Blood pressure 95/68, pulse 115, temperature 95 °F (35 °C), temperature source Oral, resp. rate 20, height 180.3 cm (71\"), weight 82.5 kg (181 lb 14.4 oz), SpO2 96%.  No intake/output data recorded.    GENERAL: WD WM NAD.  NEURO: Awake and alert, oriented. No focal deficit  PSYCHIATRIC: NMA. Cooperative with PE  EYE: PE, no icterus, no conjunctivitis  ENT: ommm, dentition intact,  Hearing intact  NECK: Supple , No JVD discernable,  Trachea midline  CV: RRR; + edema  LUNGS:  Quiet,  Nonlabored resp.  Symmetrical expansion  ABDOMEN: Soft, NTND.  : no palp bladder, no olivarez + scrotal edema  SKIN: Warm and dry without     Laboratory Data  Results from last 7 days   Lab Units 11/08/23  0551   HEMOGLOBIN g/dL 12.9*   HEMATOCRIT % 39.3     Results from last 7 days   Lab Units 11/08/23  0551   SODIUM mmol/L 126*   POTASSIUM mmol/L 4.4   CHLORIDE mmol/L 85*   CO2 mmol/L 23.0   BUN mg/dL 87*   CREATININE mg/dL 2.17*   CALCIUM mg/dL 9.3   ALBUMIN g/dL 3.3*         Results from last 7 days   Lab Units 11/08/23  0551   ALK PHOS U/L 203*   BILIRUBIN mg/dL 2.9*   ALT (SGPT) U/L 145*   AST (SGOT) U/L 67*         Estimated Creatinine Clearance: 47 mL/min (A) (by C-G formula based on SCr of 2.17 mg/dL (H)).  No results found for: \"CREATININEUR\", \"PROTEINUR\", \"NAUR\", \"UREAUR\"    A/P:      ARF: " Creatinine elevated at 2.2.  Unsure of baseline.  Monitor for now.  Continue supportive care.  Strict I's and O's.    CKD: Unsure of baseline creatinine.    HTN: Blood pressure low on Cardizem for rate control.  Support blood pressure for now.    Hyponatremia: Sodium 126 on arrival.  We will get repeat labs.    CHF: Continue diuresis.  Strict I's and O's.  Cardiology evaluating.    Thank you consulting us on Sundar Reeder who is of high risk and complexity.  We will follow along closely    Johnson Galicia MD  2023  12:25 EST                      Electronically signed by Johnson Galicia MD at 23 1319       Jacques Basurto MD at 23 1000        Consult Orders    1. Inpatient Cardiology Consult [[id number]] ordered by Jimena Kwok DO                 [facility]   Consult Note    Patient Name: Sundar Reeder  : 1970  MRN: [id number]  Primary Care Physician:  Provider, No Known  Referring Physician: No ref. provider found  Date of admission: 2023    Inpatient Cardiology Consult  Consult performed by: Jacques Basurto MD  Consult ordered by: Jimena Kwok DO  Reason for consult: afib, chf        Subjective   Subjective     Problem List  -Acute systolic heart failure unknown etiology (almost assuredly severe systolic dysfunction given chest x ray findings)  -afib with RVR, on chronic anticoagulation  -adin nearly certainly from renal congestion from heart failure  -expected mild elevation in troponin from CHF and afib with RVR, BNP elevated  -congested hepatopathy with elevated lfts  -LBBB    Mr. Reeder was diagnosed with CHF 10 years ago.  He really has not had issue with it.  Also diagnosed with afib and was on BB, eliquis and Ace.  Meds all stopped with loss of insurance when he left his job about 11 months ago.  He has continued eliquis faithfully and has not missed dose in months.  Over past year worsening fatigue.  Over past few weeks his dyspnea and leg  edema and scrotal edema have been worsening.  ROS negative except for the above.      Personal History     See above    Family History: family history is not on file. Otherwise pertinent FHx was reviewed and not pertinent to current issue.    Social History:      Home Medications:   apixaban, lisinopril, and metoprolol succinate XL    Allergies:  Allergies   Allergen Reactions    Sulfa Antibiotics Unknown - High Severity       Objective    Objective     Vitals:  Temp:  [97.7 °F (36.5 °C)] 97.7 °F (36.5 °C)  Heart Rate:  [102-144] 111  Resp:  [24] 24  BP: ()/() 107/93    Physical Exam  HENT:      Head: Normocephalic.   Eyes:      Extraocular Movements: Extraocular movements intact.   Cardiovascular:      Rate and Rhythm: Tachycardia present. Rhythm irregular.      Heart sounds: No murmur heard.     No gallop.   Pulmonary:      Breath sounds: Normal breath sounds.   Abdominal:      Palpations: Abdomen is soft.   Musculoskeletal:      Right lower leg: Edema present.      Left lower leg: Edema present.   Skin:     General: Skin is warm and dry.   Neurological:      General: No focal deficit present.      Mental Status: He is alert.   Psychiatric:         Mood and Affect: Mood normal.             Assessment & Plan   Assessment / Plan     Assessment  -Acute systolic heart failure unknown etiology (almost assuredly severe systolic dysfunction given chest x ray findings)  -afib with RVR, on chronic anticoagulation  -adin nearly certainly from renal congestion from heart failure  -expected mild elevation in troponin from CHF and afib with RVR, BNP elevated  -congested hepatopathy with elevated lfts  -LBBB    Plan  -stop diltiazem immediately (discussed with nurse).  Likely will potentate cardiogenic shock which he is borderline currently.   -continue eliquis.  Will likely cardiovert in few days when he can lay flat  -start amiodarone by typical protocol.  Rhythm control reasonable strategy given compliance with  anticoagulation  -digoxin load over next 24 hours.  Would given 500 mcg now, 250mcg in 12 hours, 250mcg in 24 hours.  Will likely not do chronic dosing tomorrow given adin  -has been given lasix 40mg, would start lasix drip  - echo to confirm systolic dysfunction  -can allow to eat, no plan for procedures.  Expect cardioversion Friday given his fluid status  -monitor QTC  -will follow as consult, he will need admission      Jacques Basurto MD      Electronically signed by Jacques Basurto MD at 11/08/23 7007

## 2023-11-11 NOTE — PROGRESS NOTES
Sundar Reeder  [id number]  1970   LOS: 3 days   Patient Care Team:  Provider, No Known as PCP - General    Chief Complaint: Follow-up on HFrEF, atrial fibrillation with RVR, STUART, LBBB    Subjective Patient is status post successful ECV.  Patient is back in atrial fibrillation this morning but the patient is unaware of any palpitations.  He denies any increased shortness of breath or chest pain. His lower extremities still have edema but it is much less than when he first was admitted.  The patient says he is very thankful for his care here.    Objective     Vital Sign Min/Max for last 24 hours  Temp  Min: 96 °F (35.6 °C)  Max: 96.9 °F (36.1 °C)   BP  Min: 81/56  Max: 110/83   Pulse  Min: 89  Max: 125   Resp  Min: 16  Max: 18   SpO2  Min: 86 %  Max: 100 %   No data recorded   No data recorded         11/08/23  1150 11/10/23  0543   Weight: 82.5 kg (181 lb 14.4 oz) 77.7 kg (171 lb 4 oz)         Intake/Output Summary (Last 24 hours) at 11/11/2023 0719  Last data filed at 11/11/2023 0200  Gross per 24 hour   Intake 300 ml   Output 3075 ml   Net -2775 ml       Physical Exam:     General Appearance:    Alert, cooperative, in no acute distress   Lungs:     Clear to auscultation,respirations regular, even and                unlabored    Heart:    Atrial fibrillation with intermittent RVR, LBBB, normal S1 and S2, grade 2/6 murmur, no gallop, no rub, no click   Abdomen:  Extremities:   Soft, nontender, bowel sounds audible x4    2+ edema, normal range of motion   Pulses:   Pulses palpable and equal bilaterally      Results Review:   Results from last 7 days   Lab Units 11/11/23  0430 11/10/23  2049 11/10/23  0340 11/09/23  0410   SODIUM mmol/L 136  --  132* 130*   POTASSIUM mmol/L 2.9* 3.0* 2.7* 3.8   CHLORIDE mmol/L 89*  --  89* 87*   CO2 mmol/L 33.0*  --  25.0 27.0   BUN mg/dL 75*  --  93* 93*   CREATININE mg/dL 1.69*  --  2.09* 2.46*   GLUCOSE mg/dL 116*  --  92 108*   CALCIUM mg/dL 8.7  --  8.9 9.2     Results from last  7 days   Lab Units 11/11/23  0430 11/10/23  0340 11/09/23  0410   WBC 10*3/mm3 9.28 8.37 9.92   HEMOGLOBIN g/dL 13.4 13.1 13.1   HEMATOCRIT % 40.3 39.9 39.7   PLATELETS 10*3/mm3 304 300 334     Results from last 7 days   Lab Units 11/09/23  0410   CHOLESTEROL mg/dL 81   TRIGLYCERIDES mg/dL 60   HDL CHOL mg/dL 22*   LDL CHOL mg/dL 45     Results from last 7 days   Lab Units 11/09/23  0410   HEMOGLOBIN A1C % 6.00*     Results from last 7 days   Lab Units 11/08/23  0804 11/08/23  0551   HSTROP T ng/L 60* 69*   Magnesium 2.3 on 11/9/2023    ECG 11/11/2023:  Undetermined rhythm  Left axis deviation  Left bundle branch block  Abnormal ECG  When compared with ECG of 10-NOV-2023 14:31,  Current undetermined rhythm precludes rhythm comparison, needs review  T wave inversion more evident in Lateral leads    No new chest x-ray to review    Medication Review: Reviewed, Lasix GTT at 5 mg/h    Assessment & Plan   Patient with acute on chronic systolic heart failure, atrial fibrillation, severe MR, and STUART.  Renal function improving.  Patient needs potassium replacement again today.  Patient is status post successful ECV but the patient is back in atrial fib with intermittent RVR currently.  Would hold amiodarone this morning due to prolonged QTc and recheck ECG later today.  Hypotension and STUART preclude GDMT; limited options.  Would continue Lasix GTT.  Diuretics per nephrology.  Hyponatremia resolved.  Patient has had excellent diuresis over the past 24 hours.  Will be difficult for patient to stay in sinus rhythm with severe MR.      Hyponatremia    STUART (acute kidney injury)    Acute on chronic heart failure with preserved ejection fraction    Atrial fibrillation with RVR    Essential hypertension    Anxiety associated with depression    Hypoalbuminemia    Hyperbilirubinemia    Elevated liver enzymes    Acute exacerbation of congestive heart failure    Electronically signed by NOELLE Nina, 11/11/23, 8:03 AM EST.      11/11/23  07:19 EST

## 2023-11-11 NOTE — PLAN OF CARE
Problem: Adult Inpatient Plan of Care  Goal: Plan of Care Review  Outcome: Ongoing, Progressing  Goal: Patient-Specific Goal (Individualized)  Outcome: Ongoing, Progressing  Goal: Absence of Hospital-Acquired Illness or Injury  Outcome: Ongoing, Progressing  Intervention: Identify and Manage Fall Risk  Recent Flowsheet Documentation  Taken 11/11/2023 1400 by Xavier Bunch RN  Safety Promotion/Fall Prevention:   activity supervised   assistive device/personal items within reach   clutter free environment maintained   lighting adjusted   nonskid shoes/slippers when out of bed   room organization consistent   safety round/check completed  Taken 11/11/2023 1200 by Xavier Bunch RN  Safety Promotion/Fall Prevention:   assistive device/personal items within reach   activity supervised   clutter free environment maintained   lighting adjusted   nonskid shoes/slippers when out of bed   safety round/check completed   room organization consistent  Taken 11/11/2023 1000 by Xavier Bunch RN  Safety Promotion/Fall Prevention:   activity supervised   assistive device/personal items within reach   clutter free environment maintained   lighting adjusted   nonskid shoes/slippers when out of bed   safety round/check completed   room organization consistent  Taken 11/11/2023 0800 by Xavier Bunch RN  Safety Promotion/Fall Prevention:   activity supervised   assistive device/personal items within reach   clutter free environment maintained   lighting adjusted   nonskid shoes/slippers when out of bed   room organization consistent   safety round/check completed  Intervention: Prevent Skin Injury  Recent Flowsheet Documentation  Taken 11/11/2023 1400 by Xavier Bunch RN  Skin Protection:   adhesive use limited   tubing/devices free from skin contact   transparent dressing maintained   skin-to-skin areas padded   skin-to-device areas padded   incontinence pads utilized  Taken 11/11/2023 1200 by Xavier Bunch RN  Skin Protection:   adhesive  use limited   tubing/devices free from skin contact   transparent dressing maintained   skin-to-skin areas padded   skin-to-device areas padded   incontinence pads utilized  Taken 11/11/2023 1000 by Xavier Bunch RN  Skin Protection:   adhesive use limited   tubing/devices free from skin contact   transparent dressing maintained   skin-to-skin areas padded   skin-to-device areas padded   incontinence pads utilized  Taken 11/11/2023 0800 by Xavier Bunch RN  Skin Protection:   adhesive use limited   tubing/devices free from skin contact   transparent dressing maintained   skin-to-skin areas padded   skin-to-device areas padded   incontinence pads utilized  Intervention: Prevent Infection  Recent Flowsheet Documentation  Taken 11/11/2023 1400 by Xavier Bunch RN  Infection Prevention:   environmental surveillance performed   equipment surfaces disinfected   hand hygiene promoted   personal protective equipment utilized   single patient room provided   rest/sleep promoted  Taken 11/11/2023 1200 by Xavier Bunch RN  Infection Prevention:   environmental surveillance performed   equipment surfaces disinfected   personal protective equipment utilized   hand hygiene promoted   rest/sleep promoted   single patient room provided  Taken 11/11/2023 1000 by Xavier Bunch RN  Infection Prevention:   environmental surveillance performed   equipment surfaces disinfected   hand hygiene promoted   personal protective equipment utilized   rest/sleep promoted   single patient room provided  Taken 11/11/2023 0800 by Xavier Bunch RN  Infection Prevention:   environmental surveillance performed   equipment surfaces disinfected   hand hygiene promoted   personal protective equipment utilized   rest/sleep promoted   single patient room provided  Goal: Optimal Comfort and Wellbeing  Outcome: Ongoing, Progressing  Intervention: Provide Person-Centered Care  Recent Flowsheet Documentation  Taken 11/11/2023 1200 by Xavier Bunch RN  Trust  Relationship/Rapport:   care explained   questions encouraged   questions answered  Taken 11/11/2023 1000 by Xavier Bunch RN  Trust Relationship/Rapport:   care explained   questions answered   questions encouraged  Taken 11/11/2023 0800 by Xavier Bunch RN  Trust Relationship/Rapport:   care explained   questions encouraged   questions answered  Goal: Readiness for Transition of Care  Outcome: Ongoing, Progressing     Problem: Asthma Comorbidity  Goal: Maintenance of Asthma Control  Outcome: Ongoing, Progressing  Intervention: Maintain Asthma Symptom Control  Recent Flowsheet Documentation  Taken 11/11/2023 1200 by Xavier Bunch RN  Medication Review/Management: medications reviewed  Taken 11/11/2023 1000 by Xavier Bunch RN  Medication Review/Management: medications reviewed  Taken 11/11/2023 0800 by Xavier Bunch RN  Medication Review/Management: medications reviewed     Problem: Behavioral Health Comorbidity  Goal: Maintenance of Behavioral Health Symptom Control  Outcome: Ongoing, Progressing  Intervention: Maintain Behavioral Health Symptom Control  Recent Flowsheet Documentation  Taken 11/11/2023 1200 by Xavier Bunch RN  Medication Review/Management: medications reviewed  Taken 11/11/2023 1000 by Xavier Bunch RN  Medication Review/Management: medications reviewed  Taken 11/11/2023 0800 by Xavier Bunch RN  Medication Review/Management: medications reviewed     Problem: COPD (Chronic Obstructive Pulmonary Disease) Comorbidity  Goal: Maintenance of COPD Symptom Control  Outcome: Ongoing, Progressing  Intervention: Maintain COPD-Symptom Control  Recent Flowsheet Documentation  Taken 11/11/2023 1200 by Xavier Bunch RN  Medication Review/Management: medications reviewed  Taken 11/11/2023 1000 by Xavier Bunch RN  Medication Review/Management: medications reviewed  Taken 11/11/2023 0800 by Xavier Bunch RN  Medication Review/Management: medications reviewed     Problem: Diabetes Comorbidity  Goal: Blood Glucose Level  Within Targeted Range  Outcome: Ongoing, Progressing     Problem: Heart Failure Comorbidity  Goal: Maintenance of Heart Failure Symptom Control  Outcome: Ongoing, Progressing  Intervention: Maintain Heart Failure-Management  Recent Flowsheet Documentation  Taken 11/11/2023 1200 by Xavier Bunch RN  Medication Review/Management: medications reviewed  Taken 11/11/2023 1000 by Xavier Bunch RN  Medication Review/Management: medications reviewed  Taken 11/11/2023 0800 by Xavier Bunch RN  Medication Review/Management: medications reviewed     Problem: Hypertension Comorbidity  Goal: Blood Pressure in Desired Range  Outcome: Ongoing, Progressing  Intervention: Maintain Blood Pressure Management  Recent Flowsheet Documentation  Taken 11/11/2023 1200 by Xavier Bunch RN  Medication Review/Management: medications reviewed  Taken 11/11/2023 1000 by Xavier Bunch RN  Medication Review/Management: medications reviewed  Taken 11/11/2023 0800 by Xavier Bunch RN  Medication Review/Management: medications reviewed     Problem: Obstructive Sleep Apnea Risk or Actual Comorbidity Management  Goal: Unobstructed Breathing During Sleep  Outcome: Ongoing, Progressing     Problem: Osteoarthritis Comorbidity  Goal: Maintenance of Osteoarthritis Symptom Control  Outcome: Ongoing, Progressing  Intervention: Maintain Osteoarthritis Symptom Control  Recent Flowsheet Documentation  Taken 11/11/2023 1200 by Xavier Bunch RN  Medication Review/Management: medications reviewed  Taken 11/11/2023 1000 by Xavier Bunch RN  Medication Review/Management: medications reviewed  Taken 11/11/2023 0800 by Xavier Bunch RN  Medication Review/Management: medications reviewed     Problem: Pain Chronic (Persistent) (Comorbidity Management)  Goal: Acceptable Pain Control and Functional Ability  Outcome: Ongoing, Progressing  Intervention: Manage Persistent Pain  Recent Flowsheet Documentation  Taken 11/11/2023 1200 by Xavier Bunch RN  Medication Review/Management:  medications reviewed  Taken 11/11/2023 1000 by Xavier Bunch RN  Medication Review/Management: medications reviewed  Taken 11/11/2023 0800 by Xavier Bunch RN  Medication Review/Management: medications reviewed     Problem: Seizure Disorder Comorbidity  Goal: Maintenance of Seizure Control  Outcome: Ongoing, Progressing     Problem: Skin Injury Risk Increased  Goal: Skin Health and Integrity  Outcome: Ongoing, Progressing  Intervention: Optimize Skin Protection  Recent Flowsheet Documentation  Taken 11/11/2023 1400 by Xavier Bunch RN  Pressure Reduction Techniques:   frequent weight shift encouraged   weight shift assistance provided  Pressure Reduction Devices:   pressure-redistributing mattress utilized   positioning supports utilized  Skin Protection:   adhesive use limited   tubing/devices free from skin contact   transparent dressing maintained   skin-to-skin areas padded   skin-to-device areas padded   incontinence pads utilized  Taken 11/11/2023 1200 by Xavier Bunch RN  Pressure Reduction Techniques:   frequent weight shift encouraged   weight shift assistance provided  Pressure Reduction Devices:   pressure-redistributing mattress utilized   positioning supports utilized  Skin Protection:   adhesive use limited   tubing/devices free from skin contact   transparent dressing maintained   skin-to-skin areas padded   skin-to-device areas padded   incontinence pads utilized  Taken 11/11/2023 1000 by Xavier Bunch RN  Pressure Reduction Techniques:   frequent weight shift encouraged   weight shift assistance provided  Pressure Reduction Devices:   pressure-redistributing mattress utilized   positioning supports utilized  Skin Protection:   adhesive use limited   tubing/devices free from skin contact   transparent dressing maintained   skin-to-skin areas padded   skin-to-device areas padded   incontinence pads utilized  Taken 11/11/2023 0800 by Xavier Bunch RN  Pressure Reduction Techniques:   frequent weight shift  encouraged   weight shift assistance provided  Pressure Reduction Devices:   pressure-redistributing mattress utilized   positioning supports utilized  Skin Protection:   adhesive use limited   tubing/devices free from skin contact   transparent dressing maintained   skin-to-skin areas padded   skin-to-device areas padded   incontinence pads utilized     Problem: Fall Injury Risk  Goal: Absence of Fall and Fall-Related Injury  Outcome: Ongoing, Progressing  Intervention: Identify and Manage Contributors  Recent Flowsheet Documentation  Taken 11/11/2023 1200 by Xavier Bunch RN  Medication Review/Management: medications reviewed  Taken 11/11/2023 1000 by Xavier Bunch RN  Medication Review/Management: medications reviewed  Taken 11/11/2023 0800 by Xavier Bunch RN  Medication Review/Management: medications reviewed  Intervention: Promote Injury-Free Environment  Recent Flowsheet Documentation  Taken 11/11/2023 1400 by Xavier Bunch RN  Safety Promotion/Fall Prevention:   activity supervised   assistive device/personal items within reach   clutter free environment maintained   lighting adjusted   nonskid shoes/slippers when out of bed   room organization consistent   safety round/check completed  Taken 11/11/2023 1200 by Xavier Bunch RN  Safety Promotion/Fall Prevention:   assistive device/personal items within reach   activity supervised   clutter free environment maintained   lighting adjusted   nonskid shoes/slippers when out of bed   safety round/check completed   room organization consistent  Taken 11/11/2023 1000 by Xavier Bunch RN  Safety Promotion/Fall Prevention:   activity supervised   assistive device/personal items within reach   clutter free environment maintained   lighting adjusted   nonskid shoes/slippers when out of bed   safety round/check completed   room organization consistent  Taken 11/11/2023 0800 by Xavier Bunch RN  Safety Promotion/Fall Prevention:   activity supervised   assistive device/personal  items within reach   clutter free environment maintained   lighting adjusted   nonskid shoes/slippers when out of bed   room organization consistent   safety round/check completed   Goal Outcome Evaluation:

## 2023-11-11 NOTE — PROGRESS NOTES
"   LOS: 3 days    Patient Care Team:  Provider, No Known as PCP - General    Chief Complaint: Shortness of breath    Subjective   Consulted for STUART and hyponatremia.  53-year-old with history of CKD, CHF, atrial fibrillation, poor compliant with medication, increasing edema and shortness of breath, presented with atrial fibs RVR on evaluation in ED started on nitroglycerin and Cardizem.  Renal consulted for STUART and hyponatremia.  Serum sodium was 126  Interval History:   Chart reviewed.  Hypotension noted.  Slightly worse.  Hemoglobin hematocrit within normal limits.  Renal function slightly improved creatinine 1.69 from 2.02, potassium remains low 2.9.  Review of Systems:   No new complaints.  Edema has improved.      Objective     Vital Sign Min/Max for last 24 hours  Temp  Min: 96.9 °F (36.1 °C)  Max: 97 °F (36.1 °C)   BP  Min: 81/56  Max: 110/83   Pulse  Min: 89  Max: 125   Resp  Min: 16  Max: 16   SpO2  Min: 86 %  Max: 100 %   Flow (L/min)  Min: 2  Max: 2   No data recorded     Flowsheet Rows      Flowsheet Row First Filed Value   Admission Height 180.3 cm (71\") Documented at 11/08/2023 0552   Admission Weight 88.5 kg (195 lb) Documented at 11/08/2023 0552            I/O this shift:  In: -   Out: 300 [Urine:300]  I/O last 3 completed shifts:  In: 300 [P.O.:300]  Out: 5000 [Urine:5000]    Physical Exam:  General Appearance:  male awake alert no obvious distress.  Eyes: PER, EOMI.  Neck: Supple no JVD.  Lungs: Clear auscultation, no rales rhonchi's, equal chest movement, nonlabored.  Heart: No gallop, murmur, rub, RRR.  Abdomen: Soft, nontender, positive bowel sounds, no organomegaly.  Extremities: Lower extremity edema markedly improved still 2+, no cyanosis.  Neuro: No focal deficit, moving all extremities, alert oriented X 4  : No suprapubic fullness or tenderness  Skin: Lower extremity skin changes are noted.  WBC WBC   Date Value Ref Range Status   11/11/2023 9.28 3.40 - 10.80 10*3/mm3 Final " "  11/10/2023 8.37 3.40 - 10.80 10*3/mm3 Final   11/09/2023 9.92 3.40 - 10.80 10*3/mm3 Final      HGB Hemoglobin   Date Value Ref Range Status   11/11/2023 13.4 13.0 - 17.7 g/dL Final   11/10/2023 13.1 13.0 - 17.7 g/dL Final   11/09/2023 13.1 13.0 - 17.7 g/dL Final      HCT Hematocrit   Date Value Ref Range Status   11/11/2023 40.3 37.5 - 51.0 % Final   11/10/2023 39.9 37.5 - 51.0 % Final   11/09/2023 39.7 37.5 - 51.0 % Final      Platlets No results found for: \"LABPLAT\"   MCV MCV   Date Value Ref Range Status   11/11/2023 82.2 79.0 - 97.0 fL Final   11/10/2023 83.5 79.0 - 97.0 fL Final   11/09/2023 82.0 79.0 - 97.0 fL Final          Sodium Sodium   Date Value Ref Range Status   11/11/2023 136 136 - 145 mmol/L Final   11/10/2023 132 (L) 136 - 145 mmol/L Final   11/09/2023 130 (L) 136 - 145 mmol/L Final      Potassium Potassium   Date Value Ref Range Status   11/11/2023 2.9 (L) 3.5 - 5.2 mmol/L Final     Comment:     Slight hemolysis detected by analyzer. Result may be falsely elevated.   11/10/2023 3.0 (L) 3.5 - 5.2 mmol/L Final   11/10/2023 2.7 (L) 3.5 - 5.2 mmol/L Final     Comment:     Slight hemolysis detected by analyzer. Result may be falsely elevated.   11/09/2023 3.8 3.5 - 5.2 mmol/L Final      Chloride Chloride   Date Value Ref Range Status   11/11/2023 89 (L) 98 - 107 mmol/L Final   11/10/2023 89 (L) 98 - 107 mmol/L Final   11/09/2023 87 (L) 98 - 107 mmol/L Final      CO2 CO2   Date Value Ref Range Status   11/11/2023 33.0 (H) 22.0 - 29.0 mmol/L Final   11/10/2023 25.0 22.0 - 29.0 mmol/L Final   11/09/2023 27.0 22.0 - 29.0 mmol/L Final      BUN BUN   Date Value Ref Range Status   11/11/2023 75 (H) 6 - 20 mg/dL Final   11/10/2023 93 (H) 6 - 20 mg/dL Final   11/09/2023 93 (H) 6 - 20 mg/dL Final      Creatinine Creatinine   Date Value Ref Range Status   11/11/2023 1.69 (H) 0.76 - 1.27 mg/dL Final   11/10/2023 2.09 (H) 0.76 - 1.27 mg/dL Final   11/09/2023 2.46 (H) 0.76 - 1.27 mg/dL Final      Calcium Calcium " "  Date Value Ref Range Status   11/11/2023 8.7 8.6 - 10.5 mg/dL Final   11/10/2023 8.9 8.6 - 10.5 mg/dL Final   11/09/2023 9.2 8.6 - 10.5 mg/dL Final      PO4 No results found for: \"CAPO4\"   Albumin Albumin   Date Value Ref Range Status   11/10/2023 3.0 (L) 3.5 - 5.2 g/dL Final   11/09/2023 3.4 (L) 3.5 - 5.2 g/dL Final      Magnesium Magnesium   Date Value Ref Range Status   11/09/2023 2.3 1.6 - 2.6 mg/dL Final      Uric Acid No results found for: \"URICACID\"        Results Review:     I reviewed the patient's new clinical results.    amiodarone, 200 mg, Oral, Q8H   Followed by  [START ON 11/16/2023] amiodarone, 200 mg, Oral, Q12H   Followed by  [START ON 11/30/2023] amiodarone, 200 mg, Oral, Daily  apixaban, 5 mg, Oral, Q12H  midazolam, , ,   pharmacy consult - MTM, , Does not apply, Daily  senna-docusate sodium, 2 tablet, Oral, BID  sodium chloride, 10 mL, Intravenous, Q12H      furosemide, 5 mg/hr, Last Rate: 5 mg/hr (11/08/23 1423)        Medication Review: Reviewed    Assessment & Plan       Hyponatremia    STUART (acute kidney injury)    Acute on chronic heart failure with preserved ejection fraction    Atrial fibrillation with RVR    Essential hypertension    Anxiety associated with depression    Hypoalbuminemia    Hyperbilirubinemia    Elevated liver enzymes    Acute exacerbation of congestive heart failure    1.  STUART: creatinine 2.02, baseline unknown.  Acute rise in creatinine most likely secondary to prerenal state.  FENa less than 1%.  2 CKD: Unsure of the baseline.  3.  Hyponatremia: Sodium 126 on arrival.  4.  CHF: Cardiology evaluating.  5.  Volume overload:  6.  Hypertension: Blood pressure low on Cardizem drip yesterday.  7.  Abnormal liver enzymes: Likely secondary to low blood pressure    Recommendations:  Creatinine improved, good urine output, hypokalemia is noted with diuresis.  Replacement ordered.  Heart rate control on amiodarone  Sodium 132, normal TSH, magnesium level.  Urine labs include U " protein 8.6, U creatinine 49.6, U sodium<20  Edema most likely cardiac.  Patient has no proteinuria.  High risk complex patient with multiple medical problems.  Continue with the diuretics.  Juan Blanco MD  11/11/23  12:25 EST

## 2023-11-11 NOTE — PROGRESS NOTES
[facility]  PROGRESS NOTE    Patient Name: Sundar Reeder  : 1970  MRN: [id number]    Date of Admission: 2023  Primary Care Physician: Provider, No Known    Subjective   Subjective     CC:  Afib    HPI:  Patient is doing well this morning. Swelling greatly improved from admission. Kept his feet elevated and pumped his calves for 3 hours today. Not requiring oxygen. 2.7L output yesterday.       Objective   Objective     Vital Signs:   Temp:  [96.5 °F (35.8 °C)-97 °F (36.1 °C)] 97 °F (36.1 °C)  Heart Rate:  [] 110  Resp:  [16-18] 16  BP: ()/(56-88) 85/75  Flow (L/min):  [2] 2     Physical Exam:  Constitutional: No acute distress, awake, alert  Respiratory: Clear to auscultation bilaterally, respiratory effort normal on room air  Cardiovascular: Irregularly irregular rhythm   Gastrointestinal: Positive bowel sounds, soft, nontender, nondistended  Musculoskeletal: +3LE edema  Psychiatric: Appropriate affect, cooperative  Neurologic: Oriented x 3, strength symmetric in all extremities, Cranial Nerves grossly intact to confrontation, speech clear  Skin: healing excoriations over bilateral distal extremities      Results Reviewed:  LAB RESULTS:      Lab 11/11/23  0430 11/10/23  0340 11/09/23  0410 11/08/23  0551   WBC 9.28 8.37 9.92 12.86*   HEMOGLOBIN 13.4 13.1 13.1 12.9*   HEMATOCRIT 40.3 39.9 39.7 39.3   PLATELETS 304 300 334 398   NEUTROS ABS  --   --   --  11.17*   IMMATURE GRANS (ABS)  --   --   --  0.06*   LYMPHS ABS  --   --   --  1.09   MONOS ABS  --   --   --  0.52   EOS ABS  --   --   --  0.01   MCV 82.2 83.5 82.0 83.3         Lab 11/11/23  0430 11/10/23  2049 11/10/23  0340 11/09/23  0410 11/08/23  0804 23  0551   SODIUM 136  --  132* 130*  --  126*   POTASSIUM 2.9* 3.0* 2.7* 3.8  --  4.4   CHLORIDE 89*  --  89* 87*  --  85*   CO2 33.0*  --  25.0 27.0  --  23.0   ANION GAP 14.0  --  18.0* 16.0*  --  18.0*   BUN 75*  --  93* 93*  --  87*    CREATININE 1.69*  --  2.09* 2.46*  --  2.17*   EGFR 48.5*  --  37.6* 30.9*  --  36.0*   GLUCOSE 116*  --  92 108*  --  132*   CALCIUM 8.7  --  8.9 9.2  --  9.3   MAGNESIUM  --   --   --  2.3 2.5  --    HEMOGLOBIN A1C  --   --   --  6.00*  --   --    TSH  --   --   --  2.550  --   --          Lab 11/10/23  0340 11/09/23  0410 11/08/23  0551   TOTAL PROTEIN 5.9* 6.1 6.1   ALBUMIN 3.0* 3.4* 3.3*   GLOBULIN 2.9 2.7 2.8   ALT (SGPT) 95* 122* 145*   AST (SGOT) 40 52* 67*   BILIRUBIN 2.1* 2.3* 2.9*   ALK PHOS 183* 202* 203*         Lab 11/08/23  0804 11/08/23  0551   PROBNP  --  35,697.0*   HSTROP T 60* 69*         Lab 11/09/23  0410   CHOLESTEROL 81   LDL CHOL 45   HDL CHOL 22*   TRIGLYCERIDES 60             Brief Urine Lab Results  (Last result in the past 365 days)        Color   Clarity   Blood   Leuk Est   Nitrite   Protein   CREAT   Urine HCG        11/08/23 1333             49.6                 Microbiology Results Abnormal       None            Cardioversion External in Cardiology Department    Result Date: 11/10/2023    Post cardioversion the patient displayed a sinus rhythm.   The cardioversion was successful.      Results for orders placed during the hospital encounter of 11/08/23    Adult Transthoracic Echo Complete W/ Cont if Necessary Per Protocol    Interpretation Summary    Left ventricular ejection fraction appears to be less than 20%.  consider limited contrasted echo to assess for LV thrombus    The left ventricular cavity is dilated.    The right ventricular cavity is mildly dilated.    The left atrial cavity is mildly dilated.    Left atrial volume is moderately increased.    severe MR present    Estimated right ventricular systolic pressure from tricuspid regurgitation is normal (<35 mmHg).      Current medications:  Scheduled Meds:amiodarone, 200 mg, Oral, Q8H   Followed by  [START ON 11/16/2023] amiodarone, 200 mg, Oral, Q12H   Followed by  [START ON 11/30/2023] amiodarone, 200 mg, Oral,  Daily  apixaban, 5 mg, Oral, Q12H  midazolam, , ,   pharmacy consult - MTM, , Does not apply, Daily  senna-docusate sodium, 2 tablet, Oral, BID  sodium chloride, 10 mL, Intravenous, Q12H      Continuous Infusions:furosemide, 5 mg/hr, Last Rate: 5 mg/hr (11/08/23 1423)      PRN Meds:.  acetaminophen **OR** acetaminophen **OR** acetaminophen    senna-docusate sodium **AND** polyethylene glycol **AND** bisacodyl **AND** bisacodyl    Calcium Replacement - Follow Nurse / BPA Driven Protocol    fentaNYL citrate (PF)    flumazenil    influenza vaccine    Magnesium Low Dose Replacement - Follow Nurse / BPA Driven Protocol    methohexital    midazolam    midazolam    naloxone    nitroglycerin    ondansetron **OR** ondansetron    Phosphorus Replacement - Follow Nurse / BPA Driven Protocol    Potassium Replacement - Follow Nurse / BPA Driven Protocol    sodium chloride    sodium chloride    sodium chloride    Assessment & Plan   Assessment & Plan     Active Hospital Problems    Diagnosis  POA    **Hyponatremia [E87.1]  Yes    STUART (acute kidney injury) [N17.9]  Yes    Acute on chronic heart failure with preserved ejection fraction [I50.33]  Yes    Atrial fibrillation with RVR [I48.91]  Yes    Essential hypertension [I10]  Yes    Anxiety associated with depression [F41.8]  Yes    Hypoalbuminemia [E88.09]  Yes    Hyperbilirubinemia [E80.6]  Yes    Elevated liver enzymes [R74.8]  Yes    Acute exacerbation of congestive heart failure [I50.9]  Yes      Resolved Hospital Problems   No resolved problems to display.        Brief Hospital Course to date:  Sundar Reeder is a 53 y.o. male with a past medical history of HTN, atrial fibrillation on Eliquis, CHF and CKD presented to the ED complaining of elevated heart rate, worsened lower extremity swelling and scrotal swelling with shortness of breath.      Atrial Fibrillation with RVR  -S/P Digoxin 500mcg x1 11/9  -holding amiodarone this morning due to QTc  -Continue Eliquis  -ECV on 11/10,  back in afib today. He is asymptomatic.  -Cards following. Discussed with NOELLE Simpson. Limited options due to blood pressure.      CHFrEF  LE Swelling/skin breakdown  -Continue IV Lasix gtt. Net Output of 2.7L yesterday  -Strict I's/O's, daily weights  -Echo reviewed: LVEF <20%, severe MR  -Did not tolerate compression wraps due to pain     STUART  -Nephrology following, appreciate assistance   -Renal US negative for hydro. Chronic renal dz   -Cr trending down. At 1.69 this morning. Repeat creatinine in the am.     Hypokalemia  - currently getting replacement, repeat level after replacement.      Hyponatremia, resolved  -Sodium WNL this morning     Hyperbilirubinemia  Elevated AST/ALT  - CT with fatty liver, GB stones/sludge  -Hepatitis panel negative  -Likely due to congestive hepatopathy     HTN  - holding home Lisinopril in setting of STUART  - cardiac meds as above     Depression  - start antidepressant inpatient once sodium and heart failure are improved     Expected Discharge Location and Transportation: Home   Expected Discharge   Expected Discharge Date: 11/14/2023; Expected Discharge Time:      DVT prophylaxis:  Medical DVT prophylaxis orders are present.     AM-PAC 6 Clicks Score (PT): 22 (11/10/23 2000)    CODE STATUS:   Code Status and Medical Interventions:   Ordered at: 11/08/23 0839     Level Of Support Discussed With:    Patient     Code Status (Patient has no pulse and is not breathing):    CPR (Attempt to Resuscitate)     Medical Interventions (Patient has pulse or is breathing):    Full Support     This patient's problems and plans were partially entered by my partner and updated as appropriate by me 11/11/23. Today is my first day evaluating this patient's active medical problems. I Personally reviewed chart and adjusted note to reflect daily changes in management/clinical condition. Copied text in this note has been reviewed and is accurate as of  11/11/23      Bree Lerner,  MD  11/11/23

## 2023-11-12 NOTE — PROGRESS NOTES
"   LOS: 4 days    Patient Care Team:  Provider, No Known as PCP - General    Chief Complaint: Shortness of breath    Subjective   Consulted for STUART and hyponatremia.  53-year-old with history of CKD, CHF, atrial fibrillation, poor compliant with medication, increasing edema and shortness of breath, presented with atrial fibs RVR on evaluation in ED started on nitroglycerin and Cardizem.  Renal consulted for STUART and hyponatremia.  Serum sodium was 126  Interval History:   Chart reviewed.  Hypotension and irregular heartbeat.  Renal function slightly 1.73 from 1.69   Review of Systems:   No new complaints.  Edema has improved.  Complains of epistaxis      Objective     Vital Sign Min/Max for last 24 hours  Temp  Min: 97.7 °F (36.5 °C)  Max: 98 °F (36.7 °C)   BP  Min: 87/71  Max: 107/81   Pulse  Min: 87  Max: 120   Resp  Min: 16  Max: 18   SpO2  Min: 81 %  Max: 98 %   Flow (L/min)  Min: 2  Max: 2   No data recorded     Flowsheet Rows      Flowsheet Row First Filed Value   Admission Height 180.3 cm (71\") Documented at 11/08/2023 0552   Admission Weight 88.5 kg (195 lb) Documented at 11/08/2023 0552            I/O this shift:  In: 120 [P.O.:120]  Out: 400 [Urine:400]  I/O last 3 completed shifts:  In: 800 [P.O.:800]  Out: 3100 [Urine:3100]    Physical Exam:  General Appearance:  male in no obvious distress.  Eyes: PER, EOMI.  Neck: Supple no JVD.  Lungs: Clear auscultation, no rales rhonchi's, equal chest movement, nonlabored.  Heart: No gallop, murmur, rub, RRR.  Abdomen: Soft, nontender, positive bowel sounds, no organomegaly.  Extremities: Lower extremity edema markedly improved still 2+, no cyanosis.  Neuro: No focal deficit, moving all extremities, alert oriented X 4  : No suprapubic fullness or tenderness  Skin: Lower extremity skin changes are noted.  WBC WBC   Date Value Ref Range Status   11/11/2023 9.28 3.40 - 10.80 10*3/mm3 Final   11/10/2023 8.37 3.40 - 10.80 10*3/mm3 Final      HGB Hemoglobin " "  Date Value Ref Range Status   11/11/2023 13.4 13.0 - 17.7 g/dL Final   11/10/2023 13.1 13.0 - 17.7 g/dL Final      HCT Hematocrit   Date Value Ref Range Status   11/11/2023 40.3 37.5 - 51.0 % Final   11/10/2023 39.9 37.5 - 51.0 % Final      Platlets No results found for: \"LABPLAT\"   MCV MCV   Date Value Ref Range Status   11/11/2023 82.2 79.0 - 97.0 fL Final   11/10/2023 83.5 79.0 - 97.0 fL Final          Sodium Sodium   Date Value Ref Range Status   11/12/2023 134 (L) 136 - 145 mmol/L Final   11/11/2023 136 136 - 145 mmol/L Final   11/10/2023 132 (L) 136 - 145 mmol/L Final      Potassium Potassium   Date Value Ref Range Status   11/12/2023 3.9 3.5 - 5.2 mmol/L Final     Comment:     Slight hemolysis detected by analyzer. Result may be falsely elevated.   11/11/2023 4.4 3.5 - 5.2 mmol/L Final     Comment:     Slight hemolysis detected by analyzer. Result may be falsely elevated.   11/11/2023 2.9 (L) 3.5 - 5.2 mmol/L Final     Comment:     Slight hemolysis detected by analyzer. Result may be falsely elevated.   11/10/2023 3.0 (L) 3.5 - 5.2 mmol/L Final   11/10/2023 2.7 (L) 3.5 - 5.2 mmol/L Final     Comment:     Slight hemolysis detected by analyzer. Result may be falsely elevated.      Chloride Chloride   Date Value Ref Range Status   11/12/2023 91 (L) 98 - 107 mmol/L Final   11/11/2023 89 (L) 98 - 107 mmol/L Final   11/10/2023 89 (L) 98 - 107 mmol/L Final      CO2 CO2   Date Value Ref Range Status   11/12/2023 32.0 (H) 22.0 - 29.0 mmol/L Final   11/11/2023 33.0 (H) 22.0 - 29.0 mmol/L Final   11/10/2023 25.0 22.0 - 29.0 mmol/L Final      BUN BUN   Date Value Ref Range Status   11/12/2023 81 (H) 6 - 20 mg/dL Final   11/11/2023 75 (H) 6 - 20 mg/dL Final   11/10/2023 93 (H) 6 - 20 mg/dL Final      Creatinine Creatinine   Date Value Ref Range Status   11/12/2023 1.73 (H) 0.76 - 1.27 mg/dL Final   11/11/2023 1.69 (H) 0.76 - 1.27 mg/dL Final   11/10/2023 2.09 (H) 0.76 - 1.27 mg/dL Final      Calcium Calcium   Date Value " "Ref Range Status   11/12/2023 9.0 8.6 - 10.5 mg/dL Final   11/11/2023 8.7 8.6 - 10.5 mg/dL Final   11/10/2023 8.9 8.6 - 10.5 mg/dL Final      PO4 No results found for: \"CAPO4\"   Albumin Albumin   Date Value Ref Range Status   11/10/2023 3.0 (L) 3.5 - 5.2 g/dL Final      Magnesium Magnesium   Date Value Ref Range Status   11/12/2023 1.7 1.6 - 2.6 mg/dL Final      Uric Acid No results found for: \"URICACID\"        Results Review:     I reviewed the patient's new clinical results.    amiodarone, 200 mg, Oral, Q12H  apixaban, 5 mg, Oral, Q12H  midazolam, , ,   pharmacy consult - MTM, , Does not apply, Daily  senna-docusate sodium, 2 tablet, Oral, BID  sodium chloride, 10 mL, Intravenous, Q12H      amiodarone, 1 mg/min        Medication Review: Reviewed    Assessment & Plan       Hyponatremia    STUART (acute kidney injury)    Acute on chronic heart failure with preserved ejection fraction    Atrial fibrillation with RVR    Essential hypertension    Anxiety associated with depression    Hypoalbuminemia    Hyperbilirubinemia    Elevated liver enzymes    Acute exacerbation of congestive heart failure    1.  STUART: creatinine 2.02, baseline unknown.  Acute rise in creatinine most likely secondary to prerenal state.  FENa less than 1%.  Ejection fraction 20% most likely cardiorenal syndrome.  2 CKD: Unsure of the baseline.  3.  Hyponatremia: Sodium 126 on arrival.  4.  CHF: Cardiology evaluating.  5.  Volume overload:  6.  Hypertension: Blood pressure low on Cardizem drip yesterday.  7.  Abnormal liver enzymes: Likely secondary to low blood pressure    Recommendations:  Creatinine stable, sodium potassium in range.  Heart rate control on amiodarone  Urine labs include U protein 8.6, U creatinine 49.6, U sodium<20  Edema most likely cardiac.  Patient has no proteinuria.  Epistaxis: We will check serologies ND-3 and MPO  High risk complex patient with multiple medical problems.  Continue with the diuretics.  Juan Blanco, " MD  11/12/23  10:08 EST

## 2023-11-12 NOTE — PLAN OF CARE
During the night, pts heart rate was ranging from 100-150's. Pt was asymptomatic. Cardiology paged, amiodarone was d/c'ed.   Problem: Adult Inpatient Plan of Care  Goal: Plan of Care Review  Outcome: Ongoing, Progressing  Goal: Patient-Specific Goal (Individualized)  Outcome: Ongoing, Progressing  Goal: Absence of Hospital-Acquired Illness or Injury  Outcome: Ongoing, Progressing  Intervention: Identify and Manage Fall Risk  Recent Flowsheet Documentation  Taken 11/12/2023 0600 by Hannah Rivas RN  Safety Promotion/Fall Prevention:   activity supervised   assistive device/personal items within reach   clutter free environment maintained   safety round/check completed   toileting scheduled  Taken 11/12/2023 0400 by Hannah Rivas, RN  Safety Promotion/Fall Prevention:   assistive device/personal items within reach   activity supervised   clutter free environment maintained   safety round/check completed   toileting scheduled  Taken 11/12/2023 0200 by Hannah Rivas RN  Safety Promotion/Fall Prevention:   activity supervised   assistive device/personal items within reach   clutter free environment maintained   safety round/check completed   toileting scheduled  Taken 11/12/2023 0000 by Hannah Rivas, RN  Safety Promotion/Fall Prevention:   activity supervised   assistive device/personal items within reach   clutter free environment maintained   safety round/check completed   toileting scheduled  Taken 11/11/2023 2200 by Hannah Rivas, RN  Safety Promotion/Fall Prevention:   activity supervised   clutter free environment maintained   assistive device/personal items within reach   safety round/check completed   toileting scheduled  Intervention: Prevent Skin Injury  Recent Flowsheet Documentation  Taken 11/12/2023 0600 by Hannah Rivas, RN  Body Position: position changed independently  Skin Protection:   adhesive use limited   incontinence pads utilized   transparent dressing  maintained   tubing/devices free from skin contact  Taken 11/12/2023 0400 by Hannah Rivas RN  Body Position: position changed independently  Skin Protection:   adhesive use limited   incontinence pads utilized   tubing/devices free from skin contact   transparent dressing maintained  Taken 11/12/2023 0200 by Hannah Rivas RN  Body Position: position changed independently  Skin Protection:   adhesive use limited   incontinence pads utilized   transparent dressing maintained   tubing/devices free from skin contact  Taken 11/12/2023 0000 by Hannah Rivas RN  Body Position: position changed independently  Skin Protection:   adhesive use limited   incontinence pads utilized   transparent dressing maintained   tubing/devices free from skin contact  Taken 11/11/2023 2200 by Hannah Rivas, RN  Body Position: position changed independently  Skin Protection:   adhesive use limited   incontinence pads utilized   transparent dressing maintained   tubing/devices free from skin contact  Intervention: Prevent and Manage VTE (Venous Thromboembolism) Risk  Recent Flowsheet Documentation  Taken 11/12/2023 0600 by Hannah Rivas, RN  Activity Management: activity encouraged  Taken 11/12/2023 0400 by Hannah Rivas RN  Activity Management: activity encouraged  Taken 11/12/2023 0200 by Hannah Rivas RN  Activity Management: activity encouraged  Taken 11/12/2023 0000 by Hannah Rivas, RN  Activity Management: activity encouraged  Taken 11/11/2023 2200 by Hannah Rivas RN  Activity Management: activity encouraged  Intervention: Prevent Infection  Recent Flowsheet Documentation  Taken 11/12/2023 0600 by Hannah Rivas, RN  Infection Prevention: environmental surveillance performed  Taken 11/12/2023 0400 by Hannah Rivas RN  Infection Prevention: environmental surveillance performed  Taken 11/12/2023 0200 by Hannah Rivas RN  Infection Prevention: environmental  surveillance performed  Taken 11/12/2023 0000 by Hannah Rivas, RN  Infection Prevention: environmental surveillance performed  Taken 11/11/2023 2200 by Hannah Rivas, RN  Infection Prevention: environmental surveillance performed  Goal: Optimal Comfort and Wellbeing  Outcome: Ongoing, Progressing  Intervention: Provide Person-Centered Care  Recent Flowsheet Documentation  Taken 11/11/2023 2000 by Hannah Rivas, RN  Trust Relationship/Rapport:   care explained   choices provided  Goal: Readiness for Transition of Care  Outcome: Ongoing, Progressing   Goal Outcome Evaluation:

## 2023-11-12 NOTE — PLAN OF CARE
Problem: Adult Inpatient Plan of Care  Goal: Plan of Care Review  Outcome: Ongoing, Progressing  Goal: Patient-Specific Goal (Individualized)  Outcome: Ongoing, Progressing  Goal: Absence of Hospital-Acquired Illness or Injury  Outcome: Ongoing, Progressing  Intervention: Identify and Manage Fall Risk  Description: Perform standard risk assessment on admission using a validated tool or comprehensive approach appropriate to the patient; reassess fall risk frequently, with change in status or transfer to another level of care.  Communicate fall injury risk to interprofessional healthcare team.  Determine need for increased observation, equipment and environmental modification, such as low bed, signage and supportive, nonskid footwear.  Adjust safety measures to individual developmental age, stage and identified risk factors.  Reinforce the importance of safety and physical activity with patient and family.  Perform regular intentional rounding to assess need for position change, pain assessment and personal needs, including assistance with toileting.  Recent Flowsheet Documentation  Taken 11/12/2023 1828 by Hannah Christensen, RN  Safety Promotion/Fall Prevention:   activity supervised   assistive device/personal items within reach   clutter free environment maintained   nonskid shoes/slippers when out of bed   room organization consistent   safety round/check completed  Taken 11/12/2023 1616 by Hannah Christensen, RN  Safety Promotion/Fall Prevention:   activity supervised   assistive device/personal items within reach   clutter free environment maintained   fall prevention program maintained   nonskid shoes/slippers when out of bed   room organization consistent   safety round/check completed  Taken 11/12/2023 1400 by Hannah Christensen, RN  Safety Promotion/Fall Prevention:   activity supervised   assistive device/personal items within reach   clutter free environment maintained   fall prevention program maintained    nonskid shoes/slippers when out of bed   room organization consistent   safety round/check completed  Taken 11/12/2023 1253 by Hannah Christensen RN  Safety Promotion/Fall Prevention:   activity supervised   assistive device/personal items within reach   clutter free environment maintained   fall prevention program maintained   nonskid shoes/slippers when out of bed   room organization consistent   safety round/check completed  Taken 11/12/2023 1000 by Hannah Christensen RN  Safety Promotion/Fall Prevention:   activity supervised   assistive device/personal items within reach   clutter free environment maintained   fall prevention program maintained   nonskid shoes/slippers when out of bed   room organization consistent   safety round/check completed  Taken 11/12/2023 0800 by Hannah Christensen RN  Safety Promotion/Fall Prevention:   activity supervised   assistive device/personal items within reach   clutter free environment maintained   fall prevention program maintained   nonskid shoes/slippers when out of bed   room organization consistent   safety round/check completed  Intervention: Prevent Skin Injury  Description: Perform a screening for skin injury risk, such as pressure or moisture associated skin damage on admission and at regular intervals throughout hospital stay.  Keep all areas of skin (especially folds) clean and dry.  Maintain adequate skin hydration.  Relieve and redistribute pressure and protect bony prominences; implement measures based on patient-specific risk factors.  Match turning and repositioning schedule to clinical condition.  Encourage weight shift frequently; assist with reposition if unable to complete independently.  Float heels off bed; avoid pressure on the Achilles tendon.  Keep skin free from extended contact with medical devices.  Encourage functional activity and mobility, as early as tolerated.  Use aids (e.g., slide boards, mechanical lift) during transfer.  Recent Flowsheet  Documentation  Taken 11/12/2023 1828 by Hannah Christensen RN  Body Position: position changed independently  Taken 11/12/2023 1616 by Hannah Christensen RN  Body Position: position changed independently  Taken 11/12/2023 1400 by Hannah Christensen RN  Body Position: position changed independently  Taken 11/12/2023 1253 by Hannah Christensen RN  Body Position: position changed independently  Taken 11/12/2023 1000 by Hannah Christensen RN  Body Position: position changed independently  Skin Protection:   adhesive use limited   incontinence pads utilized   pectin skin barriers applied   silicone foam dressing in place   skin sealant/moisture barrier applied   skin-to-device areas padded   transparent dressing maintained   tubing/devices free from skin contact  Taken 11/12/2023 0915 by Hannah Christensen RN  Skin Protection:   adhesive use limited   incontinence pads utilized   transparent dressing maintained   tubing/devices free from skin contact  Taken 11/12/2023 0800 by Hannah Christensen RN  Body Position: position changed independently  Intervention: Prevent and Manage VTE (Venous Thromboembolism) Risk  Description: Assess for VTE (venous thromboembolism) risk.  Encourage and assist with early ambulation.  Initiate and maintain compression or other therapy, as indicated, based on identified risk in accordance with organizational protocol and provider order.  Encourage both active and passive leg exercises while in bed, if unable to ambulate.  Recent Flowsheet Documentation  Taken 11/12/2023 1828 by Hannah Christensen RN  Activity Management: activity encouraged  Taken 11/12/2023 1616 by Hannah Christensen RN  Activity Management: activity encouraged  Taken 11/12/2023 1400 by Hannah Christensen RN  Activity Management: activity encouraged  Taken 11/12/2023 1253 by Hannah Christensen RN  Activity Management: activity encouraged  Taken 11/12/2023 1000 by Hannah Christensen RN  Activity Management: activity encouraged  Taken 11/12/2023 0915  by Christensen, Hannah, RN  Range of Motion: active ROM (range of motion) encouraged  Taken 11/12/2023 0800 by Hannah Christensen RN  Activity Management: activity encouraged  Intervention: Prevent Infection  Description: Maintain skin and mucous membrane integrity; promote hand, oral and pulmonary hygiene.  Optimize fluid balance, nutrition, sleep and glycemic control to maximize infection resistance.  Identify potential sources of infection early to prevent or mitigate progression of infection (e.g., wound, lines, devices).  Evaluate ongoing need for invasive devices; remove promptly when no longer indicated.  Recent Flowsheet Documentation  Taken 11/12/2023 1828 by Hannah Christensen RN  Infection Prevention:   equipment surfaces disinfected   hand hygiene promoted   personal protective equipment utilized   rest/sleep promoted  Taken 11/12/2023 1616 by Hannah Christensen RN  Infection Prevention:   equipment surfaces disinfected   hand hygiene promoted   personal protective equipment utilized   rest/sleep promoted   single patient room provided  Taken 11/12/2023 1400 by Hannah Christensen RN  Infection Prevention:   equipment surfaces disinfected   hand hygiene promoted   personal protective equipment utilized   rest/sleep promoted   single patient room provided  Taken 11/12/2023 1253 by Hannah Christensen RN  Infection Prevention:   equipment surfaces disinfected   hand hygiene promoted   personal protective equipment utilized   rest/sleep promoted   single patient room provided  Taken 11/12/2023 1000 by Hannah Christensen RN  Infection Prevention:   hand hygiene promoted   personal protective equipment utilized   rest/sleep promoted   single patient room provided  Taken 11/12/2023 0800 by Hannah Christensen RN  Infection Prevention:   equipment surfaces disinfected   hand hygiene promoted   personal protective equipment utilized   rest/sleep promoted   single patient room provided  Goal: Optimal Comfort and Wellbeing  Outcome:  Ongoing, Progressing  Intervention: Provide Person-Centered Care  Description: Use a family-focused approach to care.  Develop trust and rapport by proactively providing information, encouraging questions, addressing concerns and offering reassurance.  Acknowledge emotional response to hospitalization.  Recognize and utilize personal coping strategies.  Honor spiritual and cultural preferences.  Recent Flowsheet Documentation  Taken 11/12/2023 0915 by Hannah Christensen RN  Trust Relationship/Rapport:   care explained   choices provided   questions answered   questions encouraged   thoughts/feelings acknowledged  Taken 11/12/2023 0800 by Hannah Christensen RN  Trust Relationship/Rapport:   care explained   choices provided   questions encouraged   reassurance provided   thoughts/feelings acknowledged   questions answered  Goal: Readiness for Transition of Care  Outcome: Ongoing, Progressing   Goal Outcome Evaluation:

## 2023-11-12 NOTE — PROGRESS NOTES
Sundar Reeder  [id number]  1970   LOS: 4 days   Patient Care Team:  Provider, No Known as PCP - General    Chief Complaint: Follow-up on HFrEF, atrial fibrillation with RVR, STUART, LBBB    Subjective 53-year-old gentleman admitted to [facility] November 8, 2023 for acute systolic heart failure, left bundle branch block, atrial fibrillation acute renal insufficiency.  During hospital course he has had cardioversion but reverted back to atrial fibrillation.  .  Echocardiogram for EF low at 20% with severe MR.  Continue guideline directed medical therapy with is limited due to marginal blood pressure kidney failure.  Patient sitting up in bed this morning shortness of breath much improved.  Had some nosebleeds last evening this is now resolved.  IV amiodarone discontinued inadvertently last evening.  Will restart today.      Objective     Vital Sign Min/Max for last 24 hours  Temp  Min: 97.7 °F (36.5 °C)  Max: 98 °F (36.7 °C)   BP  Min: 87/71  Max: 107/81   Pulse  Min: 87  Max: 120   Resp  Min: 16  Max: 18   SpO2  Min: 81 %  Max: 98 %   No data recorded   No data recorded         11/08/23  1150 11/10/23  0543   Weight: 82.5 kg (181 lb 14.4 oz) 77.7 kg (171 lb 4 oz)         Intake/Output Summary (Last 24 hours) at 11/12/2023 0934  Last data filed at 11/12/2023 0857  Gross per 24 hour   Intake 620 ml   Output 1800 ml   Net -1180 ml       Physical Exam:     General Appearance:    Alert, cooperative, in no acute distress   Lungs:     Clear to auscultation,respirations regular, even and                unlabored    Heart:    Atrial fibrillation with intermittent RVR, LBBB, normal S1 and S2, grade 2/6 murmur, no gallop, no rub, no click   Abdomen:  Extremities:   Soft, nontender, bowel sounds audible x4    2+ edema, normal range of motion   Pulses:   Pulses palpable and equal bilaterally      Results Review:   Results from last 7 days   Lab Units 11/12/23  0414 11/11/23  1214 11/11/23  0430 11/10/23  2049  11/10/23  0340   SODIUM mmol/L 134*  --  136  --  132*   POTASSIUM mmol/L 3.9 4.4 2.9*   < > 2.7*   CHLORIDE mmol/L 91*  --  89*  --  89*   CO2 mmol/L 32.0*  --  33.0*  --  25.0   BUN mg/dL 81*  --  75*  --  93*   CREATININE mg/dL 1.73*  --  1.69*  --  2.09*   GLUCOSE mg/dL 115*  --  116*  --  92   CALCIUM mg/dL 9.0  --  8.7  --  8.9    < > = values in this interval not displayed.     Results from last 7 days   Lab Units 11/11/23  0430 11/10/23  0340 11/09/23  0410   WBC 10*3/mm3 9.28 8.37 9.92   HEMOGLOBIN g/dL 13.4 13.1 13.1   HEMATOCRIT % 40.3 39.9 39.7   PLATELETS 10*3/mm3 304 300 334     Results from last 7 days   Lab Units 11/09/23 0410   CHOLESTEROL mg/dL 81   TRIGLYCERIDES mg/dL 60   HDL CHOL mg/dL 22*   LDL CHOL mg/dL 45     Results from last 7 days   Lab Units 11/09/23  0410   HEMOGLOBIN A1C % 6.00*     Results from last 7 days   Lab Units 11/08/23  0804 11/08/23  0551   HSTROP T ng/L 60* 69*   Magnesium 2.3 on 11/9/2023    ECG 11/11/2023:  Undetermined rhythm  Left axis deviation  Left bundle branch block  Abnormal ECG  When compared with ECG of 10-NOV-2023 14:31,  Current undetermined rhythm precludes rhythm comparison, needs review  T wave inversion more evident in Lateral leads    No new chest x-ray to review    Medication Review: Reviewed, Lasix GTT at 5 mg/h    Assessment & Plan   Acute on chronic systolic heart failure -EF 20%. Good diuresis.   Valvular heart disease, severe MR  Paroxysmal atrial fibrillation, continue amiodarone therapy.  Left bundle branch block by EKG. continue IV amiodarone, p.o. amiodarone loading.  Consider repeat cardioversion.  Marginal blood pressure, acute kidney insufficiency and hyponatremia followed by nephrology      Hyponatremia    STUART (acute kidney injury)    Acute on chronic heart failure with preserved ejection fraction    Atrial fibrillation with RVR    Essential hypertension    Anxiety associated with depression    Hypoalbuminemia    Hyperbilirubinemia     Elevated liver enzymes    Acute exacerbation of congestive heart failure  Electronically signed by KAYLENE Dominguez, 11/12/23, 9:36 AM EST. 11/12/23  09:34 EST

## 2023-11-12 NOTE — PROGRESS NOTES
[facility] Medicine Services  PROGRESS NOTE    Patient Name: Sundar Reeder  : 1970  MRN: [id number]    Date of Admission: 2023  Primary Care Physician: Provider, No Known    Subjective   Subjective     CC:  Afib, heart failure    HPI:  Patient is insistent that we stop the lasix drip. He needs a day off of it. Agreeable to intermittent IV or oral meds. States that the fluids was coming off too fast. Had a nose bleed last night. Swelling is doing well, still with pedal edema but much better in his thighs. Denies any respiratory distress. Remains in afib.       Objective   Objective     Vital Signs:   Temp:  [97.7 °F (36.5 °C)-98 °F (36.7 °C)] 98 °F (36.7 °C)  Heart Rate:  [] 87  Resp:  [16-18] 18  BP: ()/(67-82) 102/82  Flow (L/min):  [2] 2     Physical Exam:  Constitutional: No acute distress, awake, alert  Respiratory: Clear to auscultation bilaterally, respiratory effort normal on room air  Cardiovascular: Irregularly irregular rhythm.   Gastrointestinal: Positive bowel sounds, soft, nontender, nondistended  Musculoskeletal: 4+ bilateral pedal edema  Psychiatric: Appropriate affect, cooperative  Neurologic: Oriented x 3, no gross focal deficits  Skin: excoriations on legs bilaterally      Results Reviewed:  LAB RESULTS:      Lab 11/11/23  0430 11/10/23  0340 11/09/23  0410 11/08/23  0551   WBC 9.28 8.37 9.92 12.86*   HEMOGLOBIN 13.4 13.1 13.1 12.9*   HEMATOCRIT 40.3 39.9 39.7 39.3   PLATELETS 304 300 334 398   NEUTROS ABS  --   --   --  11.17*   IMMATURE GRANS (ABS)  --   --   --  0.06*   LYMPHS ABS  --   --   --  1.09   MONOS ABS  --   --   --  0.52   EOS ABS  --   --   --  0.01   MCV 82.2 83.5 82.0 83.3         Lab 23  0414 23  1214 23  0430 11/10/23  2049 11/10/23  0340 11/09/23  0410 11/08/23  0804 23  0551   SODIUM 134*  --  136  --  132* 130*  --  126*   POTASSIUM 3.9 4.4 2.9* 3.0* 2.7* 3.8  --  4.4   CHLORIDE 91*  --  89*  --  89* 87*   --  85*   CO2 32.0*  --  33.0*  --  25.0 27.0  --  23.0   ANION GAP 11.0  --  14.0  --  18.0* 16.0*  --  18.0*   BUN 81*  --  75*  --  93* 93*  --  87*   CREATININE 1.73*  --  1.69*  --  2.09* 2.46*  --  2.17*   EGFR 47.2*  --  48.5*  --  37.6* 30.9*  --  36.0*   GLUCOSE 115*  --  116*  --  92 108*  --  132*   CALCIUM 9.0  --  8.7  --  8.9 9.2  --  9.3   MAGNESIUM 1.7  --   --   --   --  2.3 2.5  --    HEMOGLOBIN A1C  --   --   --   --   --  6.00*  --   --    TSH  --   --   --   --   --  2.550  --   --          Lab 11/10/23  0340 11/09/23  0410 11/08/23  0551   TOTAL PROTEIN 5.9* 6.1 6.1   ALBUMIN 3.0* 3.4* 3.3*   GLOBULIN 2.9 2.7 2.8   ALT (SGPT) 95* 122* 145*   AST (SGOT) 40 52* 67*   BILIRUBIN 2.1* 2.3* 2.9*   ALK PHOS 183* 202* 203*         Lab 11/08/23  0804 11/08/23  0551   PROBNP  --  35,697.0*   HSTROP T 60* 69*         Lab 11/09/23  0410   CHOLESTEROL 81   LDL CHOL 45   HDL CHOL 22*   TRIGLYCERIDES 60             Brief Urine Lab Results  (Last result in the past 365 days)        Color   Clarity   Blood   Leuk Est   Nitrite   Protein   CREAT   Urine HCG        11/08/23 1333             49.6                 Microbiology Results Abnormal       None            Cardioversion External in Cardiology Department    Result Date: 11/10/2023    Post cardioversion the patient displayed a sinus rhythm.   The cardioversion was successful.      Results for orders placed during the hospital encounter of 11/08/23    Adult Transthoracic Echo Complete W/ Cont if Necessary Per Protocol    Interpretation Summary    Left ventricular ejection fraction appears to be less than 20%.  consider limited contrasted echo to assess for LV thrombus    The left ventricular cavity is dilated.    The right ventricular cavity is mildly dilated.    The left atrial cavity is mildly dilated.    Left atrial volume is moderately increased.    severe MR present    Estimated right ventricular systolic pressure from tricuspid regurgitation is normal  (<35 mmHg).      Current medications:  Scheduled Meds:apixaban, 5 mg, Oral, Q12H  midazolam, , ,   pharmacy consult - MTM, , Does not apply, Daily  senna-docusate sodium, 2 tablet, Oral, BID  sodium chloride, 10 mL, Intravenous, Q12H      Continuous Infusions:furosemide, 5 mg/hr, Last Rate: 5 mg/hr (11/08/23 1423)      PRN Meds:.  acetaminophen **OR** acetaminophen **OR** acetaminophen    senna-docusate sodium **AND** polyethylene glycol **AND** bisacodyl **AND** bisacodyl    Calcium Replacement - Follow Nurse / BPA Driven Protocol    fentaNYL citrate (PF)    flumazenil    influenza vaccine    Magnesium Low Dose Replacement - Follow Nurse / BPA Driven Protocol    methohexital    metoprolol tartrate    midazolam    midazolam    naloxone    nitroglycerin    ondansetron **OR** ondansetron    Phosphorus Replacement - Follow Nurse / BPA Driven Protocol    Potassium Replacement - Follow Nurse / BPA Driven Protocol    sodium chloride    sodium chloride    sodium chloride    Assessment & Plan   Assessment & Plan     Active Hospital Problems    Diagnosis  POA    **Hyponatremia [E87.1]  Yes    STUART (acute kidney injury) [N17.9]  Yes    Acute on chronic heart failure with preserved ejection fraction [I50.33]  Yes    Atrial fibrillation with RVR [I48.91]  Yes    Essential hypertension [I10]  Yes    Anxiety associated with depression [F41.8]  Yes    Hypoalbuminemia [E88.09]  Yes    Hyperbilirubinemia [E80.6]  Yes    Elevated liver enzymes [R74.8]  Yes    Acute exacerbation of congestive heart failure [I50.9]  Yes      Resolved Hospital Problems   No resolved problems to display.        Brief Hospital Course to date:  Sundar Reeder is a 53 y.o. male with a past medical history of HTN, atrial fibrillation on Eliquis, CHF and CKD presented to the ED complaining of elevated heart rate, worsened lower extremity swelling and scrotal swelling with shortness of breath.      Atrial Fibrillation with RVR  -S/P Digoxin 500mcg x1 11/9  -holding  amiodarone this morning due to Qtc. QTC remains elevated this morning at 573.   -Continue Eliquis  -ECV on 11/10, back in afib today. He is asymptomatic.  -Cards following. Limited options due to blood pressure.      CHFrEF  Severe MR  -Net Output of 1.2L yesterday   -Strict I's/O's, daily weights  -Echo reviewed: LVEF <20%, severe MR  -Did not tolerate compression wraps due to pain  -Insists on stopping the Lasix ggt today. Hopefully can transition to intermittent IV lasix. I have stopped the drip per his request and started IV Bumex BID in its place. Monitor output closely.      STUART, stable  -Nephrology following, appreciate assistance   -Renal US negative for hydro. Chronic renal dz   -Cr stable this morning. Repeat creatinine in the am.      Hypokalemia  - replacement per nephrology     Hyponatremia, trending down  -Sodium slightly down this morning. Will continue to monitor.      Hyperbilirubinemia  Elevated AST/ALT  -CT with fatty liver, GB stones/sludge  -Hepatitis panel negative  -Likely due to congestive hepatopathy     HTN  - holding home Lisinopril in setting of STUART  - cardiac meds as above     Depression  - start antidepressant inpatient once sodium and heart failure are improved     Expected Discharge Location and Transportation: Home   Expected Discharge   Expected Discharge Date: 11/14/2023; Expected Discharge Time:      DVT prophylaxis:  Medical DVT prophylaxis orders are present.     AM-PAC 6 Clicks Score (PT): 24 (11/11/23 2000)    CODE STATUS:   Code Status and Medical Interventions:   Ordered at: 11/08/23 0839     Level Of Support Discussed With:    Patient     Code Status (Patient has no pulse and is not breathing):    CPR (Attempt to Resuscitate)     Medical Interventions (Patient has pulse or is breathing):    Full Support     I have prepared this progress note with copied portions of the prior day's progress note of my own authorship to preserve accuracy and maintain consistency of  documentation. I have reviewed these portions and edited them for correctness. I verify that the above documentation accurately and truly represents the evaluation and management performed on today's date.       Bree Lerner MD  11/12/23

## 2023-11-13 PROBLEM — I50.23 ACUTE ON CHRONIC SYSTOLIC CONGESTIVE HEART FAILURE: Status: ACTIVE | Noted: 2023-01-01

## 2023-11-13 PROBLEM — R79.89 ELEVATED TROPONIN LEVEL NOT DUE MYOCARDIAL INFARCTION: Status: ACTIVE | Noted: 2023-01-01

## 2023-11-13 PROBLEM — I34.0 SEVERE MITRAL REGURGITATION: Status: ACTIVE | Noted: 2023-01-01

## 2023-11-13 NOTE — DISCHARGE INSTR - APPOINTMENTS
New PCP appt  On Nov 17 Friday at 2:15; go to office 30 minutes before scheduled appt  Dr. Summers  [address] [place], [place] [address] [phone]

## 2023-11-13 NOTE — PROGRESS NOTES
Cardiology Progress Note      Reason for visit:    Atrial fibrillation with RVR  Acute on chronic systolic heart failure  Elevated troponin    IDENTIFICATION: 53-year-old gentleman who resides in [place], [place]    Active Hospital Problems    Diagnosis  POA    **Hyponatremia [E87.1]  Yes     Priority: High    Elevated troponin level not due myocardial infarction [R79.89]  Yes     Priority: High     Troponin elevated but flat in the setting of A-fib with RVR and acute systolic heart failure      Severe mitral regurgitation [I34.0]  Yes     Priority: High     Echo (11/8/2023): Severe MR.  LVEF less than 20%      STUART (acute kidney injury) [N17.9]  Yes     Priority: High    Atrial fibrillation with RVR [I48.91]  Yes     Priority: High     Reported history of atrial fibrillation for 10 years  CHA2DS2-VASc=3  Presentation [facility] 11/8/2023 in A-fib with RVR in the setting of hyponatremia and STUART  External cardioversion (11/10/2023): Successfully restored NSR.  Return of atrial fibrillation 11/11/2023 but now returned to NSR on IV amiodarone      Acute on chronic systolic congestive heart failure [I50.23]  Yes     Priority: High     Reported history of systolic heart failure dating back for 20 years.  Reportedly last echocardiogram 1 year ago had normal LVEF.  Data deficit  Echo (11/8/2023): LVEF less than 20%.  Severe MR.  RVSP less than 35 mmHg left ventricular ejection fraction appears to be less than 20%.          Essential hypertension [I10]  Yes     Priority: Medium    Hyperbilirubinemia [E80.6]  Yes     Priority: Medium    Elevated liver enzymes [R74.8]  Yes     Priority: Medium    Anxiety associated with depression [F41.8]  Yes     Priority: Low    Hypoalbuminemia [E88.09]  Yes     Priority: Low            Patient sitting up in the bed breathing easy on room air.  He reports feeling much better than when he was admitted.  He denies any chest pain.  He reports improvement in his lower extremity  edema.  The patient had cardioversion earlier this admission that converted him to normal sinus rhythm but he went back into atrial fibrillation and IV amiodarone was restarted yesterday.  He is currently on IV amiodarone drip and in normal sinus rhythm with frequent PVCs           Vital Sign Min/Max for last 24 hours  Temp  Min: 96.1 °F (35.6 °C)  Max: 97.2 °F (36.2 °C)   BP  Min: 80/70  Max: 120/109   Pulse  Min: 79  Max: 120   Resp  Min: 18  Max: 18   SpO2  Min: 97 %  Max: 98 %   No data recorded      Intake/Output Summary (Last 24 hours) at 11/13/2023 1009  Last data filed at 11/12/2023 2326  Gross per 24 hour   Intake 480 ml   Output 1450 ml   Net -970 ml           Physical Exam  Constitutional:       General: He is awake.   Neck:      Vascular: No JVD.   Cardiovascular:      Rate and Rhythm: Normal rate and regular rhythm. Frequent Extrasystoles are present.     Heart sounds: Murmur heard.      Comments: Frequent PVCs noted on telemetry  Pulmonary:      Effort: Pulmonary effort is normal.      Breath sounds: Normal breath sounds.   Musculoskeletal:      Right lower leg: Edema present.      Left lower leg: Edema present.   Skin:     General: Skin is warm and dry.   Neurological:      Mental Status: He is alert.   Psychiatric:         Behavior: Behavior is cooperative.         Tele: Normal sinus rhythm with PVCs with first-degree AV block    Results Review (reviewed the patient's recent labs in the electronic medical record):     EKG (11/12/2023): Atrial flutter with PVCs at 137 bpm    CXR (11/8/2023): Mild pulmonary edema with significant cardiomegaly    ECHO (11/8/2023): LVEF less than 20%.  Severe MR.  RVSP less than 35 mmHg    Results from last 7 days   Lab Units 11/12/23  0414 11/11/23  1214 11/11/23  0430 11/10/23  2049 11/10/23  0340 11/09/23  0410 11/08/23  0804 11/08/23  0551   SODIUM mmol/L 134*  --  136  --  132* 130*  --  126*   POTASSIUM mmol/L 3.9 4.4 2.9*   < > 2.7* 3.8  --  4.4   CHLORIDE mmol/L  91*  --  89*  --  89* 87*  --  85*   BUN mg/dL 81*  --  75*  --  93* 93*  --  87*   CREATININE mg/dL 1.73*  --  1.69*  --  2.09* 2.46*  --  2.17*   MAGNESIUM mg/dL 1.7  --   --   --   --  2.3 2.5  --     < > = values in this interval not displayed.     Results from last 7 days   Lab Units 11/08/23  0804 11/08/23  0551   HSTROP T ng/L 60* 69*     Results from last 7 days   Lab Units 11/11/23  0430 11/10/23  0340 11/09/23  0410   WBC 10*3/mm3 9.28 8.37 9.92   HEMOGLOBIN g/dL 13.4 13.1 13.1   HEMATOCRIT % 40.3 39.9 39.7   PLATELETS 10*3/mm3 304 300 334       Lab Results   Component Value Date    HGBA1C 6.00 (H) 11/09/2023       Lab Results   Component Value Date    CHOL 81 11/09/2023    TRIG 60 11/09/2023    HDL 22 (L) 11/09/2023    LDL 45 11/09/2023              Acute on chronic systolic heart failure  History of systolic heart failure dating back 20 years  Prior to admission has been off heart failure medications due to loss of health insurance  Echo this admission shows LVEF less than 20%  IV Bumex 2 mg twice daily  Was taking metoprolol succinate and lisinopril at home but on hold due to hypotension and STUART      Atrial fibrillation with RVR  History of A-fib for last 10 years.  Patient asymptomatic  Chronically anticoagulated with Eliquis and currently on 5 mg twice daily  Status post external cardioversion this admission on 11/10  IV amiodarone drip infusing  Currently in normal sinus rhythm      Elevated troponin not due to myocardial infarction but likely due to exacerbation of acute CHF and A-fib with RVR  Troponins elevated and flat and EKG without acute ischemic changes  Reportedly had heart cath over 10 years ago with no intervention  Currently chest pain-free      Severe mitral regurgitation  Severe MR noted on recent echo  Bumex 2 mg IV twice daily    Hypertension/hypotension  BP has been low to borderline low  Current BP 97/73    STUART on CKD  Nephrology following  Creatinine improving from previously 2.17  to currently 1.73    Elevated LFTs  Likely due to hepatic congestion from acute CHF  LFTs improving      Hyponatremia  Improving was 126 on admission and now 134  Patient currently on 1500 mL fluid restriction         Defer diuretic management to nephrology.  Appreciate their assistance  Would recommend LifeVest at discharge  Decrease IV amiodarone to 0.5 mg/min.  Will likely discontinue tomorrow.  Currently maintaining NSR  Increase p.o. amiodarone 400 mg twice daily  Continue Eliquis for stroke prophylaxis  Will need repeat limited echo as outpatient in 3 months  Defer ischemic evaluation for now due to lack of angina symptoms and troponins are flat and not consistent with ACS but consistent with CHF and RVR.    No beta-blocker/ACE/ARB/Entresto due to low blood pressures and elevated creatinine.    Electronically signed by NOELLE Parra, 11/13/23, 9:54 AM EST.

## 2023-11-13 NOTE — PROGRESS NOTES
[facility]  PROGRESS NOTE    Patient Name: Sundar Reeder  : 1970  MRN: [id number]    Date of Admission: 2023  Primary Care Physician: Provider, No Known    Subjective   Subjective     CC:  Heart failure    HPI:  States that we took too much fluid off of him and now he feels dehydrated and was some oral fluids. Back in sinus rhythm.       Objective   Objective     Vital Signs:   Temp:  [96.1 °F (35.6 °C)-97.2 °F (36.2 °C)] 96.2 °F (35.7 °C)  Heart Rate:  [] 80  Resp:  [18] 18  BP: ()/() 80/70     Physical Exam:  Constitutional: No acute distress, awake, alert  Respiratory: Clear to auscultation bilaterally, respiratory effort normal on room air  Cardiovascular: RRR, no murmurs, rubs, or gallops  Gastrointestinal: Positive bowel sounds, soft, nontender, nondistended  Musculoskeletal: 4+ LE edema  Psychiatric: Appropriate affect, cooperative  Neurologic: Oriented x 3, no focal deficits      Results Reviewed:  LAB RESULTS:      Lab 11/11/23  0430 11/10/23  0340 11/09/23  0410 11/08/23  0551   WBC 9.28 8.37 9.92 12.86*   HEMOGLOBIN 13.4 13.1 13.1 12.9*   HEMATOCRIT 40.3 39.9 39.7 39.3   PLATELETS 304 300 334 398   NEUTROS ABS  --   --   --  11.17*   IMMATURE GRANS (ABS)  --   --   --  0.06*   LYMPHS ABS  --   --   --  1.09   MONOS ABS  --   --   --  0.52   EOS ABS  --   --   --  0.01   MCV 82.2 83.5 82.0 83.3         Lab 23  0414 23  1214 11/11/23  0430 11/10/23  2049 11/10/23  0340 11/09/23  0410 11/08/23  0804 23  0551   SODIUM 134*  --  136  --  132* 130*  --  126*   POTASSIUM 3.9 4.4 2.9* 3.0* 2.7* 3.8  --  4.4   CHLORIDE 91*  --  89*  --  89* 87*  --  85*   CO2 32.0*  --  33.0*  --  25.0 27.0  --  23.0   ANION GAP 11.0  --  14.0  --  18.0* 16.0*  --  18.0*   BUN 81*  --  75*  --  93* 93*  --  87*   CREATININE 1.73*  --  1.69*  --  2.09* 2.46*  --  2.17*   EGFR 47.2*  --  48.5*  --  37.6* 30.9*  --  36.0*   GLUCOSE 115*  --   116*  --  92 108*  --  132*   CALCIUM 9.0  --  8.7  --  8.9 9.2  --  9.3   MAGNESIUM 1.7  --   --   --   --  2.3 2.5  --    HEMOGLOBIN A1C  --   --   --   --   --  6.00*  --   --    TSH  --   --   --   --   --  2.550  --   --          Lab 11/10/23  0340 11/09/23  0410 11/08/23  0551   TOTAL PROTEIN 5.9* 6.1 6.1   ALBUMIN 3.0* 3.4* 3.3*   GLOBULIN 2.9 2.7 2.8   ALT (SGPT) 95* 122* 145*   AST (SGOT) 40 52* 67*   BILIRUBIN 2.1* 2.3* 2.9*   ALK PHOS 183* 202* 203*         Lab 11/08/23  0804 11/08/23  0551   PROBNP  --  35,697.0*   HSTROP T 60* 69*         Lab 11/09/23  0410   CHOLESTEROL 81   LDL CHOL 45   HDL CHOL 22*   TRIGLYCERIDES 60             Brief Urine Lab Results  (Last result in the past 365 days)        Color   Clarity   Blood   Leuk Est   Nitrite   Protein   CREAT   Urine HCG        11/08/23 1333             49.6                 Microbiology Results Abnormal       None            No radiology results from the last 24 hrs    Results for orders placed during the hospital encounter of 11/08/23    Adult Transthoracic Echo Complete W/ Cont if Necessary Per Protocol    Interpretation Summary    Left ventricular ejection fraction appears to be less than 20%.  consider limited contrasted echo to assess for LV thrombus    The left ventricular cavity is dilated.    The right ventricular cavity is mildly dilated.    The left atrial cavity is mildly dilated.    Left atrial volume is moderately increased.    severe MR present    Estimated right ventricular systolic pressure from tricuspid regurgitation is normal (<35 mmHg).      Current medications:  Scheduled Meds:amiodarone, 200 mg, Oral, Q12H  apixaban, 5 mg, Oral, Q12H  bumetanide, 2 mg, Intravenous, Q12H  midazolam, , ,   pharmacy consult - MTM, , Does not apply, Daily  senna-docusate sodium, 2 tablet, Oral, BID  sodium chloride, 10 mL, Intravenous, Q12H      Continuous Infusions:amiodarone, 1 mg/min, Last Rate: 1 mg/min (11/13/23 0521)      PRN Meds:.   acetaminophen **OR** acetaminophen **OR** acetaminophen    senna-docusate sodium **AND** polyethylene glycol **AND** bisacodyl **AND** bisacodyl    Calcium Replacement - Follow Nurse / BPA Driven Protocol    fentaNYL citrate (PF)    flumazenil    influenza vaccine    Magnesium Low Dose Replacement - Follow Nurse / BPA Driven Protocol    metoprolol tartrate    midazolam    midazolam    naloxone    nitroglycerin    ondansetron **OR** ondansetron    Phosphorus Replacement - Follow Nurse / BPA Driven Protocol    Potassium Replacement - Follow Nurse / BPA Driven Protocol    sodium chloride    sodium chloride    sodium chloride    Assessment & Plan   Assessment & Plan     Active Hospital Problems    Diagnosis  POA    **Hyponatremia [E87.1]  Yes    STUART (acute kidney injury) [N17.9]  Yes    Atrial fibrillation with RVR [I48.91]  Yes    Essential hypertension [I10]  Yes    Anxiety associated with depression [F41.8]  Yes    Hypoalbuminemia [E88.09]  Yes    Hyperbilirubinemia [E80.6]  Yes    Elevated liver enzymes [R74.8]  Yes    Acute on chronic systolic congestive heart failure [I50.23]  Yes      Resolved Hospital Problems   No resolved problems to display.        Brief Hospital Course to date:  Sundar Reeder is a 53 y.o. male with a past medical history of HTN, atrial fibrillation on Eliquis, CHF and CKD presented to the ED complaining of elevated heart rate, worsened lower extremity swelling and scrotal swelling with shortness of breath.      Atrial Fibrillation with RVR  -S/P Digoxin 500mcg x1 11/9  -holding amiodarone due to prolonged Qtc  -Continue Eliquis  -ECV on 11/10, back in afib today. He is asymptomatic.  -Cards following. Limited options due to blood pressure.   - Ordered EKG this morning to reevaluate Qtc.      CHFrEF  Severe MR  -Net Output of 1.2L yesterday on intermittent diuresis with bumex.   -Strict I's/O's, daily weights  -Echo reviewed: LVEF <20%, severe MR  -Did not tolerate compression wraps due to  pain  -s/p Lasix ggt. Continue intermittent diuresis with IV Bumex.     STUART, worsening  -Nephrology following, appreciate assistance   -Renal US negative for hydro. Chronic renal dz   -BMP this morning with worsening creatinine. Repeat BMP in the am     Hypokalemia, improved  - replacement per nephrology  - likely secondary to diuresis      Hyponatremia, worsening  -may be holding on to more fluid as lasix drop was stopped yesterday.   - recheck in the am.   - Nephrology following     Hyperbilirubinemia  Elevated AST/ALT  -CT with fatty liver, GB stones/sludge  -Hepatitis panel negative  -Likely due to congestive hepatopathy     HTN  - holding home Lisinopril in setting of STUART  - cardiac meds as above     Depression  - start antidepressant inpatient once sodium and heart failure are improved     Expected Discharge Location and Transportation: Home   Expected Discharge   Expected Discharge Date: 11/14/2023; Expected Discharge Time:      DVT prophylaxis:  Medical DVT prophylaxis orders are present.     AM-PAC 6 Clicks Score (PT): 24 (11/13/23 0808)    CODE STATUS:   Code Status and Medical Interventions:   Ordered at: 11/08/23 0839     Level Of Support Discussed With:    Patient     Code Status (Patient has no pulse and is not breathing):    CPR (Attempt to Resuscitate)     Medical Interventions (Patient has pulse or is breathing):    Full Support     I have prepared this progress note with copied portions of the prior day's progress note of my own authorship to preserve accuracy and maintain consistency of documentation. I have reviewed these portions and edited them for correctness. I verify that the above documentation accurately and truly represents the evaluation and management performed on today's date.       Bree Lerner MD  11/13/23

## 2023-11-13 NOTE — CASE MANAGEMENT/SOCIAL WORK
Continued Stay Note   Peggy     Patient Name: Sundar Reeder  MRN: [id number]  Today's Date: 11/13/2023    Admit Date: 11/8/2023    Plan: home   Discharge Plan       Row Name 11/13/23 0840       Plan    Plan home    Patient/Family in Agreement with Plan yes    Plan Comments I met with this patient at the bedside. His plan remains home at discharge with family to transport. He asked that CM set up to have paperwork for POA. CM notified the 's office and they will speak to the patient about making his sister the POA. He will need a PCP set up prior to discharge. CM will continue to follow.    1207   CM set up new PCP appt and gave information to the patient and placed it in [employer]. CM to follow.    Final Discharge Disposition Code 01 - home or self-care                   Discharge Codes    No documentation.                 Expected Discharge Date and Time       Expected Discharge Date Expected Discharge Time    Nov 14, 2023               Angie Walter RN

## 2023-11-13 NOTE — PROGRESS NOTES
"   LOS: 5 days    Patient Care Team:  Provider, No Known as PCP - General    Subjective     Increased weakness today with low blood pressure.  Reports good urine output.  Edema improving.    Objective     Vital Signs:  Blood pressure (!) 86/73, pulse 82, temperature 96.9 °F (36.1 °C), temperature source Axillary, resp. rate 18, height 180.3 cm (71\"), weight 77.7 kg (171 lb 4 oz), SpO2 97%.      Intake/Output Summary (Last 24 hours) at 11/13/2023 1411  Last data filed at 11/12/2023 2326  Gross per 24 hour   Intake 240 ml   Output 950 ml   Net -710 ml        11/12 0701 - 11/13 0700  In: 600 [P.O.:600]  Out: 1850 [Urine:1850]    Physical Exam:        GENERAL: WD WM NAD  NEURO: Awake and alert, oriented. No focal deficit  PSYCHIATRIC: NMA. Cooperative with PE  EYE: PE, no icterus, no conjunctivitis  ENT: ommm, dentition intact,  Hearing intact  NECK: Supple , No JVD discernable,  Trachea midline  CV: + Edema, RRR  LUNGS:  Quiet,  Nonlabored resp.  Symmetrical expansion  ABDOMEN: Nondistended, soft nontender.  : No Lazaro, no palp bladder  SKIN: Warm and dry without rash      Labs:  Results from last 7 days   Lab Units 11/11/23  0430 11/10/23  0340 11/09/23  0410   WBC 10*3/mm3 9.28 8.37 9.92   HEMOGLOBIN g/dL 13.4 13.1 13.1   PLATELETS 10*3/mm3 304 300 334     Results from last 7 days   Lab Units 11/13/23  0925 11/12/23  0414 11/11/23  1214 11/11/23  0430 11/10/23  2049 11/10/23  0340 11/09/23  0410 11/08/23  0804 11/08/23  0551   SODIUM mmol/L 127* 134*  --  136  --  132* 130*  --  126*   POTASSIUM mmol/L 3.7 3.9 4.4 2.9*   < > 2.7* 3.8  --  4.4   CHLORIDE mmol/L 82* 91*  --  89*  --  89* 87*  --  85*   CO2 mmol/L 28.0 32.0*  --  33.0*  --  25.0 27.0  --  23.0   BUN mg/dL 77* 81*  --  75*  --  93* 93*  --  87*   CREATININE mg/dL 2.04* 1.73*  --  1.69*  --  2.09* 2.46*  --  2.17*   CALCIUM mg/dL 8.9 9.0  --  8.7  --  8.9 9.2  --  9.3   MAGNESIUM mg/dL  --  1.7  --   --   --   --  2.3 2.5  --    ALBUMIN g/dL  --   --   " --   --   --  3.0* 3.4*  --  3.3*    < > = values in this interval not displayed.     Results from last 7 days   Lab Units 11/10/23  0340   ALK PHOS U/L 183*   BILIRUBIN mg/dL 2.1*   ALT (SGPT) U/L 95*   AST (SGOT) U/L 40                   Estimated Creatinine Clearance: 47.1 mL/min (A) (by C-G formula based on SCr of 2.04 mg/dL (H)).         A/P:    ARF: Creatinine back up to 2.0 overnight.  Initially up to 2.5 with improvement down to 1.7 yesterday.  Urine output nonoliguric with negative volume overnight.  Blood pressure low.  Likely with prerenal azotemia due to hypotension.  Continue supportive care.  Strict I's and O's.  Monitor renal function closely for recovery.    CKD: Unsure of baseline creatinine.     HTN: Blood pressure low with significant negative volume.  Will give albumin for fluid mobilization and blood pressure support.      Hyponatremia: Sodium 126 on arrival.  Initially improved up to 136, however is back down to 127 today.  Monitor for now.     CHF: Continues with negative volume.  Blood pressure low with rising creatinine.  Patient post IV Bumex this morning.  We will hold tonight's dose.  If albumin.  Strict I's and O's.  Redosed diuretics in a.m. as indicated.    High risk and complexity patient.    Johnson Galicia MD  11/13/23  14:11 EST

## 2023-11-13 NOTE — PLAN OF CARE
Problem: Adult Inpatient Plan of Care  Goal: Plan of Care Review  Outcome: Ongoing, Progressing  Goal: Patient-Specific Goal (Individualized)  Outcome: Ongoing, Progressing  Goal: Absence of Hospital-Acquired Illness or Injury  Outcome: Ongoing, Progressing  Intervention: Identify and Manage Fall Risk  Recent Flowsheet Documentation  Taken 11/13/2023 1600 by Lissett Vera RN  Safety Promotion/Fall Prevention:   activity supervised   assistive device/personal items within reach   clutter free environment maintained   room organization consistent   safety round/check completed  Taken 11/13/2023 1239 by Lissett Vera RN  Safety Promotion/Fall Prevention:   activity supervised   assistive device/personal items within reach   clutter free environment maintained   room organization consistent   safety round/check completed  Taken 11/13/2023 0808 by Lissett Vera RN  Safety Promotion/Fall Prevention:   assistive device/personal items within reach   activity supervised   clutter free environment maintained   safety round/check completed   room organization consistent  Intervention: Prevent Skin Injury  Recent Flowsheet Documentation  Taken 11/13/2023 1600 by Lissett Vera RN  Body Position: position changed independently  Skin Protection:   adhesive use limited   incontinence pads utilized   transparent dressing maintained   tubing/devices free from skin contact  Taken 11/13/2023 1239 by Lissett Vera RN  Body Position: position changed independently  Skin Protection:   adhesive use limited   incontinence pads utilized   tubing/devices free from skin contact   transparent dressing maintained  Taken 11/13/2023 0808 by Lissett Vera RN  Body Position: position changed independently  Skin Protection:   adhesive use limited   incontinence pads utilized   transparent dressing maintained   tubing/devices free from skin contact  Intervention: Prevent and Manage VTE (Venous Thromboembolism) Risk  Recent  Flowsheet Documentation  Taken 11/13/2023 1600 by Lissett Vera RN  Activity Management: activity encouraged  Taken 11/13/2023 1239 by Lissett Vera RN  Activity Management: activity encouraged  Taken 11/13/2023 0808 by Lissett Vera RN  Activity Management: activity encouraged  VTE Prevention/Management: (eliquis) other (see comments)  Range of Motion: active ROM (range of motion) encouraged  Intervention: Prevent Infection  Recent Flowsheet Documentation  Taken 11/13/2023 1600 by Lissett Vera RN  Infection Prevention: environmental surveillance performed  Taken 11/13/2023 1239 by Lissett Vera RN  Infection Prevention: environmental surveillance performed  Taken 11/13/2023 0808 by Lissett Vera RN  Infection Prevention: environmental surveillance performed  Goal: Optimal Comfort and Wellbeing  Outcome: Ongoing, Progressing  Intervention: Provide Person-Centered Care  Recent Flowsheet Documentation  Taken 11/13/2023 0808 by Lissett Vera RN  Trust Relationship/Rapport:   care explained   choices provided   empathic listening provided   questions answered   questions encouraged   thoughts/feelings acknowledged  Goal: Readiness for Transition of Care  Outcome: Ongoing, Progressing   Goal Outcome Evaluation:

## 2023-11-14 NOTE — PROGRESS NOTES
"   LOS: 6 days    Patient Care Team:  Provider, No Known as PCP - General    Subjective     Blood pressure remains low overnight.  Patient reports dizziness with standing earlier this morning.  Continues with weakness.  Edema has improved.    Objective     Vital Signs:  Blood pressure 99/84, pulse 84, temperature 96.6 °F (35.9 °C), temperature source Axillary, resp. rate 18, height 180.3 cm (71\"), weight 77.7 kg (171 lb 4 oz), SpO2 99%.      Intake/Output Summary (Last 24 hours) at 11/14/2023 1528  Last data filed at 11/14/2023 1300  Gross per 24 hour   Intake 440 ml   Output 1000 ml   Net -560 ml        11/13 0701 - 11/14 0700  In: 320 [P.O.:320]  Out: 600 [Urine:600]    Physical Exam:        GENERAL: WD WM NAD  NEURO: Awake and alert, oriented. No focal deficit  PSYCHIATRIC: NMA. Cooperative with PE  EYE: PE, no icterus, no conjunctivitis  ENT: ommm, dentition intact,  Hearing intact  NECK: Supple , No JVD discernable,  Trachea midline  CV: + Edema, RRR  LUNGS:  Quiet,  Nonlabored resp.  Symmetrical expansion  ABDOMEN: Nondistended, soft nontender.  : No Lazaro, no palp bladder  SKIN: Warm and dry without rash      Labs:  Results from last 7 days   Lab Units 11/11/23  0430 11/10/23  0340 11/09/23  0410   WBC 10*3/mm3 9.28 8.37 9.92   HEMOGLOBIN g/dL 13.4 13.1 13.1   PLATELETS 10*3/mm3 304 300 334     Results from last 7 days   Lab Units 11/14/23  1223 11/13/23  0925 11/12/23  0414 11/11/23  1214 11/11/23  0430 11/10/23  2049 11/10/23  0340 11/09/23  0410 11/08/23  0804 11/08/23  0551   SODIUM mmol/L 126* 127* 134*  --  136  --  132* 130*  --  126*   POTASSIUM mmol/L 3.8 3.7 3.9 4.4 2.9*   < > 2.7* 3.8  --  4.4   CHLORIDE mmol/L 82* 82* 91*  --  89*  --  89* 87*  --  85*   CO2 mmol/L 27.0 28.0 32.0*  --  33.0*  --  25.0 27.0  --  23.0   BUN mg/dL 79* 77* 81*  --  75*  --  93* 93*  --  87*   CREATININE mg/dL 2.27* 2.04* 1.73*  --  1.69*  --  2.09* 2.46*  --  2.17*   CALCIUM mg/dL 9.2 8.9 9.0  --  8.7  --  8.9 9.2  " --  9.3   MAGNESIUM mg/dL  --   --  1.7  --   --   --   --  2.3 2.5  --    ALBUMIN g/dL  --   --   --   --   --   --  3.0* 3.4*  --  3.3*    < > = values in this interval not displayed.     Results from last 7 days   Lab Units 11/10/23  0340   ALK PHOS U/L 183*   BILIRUBIN mg/dL 2.1*   ALT (SGPT) U/L 95*   AST (SGOT) U/L 40                   Estimated Creatinine Clearance: 42.3 mL/min (A) (by C-G formula based on SCr of 2.27 mg/dL (H)).         A/P:    ARF: Creatinine is increased further to 2.3 overnight despite albumin.  Urine output borderline oliguric off diuretics.  Initially creatinine up to 2.5 with improvement down to 1.7.  Creatinine now rising with further negative volume.  Blood pressure low.  Likely with prerenal azotemia due to hypotension.  Continue supportive care.  We will get repeat urine indices.  Strict I's and O's.  Monitor renal function closely for recovery.    CKD: Unsure of baseline creatinine.     Hypotension: Blood pressure low with significant negative volume.  + Orthostasis.  Poor response to albumin overnight.  May be getting intravascularly dry with significant diuresis.  We will give small bolus of normal saline and monitor response.     Hyponatremia: Sodium 126 on arrival.  Initially improved up to 136, however is back down to 126 today.  Limit free water intake.  All IV fluids isotonic.  Monitor for now.    Volume: Patient -8 L since admission.  Diuretics held yesterday.  Edema significantly improved though still present but likely with component of third spaced fluids with low oncotic pressure.  Creatinine rising over the past couple of days.  Sodium low.  Will give small bolus normal saline.     CHF: Very low EF <20%.  Significant volume reduction with diuresis.  Blood pressure low with rising creatinine.  Patient post IV Bumex yesterday morning.  Diuretics on hold for now.  Strict I's and O's.  Redosed diuretics as indicated.    High risk and complexity patient.    Johnson King  MD [place]  11/14/23  15:28 EST

## 2023-11-14 NOTE — PROGRESS NOTES
Cardiology Progress Note      Reason for visit:    Atrial fibrillation with RVR  Acute on chronic systolic heart failure  Elevated troponin    IDENTIFICATION: 53-year-old gentleman who resides in [place], [place]    Active Hospital Problems    Diagnosis  POA    **Hyponatremia [E87.1]  Yes    Elevated troponin level not due myocardial infarction [R79.89]  Yes     Troponin elevated but flat in the setting of A-fib with RVR and acute systolic heart failure      Severe mitral regurgitation [I34.0]  Yes     Echo (11/8/2023): Severe MR.  LVEF less than 20%      STUART (acute kidney injury) [N17.9]  Yes    Atrial fibrillation with RVR [I48.91]  Yes     Reported history of atrial fibrillation for 10 years  CHA2DS2-VASc=3  Presentation [facility] [place] [facility] 11/8/2023 in A-fib with RVR in the setting of hyponatremia and STUART  External cardioversion (11/10/2023): Successfully restored NSR.  Return of atrial fibrillation 11/11/2023 but now returned to NSR on IV amiodarone      Essential hypertension [I10]  Yes    Anxiety associated with depression [F41.8]  Yes    Hypoalbuminemia [E88.09]  Yes    Hyperbilirubinemia [E80.6]  Yes    Elevated liver enzymes [R74.8]  Yes    Acute on chronic systolic congestive heart failure [I50.23]  Yes     Reported history of systolic heart failure dating back for 20 years.  Reportedly last echocardiogram 1 year ago had normal LVEF.  Data deficit  Echo (11/8/2023): LVEF less than 20%.  Severe MR.  RVSP less than 35 mmHg left ventricular ejection fraction appears to be less than 20%.                  Patient sitting up in the bed breathing easy on room air.  He reports feeling much better than when he was admitted.  He denies any chest pain.  He reports improvement in his lower extremity edema.  The patient had cardioversion earlier this admission that converted him to normal sinus rhythm but he went back into atrial fibrillation and IV amiodarone was restarted yesterday.  He is currently on IV  amiodarone drip and in normal sinus rhythm with frequent PVCs    Update 11/14/23  Feeling quite weak.  BP remains borderline.  Overall volume much better.  Still somewhat overloaded with low albumin.             Vital Sign Min/Max for last 24 hours  Temp  Min: 95.4 °F (35.2 °C)  Max: 97.5 °F (36.4 °C)   BP  Min: 85/63  Max: 99/84   Pulse  Min: 71  Max: 82   Resp  Min: 18  Max: 20   SpO2  Min: 91 %  Max: 99 %   No data recorded      Intake/Output Summary (Last 24 hours) at 11/14/2023 1032  Last data filed at 11/14/2023 0531  Gross per 24 hour   Intake 320 ml   Output 600 ml   Net -280 ml           Physical Exam  Constitutional:       General: He is awake.   Neck:      Vascular: No JVD.   Cardiovascular:      Rate and Rhythm: Normal rate and regular rhythm. Frequent Extrasystoles are present.     Heart sounds: Murmur heard.      Comments: Frequent PVCs noted on telemetry  Pulmonary:      Effort: Pulmonary effort is normal.      Breath sounds: Normal breath sounds.   Musculoskeletal:      Right lower leg: Edema present.      Left lower leg: Edema present.   Skin:     General: Skin is warm and dry.   Neurological:      Mental Status: He is alert.   Psychiatric:         Behavior: Behavior is cooperative.         Tele: Normal sinus rhythm with PVCs with first-degree AV block    Results Review (reviewed the patient's recent labs in the electronic medical record):     EKG (11/12/2023): Atrial flutter with PVCs at 137 bpm    CXR (11/8/2023): Mild pulmonary edema with significant cardiomegaly    ECHO (11/8/2023): LVEF less than 20%.  Severe MR.  RVSP less than 35 mmHg    Results from last 7 days   Lab Units 11/13/23  0925 11/12/23  0414 11/11/23  1214 11/11/23  0430 11/10/23  2049 11/10/23  0340 11/09/23  0410 11/08/23  0804 11/08/23  0551   SODIUM mmol/L 127* 134*  --  136  --  132* 130*  --  126*   POTASSIUM mmol/L 3.7 3.9 4.4 2.9*   < > 2.7* 3.8  --  4.4   CHLORIDE mmol/L 82* 91*  --  89*  --  89* 87*  --  85*   BUN mg/dL  77* 81*  --  75*  --  93* 93*  --  87*   CREATININE mg/dL 2.04* 1.73*  --  1.69*  --  2.09* 2.46*  --  2.17*   MAGNESIUM mg/dL  --  1.7  --   --   --   --  2.3 2.5  --     < > = values in this interval not displayed.     Results from last 7 days   Lab Units 11/08/23  0804 11/08/23  0551   HSTROP T ng/L 60* 69*     Results from last 7 days   Lab Units 11/11/23  0430 11/10/23  0340 11/09/23  0410   WBC 10*3/mm3 9.28 8.37 9.92   HEMOGLOBIN g/dL 13.4 13.1 13.1   HEMATOCRIT % 40.3 39.9 39.7   PLATELETS 10*3/mm3 304 300 334       Lab Results   Component Value Date    HGBA1C 6.00 (H) 11/09/2023       Lab Results   Component Value Date    CHOL 81 11/09/2023    TRIG 60 11/09/2023    HDL 22 (L) 11/09/2023    LDL 45 11/09/2023              Acute on chronic systolic heart failure  History of systolic heart failure dating back 20 years  Prior to admission has been off heart failure medications due to loss of health insurance  Echo this admission shows LVEF less than 20%  Diuretic management per nephrology  Was taking metoprolol succinate and lisinopril at home but on hold due to hypotension and STUART      Atrial fibrillation with RVR  History of A-fib for last 10 years.  Patient asymptomatic  Chronically anticoagulated with Eliquis and currently on 5 mg twice daily  Status post external cardioversion this admission on 11/10  Cotinue oral amiodarone, can stop IV  Currently in normal sinus rhythm      Elevated troponin not due to myocardial infarction but likely due to exacerbation of acute CHF and A-fib with RVR  Troponins elevated and flat and EKG without acute ischemic changes  Reportedly had heart cath over 10 years ago with no intervention  Currently chest pain-free      Severe mitral regurgitation  Severe MR noted on recent echo  Continue diuresis    Hypertension/hypotension  BP has been low to borderline low  Current BP 97/73    STUART on CKD  Nephrology following  Blood work pending    Elevated LFTs  Likely due to hepatic  congestion from acute CHF  LFTs improving             Defer diuretic management to nephrology.  Appreciate their assistance  Would recommend LifeVest at discharge  Continue oral amiodarone   p.o. amiodarone 400 mg twice daily  Continue Eliquis for stroke prophylaxis  Will need repeat limited echo as outpatient in 3 months  Defer ischemic evaluation for now due to lack of angina symptoms and troponins are flat and not consistent with ACS but consistent with CHF and RVR.    No beta-blocker/ACE/ARB/Entresto due to low blood pressures and elevated creatinine.  Will titrate as able

## 2023-11-14 NOTE — NURSING NOTE
Patient states he was standing to his sink by his bed and got weaker. He states he fell to the ground landing on his bottom. He denies any pain or injury. No injuries visualized upon exam. Notified provider on call Sofía DRAKE. Notified charge nurse Lu Jj RN of Pts report. Instructed Patient to call for assistance when getting OOB. Patient voices understanding.Patient alert x4. Call light with in reach

## 2023-11-14 NOTE — PROGRESS NOTES
[facility] Medicine Services  PROGRESS NOTE    Patient Name: Sundar Reeder  : 1970  MRN: [id number]    Date of Admission: 2023  Primary Care Physician: Provider, No Known    Subjective   Subjective     CC:  Heart failure    HPI:  States like his heart has finally rested and started to slow down.  Apparently LifeVest rep had come yesterday and patient did not feel up to being fitted for LifeVest.      Objective   Objective     Vital Signs:   Temp:  [96.6 °F (35.9 °C)-97.5 °F (36.4 °C)] 96.6 °F (35.9 °C)  Heart Rate:  [71-88] 84  Resp:  [18-20] 18  BP: (85-99)/(63-84) 99/84     Physical Exam:  Constitutional: No acute distress, awake, alert  HENT: NCAT, mucous membranes moist  Respiratory: Respiratory effort normal   Cardiovascular: RRR, no murmurs  Musculoskeletal: LE edema much improved per patient  Psychiatric: flat affect, cooperative  Neurologic: Oriented x 3,  speech clear  Skin: No rashes      Results Reviewed:  LAB RESULTS:      Lab 11/11/23  0430 11/10/23  0340 11/09/23  0410 11/08/23  0551   WBC 9.28 8.37 9.92 12.86*   HEMOGLOBIN 13.4 13.1 13.1 12.9*   HEMATOCRIT 40.3 39.9 39.7 39.3   PLATELETS 304 300 334 398   NEUTROS ABS  --   --   --  11.17*   IMMATURE GRANS (ABS)  --   --   --  0.06*   LYMPHS ABS  --   --   --  1.09   MONOS ABS  --   --   --  0.52   EOS ABS  --   --   --  0.01   MCV 82.2 83.5 82.0 83.3         Lab 23  1223 23  0925 23  0414 23  1214 11/11/23  0430 11/10/23  2049 11/10/23  0340 11/09/23  0410 11/08/23  0804   SODIUM 126* 127* 134*  --  136  --  132* 130*  --    POTASSIUM 3.8 3.7 3.9 4.4 2.9*   < > 2.7* 3.8  --    CHLORIDE 82* 82* 91*  --  89*  --  89* 87*  --    CO2 27.0 28.0 32.0*  --  33.0*  --  25.0 27.0  --    ANION GAP 17.0* 17.0* 11.0  --  14.0  --  18.0* 16.0*  --    BUN 79* 77* 81*  --  75*  --  93* 93*  --    CREATININE 2.27* 2.04* 1.73*  --  1.69*  --  2.09* 2.46*  --    EGFR 34.1* 38.7* 47.2*  --  48.5*  --  37.6*  30.9*  --    GLUCOSE 125* 198* 115*  --  116*  --  92 108*  --    CALCIUM 9.2 8.9 9.0  --  8.7  --  8.9 9.2  --    MAGNESIUM  --   --  1.7  --   --   --   --  2.3 2.5   HEMOGLOBIN A1C  --   --   --   --   --   --   --  6.00*  --    TSH  --   --   --   --   --   --   --  2.550  --     < > = values in this interval not displayed.         Lab 11/10/23  0340 11/09/23  0410 11/08/23  0551   TOTAL PROTEIN 5.9* 6.1 6.1   ALBUMIN 3.0* 3.4* 3.3*   GLOBULIN 2.9 2.7 2.8   ALT (SGPT) 95* 122* 145*   AST (SGOT) 40 52* 67*   BILIRUBIN 2.1* 2.3* 2.9*   ALK PHOS 183* 202* 203*         Lab 11/08/23  0804 11/08/23  0551   PROBNP  --  35,697.0*   HSTROP T 60* 69*         Lab 11/09/23  0410   CHOLESTEROL 81   LDL CHOL 45   HDL CHOL 22*   TRIGLYCERIDES 60             Brief Urine Lab Results  (Last result in the past 365 days)        Color   Clarity   Blood   Leuk Est   Nitrite   Protein   CREAT   Urine HCG        11/08/23 1333             49.6                 Microbiology Results Abnormal       None            No radiology results from the last 24 hrs    Results for orders placed during the hospital encounter of 11/08/23    Adult Transthoracic Echo Complete W/ Cont if Necessary Per Protocol    Interpretation Summary    Left ventricular ejection fraction appears to be less than 20%.  consider limited contrasted echo to assess for LV thrombus    The left ventricular cavity is dilated.    The right ventricular cavity is mildly dilated.    The left atrial cavity is mildly dilated.    Left atrial volume is moderately increased.    severe MR present    Estimated right ventricular systolic pressure from tricuspid regurgitation is normal (<35 mmHg).      Current medications:  Scheduled Meds:amiodarone, 400 mg, Oral, Q12H  apixaban, 5 mg, Oral, Q12H  pharmacy consult - [place], , Does not apply, Daily  senna-docusate sodium, 2 tablet, Oral, BID  sodium chloride, 10 mL, Intravenous, Q12H      Continuous Infusions:     PRN Meds:.  acetaminophen **OR**  acetaminophen **OR** acetaminophen    senna-docusate sodium **AND** polyethylene glycol **AND** bisacodyl **AND** bisacodyl    Calcium Replacement - Follow Nurse / BPA Driven Protocol    influenza vaccine    Magnesium Low Dose Replacement - Follow Nurse / BPA Driven Protocol    metoprolol tartrate    nitroglycerin    ondansetron **OR** ondansetron    Phosphorus Replacement - Follow Nurse / BPA Driven Protocol    Potassium Replacement - Follow Nurse / BPA Driven Protocol    sodium chloride    sodium chloride    sodium chloride    Assessment & Plan   Assessment & Plan     Active Hospital Problems    Diagnosis  POA    **Hyponatremia [E87.1]  Yes    Elevated troponin level not due myocardial infarction [R79.89]  Yes    Severe mitral regurgitation [I34.0]  Yes    STUART (acute kidney injury) [N17.9]  Yes    Atrial fibrillation with RVR [I48.91]  Yes    Essential hypertension [I10]  Yes    Anxiety associated with depression [F41.8]  Yes    Hypoalbuminemia [E88.09]  Yes    Hyperbilirubinemia [E80.6]  Yes    Elevated liver enzymes [R74.8]  Yes    Acute on chronic systolic congestive heart failure [I50.23]  Yes      Resolved Hospital Problems   No resolved problems to display.        Brief Hospital Course to date:  Sundar Reeder is a 53 y.o. male with a past medical history of HTN, atrial fibrillation on Eliquis, CHF and CKD presented to the ED complaining of elevated heart rate, worsened lower extremity swelling and scrotal swelling with shortness of breath.      Atrial Fibrillation with RVR  -S/P Digoxin 500mcg x1 11/9  -Continue Eliquis  -ECV on 11/10, intermittent Afib noted  -Cards following. Limited options due to blood pressure  -? LVAD  -no UDS on admission noted     CHFrEF  Severe MR  -Echo reviewed: LVEF <20%, severe MR  -Did not tolerate compression wraps due to pain  -s/p Lasix ggt. Continue intermittent diuresis with IV Bumex.     STUART, worsening  -Nephrology following, appreciate assistance   -Renal US negative for  hydro. Chronic renal dz   -renal function fluctuates with volume status     Hypokalemia, improved  - replacement per nephrology     Hyponatremia, worsening  -likely related to diuresis, continue fluid restriction  - Nephrology following     Hyperbilirubinemia  Elevated AST/ALT  -CT with fatty liver, GB stones/sludge  -Hepatitis panel negative  -Likely due to congestive hepatopathy     HTN  - holding home Lisinopril in setting of STUART  - cardiac meds as above     Depression      Expected Discharge Location and Transportation: ? Home, needs pt notes  Expected Discharge   Expected Discharge Date: 11/17/2023; Expected Discharge Time:      DVT prophylaxis:  Medical DVT prophylaxis orders are present.     AM-PAC 6 Clicks Score (PT): 24 (11/14/23 0811)    CODE STATUS:   Code Status and Medical Interventions:   Ordered at: 11/08/23 0839     Level Of Support Discussed With:    Patient     Code Status (Patient has no pulse and is not breathing):    CPR (Attempt to Resuscitate)     Medical Interventions (Patient has pulse or is breathing):    Full Support       Jewell Gan, DO  11/14/23

## 2023-11-15 PROBLEM — I49.01 VENTRICULAR FIBRILLATION: Status: ACTIVE | Noted: 2023-01-01

## 2023-11-15 NOTE — NURSING NOTE
After speaking with patient's Sister, nurse has been made aware that patient's home condition was not safe at this time for patient due to limited walking space, excessive steps getting into the home. Was also made aware that patient suffered from excessive resp issues and complications as a child that placed him on a lot of antibiotics and over use of medications as a child. Was also informed that patient's mother had a long history of cardiac issues that caused patient's mother to require a kidney transplant related to heart failure and MI even from her young age. Also Informed that patient's father suffered from cardiac issues later in his life. Sister informed that patient has been having cardiac issues for about 18-20 years, and about 18 years ago patient's mother and patient was informed that he needed to have a heart transplant due to condition and mother had refused to allow patient to proceed with treatment.  After speaking with Sister and Patient's Aunt Cynthia Hutchins phone number [phone] who is at bedside and Patient's Mother sister that at one point the MD's 18 years ago was considering if cardiac issues to be genetic, yet patient's mother whom is now  refused to be tested and would not let patient be tested. Sister also informed nurse that patient was noted to be taking patient's  fathers medications in home  and 2018. Informed that she is unaware the last time that patient was actually seen by a MD regarding his heart issues and every time that she would go over and see patient that he would tell her that he was about to leave for an appointment but was unaware if he was ever going.

## 2023-11-15 NOTE — THERAPY WOUND CARE TREATMENT
Acute Care - Wound/Debridement Treatment Note  [facility]     Patient Name: Sundar Reeder  : 1970  MRN: [id number]  Today's Date: 11/15/2023                Admit Date: 2023    Visit Dx:    ICD-10-CM ICD-9-CM   1. Acute on chronic systolic congestive heart failure  I50.23 428.23     428.0   2. Chronic atrial fibrillation  I48.20 427.31   3. Hyperbilirubinemia  E80.6 782.4   4. Atrial fibrillation with RVR  I48.91 427.31   5. Hyponatremia  E87.1 276.1   6. Hypochloremia  E87.8 276.9   7. Elevated serum creatinine  R79.89 790.99   8. Physical deconditioning  R53.81 799.3   9. Elevated brain natriuretic peptide (BNP) level  R79.89 790.99   10. Acute pulmonary edema  J81.0 518.4   11. Leukocytosis, unspecified type  D72.829 288.60   12. Elevated transaminase level  R74.01 790.4       Patient Active Problem List   Diagnosis    Hyponatremia    STUART (acute kidney injury)    Acute on chronic heart failure with preserved ejection fraction    Atrial fibrillation with RVR    Essential hypertension    Anxiety associated with depression    Hypoalbuminemia    Hyperbilirubinemia    Elevated liver enzymes    Acute on chronic systolic congestive heart failure    Elevated troponin level not due myocardial infarction    Severe mitral regurgitation        History reviewed. No pertinent past medical history.     History reviewed. No pertinent surgical history.       LDA Wound       Row Name               Wound 23 1200 Bilateral lower leg Blisters    Wound - Properties Group Placement Date: 23  - Placement Time: 1200  -AH Side: Bilateral  -AH Orientation: lower  -AH Location: leg  -AH Primary Wound Type: Blisters  -AH    Retired Wound - Properties Group Placement Date: 23  - Placement Time: 1200  -AH Side: Bilateral  -AH Orientation: lower  -AH Location: leg  -AH Primary Wound Type: Blisters  -AH    Retired Wound - Properties Group Date first assessed: 23  - Time first assessed: 1200  -AH Side:  Bilateral  - Location: leg  -AH Primary Wound Type: Blisters  -AH              User Key  (r) = Recorded By, (t) = Taken By, (c) = Cosigned By      Initials Name Provider Type    Yessenia Muhammad, RN Registered Nurse                  Wound 11/08/23 1200 Bilateral lower leg Blisters (Active)   Dressing Appearance open to air 11/15/23 1430   Closure Open to air 11/14/23 1626   Base pink;red;moist 11/15/23 1430   Periwound ecchymotic 11/15/23 1430   Periwound Temperature cool 11/15/23 1430   Periwound Skin Turgor soft 11/15/23 1430   Edges irregular 11/15/23 1430   Drainage Characteristics/Odor serosanguineous;serous 11/15/23 1430   Drainage Amount small 11/15/23 1430   Care, Wound cleansed with;soap and water 11/15/23 1430   Dressing Care foam;low-adherent;silver impregnated 11/15/23 1430   Periwound Care cleansed with pH balanced cleanser 11/15/23 1430      Lymphedema       Row Name 11/15/23 1430             Lymphedema Edema Assessment    Ptting Edema Category By severity  -      Pitting Edema Severe  -         Skin Changes/Observations    Location/Assessment Lower Extremity  -      Lower Extremity Conditions bilateral:;scaly;inflamed;weeping  -      Lower Extremity Color/Pigment bilateral:;erythema  -MF         Lymphedema Pulses/Capillary Refill    Lymphedema Pulses/Capillary Refill lower extremity pulses;capillary refill  -      Dorsalis Pedis Pulse right:;left:;+1 diminished  -      Capillary Refill lower extremity capillary refill  -      Lower Extremity Capillary Refill right:;left:;less than 3 seconds  -MF         Compression/Skin Care    Compression/Skin Care skin care;wrapping location;bandaging  -      Skin Care washed/dried;lotion applied  -MF      Wrapping Location lower extremity  -MF      Wrapping Location LE bilateral:;foot to knee  -MF      Wrapping Comments size 5 compressogrip doubled and overlapping for gradiet compresison.  -MF                User Key  (r) = Recorded By, (t) =  Taken By, (c) = Cosigned By      Initials Name Provider Type    MF Fernando Justin, PT Physical Therapist                    WOUND DEBRIDEMENT                     PT Assessment (last 12 hours)       PT Evaluation and Treatment       Row Name 11/15/23 1430          Physical Therapy Time and Intention    Subjective Information complains of;weakness;fatigue;pain  -     Document Type wound care;therapy note (daily note)  -     Mode of Treatment individual therapy;physical therapy  -       Row Name 11/15/23 1430          Pain    Pain Intervention(s) Repositioned  -     Additional Documentation Pain Scale: FACES Pre/Post-Treatment (Group)  -       Row Name 11/15/23 1430          Pain Scale: FACES Pre/Post-Treatment    Pain: FACES Scale, Pretreatment 4-->hurts little more  -     Posttreatment Pain Rating 4-->hurts little more  -     Pain Location - Side/Orientation Bilateral  -     Pain Location lower  -     Pain Location - extremity  -       Row Name 11/15/23 1430          Wound 11/08/23 1200 Bilateral lower leg Blisters    Wound - Properties Group Placement Date: 11/08/23  - Placement Time: 1200  -AH Side: Bilateral  -AH Orientation: lower  -AH Location: leg  -AH Primary Wound Type: Blisters  -AH    Dressing Appearance open to air  -MF     Base pink;red;moist  -MF     Periwound ecchymotic  -MF     Periwound Temperature cool  -MF     Periwound Skin Turgor soft  -MF     Edges irregular  -MF     Drainage Characteristics/Odor serosanguineous;serous  -MF     Drainage Amount small  -MF     Care, Wound cleansed with;soap and water  -MF     Dressing Care foam;low-adherent;silver impregnated  mepilex Ag foam with optilock to cover and cast padding to secure.  -MF     Periwound Care cleansed with pH balanced cleanser  -     Retired Wound - Properties Group Placement Date: 11/08/23  -AH Placement Time: 1200  -AH Side: Bilateral  -AH Orientation: lower  -AH Location: leg  -AH Primary Wound Type: Blisters   -    Retired Wound - Properties Group Date first assessed: 11/08/23  - Time first assessed: 1200  -AH Side: Bilateral  - Location: leg  - Primary Wound Type: Blisters  -AH      Row Name 11/15/23 1430          Coping    Observed Emotional State calm;cooperative  -     Verbalized Emotional State acceptance  -     Trust Relationship/Rapport care explained  -       Row Name 11/15/23 1430          Plan of Care Review    Plan of Care Reviewed With patient  -MF     Outcome Evaluation BLE compression wrapping applied with mepilex Ag foam to cover wounds to help decrease bioburden and wick moisture away from skin. PT will f/u with pt in 2-3 days for dressing change and to assess response to compression wrapping.  -       Row Name 11/15/23 1430          Positioning and Restraints    Pre-Treatment Position in bed  -     Post Treatment Position bed  -     In Bed supine;call light within reach  -               User Key  (r) = Recorded By, (t) = Taken By, (c) = Cosigned By      Initials Name Provider Type    MF Fernando Justin, PT Physical Therapist    Yessenia Muhammad RN Registered Nurse                  Physical Therapy Education       Title: PT OT SLP Therapies (In Progress)       Topic: Physical Therapy (In Progress)       Point: Mobility training (In Progress)       Learning Progress Summary             Patient Acceptance, E, NR by AE at 11/15/2023 0904                         Point: Home exercise program (In Progress)       Learning Progress Summary             Patient Acceptance, E, NR by AE at 11/15/2023 0904                         Point: Body mechanics (In Progress)       Learning Progress Summary             Patient Acceptance, E, NR by AE at 11/15/2023 0904                         Point: Precautions (In Progress)       Learning Progress Summary             Patient Acceptance, E, NR by AE at 11/15/2023 0904                                         User Key       Initials Effective Dates Name  Provider Type Discipline    AE 09/21/21 -  Kenroy Guzman, PT Physical Therapist PT                    Recommendation and Plan  Anticipated Discharge Disposition (PT): skilled nursing facility  Planned Therapy Interventions (PT): balance training, bed mobility training, gait training, home exercise program, patient/family education, postural re-education, ROM (range of motion), strengthening, transfer training  Therapy Frequency (PT): daily  Plan of Care Reviewed With: patient           Outcome Evaluation: BLE compression wrapping applied with mepilex Ag foam to cover wounds to help decrease bioburden and wick moisture away from skin. PT will f/u with pt in 2-3 days for dressing change and to assess response to compression wrapping.  Plan of Care Reviewed With: patient            Time Calculation   PT Charges       Row Name 11/15/23 1430 11/15/23 1052          Time Calculation    Start Time 1430  -MF 0904  -AE     PT Received On -- 11/15/23  -AE     PT Goal Re-Cert Due Date 11/25/23  - 11/25/23  -AE        Timed Charges    20772 - PT Therapeutic Activity Minutes -- 26  -AE        Untimed Charges    PT Eval/Re-eval Minutes -- 23  -AE     Wound Care 27497 Multilayer comp below knee  -MF --     [id number] comp below knee 25  -MF --        Total Minutes    Timed Charges Total Minutes -- 26  -AE     Untimed Charges Total Minutes 25  -MF 23  -AE      Total Minutes 25  -MF 49  -AE               User Key  (r) = Recorded By, (t) = Taken By, (c) = Cosigned By      Initials Name Provider Type     Fernando Justin, PT Physical Therapist    AE Kenroy Guzman PT Physical Therapist                            PT G-Codes  Outcome Measure Options: AM-PAC 6 Clicks Basic Mobility (PT)  AM-PAC 6 Clicks Score (PT): 18  AM-PAC 6 Clicks Score (OT): 24  Zeng Total Score: 55       Fernando Justin, PT  11/15/2023

## 2023-11-15 NOTE — THERAPY RE-EVALUATION
Patient Name: Sundar Reeder  : 1970    MRN: [id number]                              Today's Date: 11/15/2023       Admit Date: 2023    Visit Dx:     ICD-10-CM ICD-9-CM   1. Acute on chronic systolic congestive heart failure  I50.23 428.23     428.0   2. Chronic atrial fibrillation  I48.20 427.31   3. Hyperbilirubinemia  E80.6 782.4   4. Atrial fibrillation with RVR  I48.91 427.31   5. Hyponatremia  E87.1 276.1   6. Hypochloremia  E87.8 276.9   7. Elevated serum creatinine  R79.89 790.99   8. Physical deconditioning  R53.81 799.3   9. Elevated brain natriuretic peptide (BNP) level  R79.89 790.99   10. Acute pulmonary edema  J81.0 518.4   11. Leukocytosis, unspecified type  D72.829 288.60   12. Elevated transaminase level  R74.01 790.4     Patient Active Problem List   Diagnosis    Hyponatremia    STUART (acute kidney injury)    Acute on chronic heart failure with preserved ejection fraction    Atrial fibrillation with RVR    Essential hypertension    Anxiety associated with depression    Hypoalbuminemia    Hyperbilirubinemia    Elevated liver enzymes    Acute on chronic systolic congestive heart failure    Elevated troponin level not due myocardial infarction    Severe mitral regurgitation     History reviewed. No pertinent past medical history.  History reviewed. No pertinent surgical history.   General Information       Row Name 11/15/23 1028          Physical Therapy Time and Intention    Document Type re-evaluation  -AE     Mode of Treatment physical therapy  -AE       Row Name 11/15/23 1028          General Information    Patient Profile Reviewed yes  -AE     Prior Level of Function --  Refer to IE  -AE     Existing Precautions/Restrictions fall;other (see comments)  LE edema/weeping  -AE     Barriers to Rehab medically complex;cognitive status  -AE       Row Name 11/15/23 1028          Living Environment    People in Home --  Refer to IE  -AE       Row Name 11/15/23 1028          Cognition    Orientation  Status (Cognition) oriented x 4  -AE       Row Name 11/15/23 1028          Safety Issues, Functional Mobility    Safety Issues Affecting Function (Mobility) awareness of need for assistance;insight into deficits/self-awareness;safety precaution awareness;safety precautions follow-through/compliance;sequencing abilities;judgment  -AE     Impairments Affecting Function (Mobility) balance;cognition;endurance/activity tolerance;strength  -AE     Cognitive Impairments, Mobility Safety/Performance awareness, need for assistance;safety precaution awareness;safety precaution follow-through;insight into deficits/self-awareness;problem-solving/reasoning;judgment  -AE               User Key  (r) = Recorded By, (t) = Taken By, (c) = Cosigned By      Initials Name Provider Type    AE Kenroy Guzman, PT Physical Therapist                   Mobility       Row Name 11/15/23 1030          Bed Mobility    Bed Mobility supine-sit;sit-supine  -AE     Supine-Sit [place] (Bed Mobility) contact guard;1 person assist  -AE     Sit-Supine [place] (Bed Mobility) contact guard;1 person assist;verbal cues  -AE     Assistive Device (Bed Mobility) head of bed elevated;bed rails  -AE     Comment, (Bed Mobility) Pt used bed rails to assist with supine to sit. Pt required increased cues to improve independence with bed mobility.  -AE       Row Name 11/15/23 1030          Transfers    Comment, (Transfers) VCs for hand placement and sequencing. Momentum required to complete STS with RW for support.  -AE       Row Name 11/15/23 1030          Sit-Stand Transfer    Sit-Stand [place] (Transfers) minimum assist (75% patient effort);1 person assist;verbal cues  -AE     Assistive Device (Sit-Stand Transfers) walker, front-wheeled  -AE       Row Name 11/15/23 1030          Gait/Stairs (Locomotion)    [place] Level (Gait) minimum assist (75% patient effort);1 person assist;verbal cues  -AE     Assistive Device (Gait) walker, front-wheeled  " -AE     Distance in Feet (Gait) 4+4  -AE     Deviations/Abnormal Patterns (Gait) bilateral deviations;base of support, narrow;jesusita decreased;gait speed decreased;stride length decreased;festinating/shuffling  -AE     Bilateral Gait Deviations forward flexed posture;heel strike decreased  -AE     Comment, (Gait/Stairs) Pt demo step to gait pattern with slowed jesusita, forward flexed posture, and difficulty improving upright standing posture. While stepping forward, pt reporting inability to stand upright and \"I am going to fall\" while leaning backwards. Pt required min-mod A at gait belt to maintain standing but pt maintained good functional strength to remain standing. After questioning reasoning for feeling like he may fall, pt reports \"I am disoriented and I am weak\". Unable to determine specific cause. Further distance limited by fatigue.  -AE               User Key  (r) = Recorded By, (t) = Taken By, (c) = Cosigned By      Initials Name Provider Type    AE Kenroy Guzman PT Physical Therapist                   Obj/Interventions       Row Name 11/15/23 1041          Range of Motion Comprehensive    General Range of Motion bilateral lower extremity ROM WFL  -AE       Row Name 11/15/23 1041          Strength Comprehensive (MMT)    General Manual Muscle Testing (MMT) Assessment lower extremity strength deficits identified  -AE     Comment, General Manual Muscle Testing (MMT) Assessment BLE 4/5 with MMT; functionally able to complete bilat SLR while placing socks on feet with no c/o weakness  -AE       Row Name 11/15/23 1041          Balance    Balance Assessment sitting static balance;sitting dynamic balance;sit to stand dynamic balance;standing static balance;standing dynamic balance  -AE     Static Sitting Balance standby assist  -AE     Dynamic Sitting Balance contact guard  -AE     Position, Sitting Balance unsupported;sitting edge of bed  -AE     Sit to Stand Dynamic Balance minimal assist;1-person " assist;verbal cues  -AE     Static Standing Balance contact guard  -AE     Dynamic Standing Balance minimal assist  -AE     Position/Device Used, Standing Balance supported;walker, front-wheeled  -AE     Comment, Balance Pt demo periods of decreased balance while sitting at EOB, resembling actions of room spinning; pt reports light headedness, no significant drops in BP  -AE               User Key  (r) = Recorded By, (t) = Taken By, (c) = Cosigned By      Initials Name Provider Type    AE Kenroy Guzman, PT Physical Therapist                   Goals/Plan       Row Name 11/15/23 1050          Bed Mobility Goal 1 (PT)    Activity/Assistive Device (Bed Mobility Goal 1, PT) sit to supine/supine to sit  -AE     [place] Level/Cues Needed (Bed Mobility Goal 1, PT) modified independence  -AE     Time Frame (Bed Mobility Goal 1, PT) long term goal (LTG);10 days  -AE     Progress/Outcomes (Bed Mobility Goal 1, PT) new goal  -AE       Row Name 11/15/23 1050          Transfer Goal 1 (PT)    Activity/Assistive Device (Transfer Goal 1, PT) sit-to-stand/stand-to-sit;bed-to-chair/chair-to-bed  -AE     [place] Level/Cues Needed (Transfer Goal 1, PT) standby assist  -AE     Time Frame (Transfer Goal 1, PT) long term goal (LTG);10 days  -AE     Progress/Outcome (Transfer Goal 1, PT) new goal  -AE       Row Name 11/15/23 1050          Gait Training Goal 1 (PT)    Activity/Assistive Device (Gait Training Goal 1, PT) gait (walking locomotion);assistive device use  -AE     [place] Level (Gait Training Goal 1, PT) contact guard required  -AE     Distance (Gait Training Goal 1, PT) 50ft  -AE     Time Frame (Gait Training Goal 1, PT) long term goal (LTG);10 days  -AE     Progress/Outcome (Gait Training Goal 1, PT) new goal  -AE       Row Name 11/15/23 1050          Therapy Assessment/Plan (PT)    Planned Therapy Interventions (PT) balance training;bed mobility training;gait training;home exercise program;patient/family  "education;postural re-education;ROM (range of motion);strengthening;transfer training  -AE               User Key  (r) = Recorded By, (t) = Taken By, (c) = Cosigned By      Initials Name Provider Type    AE Kenroy Guzman, PT Physical Therapist                   Clinical Impression       Row Name 11/15/23 1046          Pain    Additional Documentation Pain Scale: FACES Pre/Post-Treatment (Group)  -AE       Row Name 11/15/23 1046          Pain Scale: FACES Pre/Post-Treatment    Pain: FACES Scale, Pretreatment 2-->hurts little bit  -AE     Posttreatment Pain Rating 2-->hurts little bit  -AE     Pain Location - Side/Orientation Bilateral  -AE     Pain Location lower  -AE     Pain Location - extremity  -AE     Pre/Posttreatment Pain Comment tolerated  -AE       Row Name 11/15/23 1046          Plan of Care Review    Plan of Care Reviewed With patient  -AE     Progress declining  -AE     Outcome Evaluation Pt presents with decreased balance, increased c/o weakness, and \"disorientation\". Pt required min A for STS and ambulating 4ft + 4ft with RW for support. Pt reports light headedness and weakness, no significant changes in BP noted. Recommend continued skilled IP PT interventions. Recommend D/C to SNF.  -AE       Row Name 11/15/23 1046          Vital Signs    Pre Systolic BP Rehab 98  -AE     Pre Treatment Diastolic BP 82  -AE     Intra Systolic BP Rehab 97  -AE     Intra Treatment Diastolic BP 62  -AE     Post Systolic BP Rehab 95  -AE     Post Treatment Diastolic BP 78  -AE     Pretreatment Heart Rate (beats/min) 105  -AE     Posttreatment Heart Rate (beats/min) 101  -AE     O2 Delivery Pre Treatment room air  -AE     O2 Delivery Post Treatment room air  -AE     Pre Patient Position Supine  -AE     Intra Patient Position Standing  -AE     Post Patient Position Supine  -AE       Row Name 11/15/23 1046          Positioning and Restraints    Pre-Treatment Position in bed  -AE     Post Treatment Position bed  -AE     In " Bed notified nsg;fowlers;call light within reach;encouraged to call for assist;exit alarm on;side rails up x2;legs elevated  -AE               User Key  (r) = Recorded By, (t) = Taken By, (c) = Cosigned By      Initials Name Provider Type    AE Kenroy Guzman PT Physical Therapist                   Outcome Measures       Row Name 11/15/23 1051          How much help from another person do you currently need...    Turning from your back to your side while in flat bed without using bedrails? 4  -AE     Moving from lying on back to sitting on the side of a flat bed without bedrails? 3  -AE     Moving to and from a bed to a chair (including a wheelchair)? 3  -AE     Standing up from a chair using your arms (e.g., wheelchair, bedside chair)? 3  -AE     Climbing 3-5 steps with a railing? 2  -AE     To walk in hospital room? 3  -AE     AM-PAC 6 Clicks Score (PT) 18  -AE     Highest Level of Mobility Goal 6 --> Walk 10 steps or more  -AE       Row Name 11/15/23 1051          Functional Assessment    Outcome Measure Options AM-PAC 6 Clicks Basic Mobility (PT)  -AE               User Key  (r) = Recorded By, (t) = Taken By, (c) = Cosigned By      Initials Name Provider Type    Kenroy Pa PT Physical Therapist                                 Physical Therapy Education       Title: PT OT SLP Therapies (In Progress)       Topic: Physical Therapy (In Progress)       Point: Mobility training (In Progress)       Learning Progress Summary             Patient Acceptance, E, NR by AE at 11/15/2023 0904                         Point: Home exercise program (In Progress)       Learning Progress Summary             Patient Acceptance, E, NR by AE at 11/15/2023 0904                         Point: Body mechanics (In Progress)       Learning Progress Summary             Patient Acceptance, E, NR by AE at 11/15/2023 0904                         Point: Precautions (In Progress)       Learning Progress Summary             Patient  "Acceptance, E, NR by AE at 11/15/2023 0904                                         User Key       Initials Effective Dates Name Provider Type Discipline    AE 09/21/21 -  Kenroy Guzman PT Physical Therapist PT                  PT Recommendation and Plan  Planned Therapy Interventions (PT): balance training, bed mobility training, gait training, home exercise program, patient/family education, postural re-education, ROM (range of motion), strengthening, transfer training  Plan of Care Reviewed With: patient  Progress: declining  Outcome Evaluation: Pt presents with decreased balance, increased c/o weakness, and \"disorientation\". Pt required min A for STS and ambulating 4ft + 4ft with RW for support. Pt reports light headedness and weakness, no significant changes in BP noted. Recommend continued skilled IP PT interventions. Recommend D/C to SNF.     Time Calculation:         PT Charges       Row Name 11/15/23 1052             Time Calculation    Start Time 0904  -AE      PT Received On 11/15/23  -AE      PT Goal Re-Cert Due Date 11/25/23  -AE         Timed Charges    28729 - PT Therapeutic Activity Minutes 26  -AE         Untimed Charges    PT Eval/Re-eval Minutes 23  -AE         Total Minutes    Timed Charges Total Minutes 26  -AE      Untimed Charges Total Minutes 23  -AE       Total Minutes 49  -AE                User Key  (r) = Recorded By, (t) = Taken By, (c) = Cosigned By      Initials Name Provider Type    AE Kenroy Guzman PT Physical Therapist                  Therapy Charges for Today       Code Description Service Date Service Provider Modifiers Qty    81423539751 HC PT THERAPEUTIC ACT EA 15 MIN 11/15/2023 Kenroy Guzman, PT GP 2    [id number] HC PT RE-EVAL ESTABLISHED PLAN 2 11/15/2023 Kenroy Guzman, PT GP 1            PT G-Codes  Outcome Measure Options: AM-PAC 6 Clicks Basic Mobility (PT)  AM-PAC 6 Clicks Score (PT): 18  AM-PAC 6 Clicks Score (OT): 24  Zeng Total Score: 55  PT Discharge " Summary  Anticipated Discharge Disposition (PT): skilled nursing facility    Kenroy Guzman, PT  11/15/2023

## 2023-11-15 NOTE — PLAN OF CARE
Problem: Adult Inpatient Plan of Care  Goal: Plan of Care Review  Outcome: Ongoing, Progressing  Flowsheets (Taken 11/15/2023 1603)  Progress: improving  Plan of Care Reviewed With: patient  Goal: Patient-Specific Goal (Individualized)  Outcome: Ongoing, Progressing  Goal: Absence of Hospital-Acquired Illness or Injury  Outcome: Ongoing, Progressing  Goal: Optimal Comfort and Wellbeing  Outcome: Ongoing, Progressing  Goal: Readiness for Transition of Care  Outcome: Ongoing, Progressing     Problem: Asthma Comorbidity  Goal: Maintenance of Asthma Control  Outcome: Ongoing, Progressing     Problem: Behavioral Health Comorbidity  Goal: Maintenance of Behavioral Health Symptom Control  Outcome: Ongoing, Progressing     Problem: COPD (Chronic Obstructive Pulmonary Disease) Comorbidity  Goal: Maintenance of COPD Symptom Control  Outcome: Ongoing, Progressing     Problem: Diabetes Comorbidity  Goal: Blood Glucose Level Within Targeted Range  Outcome: Ongoing, Progressing     Problem: Heart Failure Comorbidity  Goal: Maintenance of Heart Failure Symptom Control  Outcome: Ongoing, Progressing     Problem: Hypertension Comorbidity  Goal: Blood Pressure in Desired Range  Outcome: Ongoing, Progressing     Problem: Obstructive Sleep Apnea Risk or Actual Comorbidity Management  Goal: Unobstructed Breathing During Sleep  Outcome: Ongoing, Progressing     Problem: Osteoarthritis Comorbidity  Goal: Maintenance of Osteoarthritis Symptom Control  Outcome: Ongoing, Progressing     Problem: Pain Chronic (Persistent) (Comorbidity Management)  Goal: Acceptable Pain Control and Functional Ability  Outcome: Ongoing, Progressing     Problem: Seizure Disorder Comorbidity  Goal: Maintenance of Seizure Control  Outcome: Ongoing, Progressing     Problem: Skin Injury Risk Increased  Goal: Skin Health and Integrity  Outcome: Ongoing, Progressing     Problem: Fall Injury Risk  Goal: Absence of Fall and Fall-Related Injury  Outcome: Ongoing,  Progressing  Goal: Absence of Fall and Fall-Related Injury  Outcome: Ongoing, Progressing   Goal Outcome Evaluation:  Plan of Care Reviewed With: patient        Progress: improving

## 2023-11-15 NOTE — PLAN OF CARE
"Goal Outcome Evaluation:  Plan of Care Reviewed With: patient        Progress: declining  Outcome Evaluation: Pt presents with decreased balance, increased c/o weakness, and \"disorientation\". Pt required min A for STS and ambulating 4ft + 4ft with RW for support. Pt reports light headedness and weakness, no significant changes in BP noted. Recommend continued skilled IP PT interventions. Recommend D/C to SNF.      Anticipated Discharge Disposition (PT): skilled nursing facility  "

## 2023-11-15 NOTE — PROGRESS NOTES
Cardiology Progress Note      Reason for visit:    Atrial fibrillation with RVR  Acute on chronic systolic heart failure  Elevated troponin    IDENTIFICATION: 53-year-old gentleman who resides in [place], [place]    Active Hospital Problems    Diagnosis  POA    **Hyponatremia [E87.1]  Yes    Elevated troponin level not due myocardial infarction [R79.89]  Yes     Troponin elevated but flat in the setting of A-fib with RVR and acute systolic heart failure      Severe mitral regurgitation [I34.0]  Yes     Echo (11/8/2023): Severe MR.  LVEF less than 20%      STUART (acute kidney injury) [N17.9]  Yes    Atrial fibrillation with RVR [I48.91]  Yes     Reported history of atrial fibrillation for 10 years  CHA2DS2-VASc=3  Presentation [facility] 11/8/2023 in A-fib with RVR in the setting of hyponatremia and STUART  External cardioversion (11/10/2023): Successfully restored NSR.  Return of atrial fibrillation 11/11/2023 but now returned to NSR on IV amiodarone      Essential hypertension [I10]  Yes    Anxiety associated with depression [F41.8]  Yes    Hypoalbuminemia [E88.09]  Yes    Hyperbilirubinemia [E80.6]  Yes    Elevated liver enzymes [R74.8]  Yes    Acute on chronic systolic congestive heart failure [I50.23]  Yes     Reported history of systolic heart failure dating back for 20 years.  Reportedly last echocardiogram 1 year ago had normal LVEF.  Data deficit  Echo (11/8/2023): LVEF less than 20%.  Severe MR.  RVSP less than 35 mmHg left ventricular ejection fraction appears to be less than 20%.                  Patient sitting up in the bed breathing easy on room air.  He reports feeling much better than when he was admitted.  He denies any chest pain.  He reports improvement in his lower extremity edema.  The patient had cardioversion earlier this admission that converted him to normal sinus rhythm but he went back into atrial fibrillation and IV amiodarone was restarted yesterday.  He is currently on IV  amiodarone drip and in normal sinus rhythm with frequent PVCs    Update 11/14/23  Feeling quite weak.  BP remains borderline.  Overall volume much better.  Still somewhat overloaded with low albumin.      Update 11/15/23  About the same.  Really no change           Vital Sign Min/Max for last 24 hours  Temp  Min: 96.2 °F (35.7 °C)  Max: 97.8 °F (36.6 °C)   BP  Min: 95/70  Max: 99/79   Pulse  Min: 75  Max: 84   Resp  Min: 18  Max: 20   SpO2  Min: 99 %  Max: 99 %   No data recorded      Intake/Output Summary (Last 24 hours) at 11/15/2023 0921  Last data filed at 11/15/2023 0900  Gross per 24 hour   Intake 350 ml   Output 850 ml   Net -500 ml           Physical Exam  Constitutional:       General: He is awake.   Neck:      Vascular: No JVD.   Cardiovascular:      Rate and Rhythm: Normal rate and regular rhythm. Frequent Extrasystoles are present.     Heart sounds: Murmur heard.      Comments: Frequent PVCs noted on telemetry  Pulmonary:      Effort: Pulmonary effort is normal.      Breath sounds: Normal breath sounds.   Musculoskeletal:      Right lower leg: Edema present.      Left lower leg: Edema present.   Skin:     General: Skin is warm and dry.   Neurological:      Mental Status: He is alert.   Psychiatric:         Behavior: Behavior is cooperative.         Tele: Normal sinus rhythm with PVCs with first-degree AV block    Results Review (reviewed the patient's recent labs in the electronic medical record):     EKG (11/12/2023): Atrial flutter with PVCs at 137 bpm    CXR (11/8/2023): Mild pulmonary edema with significant cardiomegaly    ECHO (11/8/2023): LVEF less than 20%.  Severe MR.  RVSP less than 35 mmHg    Results from last 7 days   Lab Units 11/14/23  1223 11/13/23  0925 11/12/23  0414 11/11/23  1214 11/11/23  0430 11/10/23  2049 11/10/23  0340 11/09/23  0410   SODIUM mmol/L 126* 127* 134*  --  136  --  132* 130*   POTASSIUM mmol/L 3.8 3.7 3.9   < > 2.9*   < > 2.7* 3.8   CHLORIDE mmol/L 82* 82* 91*  --  89*   --  89* 87*   BUN mg/dL 79* 77* 81*  --  75*  --  93* 93*   CREATININE mg/dL 2.27* 2.04* 1.73*  --  1.69*  --  2.09* 2.46*   MAGNESIUM mg/dL  --   --  1.7  --   --   --   --  2.3    < > = values in this interval not displayed.           Results from last 7 days   Lab Units 11/11/23  0430 11/10/23  0340 11/09/23  0410   WBC 10*3/mm3 9.28 8.37 9.92   HEMOGLOBIN g/dL 13.4 13.1 13.1   HEMATOCRIT % 40.3 39.9 39.7   PLATELETS 10*3/mm3 304 300 334       Lab Results   Component Value Date    HGBA1C 6.00 (H) 11/09/2023       Lab Results   Component Value Date    CHOL 81 11/09/2023    TRIG 60 11/09/2023    HDL 22 (L) 11/09/2023    LDL 45 11/09/2023              Acute on chronic systolic heart failure  History of systolic heart failure dating back 20 years  Prior to admission has been off heart failure medications due to loss of health insurance  Echo this admission shows LVEF less than 20%  Diuretic management per nephrology  Was taking metoprolol succinate and lisinopril at home but on hold due to hypotension and STUART      Atrial fibrillation with RVR  History of A-fib for last 10 years.  Patient asymptomatic  Chronically anticoagulated with Eliquis and currently on 5 mg twice daily  Status post external cardioversion this admission on 11/10  Cotinue oral amiodarone, can stop IV  Currently in normal sinus rhythm      Elevated troponin not due to myocardial infarction but likely due to exacerbation of acute CHF and A-fib with RVR  Troponins elevated and flat and EKG without acute ischemic changes  Reportedly had heart cath over 10 years ago with no intervention  Currently chest pain-free      Severe mitral regurgitation  Severe MR noted on recent echo  Continue diuresis    Hypertension/hypotension  BP has been low to borderline low  Current BP 97/73    STUART on CKD  Nephrology following  Blood work pending    Elevated LFTs  Likely due to hepatic congestion from acute CHF  LFTs improving         Update 11/15/23  Defer diuretic  management to nephrology.  Appreciate their assistance  Consider [employer] at discharge  Continue oral amiodarone  Continue Eliquis for stroke prophylaxis  Will need repeat limited echo as outpatient in 3 months  Defer ischemic evaluation for now due to lack of angina symptoms and troponins are flat and not consistent with ACS but consistent with CHF and RVR.    No beta-blocker/ACE/ARB/Entresto due to low blood pressures and elevated creatinine.  Will titrate as able  Pro bnp has decreased significantly.  On physical exam he does not seem to be improving.  May be from low albumin or potentially low output state.  Would consider RHC but has LBBB.  Will discuss with nephrology but if ok will trial dobutamine to help facilitate diuresis.

## 2023-11-15 NOTE — PROGRESS NOTES
"   LOS: 7 days    Patient Care Team:  Provider, No Known as PCP - General    Subjective     Patient reports feeling better today after getting fluids yesterday.  Still with weakness but does not feel as dizzy as he had.  Edema stable.  No chest pain or shortness of breath.    Objective     Vital Signs:  Blood pressure 98/82, pulse 83, temperature 97.8 °F (36.6 °C), temperature source Axillary, resp. rate 18, height 180.3 cm (71\"), weight 80.2 kg (176 lb 12.8 oz), SpO2 99%.      Intake/Output Summary (Last 24 hours) at 11/15/2023 1047  Last data filed at 11/15/2023 0900  Gross per 24 hour   Intake 350 ml   Output 850 ml   Net -500 ml        11/14 0701 - 11/15 0700  In: 460 [P.O.:460]  Out: 850 [Urine:850]    Physical Exam:        GENERAL: WD WM NAD  NEURO: Awake and alert, oriented. No focal deficit  PSYCHIATRIC: NMA. Cooperative with PE  EYE: PE, no icterus, no conjunctivitis  ENT: ommm, dentition intact,  Hearing intact  NECK: Supple , No JVD discernable,  Trachea midline  CV: + Edema, RRR  LUNGS:  Quiet,  Nonlabored resp.  Symmetrical expansion  ABDOMEN: Nondistended, soft nontender.  : No Lazaro, no palp bladder  SKIN: Warm and dry without rash      Labs:  Results from last 7 days   Lab Units 11/11/23  0430 11/10/23  0340 11/09/23  0410   WBC 10*3/mm3 9.28 8.37 9.92   HEMOGLOBIN g/dL 13.4 13.1 13.1   PLATELETS 10*3/mm3 304 300 334     Results from last 7 days   Lab Units 11/15/23  0433 11/14/23  1223 11/13/23  0925 11/12/23  0414 11/10/23  2049 11/10/23  0340 11/09/23  0410   SODIUM mmol/L 124* 126* 127* 134*   < > 132* 130*   POTASSIUM mmol/L 3.5 3.8 3.7 3.9   < > 2.7* 3.8   CHLORIDE mmol/L 80* 82* 82* 91*   < > 89* 87*   CO2 mmol/L 26.0 27.0 28.0 32.0*   < > 25.0 27.0   BUN mg/dL 83* 79* 77* 81*   < > 93* 93*   CREATININE mg/dL 2.29* 2.27* 2.04* 1.73*   < > 2.09* 2.46*   CALCIUM mg/dL 9.6 9.2 8.9 9.0   < > 8.9 9.2   PHOSPHORUS mg/dL 4.3  --   --   --   --   --   --    MAGNESIUM mg/dL  --   --   --  1.7  --   " --  2.3   ALBUMIN g/dL 3.3*  --   --   --   --  3.0* 3.4*    < > = values in this interval not displayed.     Results from last 7 days   Lab Units 11/10/23  0340   ALK PHOS U/L 183*   BILIRUBIN mg/dL 2.1*   ALT (SGPT) U/L 95*   AST (SGOT) U/L 40                   Estimated Creatinine Clearance: 43.3 mL/min (A) (by C-G formula based on SCr of 2.29 mg/dL (H)).         A/P:    ARF: Creatinine stable overnight at 2.3 with IV fluids..  Urine output increased to nonoliguric with net negative volume if measured correctly.  Initially creatinine up to 2.5 with improvement down to 1.7.  Creatinine began rising with further negative volume.  Blood pressure remains low but stable.  FeNa remains quite low consistent with prerenal azotemia in setting of hypotension.  For now would continue supportive care.   Strict I's and O's.  Monitor renal function closely for recovery.    CKD: Unsure of baseline creatinine.     Hypotension: Blood pressure remains low.  May benefit from midodrine.     Hyponatremia: Sodium dropped further to 124 despite isotonic fluids yesterday.  Urine sodium quite low consistent with prerenal azotemia.    Initially improved up to 136.continue to limit free water intake.  All IV fluids isotonic.  Will check U/POSM.  May have component of SIADH with underlying CHF.  May be candidate for tolvaptan..  We will check U/P OSM    Volume: Patient -8 L since admission.  Initially with improving creatinine, however with further diuresis creatinine increasing.  Patient poorly responsive to albumin.  Was given bolus normal saline yesterday with stabilization of creatinine.  For now continue to hold diuretics.  Strict I's and O's.  We will start tolvaptan once patient hemodynamically stable.  Daily standing weight would be helpful.     CHF: Very low EF <20%.  Significant volume reduction with diuresis.  Blood pressure low with rising creatinine.  Patient post IV Bumex yesterday morning.  Diuretics on hold for now.  Strict  I's and O's.  Redosed diuretics as indicated.    High risk and complexity patient.      Johnson Galicia MD  11/15/23  10:47 EST

## 2023-11-15 NOTE — CASE MANAGEMENT/SOCIAL WORK
Continued Stay Note   [place]     Patient Name: Sundar Reeder  MRN: [id number]  Today's Date: 11/15/2023    Admit Date: 11/8/2023    Plan: rehab vs home   Discharge Plan       Row Name 11/15/23 0826       Plan    Plan rehab vs home    Patient/Family in Agreement with Plan yes    Plan Comments I spoke with this patient at the bedside. He is asking that PT and OT come back to work with him as he feels he is too weak to go home and needs rehab. CM placed another order in for PT and OT. He also asked that the person from [facility] come to his room to answer some questions. Luba was notified and will go bedside to see him. His family can transport. CM to follow.    Final Discharge Disposition Code 30 - still a patient                   Discharge Codes    No documentation.                 Expected Discharge Date and Time       Expected Discharge Date Expected Discharge Time    Nov 17, 2023               Angie Walter RN

## 2023-11-15 NOTE — PROGRESS NOTES
[facility] Medicine Services  PROGRESS NOTE    Patient Name: Sundar Reeder  : 1970  MRN: [id number]    Date of Admission: 2023  Primary Care Physician: Provider, No Known    Subjective   Subjective     CC:  Heart failure    HPI:  Patient states he feels better today and thinking more clearly. Wants to talk with [employer] rep today when sister arrives  No overnight issues. Still very weak and wants rehab      Objective   Objective     Vital Signs:   Temp:  [96.2 °F (35.7 °C)-97.8 °F (36.6 °C)] 97.8 °F (36.6 °C)  Heart Rate:  [76-84] 83  Resp:  [18-20] 18  BP: (95-99)/(70-82) 98/82     Physical Exam:  Constitutional: No acute distress, awake, alert, sitting up in bed  HENT: NCAT, mucous membranes moist  Respiratory: Respiratory effort normal, CTAB  Cardiovascular: RRR  Musculoskeletal: LE edema mild  Psychiatric: appropriate affect, cooperative  Neurologic: Oriented x 3,  MACIEL, speech clear  Skin: No rashes      Results Reviewed:  LAB RESULTS:      Lab 23  0430 11/10/23  0340 23  0410   WBC 9.28 8.37 9.92   HEMOGLOBIN 13.4 13.1 13.1   HEMATOCRIT 40.3 39.9 39.7   PLATELETS 304 300 334   MCV 82.2 83.5 82.0         Lab 11/15/23  0433 23  1223 23  0925 23  0414 23  1214 23  0430 11/10/23  0340 23  0410   SODIUM 124* 126* 127* 134*  --  136   < > 130*   POTASSIUM 3.5 3.8 3.7 3.9 4.4 2.9*   < > 3.8   CHLORIDE 80* 82* 82* 91*  --  89*   < > 87*   CO2 26.0 27.0 28.0 32.0*  --  33.0*   < > 27.0   ANION GAP 18.0* 17.0* 17.0* 11.0  --  14.0   < > 16.0*   BUN 83* 79* 77* 81*  --  75*   < > 93*   CREATININE 2.29* 2.27* 2.04* 1.73*  --  1.69*   < > 2.46*   EGFR 33.7* 34.1* 38.7* 47.2*  --  48.5*   < > 30.9*   GLUCOSE 108* 125* 198* 115*  --  116*   < > 108*   CALCIUM 9.6 9.2 8.9 9.0  --  8.7   < > 9.2   MAGNESIUM  --   --   --  1.7  --   --   --  2.3   PHOSPHORUS 4.3  --   --   --   --   --   --   --    HEMOGLOBIN A1C  --   --   --   --   --   --    --  6.00*   TSH  --   --   --   --   --   --   --  2.550    < > = values in this interval not displayed.         Lab 11/15/23  0433 11/10/23  0340 11/09/23  0410   TOTAL PROTEIN  --  5.9* 6.1   ALBUMIN 3.3* 3.0* 3.4*   GLOBULIN  --  2.9 2.7   ALT (SGPT)  --  95* 122*   AST (SGOT)  --  40 52*   BILIRUBIN  --  2.1* 2.3*   ALK PHOS  --  183* 202*         Lab 11/15/23  0433   PROBNP 14,901.0*         Lab 11/09/23  0410   CHOLESTEROL 81   LDL CHOL 45   HDL CHOL 22*   TRIGLYCERIDES 60             Brief Urine Lab Results  (Last result in the past 365 days)        Color   Clarity   Blood   Leuk Est   Nitrite   Protein   CREAT   Urine HCG        11/15/23 0132             72.0                 Microbiology Results Abnormal       None            No radiology results from the last 24 hrs    Results for orders placed during the hospital encounter of 11/08/23    Adult Transthoracic Echo Complete W/ Cont if Necessary Per Protocol    Interpretation Summary    Left ventricular ejection fraction appears to be less than 20%.  consider limited contrasted echo to assess for LV thrombus    The left ventricular cavity is dilated.    The right ventricular cavity is mildly dilated.    The left atrial cavity is mildly dilated.    Left atrial volume is moderately increased.    severe MR present    Estimated right ventricular systolic pressure from tricuspid regurgitation is normal (<35 mmHg).      Current medications:  Scheduled Meds:amiodarone, 400 mg, Oral, Q12H  apixaban, 5 mg, Oral, Q12H  empagliflozin, 10 mg, Oral, Daily  pharmacy consult - MTM, , Does not apply, Daily  senna-docusate sodium, 2 tablet, Oral, BID  sodium chloride, 10 mL, Intravenous, Q12H      Continuous Infusions:     PRN Meds:.  acetaminophen **OR** acetaminophen **OR** acetaminophen    senna-docusate sodium **AND** polyethylene glycol **AND** bisacodyl **AND** bisacodyl    Calcium Replacement - Follow Nurse / BPA Driven Protocol    influenza vaccine    Magnesium Low  Dose Replacement - Follow Nurse / BPA Driven Protocol    metoprolol tartrate    nitroglycerin    ondansetron **OR** ondansetron    Phosphorus Replacement - Follow Nurse / BPA Driven Protocol    Potassium Replacement - Follow Nurse / BPA Driven Protocol    sodium chloride    sodium chloride    sodium chloride    Assessment & Plan   Assessment & Plan     Active Hospital Problems    Diagnosis  POA    **Hyponatremia [E87.1]  Yes    Elevated troponin level not due myocardial infarction [R79.89]  Yes    Severe mitral regurgitation [I34.0]  Yes    STUART (acute kidney injury) [N17.9]  Yes    Atrial fibrillation with RVR [I48.91]  Yes    Essential hypertension [I10]  Yes    Anxiety associated with depression [F41.8]  Yes    Hypoalbuminemia [E88.09]  Yes    Hyperbilirubinemia [E80.6]  Yes    Elevated liver enzymes [R74.8]  Yes    Acute on chronic systolic congestive heart failure [I50.23]  Yes      Resolved Hospital Problems   No resolved problems to display.        Brief Hospital Course to date:  Sundar Reeder is a 53 y.o. male with a past medical history of HTN, atrial fibrillation on Eliquis, CHF and CKD presented to the ED complaining of elevated heart rate, worsened lower extremity swelling and scrotal swelling with shortness of breath.     This patient's problems and plans were partially entered by my partner and updated as appropriate by me 11/15/23.       Atrial Fibrillation with RVR  -S/P Digoxin 500mcg x1 11/9  -Continue Eliquis  -ECV on 11/10, intermittent Afib noted, currently NSR  -Cards following. Limited options due to blood pressure   -no UDS on admission noted     CHFrEF  Severe MR  -Echo reviewed: LVEF <20%, severe MR  -Did not tolerate compression wraps due to pain  -s/p Lasix gtt. intermittent diuresis with IV Bumex.  -BNP significantly reduced, but patient having reduced output and low BP. Cardiology may start dobutamine gtt to facilitate diuresis  -- [employer] [facility] ordered, Pending rep placement and education  --  will need repeat echo in 3 mos  -- considering RHC  -- unable to initiate GDMT due to hypotension     STUART, worsening  -Nephrology following, appreciate assistance   -Renal US negative for hydro. Chronic renal dz   -renal function fluctuates with volume status- did receive IVF 11/14, but continued rise in creatinine and drop in Na+     Hypokalemia, improved  - replacement per nephrology     Hyponatremia, worsening  -likely related to diuresis, continue fluid restriction  - Nephrology following     Hyperbilirubinemia  Elevated AST/ALT  -CT with fatty liver, GB stones/sludge  -Hepatitis panel negative  -Likely due to congestive hepatopathy     HTN  - holding home Lisinopril in setting of STUART  - cardiac meds as above     Depression      Expected Discharge Location and Transportation:rehab  Expected Discharge   Expected Discharge Date: 11/17/2023; Expected Discharge Time:      DVT prophylaxis:  Medical DVT prophylaxis orders are present.     AM-PAC 6 Clicks Score (PT): 18 (11/15/23 1051)    CODE STATUS:   Code Status and Medical Interventions:   Ordered at: 11/08/23 0809     Level Of Support Discussed With:    Patient     Code Status (Patient has no pulse and is not breathing):    CPR (Attempt to Resuscitate)     Medical Interventions (Patient has pulse or is breathing):    Full Support       Tana Beltran, APRN  11/15/23

## 2023-11-15 NOTE — PLAN OF CARE
Goal Outcome Evaluation:  Plan of Care Reviewed With: patient           Outcome Evaluation: BLE compression wrapping applied with mepilex Ag foam to cover wounds to help decrease bioburden and wick moisture away from skin. PT will f/u with pt in 2-3 days for dressing change and to assess response to compression wrapping.

## 2023-11-16 NOTE — PROGRESS NOTES
Intensive Care Follow-up     Hospital:  LOS: 8 days   Mr. Sundar Reeder, 53 y.o. male is followed for:   Acute on chronic systolic congestive heart failure            History of present illness:   53-year-old male with a past medical history of CHF, CKD, hypertension, and atrial fibrillation.  He presented to the ER and was admitted on 11/8 with atrial fibrillation with RVR, shortness of breath, and worsening lower extremity edema.     He was admitted to telemetry and placed on an amiodarone drip and underwent ECV on 11/10.  Subsequent to that he has been primarily in sinus rhythm but with intermittent atrial fibrillation noted.  His ejection fraction by her most recent echo is less than 20% with severe MR.  While in the hospital he had worsening acute kidney injury associate with diuresis and this has been held.  He has ongoing hyponatremia and elevated liver enzymes felt to be related to congestive hepatopathy.        This evening he developed acute onset of ventricular fibrillation after what appeared to be an R-on-T phenomenon.  He required defibrillation and received CPR as well as epinephrine and bicarbonate.  Postcode he woke up and was awake alert and oriented.  He was transferred to ICU.  Potassium is 3.6 and magnesium is 3.6.  Sodium is 124 and is unchanged.  GFR is low and is unchanged.    Subjective   Interval History:  Patient doing well this morning.  On Julio César-Synephrine amiodarone.  No further arrhythmias at this point.  Planning for ICD placement prior to discharge.             The patient's past medical, surgical and social history were reviewed and updated in Epic as appropriate.       Objective     Infusions:  phenylephrine, 0.5-3 mcg/kg/min, Last Rate: 0.8 mcg/kg/min (11/16/23 0619)      Medications:  amiodarone, 200 mg, Oral, BID With Meals  [Held by provider] apixaban, 5 mg, Oral, Q12H  [Held by provider] empagliflozin, 10 mg, Oral, Daily  pharmacy consult - MTM, , Does not apply,  Daily  senna-docusate sodium, 2 tablet, Oral, BID  sodium chloride, 10 mL, Intravenous, Q12H      I reviewed the patient's medications.    Vital Sign Min/Max for last 24 hours  Temp  Min: 97.4 °F (36.3 °C)  Max: 98.6 °F (37 °C)   BP  Min: 64/53  Max: 122/111   Pulse  Min: 68  Max: 109   Resp  Min: 16  Max: 22   SpO2  Min: 81 %  Max: 100 %   Flow (L/min)  Min: 2  Max: 45       Input/Output for last 24 hour shift  11/15 0701 - 11/16 0700  In: 807.1 [P.O.:470; I.V.:337.1]  Out: 700 [Urine:700]      GENERAL : NAD, conversant  RESPIRATORY/THORAX : normal respiratory effort and no intercostal retractions, CTAB  CARDIOVASCULAR : Normal S1/S2, RRR.  No lower ext edema.  GASTROINTESTINAL : Soft, NT/ND. BS x 4 normoactive. No hepatosplenomegaly.  MUSCULOSKELETAL : No cyanosis, clubbing, or ischemia  NEUROLOGICAL: alert and oriented to person, place and time  PSYCHOLOGICAL : Appropriate affect    Results from last 7 days   Lab Units 11/16/23  0332 11/11/23  0430 11/10/23  0340   WBC 10*3/mm3 26.12* 9.28 8.37   HEMOGLOBIN g/dL 12.0* 13.4 13.1   PLATELETS 10*3/mm3 238 304 300     Results from last 7 days   Lab Units 11/16/23  0332 11/15/23  2151 11/15/23  0433 11/13/23  0925 11/12/23  0414   SODIUM mmol/L 127* 124* 124*   < > 134*   POTASSIUM mmol/L 3.8 3.6 3.5   < > 3.9   CO2 mmol/L 25.0 26.0 26.0   < > 32.0*   BUN mg/dL 82* 82* 83*   < > 81*   CREATININE mg/dL 2.41* 2.32* 2.29*   < > 1.73*   MAGNESIUM mg/dL 2.9* 3.6*  --   --  1.7   PHOSPHORUS mg/dL 5.5*  --  4.3  --   --    GLUCOSE mg/dL 118* 146* 108*   < > 115*    < > = values in this interval not displayed.     Estimated Creatinine Clearance: 41.1 mL/min (A) (by C-G formula based on SCr of 2.41 mg/dL (H)).          I reviewed the patient's new clinical results.  I reviewed the patient's new imaging results/reports including actual images and agree with reports.       Imaging Results (Last 24 Hours)       Procedure Component Value Units Date/Time    XR Chest 1 View  [[id number]] Collected: 11/16/23 0731     Updated: 11/16/23 0735    Narrative:      XR CHEST 1 VW    Date of Exam: 11/16/2023 1:30 AM CST    Indication: CHF    Comparison: Chest x-ray 11/8/2023    Findings:  The heart is prominent yet stable in size. No pneumothorax or pleural effusion. Defibrillator pad overlies the left hemithorax.      Impression:      Impression:  Stable chest with cardiomegaly. No acute cardiopulmonary process.      Electronically Signed: Dianne Alexander MD    11/16/2023 6:32 AM CST    Workstation ID: [id number]            Assessment & Plan   Impression        Acute on chronic systolic congestive heart failure    Hyponatremia    STUART (acute kidney injury)    Atrial fibrillation with RVR    Essential hypertension    Anxiety associated with depression    Hypoalbuminemia    Hyperbilirubinemia    Elevated liver enzymes    Elevated troponin level not due myocardial infarction    Severe mitral regurgitation    Ventricular fibrillation       Plan        53-year-old male with past medical history significant for systolic heart failure, CKD stage IV, hypertension, and atrial fibrillation.  Who was admitted to [facility] 11/8/2023 with atrial fibrillation and significant lower extremity edema.  Patient was transferred to the ICU on 11/15/2023 after he had episode of ventricular fibrillation, did not require mechanical ventilation suspected to be an R-on-T phenomenon and did require an episode of defibrillation.    -A-fib and V-fib management per cardiology  -Continue Julio César-Synephrine for blood pressure support, goal MAP greater than 65  -Continue amiodarone  -Possible ICD placement, will defer to cardiology to timing and decision making with this.  -Continue mag greater than 2 and potassium greater than 4  -White blood cell count did increase postcardiac arrest yesterday but likely a stress response.  Will hold antibiotics for now.  -Holding Eliquis for possible ICD placement.  -A.m. labs    I  conducted multidisciplinary rounds in the plan of care was discussed with the multidisciplinary team at that time. In attendance at multidisciplinary rounds was clinical pharmacist, dietitian, nursing staff, and case management.    I discussed the patient's findings and my recommendations with patient and nursing staff     High level of risk due to:  drug(s) requiring intensive monitoring for toxicity.      Shun Olmedo, DO  Pulmonary, Critical care and Sleep Medicine

## 2023-11-16 NOTE — SIGNIFICANT NOTE
CODE BLUE called for unresponsiveness.  Patient found to be in ventricular fibrillation.  Compressions started and desynchronized cardioversion given.  Patient received 2 doses of epinephrine, 1 amp of bicarb, 2 g IV magnesium, 1 g IV calcium.  ROSC was obtained and patient was responsive to commands.  Discussed with Dr. Noland's and patient moved to ICU.    Sundar Pandey MD

## 2023-11-16 NOTE — CONSULTS
[facility] at [facility]  Cardiovascular Consultation/h&p Note      Sundar Reeder  [id number]  1970  [id number]/1    Referring Provider: No ref. provider found   PCP: Provider, No Known    DATE OF ADMISSION: 11/8/2023    Reason for Consultation: ICD consideration    History of Present Illness  This is a 53-year-old gentleman with a past medical history of heart failure with reduced ejection fraction, has been on guideline-directed medical therapy in the past with appropriate response. He has been getting his care from Dr. Alejandro's office. He worked at [employer], but decided to cut back on his work and since then he couldn't not get back to work and ultimately ended up losing his insurance.  Per outside hospital records patient did have improve ejection fraction on GDMT (noted to be 50 to 55%), but now presenting to [facility] with heart failure and AF exacerbation. While patient is in the hospital he had VF arrest requiring resuscitation.     History reviewed. No pertinent past medical history.    History reviewed. No pertinent surgical history.    No current facility-administered medications on file prior to encounter.     Current Outpatient Medications on File Prior to Encounter   Medication Sig Dispense Refill    apixaban (ELIQUIS) 5 MG tablet tablet Take 1 tablet by mouth 2 (Two) Times a Day.      lisinopril (PRINIVIL,ZESTRIL) 20 MG tablet Take 1 tablet by mouth Daily.      metoprolol succinate XL (TOPROL-XL) 50 MG 24 hr tablet Take 1 tablet by mouth Daily.         Social History     Socioeconomic History    Marital status: Single   Tobacco Use    Smoking status: Never    Smokeless tobacco: Never   Vaping Use    Vaping Use: Never used   Substance and Sexual Activity    Alcohol use: Never    Drug use: Never    Sexual activity: Defer       History reviewed. No pertinent family history.    Review of Systems   Constitutional:  Positive for fatigue. Negative for activity change and fever.    Respiratory:  Positive for shortness of breath. Negative for chest tightness.    Cardiovascular:  Positive for leg swelling. Negative for chest pain and palpitations.   Gastrointestinal:  Negative for constipation and diarrhea.   Genitourinary:  Negative for decreased urine volume and difficulty urinating.   Skin:  Negative for wound.   Neurological:  Positive for weakness. Negative for dizziness, syncope and light-headedness.   Psychiatric/Behavioral:  Negative for suicidal ideas.          Physical Exam  Vitals:    11/16/23 1600 11/16/23 1630 11/16/23 1700 11/16/23 1730   BP: (!) 86/63 (!) 88/63 95/73 (!) 87/70   BP Location: Right arm      Patient Position: Lying      Pulse: 78 79 90 86   Resp: 20      Temp: 98.4 °F (36.9 °C)      TempSrc: Oral      SpO2: 95% 96% 94% 96%   Weight:       Height:         GENERAL: Well-developed, well-nourished patient in no acute distress.  HEENT: NC, AC, PERRLA. MMM  NECK: No JVD. No carotid bruits auscultated.  LUNGS: Clear to auscultation bilaterally.  CARDIOVASCULAR: RRR No murmurs, gallops or rubs noted.   ABDOMEN: Soft, nontender. Positive bowel sounds.  MUSCULOSKELETAL: No gross deformities. No clubbing, cyanosis  EXT: pulses intact, No edema  SKIN: Pink, warm  Neuro: Nonfocal exam. Gait intact    Lab Review:            Results from last 7 days   Lab Units 11/16/23  0332   SODIUM mmol/L 127*   POTASSIUM mmol/L 3.8   CHLORIDE mmol/L 83*   CO2 mmol/L 25.0   BUN mg/dL 82*   CREATININE mg/dL 2.41*   GLUCOSE mg/dL 118*   CALCIUM mg/dL 9.9         Results from last 7 days   Lab Units 11/16/23  0332   WBC 10*3/mm3 26.12*   HEMOGLOBIN g/dL 12.0*   HEMATOCRIT % 36.7*   PLATELETS 10*3/mm3 238             Results from last 7 days   Lab Units 11/16/23  0332   MAGNESIUM mg/dL 2.9*             ECHO: 11/2023    Left ventricular ejection fraction appears to be less than 20%.  consider limited contrasted echo to assess for LV thrombus    The left ventricular cavity is dilated.    The right  ventricular cavity is mildly dilated.    The left atrial cavity is mildly dilated.    Left atrial volume is moderately increased.    severe MR present    Estimated right ventricular systolic pressure from tricuspid regurgitation is normal (<35 mmHg).      I personally viewed and interpreted the patient's EKG/telemetry/lab/imaging data    Assessment & Plan:    VF arrest  - appears to have started with PVC creating long short sequence  - will plan for CRT-D implantation before discharge; tentatively planning for tomorrow with GA    HFrEF 2/2 Nonischemic cardiomyopathy with unclear etiology  LBBB  Severe MR which appear to be functional from HF and AF  - OSH records reviewed  - it appears that patient is having HF exacerbation/reduction of EF due to medication non adherence (c/b insurance). Also could be further exacerbated by undetected AF  - currently working with cardiology to get him out of HF exacerbation and back on GDMT; hopefully CRT-D will help him titrate meds    Paroxysmal AF  - started on amio gtt and transitioned to PO  - given possibility of tachy induced cardiomyopathy, would recommend aggressive AF control. Continue amio and can follow up as outpatient for consideration of AF ablation  - hold AC today and tomorrow prior to his procedure      Thank you for allowing me to participate in the care of Sundar Reeder. Feel free to contact me directly with any further questions or concerns.    Sundar Bhagat MD  Cardiac Electrophysiologist  [facility]     11/16/23  18:22 EST.

## 2023-11-16 NOTE — PROGRESS NOTES
"   LOS: 8 days    Patient Care Team:  Provider, No Known as PCP - General    Subjective     Patient post code 500 yesterday with V-fib.  Transferred to ICU.    Objective     Vital Signs:  Blood pressure (!) 81/66, pulse 71, temperature 97.7 °F (36.5 °C), temperature source Oral, resp. rate (!) 33, height 180.3 cm (71\"), weight 80.2 kg (176 lb 12.8 oz), SpO2 97%.      Intake/Output Summary (Last 24 hours) at 11/16/2023 0918  Last data filed at 11/16/2023 0800  Gross per 24 hour   Intake 746.62 ml   Output 700 ml   Net 46.62 ml        11/15 0701 - 11/16 0700  In: 807.1 [P.O.:470; I.V.:337.1]  Out: 700 [Urine:700]    Physical Exam:        GENERAL: WD WM NAD  NEURO: Awake and alert, oriented. No focal deficit  PSYCHIATRIC: NMA. Cooperative with PE  EYE: PE, no icterus, no conjunctivitis  ENT: ommm, dentition intact,  Hearing intact  NECK: Supple , No JVD discernable,  Trachea midline  CV: + Edema, RRR  LUNGS:  Quiet,  Nonlabored resp.  Symmetrical expansion  ABDOMEN: Nondistended, soft nontender.  : No Lazaro, no palp bladder  SKIN: Warm and dry without rash      Labs:  Results from last 7 days   Lab Units 11/16/23  0332 11/11/23  0430 11/10/23  0340   WBC 10*3/mm3 26.12* 9.28 8.37   HEMOGLOBIN g/dL 12.0* 13.4 13.1   PLATELETS 10*3/mm3 238 304 300     Results from last 7 days   Lab Units 11/16/23  0332 11/15/23  2151 11/15/23  0433 11/14/23  1223 11/13/23  0925 11/12/23  0414 11/10/23  2049 11/10/23  0340   SODIUM mmol/L 127* 124* 124* 126*   < > 134*   < > 132*   POTASSIUM mmol/L 3.8 3.6 3.5 3.8   < > 3.9   < > 2.7*   CHLORIDE mmol/L 83* 80* 80* 82*   < > 91*   < > 89*   CO2 mmol/L 25.0 26.0 26.0 27.0   < > 32.0*   < > 25.0   BUN mg/dL 82* 82* 83* 79*   < > 81*   < > 93*   CREATININE mg/dL 2.41* 2.32* 2.29* 2.27*   < > 1.73*   < > 2.09*   CALCIUM mg/dL 9.9 11.3* 9.6 9.2   < > 9.0   < > 8.9   PHOSPHORUS mg/dL 5.5*  --  4.3  --   --   --   --   --    MAGNESIUM mg/dL 2.9* 3.6*  --   --   --  1.7  --   --    ALBUMIN g/dL " 3.0*  --  3.3*  --   --   --   --  3.0*    < > = values in this interval not displayed.     Results from last 7 days   Lab Units 11/16/23  0332   ALK PHOS U/L 158*   BILIRUBIN mg/dL 3.0*   ALT (SGPT) U/L 53*   AST (SGOT) U/L 54*                   Estimated Creatinine Clearance: 41.1 mL/min (A) (by C-G formula based on SCr of 2.41 mg/dL (H)).         A/P:    ARF: Creatinine up slightly overnight at 2.4 despite cardiac arrhythmia with code 500.  Urine output remains nonoliguric.  Initially creatinine up to 2.5 with improvement down to 1.7.  Creatinine began rising with further negative volume.  Blood pressure remains low but stable.  FeNa remains quite low consistent with prerenal azotemia in setting of hypotension.  For now would continue supportive care.   Strict I's and O's.  Monitor renal function closely for recovery.    CKD: Unsure of baseline creatinine.     Hypotension: Blood pressure remains low.  Has been getting low-dose pressors.  Can support blood pressure with albumin.  Patient is negative over 8 L since admission.  May have component of intravascular volume depletion despite mild lower extremity edema.     Normal osmolar hyponatremia: Sodium back up to 127.  Serum osmolality 293 which is in normal range.  Would monitor for now.    Volume: Patient -8 L since admission.  Initially with improving creatinine, however with further diuresis, creatinine increasing.  Chest x-ray clear.  Diuretics on hold.  Monitor strict I's and O's for now.    CHF: Very low EF <20%.  Significant volume reduction with diuresis.  Blood pressure low with rising creatinine.  Patient post IV Bumex yesterday morning.  Diuretics on hold for now.  Strict I's and O's.  Redosed diuretics as indicated.    High risk and complexity patient.      Johnson Galicia MD  11/16/23  09:18 EST

## 2023-11-16 NOTE — PROGRESS NOTES
"                  Clinical Nutrition     Reason for Visit: MDR, Follow-up protocol      Patient Name: Sundar Reeder  YOB: 1970  MRN: [id number]  Date of Encounter: 11/16/23 15:22 EST  Admission date: 11/8/2023      Nutrition Assessment   Admission Diagnosis:  Hyponatremia [E87.1]  Acute exacerbation of congestive heart failure [I50.9]      Problem List:    Acute on chronic systolic congestive heart failure    Hyponatremia    STUART (acute kidney injury)    Atrial fibrillation with RVR    Essential hypertension    Anxiety associated with depression    Hypoalbuminemia    Hyperbilirubinemia    Elevated liver enzymes    Elevated troponin level not due myocardial infarction    Severe mitral regurgitation    Ventricular fibrillation        PMH:   He  has no past medical history on file.    PSH:  He  has no past surgical history on file.    Applicable Nutrition Concerns:   Skin:BLE blisters, B, gluteal ulcers      Applicable Interval History:     V. Fib s/p Code Blue 11-15-23    Reported/Observed/Food/Nutrition Related History:     11-16-23: pt resting in bed, on room air, reports not much appetite, is very thirsty, feels dry, noticed his urine was very dark, concentrated today    Per RN: fluid restriction has been lifted, plan for possible ICD tomorrow    11-9-23:Spoke w/pt at bedside, very pleasant. Pt reports decreased po intake x 2 days PTA, eating <50% of usual 2/2 fluid retention and breathing difficulty. Pt reports doing better now, states he ate well for breakfast this am. Pt reports (dry) UBW of 186-190lbs. Pt declines need for ONS at this time. NKFA.     Anthropometrics     Flowsheet Rows      Flowsheet Row First Filed Value   Admission Height 180.3 cm (71\") Documented at 11/08/2023 0552   Admission Weight 88.5 kg (195 lb) Documented at 11/08/2023 0552          Height: Height: 180.3 cm (71\")  Last Filed Weight: Weight: 80.2 kg (176 lb 12.8 oz) (11/15/23 0807)  Method: Weight Method: Standing scale  BMI: " BMI (Calculated): 24.7  BMI classification: Overweight: 25.0-29.9kg/m2   IBW:  75kg    UBW:  186-190lbs  Weight change: ?10-14lb wt loss     Weight      Weight (kg) Weight (lbs) Weight Method   11/8/2023 82.509 kg  181 lb 14.4 oz  Bed scale     85.957 kg  189 lb 8 oz  Bed scale     88.451 kg  195 lb  Estimated        Nutrition Focused Physical Exam     Date:     11/9    NFPE completed, patient does not meet criteria for MSA at this time. Some moderate UE muscle wasting, suspect body habitus?    Current Nutrition Prescription   PO: Diet: Cardiac Diets; Healthy Heart (2-3 Na+); Texture: Regular Texture (IDDSI 7); Fluid Consistency: Thin (IDDSI 0)  NPO Diet NPO Type: Strict NPO  Oral Nutrition Supplement:   Intake: 63% of 10 meals      Nutrition Diagnosis   Date:  11/ 16         Updated:    Problem Inadequate oral intake   Etiology Per Clinical Status   Signs/Symptoms Po intake 63%   Status:     Goal:   General: Maintain nutrition  PO: Establish PO  EN/PN: N/A    Nutrition Intervention      Follow treatment progress, Care plan reviewed, Advise alternate selection, Advised available snacks, Interview for preferences, Menu provided, Menu adjusted, Encourage intake, Supplement provided    Ensure 2x/day    Monitoring/Evaluation:   Per protocol, I&O, PO intake, Pertinent labs, Weight, Skin status, GI status, Symptoms, Hemodynamic stability      Laura Del Valle RD  Time Spent: 30min

## 2023-11-16 NOTE — PROGRESS NOTES
Attempted to place an arterial line for accurate blood pressure readings with patient permission; however, unsuccessful. Patient refused second attempt. NIBP relatively similar in both upper extremities, but does vary. Patient states his blood pressure is always low. Mean is ~60. Will place order for phenylephrine PRN for SBP >65.    Carla Dumont, MSN, APRN, ACNPC-AG  Pulmonary and Critical Care Medicine  Electronically signed by Rhona Dumont, NOELLE, 11/16/23, 2:11 AM EST.

## 2023-11-16 NOTE — PROGRESS NOTES
"INTENSIVIST NOTE     Hospital Day: 7    Mr. Sundar Reeder, 53 y.o. male is followed for:   Acute on chronic heart failure with in-hospital ventricular fibrillation and atrial fibrillation       SUBJECTIVE     Interval history:    Seen in ICU after transfer from the floor.  See note below.  Currently awake alert and oriented x4.  Following commands.  On facemask O2.  Current rhythm appears to be atrial fibrillation with a wide QRS which is similar to prior rhythms though QRS is slightly wider.  Magnesium is 3.6 and potassium is 3.6.  Sodium and renal function unchanged.    The patient's relevant past medical, surgical and social history were reviewed and updated in Epic as appropriate.       OBJECTIVE     Vital Sign Min/Max for last 24 hours  Temp  Min: 96.2 °F (35.7 °C)  Max: 98.6 °F (37 °C)   BP  Min: 79/55  Max: 101/81   Pulse  Min: 83  Max: 109   Resp  Min: 16  Max: 22   SpO2  Min: 81 %  Max: 99 %   No data recorded   Weight  Min: 80.2 kg (176 lb 12.8 oz)  Max: 80.2 kg (176 lb 12.8 oz)      Intake/Output Summary (Last 24 hours) at 11/15/2023 2335  Last data filed at 11/15/2023 2100  Gross per 24 hour   Intake 230 ml   Output 600 ml   Net -370 ml      Flowsheet Rows      Flowsheet Row First Filed Value   Admission Height 180.3 cm (71\") Documented at 11/08/2023 0552   Admission Weight 88.5 kg (195 lb) Documented at 11/08/2023 0552               11/08/23  1150 11/10/23  0543 11/15/23  0807   Weight: 82.5 kg (181 lb 14.4 oz) 77.7 kg (171 lb 4 oz) 80.2 kg (176 lb 12.8 oz)            Objective:  General Appearance:  In no acute distress.    Vital signs: (most recent): Blood pressure 101/81, pulse 109, temperature 98.5 °F (36.9 °C), temperature source Oral, resp. rate 22, height 180.3 cm (71\"), weight 80.2 kg (176 lb 12.8 oz), SpO2 98%.    HEENT: Normal HEENT exam.  (Nasal cannula O2)    Lungs:  Normal effort and normal respiratory rate.  He is not in respiratory distress.  There are rales.  No wheezes or rhonchi.  " Care Due:                  Date            Visit Type   Department     Provider  --------------------------------------------------------------------------------                                EP -                              PRIMARY      [facility] FAM[facility]  Last Visit: 02-      CARE ([facility])   JUAN DIEGO Hope                               -                              PRIMARY      [facility]  Next Visit: 07-      CARE ([facility])   MEDICINE       Klaudia Hope                                                            Last  Test          Frequency    Reason                     Performed    Due Date  --------------------------------------------------------------------------------    Cr..........  12 months..  venlafaxine..............  10-   10-    [facility] Embedded Care Due Messages. Reference number: [id number].   7/27/2023 8:37:47 AM CDT     Heart: Normal rate.  Irregular rhythm.  S1 normal and S2 normal.  No murmur, gallop or friction rub.   Chest: Symmetric chest wall expansion.   Abdomen: Abdomen is soft and non-distended.  Bowel sounds are normal.   There is no abdominal tenderness.   There is no mass. There is no splenomegaly. There is no hepatomegaly.   Extremities: There is dependent edema.  There is no deformity.  (Lower extremities wrapped)  Neurological: Patient is alert and oriented to person, place and time.    Pupils:  Pupils are equal, round, and reactive to light.    Skin:  Warm and dry.                I reviewed the patient's new clinical results.  I reviewed the patient's new imaging results/reports including actual images and agree with reports.    No radiology results for the last day     INFUSIONS  [START ON 11/16/2023] amiodarone, 1 mg/min   Followed by  [START ON 11/16/2023] amiodarone, 0.5 mg/min        Results from last 7 days   Lab Units 11/11/23  0430 11/10/23  0340 11/09/23  0410   WBC 10*3/mm3 9.28 8.37 9.92   HEMOGLOBIN g/dL 13.4 13.1 13.1   HEMATOCRIT % 40.3 39.9 39.7   PLATELETS 10*3/mm3 304 300 334     Results from last 7 days   Lab Units 11/15/23  2151 11/15/23  0433 11/14/23  1223 11/13/23  0925 11/12/23  0414 11/10/23  2049 11/10/23  0340 11/09/23  0410   SODIUM mmol/L 124* 124* 126*   < > 134*   < > 132* 130*   POTASSIUM mmol/L 3.6 3.5 3.8   < > 3.9   < > 2.7* 3.8   CHLORIDE mmol/L 80* 80* 82*   < > 91*   < > 89* 87*   CO2 mmol/L 26.0 26.0 27.0   < > 32.0*   < > 25.0 27.0   BUN mg/dL 82* 83* 79*   < > 81*   < > 93* 93*   GLUCOSE mg/dL 146* 108* 125*   < > 115*   < > 92 108*   CREATININE mg/dL 2.32* 2.29* 2.27*   < > 1.73*   < > 2.09* 2.46*   MAGNESIUM mg/dL 3.6*  --   --   --  1.7  --   --  2.3   CALCIUM mg/dL 11.3* 9.6 9.2   < > 9.0   < > 8.9 9.2   ALBUMIN g/dL  --  3.3*  --   --   --   --  3.0* 3.4*    < > = values in this interval not displayed.         Results from last 7 days   Lab Units 11/15/23  3109   FIO2 %  80       Patient isn't on Tube Feeding   /h  Patient doesn't have any tube feeding modular orders    Mechanical Ventilator:   Settings: Observed:                                                I reviewed the patient's medications.    Assessment & Plan   ASSESSMENT/PLAN     Active Hospital Problems    Diagnosis     **Acute on chronic systolic congestive heart failure     Ventricular fibrillation     Hyponatremia     STUART (acute kidney injury)     Elevated troponin level not due myocardial infarction     Severe mitral regurgitation     Atrial fibrillation with RVR     Essential hypertension     Anxiety associated with depression     Hypoalbuminemia     Hyperbilirubinemia     Elevated liver enzymes        53-year-old male with a past medical history of CHF, CKD, hypertension, and atrial fibrillation.  He presented to the ER and was admitted on 11/8 with atrial fibrillation with RVR, shortness of breath, and worsening lower extremity edema.    He was admitted to telemetry and placed on an amiodarone drip and underwent ECV on 11/10.  Subsequent to that he has been primarily in sinus rhythm but with intermittent atrial fibrillation noted.  His ejection fraction by her most recent echo is less than 20% with severe MR.  While in the hospital he had worsening acute kidney injury associate with diuresis and this has been held.  He has ongoing hyponatremia and elevated liver enzymes felt to be related to congestive hepatopathy.      This evening he developed acute onset of ventricular fibrillation after what appeared to be an R-on-T phenomenon.  He required defibrillation and received CPR as well as epinephrine and bicarbonate.  Postcode he woke up and was awake alert and oriented.  He was transferred to ICU.  Potassium is 3.6 and magnesium is 3.6.  Sodium is 124 and is unchanged.  GFR is low and is unchanged.    Plan:    I have discussed the case with Dr. Ivan who has been very helpful and reviewed the chart, EKGs, and  rhythm strips  Initial plan is to replace his potassium and bolus with IV amiodarone at 150 mg and initiate a drip at 1 mg/minute  Leave pads in place as he is at high risk for arrhythmias  Hold off on any inotropic agents due to his arrhythmias  Chest x-ray  Repeat electrolytes in several hours after potassium replacement     I discussed the patient's findings and my recommendations with patient, nursing staff, and Oskar Ivan and Katherin     Plan of care and goals reviewed with multidisciplinary team at daily rounds.    Critical Care time spent in direct patient care: 60 minutes (excluding procedure time, if applicable) including high complexity decision making to assess, manipulate, and support vital organ system failure in this individual who has impairment of one or more vital organ systems such that there is a high probability of imminent or life threatening deterioration in the patient's condition.    Franko Noland MD  Pulmonary and Critical Care Medicine  11/15/23 23:35 EST

## 2023-11-16 NOTE — PLAN OF CARE
Goal Outcome Evaluation:  Plan of Care Reviewed With: patient        Progress: no change  Outcome Evaluation: .         - Pt received in unit last night post code, awake and alert, remained in bundle branch block and then into A-Fib  - Amio gtt initiated & K+ replaced, pt now in SR w/ first degree AV block   - Julio César gtt infusing @ 0.8 to maintain BP  - Cr up to 2.41, .  - 1500 ml fluid restriction remains in place

## 2023-11-16 NOTE — NURSING NOTE
"                             Wound, Ostomy and Continence (WOC) Note    Reason for WOC Consultation: Open areas, nonblanchable redness on buttock    Patient awake, alert.  Family/support person not present.     Skin/Wound Assessment:     Wound Type: Suspected stage II pressure injury vs worsening ITD with bilateral gluteal moisture/friction injury damage  Location: Coccyx  Measurements: 1 x 0.2 x 0.2 cm  Wound Bed: blanchable, non-blanchable, bleeding, and red  Wound Edges: Open  Periwound Skin: intact, blanchable, and excoriated   Drainage Characteristics/Odor: none  Drainage Amount: scant  Pain: Yes   Care provided: The wound was cleansed with barrier wipes.  Z guard applied.  Image:     Summary and Recommendation(s):     -Venelex ordered twice daily.  Please apply to bilateral gluteals and coccygeal wound.  Cover with an Optifoam dressing.  -A second WOC will follow up tomorrow to reassess injury at coccyx.  -Patient must be turned and offloaded. Pressure injury prevention measures.  - Refer to wound care instructions in the \"Orders\" tab  - Specialty support surface in place: Isolibrium          Pressure Injury Prevention Measures (initiate for a Kennedy Scale Score of <18):     Most recent Kennedy Scale score:  Sensory Perception: 3-->slightly limited  Moisture: 3-->occasionally moist  Activity: 1-->bedfast  Mobility: 3-->slightly limited  Nutrition: 2-->probably inadequate  Friction and Shear: 2-->potential problem  Kennedy Score: 14 (11/16/23 0800)     -Turn q 2 hr. using an offloading foam wedge, keep heels elevated and offloaded with offloading heel boots.    -Raise knee-gatch before elevating HOB to reduce shearing   -Follow C.A.R.E protocol if medical devices (Bipap, olivarez, Ng tube, etc) are being used.  -Apply moisture barrier cream to bottom BID & PRN, if incontinent.  -Clean skin gently with no-rinse PH-balanced foam cleanser and a soft, disposable cloth (barrier wipes-blue pack).   -Reduce layers under " patient (one sheet as drawsheet and two incontinence pads)    Thank you for consulting the WOC Nurse.  WOC Team will continue to follow.  Please re consult if the wound(s) worsens.     Dheeraj Alexis RN, BSN, CCRN, CWOCN  Wound, Ostomy and Continence (WOC) Department  [facility]

## 2023-11-16 NOTE — PROGRESS NOTES
Cardiology Progress Note      Reason for visit:    Atrial fibrillation with RVR  Acute on chronic systolic heart failure  Elevated troponin    IDENTIFICATION: 53-year-old gentleman who resides in [place], [place]    Active Hospital Problems    Diagnosis  POA    **Acute on chronic systolic congestive heart failure [I50.23]  Yes     Reported history of systolic heart failure dating back for 20 years.  Reportedly last echocardiogram 1 year ago had normal LVEF.  Data deficit  Echo (11/8/2023): LVEF less than 20%.  Severe MR.  RVSP less than 35 mmHg left ventricular ejection fraction appears to be less than 20%.          Ventricular fibrillation [I49.01]  No    Elevated troponin level not due myocardial infarction [R79.89]  Yes     Troponin elevated but flat in the setting of A-fib with RVR and acute systolic heart failure      Severe mitral regurgitation [I34.0]  Yes     Echo (11/8/2023): Severe MR.  LVEF less than 20%      Hyponatremia [E87.1]  Yes    STUART (acute kidney injury) [N17.9]  Yes    Atrial fibrillation with RVR [I48.91]  Yes     Reported history of atrial fibrillation for 10 years  CHA2DS2-VASc=3  Presentation [facility] 11/8/2023 in A-fib with RVR in the setting of hyponatremia and STUART  External cardioversion (11/10/2023): Successfully restored NSR.  Return of atrial fibrillation 11/11/2023 but now returned to NSR on IV amiodarone      Essential hypertension [I10]  Yes    Anxiety associated with depression [F41.8]  Yes    Hypoalbuminemia [E88.09]  Yes    Hyperbilirubinemia [E80.6]  Yes    Elevated liver enzymes [R74.8]  Yes            Patient sitting up in the bed breathing easy on room air.  He reports feeling much better than when he was admitted.  He denies any chest pain.  He reports improvement in his lower extremity edema.  The patient had cardioversion earlier this admission that converted him to normal sinus rhythm but he went back into atrial fibrillation and IV amiodarone was  restarted yesterday.  He is currently on IV amiodarone drip and in normal sinus rhythm with frequent PVCs    Update 11/14/23  Feeling quite weak.  BP remains borderline.  Overall volume much better.  Still somewhat overloaded with low albumin.      Update 11/15/23  About the same.  Really no change      Update 11/16/23  His edema is all but resolved.  Minimal JVD.  His fluid status is the best it has been while here even though no diuretics today.  He apparently (dont have telemetry) and had R on T with polymorphic Vt then VF.  One round of CPR and desynchronized cardioversion.  Was then in afib with RVR and has converted to NSR.  Kidney function stable.  QT mildly prolonged.           Vital Sign Min/Max for last 24 hours  Temp  Min: 97.4 °F (36.3 °C)  Max: 98.6 °F (37 °C)   BP  Min: 64/53  Max: 122/111   Pulse  Min: 68  Max: 109   Resp  Min: 16  Max: 33   SpO2  Min: 81 %  Max: 100 %   Flow (L/min)  Min: 2  Max: 45      Intake/Output Summary (Last 24 hours) at 11/16/2023 0922  Last data filed at 11/16/2023 0800  Gross per 24 hour   Intake 746.62 ml   Output 700 ml   Net 46.62 ml               Physical exam  General: alert and oriented  Card: RRR, minimal JVD  Chest: CTAB  Abd: soft, non distended  Extremities: mild edema  MK: moves all extremities  Neuro: non focal exam  Psych: cooperative    Results from last 7 days   Lab Units 11/16/23  0332 11/15/23  2151 11/15/23  0433 11/14/23  1223 11/13/23  0925 11/12/23  0414 11/11/23  1214 11/11/23  0430   SODIUM mmol/L 127* 124* 124* 126* 127* 134*  --  136   POTASSIUM mmol/L 3.8 3.6 3.5 3.8 3.7 3.9   < > 2.9*   CHLORIDE mmol/L 83* 80* 80* 82* 82* 91*  --  89*   BUN mg/dL 82* 82* 83* 79* 77* 81*  --  75*   CREATININE mg/dL 2.41* 2.32* 2.29* 2.27* 2.04* 1.73*  --  1.69*   MAGNESIUM mg/dL 2.9* 3.6*  --   --   --  1.7  --   --     < > = values in this interval not displayed.           Results from last 7 days   Lab Units 11/16/23  0332 11/11/23  0430 11/10/23  0340   WBC  10*3/mm3 26.12* 9.28 8.37   HEMOGLOBIN g/dL 12.0* 13.4 13.1   HEMATOCRIT % 36.7* 40.3 39.9   PLATELETS 10*3/mm3 238 304 300       Lab Results   Component Value Date    HGBA1C 6.00 (H) 11/09/2023       Lab Results   Component Value Date    CHOL 81 11/09/2023    TRIG 60 11/09/2023    HDL 22 (L) 11/09/2023    LDL 45 11/09/2023       Assessment  -chronic systolic CHF, hx of NICM, no CAC on recent CT or atherosclrosis of aorta  -PAF  -Severe MR  -STUART  -LBBB    Plan  -Regarding fluid status on physical exam significantly better than yesterday.  CR about the same.  Appreciate nephrology assistance.  Had been considering dobutamine but fluid status seems to be improving.  Will hold on RHC for now given LBBB and fluid status seems to be improving.  Also dobutamine would be pro arrythmic  -Dr. Bhagat seeing today regarding considering BIVICD which may be helpful.  Appreciate his assistance.  Hold eliquis for possible procedure tomorrow.  NPO after midnight.  If does not get BIVICD will absolutely need lifevest  -continue amiodarone.  Monitor QT.  Currently looks mildly prolonged  -given possible upcoming procedure will hold jardiance, can likely resume tomorrow  -as BP improves will titrate GDMT as able    Romaine Basurto

## 2023-11-17 NOTE — ANESTHESIA POSTPROCEDURE EVALUATION
Patient: Sundar Reeder    Procedure Summary       Date: 11/17/23 Room / Location: [facility] C[facility]EP [facility] / [facility] INVASIVE LOCATION    Anesthesia Start: 1136 Anesthesia Stop: 1432    Procedure: Implant ICD - bi ventricular Diagnosis:     Providers: Sundar Bhagat MD Provider: El Arias MD    Anesthesia Type: general ASA Status: 4            Anesthesia Type: general    Vitals  No vitals data found for the desired time range.          Post Anesthesia Care and Evaluation    Patient location during evaluation: PACU  Patient participation: complete - patient participated  Level of consciousness: awake and alert  Pain management: adequate    Airway patency: patent  Anesthetic complications: No anesthetic complications  PONV Status: none  Cardiovascular status: hemodynamically stable and acceptable  Respiratory status: nonlabored ventilation, acceptable and nasal cannula  Hydration status: acceptable

## 2023-11-17 NOTE — PROGRESS NOTES
Intensive Care Follow-up     Hospital:  LOS: 9 days   Mr. Sundar Reeder, 53 y.o. male is followed for:   Acute on chronic systolic congestive heart failure            History of present illness:   53-year-old male with a past medical history of CHF, CKD, hypertension, and atrial fibrillation.  He presented to the ER and was admitted on 11/8 with atrial fibrillation with RVR, shortness of breath, and worsening lower extremity edema.     He was admitted to telemetry and placed on an amiodarone drip and underwent ECV on 11/10.  Subsequent to that he has been primarily in sinus rhythm but with intermittent atrial fibrillation noted.  His ejection fraction by her most recent echo is less than 20% with severe MR.  While in the hospital he had worsening acute kidney injury associate with diuresis and this has been held.  He has ongoing hyponatremia and elevated liver enzymes felt to be related to congestive hepatopathy.        This evening he developed acute onset of ventricular fibrillation after what appeared to be an R-on-T phenomenon.  He required defibrillation and received CPR as well as epinephrine and bicarbonate.  Postcode he woke up and was awake alert and oriented.  He was transferred to ICU.  Potassium is 3.6 and magnesium is 3.6.  Sodium is 124 and is unchanged.  GFR is low and is unchanged.      Subjective   Interval History:  Patient doing well this morning only complaint is some chest pain that he has from some cracked ribs that he got during CPR.  Initially off Julio César-Synephrine but now back on this morning.             The patient's past medical, surgical and social history were reviewed and updated in Epic as appropriate.       Objective     Infusions:  phenylephrine, 0.5-3 mcg/kg/min, Last Rate: 0.5 mcg/kg/min (11/17/23 0932)      Medications:  amiodarone, 200 mg, Oral, BID With Meals  [Held by provider] apixaban, 5 mg, Oral, Q12H  castor oil-balsam peru, 1 application , Topical, Q12H  [Held by provider]  empagliflozin, 10 mg, Oral, Daily  pharmacy consult - [place], , Does not apply, Daily  senna-docusate sodium, 2 tablet, Oral, BID  sodium chloride, 10 mL, Intravenous, Q12H      I reviewed the patient's medications.    Vital Sign Min/Max for last 24 hours  Temp  Min: 97.8 °F (36.6 °C)  Max: 98.4 °F (36.9 °C)   BP  Min: 73/59  Max: 117/105   Pulse  Min: 71  Max: 101   Resp  Min: 20  Max: 28   SpO2  Min: 87 %  Max: 100 %   Flow (L/min)  Min: 1  Max: 2       Input/Output for last 24 hour shift  11/16 0701 - 11/17 0700  In: 1734.7 [P.O.:1250; I.V.:484.7]  Out: 675 [Urine:675]      GENERAL : NAD, conversant  RESPIRATORY/THORAX : normal respiratory effort and no intercostal retractions, CTAB  CARDIOVASCULAR : Normal S1/S2, RRR.  No lower ext edema.  GASTROINTESTINAL : Soft, NT/ND. BS x 4 normoactive. No hepatosplenomegaly.  MUSCULOSKELETAL : No cyanosis, clubbing, or ischemia  NEUROLOGICAL: alert and oriented to person, place and time  PSYCHOLOGICAL : Appropriate affect    Results from last 7 days   Lab Units 11/17/23 0327 11/16/23 0332 11/11/23  0430   WBC 10*3/mm3 22.22* 26.12* 9.28   HEMOGLOBIN g/dL 12.4* 12.0* 13.4   PLATELETS 10*3/mm3 219 238 304     Results from last 7 days   Lab Units 11/17/23 0327 11/16/23 0332 11/15/23  2151 11/15/23  0433   SODIUM mmol/L 122* 127* 124* 124*   POTASSIUM mmol/L 4.4 3.8 3.6 3.5   CO2 mmol/L 27.0 25.0 26.0 26.0   BUN mg/dL 104* 82* 82* 83*   CREATININE mg/dL 2.73* 2.41* 2.32* 2.29*   MAGNESIUM mg/dL 2.8* 2.9* 3.6*  --    PHOSPHORUS mg/dL 4.9* 5.5*  --  4.3   GLUCOSE mg/dL 118* 118* 146* 108*     Estimated Creatinine Clearance: 37.7 mL/min (A) (by C-G formula based on SCr of 2.73 mg/dL (H)).          I reviewed the patient's new clinical results.  I reviewed the patient's new imaging results/reports including actual images and agree with reports.         Assessment & Plan   Impression        Acute on chronic systolic congestive heart failure    Hyponatremia    STUART (acute kidney  injury)    Atrial fibrillation with RVR    Essential hypertension    Anxiety associated with depression    Hypoalbuminemia    Hyperbilirubinemia    Elevated liver enzymes    Elevated troponin level not due myocardial infarction    Severe mitral regurgitation    Ventricular fibrillation       Plan        53-year-old male with past medical history significant for systolic heart failure, CKD stage IV, hypertension, and atrial fibrillation.  Who was [facility] to [facility]/8/2023 with atrial fibrillation and significant lower extremity edema.  Patient was transferred to the ICU on 11/15/2023 after he had episode of ventricular fibrillation, did not require mechanical ventilation suspected to be an R-on-T phenomenon and did require an episode of defibrillation.     -A-fib and V-fib management per cardiology  -Continue Julio César-Synephrine for blood pressure support, goal MAP greater than 65  -Continue amiodarone  -ICD placement, will defer to cardiology to timing and decision making with this.  -Continue mag greater than 2 and potassium greater than 4  -Leukocytosis gradually improving, holding antibiotics for now.  No clear signs of infection  -For chest pain related to CPR we will start Dilaudid 0.25 mg every 2 hours with Hildreth's as needed.  -Holding Eliquis for possible ICD placement.  -A.m. labs    I conducted multidisciplinary rounds in the plan of care was discussed with the multidisciplinary team at that time. In attendance at multidisciplinary rounds was clinical pharmacist, dietitian, nursing staff, and case management.    I discussed the patient's findings and my recommendations with patient and nursing staff     High level of risk due to:  drug(s) requiring intensive monitoring for toxicity.      Shun Olmedo DO  Pulmonary, Critical care and Sleep Medicine

## 2023-11-17 NOTE — ANESTHESIA PREPROCEDURE EVALUATION
Anesthesia Evaluation                  Airway   Mallampati: I  TM distance: >3 FB  Neck ROM: full  No difficulty expected  Dental      Pulmonary    Cardiovascular     ECG reviewed    (+) hypertension, valvular problems/murmurs MR, dysrhythmias Atrial Fib, CHF       Neuro/Psych  (+) psychiatric history Anxiety and Depression  GI/Hepatic/Renal/Endo    (+) renal disease-    Musculoskeletal     Abdominal    Substance History      OB/GYN          Other                    Anesthesia Plan    ASA 4     general     (Only reason he is an ASA 4 is because I can't select ASA 4.5, which he is    Potential A-Line)  intravenous induction     Anesthetic plan, risks, benefits, and alternatives have been provided, discussed and informed consent has been obtained with: patient.    Plan discussed with CRNA.    CODE STATUS:    Level Of Support Discussed With: Patient  Code Status (Patient has no pulse and is not breathing): CPR (Attempt to Resuscitate)  Medical Interventions (Patient has pulse or is breathing): Full Support

## 2023-11-17 NOTE — PLAN OF CARE
Goal Outcome Evaluation:   Patient remains alert and oriented, some complaints of chest soreness from CPR. Tylenol given. Amio gtt transitioned to oral. NSR until 1800 - afib confirmed with 12 lead. Rate controlled 80s-100. Cardiology and EP notified. No further orders at this time. Julio César titrated off - blood pressures remain 80s-90s systolic with adequate MAP and narrow pulse pressure. Cardiology aware. Plan for patient to receive AICD tomorrow. NPO after midnight, eliquis and jardiance held. Consent in chart signed by patient and physician.BM x1, voided x1. Patient updated on plan of care.

## 2023-11-17 NOTE — CASE MANAGEMENT/SOCIAL WORK
Continued Stay Note  [facility]     Patient Name: Sundar Reeder  MRN: [id number]  Today's Date: 11/17/2023    Admit Date: 11/8/2023    Plan: Rehab   Discharge Plan       Row Name 11/17/23 1137       Plan    Plan Rehab    Patient/Family in Agreement with Plan yes    Plan Comments Mr. Reeder is getting a pacemaker today. He wants to go to rehab and would like referrals sent to [facility] and [facility]. His insurance only has acute rehab benefits so [facility] will not be an option. However, both physical and occupational therapy will need to see him, when medically ready, prior to a [facility] referral being sent. CM continues to follow.    Final Discharge Disposition Code 62 - inpatient rehab facility                   Discharge Codes    No documentation.                 Expected Discharge Date and Time       Expected Discharge Date Expected Discharge Time    Nov 24, 2023               Alexandro Aguilera RN

## 2023-11-17 NOTE — PROGRESS NOTES
"   LOS: 9 days    Patient Care Team:  Laura Summers PA-C as PCP - General (Family Medicine)    Subjective     Stable overnight    Objective     Vital Signs:  Blood pressure (!) 73/59, pulse 79, temperature 98.3 °F (36.8 °C), temperature source Oral, resp. rate 20, height 180.3 cm (71\"), weight 83.2 kg (183 lb 6.8 oz), SpO2 96%.      Intake/Output Summary (Last 24 hours) at 11/17/2023 0948  Last data filed at 11/17/2023 0400  Gross per 24 hour   Intake 1665.12 ml   Output 675 ml   Net 990.12 ml        11/16 0701 - 11/17 0700  In: 1734.7 [P.O.:1250; I.V.:484.7]  Out: 675 [Urine:675]    Physical Exam:        GENERAL: WD WM NAD  NEURO: Awake and alert, oriented. No focal deficit  PSYCHIATRIC: NMA. Cooperative with PE  EYE: PE, no icterus, no conjunctivitis  ENT: ommm, dentition intact,  Hearing intact  NECK: Supple , No JVD discernable,  Trachea midline  CV: + Edema, RRR  LUNGS:  Quiet,  Nonlabored resp.  Symmetrical expansion  ABDOMEN: Nondistended, soft nontender.  : No Lazaro, no palp bladder  SKIN: Warm and dry without rash      Labs:  Results from last 7 days   Lab Units 11/17/23  0327 11/16/23  0332 11/11/23  0430   WBC 10*3/mm3 22.22* 26.12* 9.28   HEMOGLOBIN g/dL 12.4* 12.0* 13.4   PLATELETS 10*3/mm3 219 238 304     Results from last 7 days   Lab Units 11/17/23  0327 11/16/23  0332 11/15/23  2151 11/15/23  0433 11/13/23  0925 11/12/23  0414   SODIUM mmol/L 122* 127* 124* 124*   < > 134*   POTASSIUM mmol/L 4.4 3.8 3.6 3.5   < > 3.9   CHLORIDE mmol/L 81* 83* 80* 80*   < > 91*   CO2 mmol/L 27.0 25.0 26.0 26.0   < > 32.0*   BUN mg/dL 104* 82* 82* 83*   < > 81*   CREATININE mg/dL 2.73* 2.41* 2.32* 2.29*   < > 1.73*   CALCIUM mg/dL 10.0 9.9 11.3* 9.6   < > 9.0   PHOSPHORUS mg/dL 4.9* 5.5*  --  4.3  --   --    MAGNESIUM mg/dL 2.8* 2.9* 3.6*  --   --  1.7   ALBUMIN g/dL  --  3.0*  --  3.3*  --   --     < > = values in this interval not displayed.     Results from last 7 days   Lab Units 11/16/23  0332   ALK PHOS " U/L 158*   BILIRUBIN mg/dL 3.0*   ALT (SGPT) U/L 53*   AST (SGOT) U/L 54*                   Estimated Creatinine Clearance: 37.7 mL/min (A) (by C-G formula based on SCr of 2.73 mg/dL (H)).         A/P:    ARF: BUN/creatinine up further with continued hypotension.  Urine output remains nonoliguric but borderline.  Initially creatinine up to 2.5 with improvement down to 1.7 with volume reduction.  Creatinine began rising with further negative volume.  Blood pressure remains low but stable.  FeNa remains quite low consistent with prerenal azotemia in setting of hypotension and poor cardiac function.  Patient did have some subjective improvement with bolus of normal saline 2 days ago.  For now would continue supportive care.   Strict I's and O's.  Monitor renal function closely for recovery.  Given patient's low cardiac function and hypotension, dialysis will be difficult if needed.      CKD: Unsure of baseline creatinine.     Hypotension: Blood pressure remains low.  Restarted on pressors this a.m.  Can support blood pressure with albumin as needed.  Patient is negative over 8 L since admission.  Likely with contributions from low cardiac output.  May have component of intravascular volume depletion despite mild lower extremity edema.     Normal osmolar hyponatremia: Sodium back up to 127.  Serum osmolality 293 which is in normal range.  Would monitor for now.    Volume: Patient negative >8 L since admission.  Initially with improving creatinine, however with further diuresis, creatinine increasing.  Chest x-ray clear.  Diuretics on hold.  May need additional volume expansion.  Monitor strict I's and O's for now.    CHF: Very low EF <20%.  Significant volume reduction with diuresis.  Blood pressure low with rising creatinine.  Patient post IV Bumex yesterday morning.  Diuretics on hold for now.  Strict I's and O's.  Redosed diuretics as indicated. Would hold on restarting Jardiance until creatinine improved.    High  risk and complexity patient.      Johnson Galicia MD  11/17/23  09:48 EST

## 2023-11-17 NOTE — PROGRESS NOTES
Cardiology Progress Note      Reason for visit:    Atrial fibrillation with RVR  Acute on chronic systolic heart failure  Elevated troponin    IDENTIFICATION: 53-year-old gentleman who resides in [place], [place]    Active Hospital Problems    Diagnosis  POA    **Acute on chronic systolic congestive heart failure [I50.23]  Yes     Reported history of systolic heart failure dating back for 20 years.  Reportedly last echocardiogram 1 year ago had normal LVEF.  Data deficit  Echo (11/8/2023): LVEF less than 20%.  Severe MR.  RVSP less than 35 mmHg left ventricular ejection fraction appears to be less than 20%.          Ventricular fibrillation [I49.01]  No    Elevated troponin level not due myocardial infarction [R79.89]  Yes     Troponin elevated but flat in the setting of A-fib with RVR and acute systolic heart failure      Severe mitral regurgitation [I34.0]  Yes     Echo (11/8/2023): Severe MR.  LVEF less than 20%      Hyponatremia [E87.1]  Yes    STUART (acute kidney injury) [N17.9]  Yes    Atrial fibrillation with RVR [I48.91]  Yes     Reported history of atrial fibrillation for 10 years  CHA2DS2-VASc=3  Presentation [facility] 11/8/2023 in A-fib with RVR in the setting of hyponatremia and STUART  External cardioversion (11/10/2023): Successfully restored NSR.  Return of atrial fibrillation 11/11/2023 but now returned to NSR on IV amiodarone      Essential hypertension [I10]  Yes    Anxiety associated with depression [F41.8]  Yes    Hypoalbuminemia [E88.09]  Yes    Hyperbilirubinemia [E80.6]  Yes    Elevated liver enzymes [R74.8]  Yes            Patient sitting up in the bed breathing easy on room air.  He reports feeling much better than when he was admitted.  He denies any chest pain.  He reports improvement in his lower extremity edema.  The patient had cardioversion earlier this admission that converted him to normal sinus rhythm but he went back into atrial fibrillation and IV amiodarone was  restarted yesterday.  He is currently on IV amiodarone drip and in normal sinus rhythm with frequent PVCs    Update 11/14/23  Feeling quite weak.  BP remains borderline.  Overall volume much better.  Still somewhat overloaded with low albumin.      Update 11/15/23  About the same.  Really no change      Update 11/16/23  His edema is all but resolved.  Minimal JVD.  His fluid status is the best it has been while here even though no diuretics today.  He apparently (dont have telemetry) and had R on T with polymorphic Vt then VF.  One round of CPR and desynchronized cardioversion.  Was then in afib with RVR and has converted to NSR.  Kidney function stable.  QT mildly prolonged.      Update 11/17/23  Uneventful night.  Cr little worse.  Edema little worse.  He feels fine except rib pain         Vital Sign Min/Max for last 24 hours  Temp  Min: 97.7 °F (36.5 °C)  Max: 98.4 °F (36.9 °C)   BP  Min: 74/63  Max: 117/105   Pulse  Min: 70  Max: 101   Resp  Min: 16  Max: 28   SpO2  Min: 87 %  Max: 100 %   Flow (L/min)  Min: 1  Max: 2      Intake/Output Summary (Last 24 hours) at 11/17/2023 0747  Last data filed at 11/17/2023 0400  Gross per 24 hour   Intake 1734.67 ml   Output 675 ml   Net 1059.67 ml               Physical exam  General: alert and oriented  Card: RRR, minimal JVD  Chest: CTAB  Abd: soft, non distended  Extremities: mild edema  MK: moves all extremities  Neuro: non focal exam  Psych: cooperative    Results from last 7 days   Lab Units 11/17/23  0327 11/16/23  0332 11/15/23  2151 11/15/23  0433 11/14/23  1223 11/13/23  0925 11/12/23  0414   SODIUM mmol/L 122* 127* 124* 124* 126* 127* 134*   POTASSIUM mmol/L 4.4 3.8 3.6 3.5 3.8 3.7 3.9   CHLORIDE mmol/L 81* 83* 80* 80* 82* 82* 91*   BUN mg/dL 104* 82* 82* 83* 79* 77* 81*   CREATININE mg/dL 2.73* 2.41* 2.32* 2.29* 2.27* 2.04* 1.73*   MAGNESIUM mg/dL 2.8* 2.9* 3.6*  --   --   --  1.7           Results from last 7 days   Lab Units 11/17/23  0327 11/16/23  0332  11/11/23  0430   WBC 10*3/mm3 22.22* 26.12* 9.28   HEMOGLOBIN g/dL 12.4* 12.0* 13.4   HEMATOCRIT % 37.4* 36.7* 40.3   PLATELETS 10*3/mm3 219 238 304       Lab Results   Component Value Date    HGBA1C 6.00 (H) 11/09/2023       Lab Results   Component Value Date    CHOL 81 11/09/2023    TRIG 60 11/09/2023    HDL 22 (L) 11/09/2023    LDL 45 11/09/2023       Assessment  -chronic systolic CHF, hx of NICM, no CAC on recent CT or atherosclrosis of aorta  -PAF  -Severe MR  -STUART  -LBBB    Plan  -Regarding fluid status on physical exam about the same as yesterday and no orthopnea.  CR little wore.  Appreciate nephrology assistance.  Had been considering dobutamine but fluid status seems to be improving.  Will hold on RHC for now given LBBB and fluid status seems to be improving.  Also dobutamine would be pro arrythmic  -plan for BIVICD today.  I hope he responds well to BIV given his LBBB QRS duration of 176 ms  -continue amiodarone.  Monitor QT.  Currently looks mildly prolonged  -given possible upcoming procedure will hold jardiance, also holding eliquis.  NPO currently  -as BP improves will titrate GDMT as able    Romaine Basurto

## 2023-11-17 NOTE — PLAN OF CARE
Goal Outcome Evaluation:           Progress: no change  Outcome Evaluation: VSS, pt remains afebrile. Estimated total uop for the shift was 375 and 1 large occurrence. Bowel movement noted. Pt's only has tylenol prn for pain management. Pt received 1 dose of norco 5 mg, inwhich pt stated did not help his chest soreness. Pt did not sleep or rest well. Pt stated he was afraid to sleep. RN offered to call and get some meds to help with sleep but pt declined offer. Pt's rhythm was 1st degree AV block, however, after a coughing episode last night pt cardiac rhythm changed to a-fib. Plans for AICD placement today.

## 2023-11-17 NOTE — PLAN OF CARE
Goal Outcome Evaluation:      VSS, Afebrile  A&O x4     Dilaudid 0.25 mg given x2 for chest soreness   ICD placed today, Site clean, dry intact.   Family updated at bedside, patient resting comfortable this evening

## 2023-11-18 NOTE — THERAPY WOUND CARE TREATMENT
Acute Care - Wound/Debridement Treatment Note  [facility]     Patient Name: Sundar Reeder  : 1970  MRN: [id number]  Today's Date: 2023                Admit Date: 2023    Visit Dx:    ICD-10-CM ICD-9-CM   1. Acute on chronic systolic congestive heart failure  I50.23 428.23     428.0   2. Chronic atrial fibrillation  I48.20 427.31   3. Hyperbilirubinemia  E80.6 782.4   4. Atrial fibrillation with RVR  I48.91 427.31   5. Hyponatremia  E87.1 276.1   6. Hypochloremia  E87.8 276.9   7. Elevated serum creatinine  R79.89 790.99   8. Physical deconditioning  R53.81 799.3   9. Elevated brain natriuretic peptide (BNP) level  R79.89 790.99   10. Acute pulmonary edema  J81.0 518.4   11. Leukocytosis, unspecified type  D72.829 288.60   12. Elevated transaminase level  R74.01 790.4       Patient Active Problem List   Diagnosis    Hyponatremia    STUART (acute kidney injury)    Atrial fibrillation with RVR    Essential hypertension    Anxiety associated with depression    Hypoalbuminemia    Hyperbilirubinemia    Elevated liver enzymes    Acute on chronic systolic congestive heart failure    Elevated troponin level not due myocardial infarction    Severe mitral regurgitation    Ventricular fibrillation        History reviewed. No pertinent past medical history.     History reviewed. No pertinent surgical history.       LDA Wound       Row Name               Wound 23 1200 Bilateral lower leg Blisters    Wound - Properties Group Placement Date: 23  - Placement Time: 1200  -AH Side: Bilateral  -AH Orientation: lower  -AH Location: leg  -AH Primary Wound Type: Blisters  -AH    Retired Wound - Properties Group Placement Date: 23  - Placement Time: 1200  -AH Side: Bilateral  -AH Orientation: lower  -AH Location: leg  -AH Primary Wound Type: Blisters  -AH    Retired Wound - Properties Group Date first assessed: 23  - Time first assessed: 1200  -AH Side: Bilateral  -AH Location: leg  -AH Primary  Wound Type: Blisters  -AH       Wound 11/15/23 2200 Bilateral perineum Pressure Injury    Wound - Properties Group Placement Date: 11/15/23  -IE Placement Time: 2200 -IE Side: Bilateral  -IE Location: perineum  -IE Primary Wound Type: Pressure inj  -IE    Retired Wound - Properties Group Placement Date: 11/15/23  -IE Placement Time: 2200  -IE Side: Bilateral  -IE Location: perineum  -IE Primary Wound Type: Pressure inj  -IE    Retired Wound - Properties Group Date first assessed: 11/15/23  -IE Time first assessed: 2200  -IE Side: Bilateral  -IE Location: perineum  -IE Primary Wound Type: Pressure inj  -IE       Wound 11/17/23 1232 Left lateral clavicle Incision    Wound - Properties Group Placement Date: 11/17/23  -BB Placement Time: 1232  -BB Side: Left  -BB Orientation: lateral  -BB Location: clavicle  -BB Primary Wound Type: Incision  -BB    Retired Wound - Properties Group Placement Date: 11/17/23  -BB Placement Time: 1232  -BB Side: Left  -BB Orientation: lateral  -BB Location: clavicle  -BB Primary Wound Type: Incision  -BB    Retired Wound - Properties Group Date first assessed: 11/17/23  -BB Time first assessed: 1232  -BB Side: Left  -BB Location: clavicle  -BB Primary Wound Type: Incision  -BB              User Key  (r) = Recorded By, (t) = Taken By, (c) = Cosigned By      Initials Name Provider Type    Yessenia Muhammad, RN Registered Nurse    BB Becky Cisneros Technologist    Negin Rocha RN Registered Nurse                  Wound 11/08/23 1200 Bilateral lower leg Blisters (Active)   Dressing Appearance intact;dried drainage 11/18/23 0826   Closure None 11/18/23 0826   Base granulating;moist;pink;red;scab 11/18/23 0826   Periwound ecchymotic 11/18/23 0826   Periwound Temperature warm 11/18/23 0826   Periwound Skin Turgor soft 11/18/23 0826   Edges irregular 11/18/23 0826   Drainage Characteristics/Odor serosanguineous;serous 11/18/23 0826   Drainage Amount small 11/18/23 0826   Care, Wound  cleansed with;soap and water;debrided 11/18/23 0826   Dressing Care dressing changed;dressing applied;foam;low-adherent;silver impregnated;other (see comments) 11/18/23 0826   Periwound Care dry periwound area maintained 11/18/23 0826       Wound 11/15/23 2200 Bilateral perineum Pressure Injury (Active)   Base red;bleeding;non-blanchable;blanchable 11/18/23 0406   Periwound blanchable;pink 11/18/23 0406   Periwound Temperature warm 11/18/23 0406   Periwound Skin Turgor soft 11/18/23 0406   Care, Wound cleansed with;neutral pH skin disinfectant applied;barrier applied 11/17/23 2041   Periwound Care barrier ointment applied 11/17/23 2041       Wound 11/17/23 1232 Left lateral clavicle Incision (Active)   Dressing Appearance dry;intact;no drainage 11/18/23 0000   Closure Unable to assess 11/18/23 0406   Periwound dry;intact 11/17/23 2041   Periwound Temperature warm 11/17/23 2041   Periwound Skin Turgor soft 11/17/23 2041   Drainage Characteristics/Odor other (see comments) 11/17/23 1621   Drainage Amount none 11/17/23 2041      Lymphedema       Row Name 11/18/23 0826             Lymphedema Edema Assessment    Ptting Edema Category By severity  -ES      Pitting Edema Moderate  -ES         Skin Changes/Observations    Location/Assessment Lower Extremity  -ES      Lower Extremity Conditions bilateral:;scaly;inflamed;weeping  -ES      Lower Extremity Color/Pigment bilateral:;erythema  -ES         Lymphedema Pulses/Capillary Refill    Lymphedema Pulses/Capillary Refill lower extremity pulses;capillary refill  -ES      Dorsalis Pedis Pulse right:;left:;+1 diminished  -ES      Capillary Refill lower extremity capillary refill  -ES      Lower Extremity Capillary Refill right:;left:;less than 3 seconds  -ES         Compression/Skin Care    Compression/Skin Care skin care;wrapping location;bandaging  -ES      Skin Care washed/dried;lotion applied  -ES      Wrapping Location lower extremity  -ES      Wrapping Location LE  bilateral:;foot to knee  -ES      Wrapping Comments BLE MLW size 5 applied in overlapping fashion to allow for gradient compression  -ES                User Key  (r) = Recorded By, (t) = Taken By, (c) = Cosigned By      Initials Name Provider Type    ES Stefany Brantley, PT Physical Therapist                    WOUND DEBRIDEMENT  Total area of Debridement: ~30cm2  Debridement Site 1  Location- Site 1: BLE  Selective Debridement- Site 1: Wound Surface >20cmsq  Instruments- Site 1: tweezers, scissors  Excised Tissue Description- Site 1: moderate, other (comment) (dry, nonviable skin)  Bleeding- Site 1: none               PT Assessment (last 12 hours)       PT Evaluation and Treatment       Row Name 11/18/23 0826          Physical Therapy Time and Intention    Subjective Information no complaints  -ES     Document Type wound care;therapy note (daily note)  -ES     Mode of Treatment individual therapy;physical therapy  -ES     Patient Effort good  -ES     Symptoms Noted During/After Treatment increased pain  -ES       Row Name 11/18/23 0826          General Information    Patient Profile Reviewed yes  -ES     Risks Reviewed patient:;increased discomfort  -ES     Benefits Reviewed patient:;improve function;decrease pain;improve skin integrity  -ES     Barriers to Rehab medically complex;previous functional deficit  -ES       Row Name 11/18/23 0826          Pain    Pretreatment Pain Rating 0/10 - no pain  -ES     Posttreatment Pain Rating 0/10 - no pain  -ES     Pre/Posttreatment Pain Comment tolerated  -ES       Row Name 11/18/23 0826          Cognition    Orientation Status (Cognition) oriented x 4  -ES       Row Name 11/18/23 0826          Wound 11/08/23 1200 Bilateral lower leg Blisters    Wound - Properties Group Placement Date: 11/08/23  -AH Placement Time: 1200  -AH Side: Bilateral  - Orientation: lower  -AH Location: leg  -AH Primary Wound Type: Blisters  -AH    Dressing Appearance intact;dried drainage  -ES      Closure None  -ES     Base granulating;moist;pink;red;scab  -ES     Periwound ecchymotic  -ES     Periwound Temperature warm  -ES     Periwound Skin Turgor soft  -ES     Edges irregular  -ES     Drainage Characteristics/Odor serosanguineous;serous  -ES     Drainage Amount small  -ES     Care, Wound cleansed with;soap and water;debrided  -ES     Dressing Care dressing changed;dressing applied;foam;low-adherent;silver impregnated;other (see comments)  optilock, cast padding, MLW size 5 to BLE  -ES     Periwound Care dry periwound area maintained  -ES     Retired Wound - Properties Group Placement Date: 11/08/23  -AH Placement Time: 1200  -AH Side: Bilateral  -AH Orientation: lower  -AH Location: leg  -AH Primary Wound Type: Blisters  -AH    Retired Wound - Properties Group Date first assessed: 11/08/23  -AH Time first assessed: 1200  -AH Side: Bilateral  -AH Location: leg  -AH Primary Wound Type: Blisters  -AH      Row Name             Wound 11/15/23 2200 Bilateral perineum Pressure Injury    Wound - Properties Group Placement Date: 11/15/23  -IE Placement Time: 2200  -IE Side: Bilateral  -IE Location: perineum  -IE Primary Wound Type: Pressure inj  -IE    Retired Wound - Properties Group Placement Date: 11/15/23  -IE Placement Time: 2200  -IE Side: Bilateral  -IE Location: perineum  -IE Primary Wound Type: Pressure inj  -IE    Retired Wound - Properties Group Date first assessed: 11/15/23  -IE Time first assessed: 2200  -IE Side: Bilateral  -IE Location: perineum  -IE Primary Wound Type: Pressure inj  -IE      Row Name             Wound 11/17/23 1232 Left lateral clavicle Incision    Wound - Properties Group Placement Date: 11/17/23  -BB Placement Time: 1232 -BB Side: Left  -BB Orientation: lateral  -BB Location: clavicle  -BB Primary Wound Type: Incision  -BB    Retired Wound - Properties Group Placement Date: 11/17/23  -BB Placement Time: 1232 -BB Side: Left  -BB Orientation: lateral  -BB Location: clavicle  -BB  Primary Wound Type: Incision  -BB    Retired Wound - Properties Group Date first assessed: 11/17/23  -BB Time first assessed: 1232  -BB Side: Left  -BB Location: clavicle  -BB Primary Wound Type: Incision  -BB      Row Name 11/18/23 0826          Coping    Observed Emotional State calm;cooperative  -ES     Verbalized Emotional State acceptance  -ES     Trust Relationship/Rapport care explained;choices provided;emotional support provided  -ES     Family/Support Persons sibling  -ES     Involvement in Care not present at bedside  -ES       Row Name 11/18/23 0826          Plan of Care Review    Plan of Care Reviewed With patient  -ES     Progress improving  -ES     Outcome Evaluation Pt demonstrated slightly improved BLE skin integrity with increased granulation noted. PT able to debride moderate amounts of dry, nonviable skin to improve skin integrity, decrease bioburden, and improve healing potential. MLW size 5 applied to BLE to improve venous return and overall function. Will f/u in 2-3 days.  -ES       Row Name 11/18/23 0826          Vital Signs    Pre Systolic BP Rehab --  RN cleared for wound care  -ES       Row Name 11/18/23 0826          Positioning and Restraints    Pre-Treatment Position in bed  -ES     Post Treatment Position bed  -ES     In Bed fowlers;call light within reach;encouraged to call for assist;with nsg  -ES               User Key  (r) = Recorded By, (t) = Taken By, (c) = Cosigned By      Initials Name Provider Type    Yessenia Muhammad, RN Registered Nurse    Becky Guzman Technologist    Stefany Pineda, FELICIANO Physical Therapist    Negin Rocha RN Registered Nurse                  Physical Therapy Education       Title: PT OT SLP Therapies (In Progress)       Topic: Physical Therapy (In Progress)       Point: Mobility training (In Progress)       Learning Progress Summary             Patient Acceptance, E, NR by AE at 11/15/2023 0904                         Point: Home  exercise program (In Progress)       Learning Progress Summary             Patient Acceptance, E, NR by AE at 11/15/2023 0904                         Point: Body mechanics (In Progress)       Learning Progress Summary             Patient Acceptance, E, NR by AE at 11/15/2023 0904                         Point: Precautions (In Progress)       Learning Progress Summary             Patient Acceptance, E, NR by AE at 11/15/2023 0904                                         User Key       Initials Effective Dates Name Provider Type Discipline    AE 09/21/21 -  Kenroy Guzman, PT Physical Therapist PT                    Recommendation and Plan  Anticipated Discharge Disposition (PT): skilled nursing facility  Planned Therapy Interventions (PT): balance training, bed mobility training, gait training, home exercise program, patient/family education, postural re-education, ROM (range of motion), strengthening, transfer training  Therapy Frequency (PT): daily  Plan of Care Reviewed With: patient   Progress: improving       Progress: improving  Outcome Evaluation: Pt demonstrated slightly improved BLE skin integrity with increased granulation noted. PT able to debride moderate amounts of dry, nonviable skin to improve skin integrity, decrease bioburden, and improve healing potential. MLW size 5 applied to BLE to improve venous return and overall function. Will f/u in 2-3 days.  Plan of Care Reviewed With: patient            Time Calculation   PT Charges       Row Name 11/18/23 0934             Time Calculation    Start Time 0826  -ES      PT Goal Re-Cert Due Date 11/25/23  -ES         Untimed Charges    Wound Care 19723 Multilayer comp below knee;02593 Selective debridement;91785 Add't debridement ea 20 cm  -ES      40951-Ceyspzvnsf comp below knee 20  -ES      98821-Vpgsorkpg debridement 20  -ES      34239-Jgw't debridement ea 20 cm 10  -ES         Total Minutes    Untimed Charges Total Minutes 50  -ES       Total Minutes 50   -ES                User Key  (r) = Recorded By, (t) = Taken By, (c) = Cosigned By      Initials Name Provider Type    ES Stefany Brantley PT Physical Therapist                      Therapy Charges for Today       Code Description Service Date Service Provider Modifiers Qty    78893615114 HC YENNY DEBRIDE OPEN WOUND UP TO 20CM 11/18/2023 Stefany Brantley, PT GP 1    13454863700 HC PT YENNY DEBRIDE OPEN WOUND EA ADD 20CM 11/18/2023 Stefany Brantley, PT GP 1    86512248383 HC PT MULTI LAYER COMP SYS BELOW KNEE 11/18/2023 Stefany Brantley, PT GP 1              PT G-Codes  Outcome Measure Options: AM-PAC 6 Clicks Basic Mobility (PT)  AM-PAC 6 Clicks Score (PT): 17  AM-PAC 6 Clicks Score (OT): 24  Zeng Total Score: 55       Stefany Brantley PT  11/18/2023

## 2023-11-18 NOTE — PROGRESS NOTES
"INTENSIVIST NOTE     Hospital Day: 10    Mr. Sundar Reeder, 53 y.o. male is followed for:   Acute on chronic heart failure with in-hospital ventricular fibrillation and atrial fibrillation       SUBJECTIVE     Interval history:    Awake alert and oriented.  Good spirits.  Afebrile.  Fluid balance +400 mL.  ICD placed yesterday.  Rhythm remains irregular though no malignant arrhythmias.  Blood pressure marginal but patient remains asymptomatic.    The patient's relevant past medical, surgical and social history were reviewed and updated in Epic as appropriate.       OBJECTIVE     Vital Sign Min/Max for last 24 hours  Temp  Min: 97.5 °F (36.4 °C)  Max: 97.8 °F (36.6 °C)   BP  Min: 71/52  Max: 113/70   Pulse  Min: 78  Max: 125   Resp  Min: 16  Max: 20   SpO2  Min: 91 %  Max: 100 %   No data recorded   Weight  Min: 84.4 kg (186 lb 1.1 oz)  Max: 84.4 kg (186 lb 1.1 oz)      Intake/Output Summary (Last 24 hours) at 11/18/2023 1526  Last data filed at 11/18/2023 1100  Gross per 24 hour   Intake 1680 ml   Output 1250 ml   Net 430 ml      Flowsheet Rows      Flowsheet Row First Filed Value   Admission Height 180.3 cm (71\") Documented at 11/08/2023 0552   Admission Weight 88.5 kg (195 lb) Documented at 11/08/2023 0552               11/15/23  0807 11/17/23  0400 11/18/23  0400   Weight: 80.2 kg (176 lb 12.8 oz) 83.2 kg (183 lb 6.8 oz) 84.4 kg (186 lb 1.1 oz)            Objective:  General Appearance:  In no acute distress.    Vital signs: (most recent): Blood pressure (!) 71/52, pulse 99, temperature 97.8 °F (36.6 °C), temperature source Oral, resp. rate 18, height 180.3 cm (71\"), weight 84.4 kg (186 lb 1.1 oz), SpO2 98%.    HEENT: Normal HEENT exam.  (Nasal cannula O2)    Lungs:  Normal effort and normal respiratory rate.  He is not in respiratory distress.  There are rales.  No wheezes or rhonchi.    Heart: Normal rate.  Irregular rhythm.  S1 normal and S2 normal.  No murmur, gallop or friction rub.   Chest: Symmetric chest " wall expansion.   Abdomen: Abdomen is soft and non-distended.  Bowel sounds are normal.   There is no abdominal tenderness.   There is no mass. There is no splenomegaly. There is no hepatomegaly.   Extremities: There is dependent edema.  There is no deformity.  (Lower extremities wrapped)  Neurological: Patient is alert and oriented to person, place and time.    Pupils:  Pupils are equal, round, and reactive to light.    Skin:  Warm and dry.                I reviewed the patient's new clinical results.  I reviewed the patient's new imaging results/reports including actual images and agree with reports.    XR Chest PA & Lateral    Result Date: 11/18/2023  Impression: 1. Interval placement of left subclavian transvenous pacemaker. No evidence of pneumothorax. 2. Patchy bilateral airspace disease favored to be secondary pulmonary edema. Electronically Signed: Chapin Murillo MD  11/18/2023 11:03 AM EST  Workstation ID: [id number]      INFUSIONS         Results from last 7 days   Lab Units 11/18/23  1054 11/17/23  0327 11/16/23  0332   WBC 10*3/mm3 15.47* 22.22* 26.12*   HEMOGLOBIN g/dL 11.7* 12.4* 12.0*   HEMATOCRIT % 35.1* 37.4* 36.7*   PLATELETS 10*3/mm3 163 219 238     Results from last 7 days   Lab Units 11/18/23  1054 11/17/23  0327 11/16/23  0332 11/15/23  2151 11/15/23  0433   SODIUM mmol/L 128* 122* 127*   < > 124*   POTASSIUM mmol/L 4.1 4.4 3.8   < > 3.5   CHLORIDE mmol/L 85* 81* 83*   < > 80*   CO2 mmol/L 31.0* 27.0 25.0   < > 26.0   BUN mg/dL 85* 104* 82*   < > 83*   GLUCOSE mg/dL 112* 118* 118*   < > 108*   CREATININE mg/dL 2.13* 2.73* 2.41*   < > 2.29*   MAGNESIUM mg/dL 2.4 2.8* 2.9*   < >  --    CALCIUM mg/dL 8.7 10.0 9.9   < > 9.6   ALBUMIN g/dL 2.9*  --  3.0*  --  3.3*    < > = values in this interval not displayed.         Results from last 7 days   Lab Units 11/15/23  2239   FIO2 % 80       Patient isn't on Tube Feeding   /h  Patient doesn't have any tube feeding modular orders    Mechanical  Ventilator:   Settings: Observed:                                                I reviewed the patient's medications.    Assessment & Plan   ASSESSMENT/PLAN     Active Hospital Problems    Diagnosis     **Acute on chronic systolic congestive heart failure     Ventricular fibrillation     Hyponatremia     STUART (acute kidney injury)     Elevated troponin level not due myocardial infarction     Severe mitral regurgitation     Atrial fibrillation with RVR     Essential hypertension     Anxiety associated with depression     Hypoalbuminemia     Hyperbilirubinemia     Elevated liver enzymes        53-year-old male with a past medical history of CHF, CKD, hypertension, and atrial fibrillation.  He presented to the ER and was admitted on 11/8 with atrial fibrillation with RVR, shortness of breath, and worsening lower extremity edema.    He was admitted to telemetry and placed on an amiodarone drip and underwent ECV on 11/10.  Subsequent to that he has been primarily in sinus rhythm but with intermittent atrial fibrillation noted.  His ejection fraction by her most recent echo is less than 20% with severe MR.  While in the hospital he had worsening acute kidney injury associate with diuresis and this has been held.  He has ongoing hyponatremia and elevated liver enzymes felt to be related to congestive hepatopathy.      On the evening of 11/15, he developed the acute onset of ventricular fibrillation after what appeared to be an R-on-T phenomenon.  He required defibrillation and received CPR as well as epinephrine and bicarbonate.  Postcode he woke up and was awake alert and oriented.  He was transferred to ICU.     He has had no further episodes of ventricular fibrillation.  He had an ICD placed on 11/17.  He remains awake alert and oriented.  Blood pressure is marginal though he is asymptomatic and on no pressors currently.  Sodium has improved over the last 24 hours to 128.  Potassium and magnesium are acceptable.   Creatinine has decreased to 2.13.     Plan:    Observe in ICU another 24 hours due to marginal blood pressure  Oral amiodarone  Transition to oral pain medications from IV pain medications.  He still has some pain related to his CPR.  Monitor sodium and renal function     I discussed the patient's findings and my recommendations with patient and nursing staff     Plan of care and goals reviewed with multidisciplinary team at daily rounds.    .    Franko Noland MD  Pulmonary and Critical Care Medicine  11/18/23 15:26 EST

## 2023-11-18 NOTE — PLAN OF CARE
Goal Outcome Evaluation:  Plan of Care Reviewed With: patient        Progress: improving  Outcome Evaluation: Pt demonstrated slightly improved BLE skin integrity with increased granulation noted. PT able to debride moderate amounts of dry, nonviable skin to improve skin integrity, decrease bioburden, and improve healing potential. MLW size 5 applied to BLE to improve venous return and overall function. Will f/u in 2-3 days.

## 2023-11-18 NOTE — PLAN OF CARE
Goal Outcome Evaluation:  Plan of Care Reviewed With: patient        Progress: improving  Outcome Evaluation: VSS, pt remains afebrile, pain controlled. Pacemaker dressing CDI. Cardiac rhythm is paced. Total uop was 500 and 1 occurrence for the shift. Pt states he rested well.

## 2023-11-18 NOTE — PROGRESS NOTES
Cardiology Progress Note      Reason for visit:    Atrial fibrillation with RVR  Acute on chronic systolic heart failure  Elevated troponin    IDENTIFICATION: 53-year-old gentleman who resides in [place], [place]    Active Hospital Problems    Diagnosis  POA    **Acute on chronic systolic congestive heart failure [I50.23]  Yes     Reported history of systolic heart failure dating back for 20 years.  Reportedly last echocardiogram 1 year ago had normal LVEF.  Data deficit  Echo (11/8/2023): LVEF less than 20%.  Severe MR.  RVSP less than 35 mmHg left ventricular ejection fraction appears to be less than 20%.          Ventricular fibrillation [I49.01]  No    Elevated troponin level not due myocardial infarction [R79.89]  Yes     Troponin elevated but flat in the setting of A-fib with RVR and acute systolic heart failure      Severe mitral regurgitation [I34.0]  Yes     Echo (11/8/2023): Severe MR.  LVEF less than 20%      Hyponatremia [E87.1]  Yes    STUART (acute kidney injury) [N17.9]  Yes    Atrial fibrillation with RVR [I48.91]  Yes     Reported history of atrial fibrillation for 10 years  CHA2DS2-VASc=3  Presentation [facility] 11/8/2023 in A-fib with RVR in the setting of hyponatremia and STUART  External cardioversion (11/10/2023): Successfully restored NSR.  Return of atrial fibrillation 11/11/2023 but now returned to NSR on IV amiodarone      Essential hypertension [I10]  Yes    Anxiety associated with depression [F41.8]  Yes    Hypoalbuminemia [E88.09]  Yes    Hyperbilirubinemia [E80.6]  Yes    Elevated liver enzymes [R74.8]  Yes            Patient sitting up in the bed breathing easy on room air.  He reports feeling much better than when he was admitted.  He denies any chest pain.  He reports improvement in his lower extremity edema.  The patient had cardioversion earlier this admission that converted him to normal sinus rhythm but he went back into atrial fibrillation and IV amiodarone was  restarted yesterday.  He is currently on IV amiodarone drip and in normal sinus rhythm with frequent PVCs    Update 11/14/23  Feeling quite weak.  BP remains borderline.  Overall volume much better.  Still somewhat overloaded with low albumin.      Update 11/15/23  About the same.  Really no change      Update 11/16/23  His edema is all but resolved.  Minimal JVD.  His fluid status is the best it has been while here even though no diuretics today.  He apparently (dont have telemetry) and had R on T with polymorphic Vt then VF.  One round of CPR and desynchronized cardioversion.  Was then in afib with RVR and has converted to NSR.  Kidney function stable.  QT mildly prolonged.      Update 11/17/23  Uneventful night.  Cr little worse.  Edema little worse.  He feels fine except rib pain    UPDATE 11/18/23  Pt seen and examined today  S/p CRT-D implantation  Feeling much better  Minor incisional site         Vital Sign Min/Max for last 24 hours  Temp  Min: 97.5 °F (36.4 °C)  Max: 97.7 °F (36.5 °C)   BP  Min: 73/59  Max: 113/70   Pulse  Min: 71  Max: 99   Resp  Min: 16  Max: 27   SpO2  Min: 90 %  Max: 100 %   Flow (L/min)  Min: 1  Max: 3      Intake/Output Summary (Last 24 hours) at 11/18/2023 0831  Last data filed at 11/18/2023 0600  Gross per 24 hour   Intake 2400 ml   Output 750 ml   Net 1650 ml               Physical exam  General: alert and oriented  Card: RRR, minimal JVD  Chest: CTAB  Abd: soft, non distended  Extremities: mild edema  MK: moves all extremities  Neuro: non focal exam  Psych: cooperative    Results from last 7 days   Lab Units 11/17/23  0327 11/16/23  0332 11/15/23  2151 11/15/23  0433 11/14/23  1223 11/13/23  0925 11/12/23  0414   SODIUM mmol/L 122* 127* 124* 124* 126* 127* 134*   POTASSIUM mmol/L 4.4 3.8 3.6 3.5 3.8 3.7 3.9   CHLORIDE mmol/L 81* 83* 80* 80* 82* 82* 91*   BUN mg/dL 104* 82* 82* 83* 79* 77* 81*   CREATININE mg/dL 2.73* 2.41* 2.32* 2.29* 2.27* 2.04* 1.73*   MAGNESIUM mg/dL 2.8* 2.9*  3.6*  --   --   --  1.7           Results from last 7 days   Lab Units 11/17/23  0327 11/16/23  0332   WBC 10*3/mm3 22.22* 26.12*   HEMOGLOBIN g/dL 12.4* 12.0*   HEMATOCRIT % 37.4* 36.7*   PLATELETS 10*3/mm3 219 238       Lab Results   Component Value Date    HGBA1C 6.00 (H) 11/09/2023       Lab Results   Component Value Date    CHOL 81 11/09/2023    TRIG 60 11/09/2023    HDL 22 (L) 11/09/2023    LDL 45 11/09/2023       Assessment  -VF arrest  -chronic systolic CHF, hx of NICM, no CAC on recent CT or atherosclrosis of aorta  -PAF  -Severe MR  -STUART  -LBBB    Plan  - overall appear to be euvolemic  - restarting on Jardiance  - restarting on apixaban  - continue amiodarone; recommend DCCV prior to discharge  -as BP improves will titrate GDMT as able; currently remains tenuous     Sundar Bhagat MD  [facility]

## 2023-11-18 NOTE — PROGRESS NOTES
"   LOS: 10 days    Patient Care Team:  Laura Summers PA-C as PCP - General (Family Medicine)    Subjective     Sitting in chair, complains of pain.  Denies shortness of breath.   No overnight issues per nurse.     Excellent urine output, creatinine down trending.     Objective     Vital Signs:  Blood pressure 95/65, pulse 96, temperature 97.9 °F (36.6 °C), temperature source Oral, resp. rate 18, height 180.3 cm (71\"), weight 84.4 kg (186 lb 1.1 oz), SpO2 99%.      Intake/Output Summary (Last 24 hours) at 11/18/2023 1722  Last data filed at 11/18/2023 1600  Gross per 24 hour   Intake 1200 ml   Output 2150 ml   Net -950 ml        11/17 0701 - 11/18 0700  In: 2400 [P.O.:960; I.V.:1240]  Out: 750 [Urine:750]    Physical Exam:        GENERAL: WD WM NAD  NEURO: Awake and alert, oriented. No focal deficit  PSYCHIATRIC: NMA. Cooperative with PE  EYE: PE, no icterus, no conjunctivitis  ENT: ommm, dentition intact,  Hearing intact  NECK: Supple , No JVD discernable,  Trachea midline  CV: + Edema, RRR  LUNGS:  Quiet,  Nonlabored resp.  Symmetrical expansion  ABDOMEN: Nondistended, soft nontender.  : No Lazaro, no palp bladder  SKIN: Warm and dry without rash      Labs:  Results from last 7 days   Lab Units 11/18/23  1054 11/17/23  0327 11/16/23  0332   WBC 10*3/mm3 15.47* 22.22* 26.12*   HEMOGLOBIN g/dL 11.7* 12.4* 12.0*   PLATELETS 10*3/mm3 163 219 238     Results from last 7 days   Lab Units 11/18/23  1054 11/17/23  0327 11/16/23  0332 11/15/23  2151 11/15/23  0433   SODIUM mmol/L 128* 122* 127* 124* 124*   POTASSIUM mmol/L 4.1 4.4 3.8 3.6 3.5   CHLORIDE mmol/L 85* 81* 83* 80* 80*   CO2 mmol/L 31.0* 27.0 25.0 26.0 26.0   BUN mg/dL 85* 104* 82* 82* 83*   CREATININE mg/dL 2.13* 2.73* 2.41* 2.32* 2.29*   CALCIUM mg/dL 8.7 10.0 9.9 11.3* 9.6   PHOSPHORUS mg/dL 3.0 4.9* 5.5*  --  4.3   MAGNESIUM mg/dL 2.4 2.8* 2.9* 3.6*  --    ALBUMIN g/dL 2.9*  --  3.0*  --  3.3*     Results from last 7 days   Lab Units 11/18/23  1054 "   ALK PHOS U/L 125*   BILIRUBIN mg/dL 3.6*   ALT (SGPT) U/L 146*   AST (SGOT) U/L 83*         Estimated Creatinine Clearance: 49 mL/min (A) (by C-G formula based on SCr of 2.13 mg/dL (H)).         A/P:  ARF: Urine output remains nonoliguric but borderline.  Initially creatinine up to 2.5 with improvement down to 1.7 with volume reduction.  Creatinine began rising with further negative volume.  Blood pressure remains low but stable.  FeNa remains quite low consistent with prerenal azotemia in setting of hypotension and poor cardiac function.  Patient did have some subjective improvement with bolus of normal saline 2 days ago.  For now would continue supportive care.   Strict I's and O's.  Monitor renal function closely for recovery.  Given patient's low cardiac function and hypotension, dialysis will be difficult if needed.      CKD: Unsure of baseline creatinine.     Hypotension: Blood pressure remains low.  Can support blood pressure with albumin as needed.  Patient is negative over 8 L since admission.  Likely with contributions from low cardiac output.  May have component of intravascular volume depletion despite mild lower extremity edema.     Normal osmolar hyponatremia: Sodium improving.  Serum osmolality 293 which is in normal range.  Would monitor for now.    Volume: In negative balance since admission.. Diuretics on hold.  May need additional volume expansion.  Monitor strict I's and O's for now.    CHF: Very low EF <20%.  Significant volume reduction with diuresis.  Blood pressure low with rising creatinine.  Strict I's and O's.  Redosed diuretics as indicated. Would hold on restarting Jardiance until creatinine improved.    High risk and complexity patient.      Rohan Corrigan MD  11/18/23  17:22 EST

## 2023-11-19 NOTE — PLAN OF CARE
Problem: Adult Inpatient Plan of Care  Goal: Plan of Care Review  Outcome: Ongoing, Progressing  Flowsheets (Taken 11/19/2023 1826)  Outcome Evaluation: Midodrine intitated today, BP's improving, SBP between  today.  Amiodarone gtt initiated per cardiology orders, plan to infuse over 24 hours.  Adequate UOP.  Pt started on fluid restriction today, pt states he understands and has been on one before.  No BM.  Goal: Patient-Specific Goal (Individualized)  Outcome: Ongoing, Progressing  Goal: Absence of Hospital-Acquired Illness or Injury  Outcome: Ongoing, Progressing  Intervention: Identify and Manage Fall Risk  Recent Flowsheet Documentation  Taken 11/19/2023 1600 by Celia Mora, RN  Safety Promotion/Fall Prevention:   activity supervised   clutter free environment maintained   fall prevention program maintained   nonskid shoes/slippers when out of bed   room organization consistent   safety round/check completed  Taken 11/19/2023 1400 by Celia Mora, RN  Safety Promotion/Fall Prevention:   activity supervised   clutter free environment maintained   fall prevention program maintained   nonskid shoes/slippers when out of bed   room organization consistent   safety round/check completed  Taken 11/19/2023 1200 by Celia Mora, RN  Safety Promotion/Fall Prevention:   activity supervised   clutter free environment maintained   fall prevention program maintained   nonskid shoes/slippers when out of bed   room organization consistent   safety round/check completed  Intervention: Prevent Skin Injury  Recent Flowsheet Documentation  Taken 11/19/2023 1600 by Celia Mora, RN  Body Position: weight shifting  Skin Protection:   adhesive use limited   incontinence pads utilized   tubing/devices free from skin contact   skin-to-device areas padded  Taken 11/19/2023 1400 by Celia Mora, RN  Body Position: weight shifting  Skin Protection:   adhesive use  limited   incontinence pads utilized   tubing/devices free from skin contact   silicone foam dressing in place  Taken 11/19/2023 1200 by Celia Mora, RN  Body Position: weight shifting  Skin Protection:   adhesive use limited   incontinence pads utilized   tubing/devices free from skin contact   skin sealant/moisture barrier applied  Intervention: Prevent and Manage VTE (Venous Thromboembolism) Risk  Recent Flowsheet Documentation  Taken 11/19/2023 1600 by Celia Mora, RN  Activity Management: up in chair  Range of Motion:   active ROM (range of motion) encouraged   ROM (range of motion) performed  Taken 11/19/2023 1400 by Celia Mora RN  Activity Management: (w/ PT)   activity encouraged   up in chair   ambulated outside room  Taken 11/19/2023 1200 by Celia Mora RN  Activity Management: bedrest  Intervention: Prevent Infection  Recent Flowsheet Documentation  Taken 11/19/2023 1200 by Celia Mora, RN  Infection Prevention:   environmental surveillance performed   visitors restricted/screened   single patient room provided   rest/sleep promoted   personal protective equipment utilized  Goal: Optimal Comfort and Wellbeing  Outcome: Ongoing, Progressing  Intervention: Provide Person-Centered Care  Recent Flowsheet Documentation  Taken 11/19/2023 1600 by Celia Mora RN  Trust Relationship/Rapport:   care explained   choices provided   reassurance provided  Taken 11/19/2023 1200 by Celia Mora RN  Trust Relationship/Rapport:   care explained   choices provided  Goal: Readiness for Transition of Care  Outcome: Ongoing, Progressing     Problem: Asthma Comorbidity  Goal: Maintenance of Asthma Control  Outcome: Ongoing, Progressing  Intervention: Maintain Asthma Symptom Control  Recent Flowsheet Documentation  Taken 11/19/2023 1600 by Celia Mora RN  Medication Review/Management: medications reviewed  Taken  11/19/2023 1400 by Celia Mora, RN  Medication Review/Management: medications reviewed  Taken 11/19/2023 1200 by Celia Mora, RN  Medication Review/Management: medications reviewed     Problem: Behavioral Health Comorbidity  Goal: Maintenance of Behavioral Health Symptom Control  Outcome: Ongoing, Progressing  Intervention: Maintain Behavioral Health Symptom Control  Recent Flowsheet Documentation  Taken 11/19/2023 1600 by Celia Mora, RN  Medication Review/Management: medications reviewed  Taken 11/19/2023 1400 by Celia Mora, RN  Medication Review/Management: medications reviewed  Taken 11/19/2023 1200 by Celia Mora RN  Medication Review/Management: medications reviewed     Problem: COPD (Chronic Obstructive Pulmonary Disease) Comorbidity  Goal: Maintenance of COPD Symptom Control  Outcome: Ongoing, Progressing  Intervention: Maintain COPD-Symptom Control  Recent Flowsheet Documentation  Taken 11/19/2023 1600 by Celia Mora RN  Medication Review/Management: medications reviewed  Taken 11/19/2023 1400 by Celia Mora, RN  Medication Review/Management: medications reviewed  Taken 11/19/2023 1200 by Celia Mora, RN  Medication Review/Management: medications reviewed     Problem: Diabetes Comorbidity  Goal: Blood Glucose Level Within Targeted Range  Outcome: Ongoing, Progressing     Problem: Heart Failure Comorbidity  Goal: Maintenance of Heart Failure Symptom Control  Outcome: Ongoing, Progressing  Intervention: Maintain Heart Failure-Management  Recent Flowsheet Documentation  Taken 11/19/2023 1600 by Celia Mora, RN  Medication Review/Management: medications reviewed  Taken 11/19/2023 1400 by Celia Mora, RN  Medication Review/Management: medications reviewed  Taken 11/19/2023 1200 by Celia Mora, RN  Medication Review/Management: medications reviewed     Problem:  Hypertension Comorbidity  Goal: Blood Pressure in Desired Range  Outcome: Ongoing, Progressing  Intervention: Maintain Blood Pressure Management  Recent Flowsheet Documentation  Taken 11/19/2023 1600 by Celia Mora, RN  Medication Review/Management: medications reviewed  Taken 11/19/2023 1400 by Celia Mora, RN  Medication Review/Management: medications reviewed  Taken 11/19/2023 1200 by Celia Mora, RN  Medication Review/Management: medications reviewed     Problem: Obstructive Sleep Apnea Risk or Actual Comorbidity Management  Goal: Unobstructed Breathing During Sleep  Outcome: Ongoing, Progressing     Problem: Osteoarthritis Comorbidity  Goal: Maintenance of Osteoarthritis Symptom Control  Outcome: Ongoing, Progressing  Intervention: Maintain Osteoarthritis Symptom Control  Recent Flowsheet Documentation  Taken 11/19/2023 1600 by Celia Mora, RN  Activity Management: up in chair  Medication Review/Management: medications reviewed  Taken 11/19/2023 1400 by Celia Mora, RN  Activity Management: (w/ PT)   activity encouraged   up in chair   ambulated outside room  Medication Review/Management: medications reviewed  Taken 11/19/2023 1200 by Celia Mora, RN  Activity Management: bedrest  Medication Review/Management: medications reviewed     Problem: Pain Chronic (Persistent) (Comorbidity Management)  Goal: Acceptable Pain Control and Functional Ability  Outcome: Ongoing, Progressing  Intervention: Manage Persistent Pain  Recent Flowsheet Documentation  Taken 11/19/2023 1600 by Celia Mora, RN  Medication Review/Management: medications reviewed  Taken 11/19/2023 1400 by Celia Mora, RN  Medication Review/Management: medications reviewed  Taken 11/19/2023 1200 by Celia Mora, RN  Medication Review/Management: medications reviewed  Intervention: Optimize Psychosocial Wellbeing  Recent Flowsheet  Documentation  Taken 11/19/2023 1600 by Celia Mora RN  Diversional Activities:   television   smartphone  Taken 11/19/2023 1200 by Celia Mora RN  Diversional Activities:   smartphone   television     Problem: Seizure Disorder Comorbidity  Goal: Maintenance of Seizure Control  Outcome: Ongoing, Progressing     Problem: Skin Injury Risk Increased  Goal: Skin Health and Integrity  Outcome: Ongoing, Progressing  Intervention: Optimize Skin Protection  Recent Flowsheet Documentation  Taken 11/19/2023 1600 by Celia Mora RN  Pressure Reduction Techniques:   frequent weight shift encouraged   heels elevated off bed   pressure points protected   weight shift assistance provided  Head of Bed (HOB) Positioning: HOB at 30-45 degrees  Pressure Reduction Devices: chair cushion utilized  Skin Protection:   adhesive use limited   incontinence pads utilized   tubing/devices free from skin contact   skin-to-device areas padded  Taken 11/19/2023 1400 by Celia Mora RN  Pressure Reduction Techniques:   frequent weight shift encouraged   heels elevated off bed   pressure points protected   weight shift assistance provided  Head of Bed (HOB) Positioning: HOB at 30-45 degrees  Pressure Reduction Devices:   alternating pressure pump (ADD)   elbow protectors utilized   specialty bed utilized   pressure-redistributing mattress utilized   positioning supports utilized   heel offloading device utilized   chair cushion utilized  Skin Protection:   adhesive use limited   incontinence pads utilized   tubing/devices free from skin contact   silicone foam dressing in place  Taken 11/19/2023 1200 by Celia Mora RN  Pressure Reduction Techniques:   frequent weight shift encouraged   heels elevated off bed   pressure points protected   weight shift assistance provided  Head of Bed (HOB) Positioning: HOB at 30-45 degrees  Pressure Reduction Devices:   alternating pressure pump  (ADD)   elbow protectors utilized   specialty bed utilized   pressure-redistributing mattress utilized   positioning supports utilized   heel offloading device utilized  Skin Protection:   adhesive use limited   incontinence pads utilized   tubing/devices free from skin contact   skin sealant/moisture barrier applied     Problem: Fall Injury Risk  Goal: Absence of Fall and Fall-Related Injury  Outcome: Ongoing, Progressing  Intervention: Identify and Manage Contributors  Recent Flowsheet Documentation  Taken 11/19/2023 1600 by Celia Mora, RN  Medication Review/Management: medications reviewed  Taken 11/19/2023 1400 by Celia Mora, RN  Medication Review/Management: medications reviewed  Taken 11/19/2023 1200 by Celia Mora, RN  Medication Review/Management: medications reviewed  Intervention: Promote Injury-Free Environment  Recent Flowsheet Documentation  Taken 11/19/2023 1600 by Celia Mora, RN  Safety Promotion/Fall Prevention:   activity supervised   clutter free environment maintained   fall prevention program maintained   nonskid shoes/slippers when out of bed   room organization consistent   safety round/check completed  Taken 11/19/2023 1400 by Celia Mora, RN  Safety Promotion/Fall Prevention:   activity supervised   clutter free environment maintained   fall prevention program maintained   nonskid shoes/slippers when out of bed   room organization consistent   safety round/check completed  Taken 11/19/2023 1200 by Celia Mora, RN  Safety Promotion/Fall Prevention:   activity supervised   clutter free environment maintained   fall prevention program maintained   nonskid shoes/slippers when out of bed   room organization consistent   safety round/check completed  Goal: Absence of Fall and Fall-Related Injury  Outcome: Ongoing, Progressing  Intervention: Identify and Manage Contributors  Recent Flowsheet Documentation  Taken  11/19/2023 1600 by Celia Mora, RN  Medication Review/Management: medications reviewed  Taken 11/19/2023 1400 by Celia Mora, RN  Medication Review/Management: medications reviewed  Taken 11/19/2023 1200 by Celia Mora, RN  Medication Review/Management: medications reviewed  Intervention: Promote Injury-Free Environment  Recent Flowsheet Documentation  Taken 11/19/2023 1600 by Celia Mora, RN  Safety Promotion/Fall Prevention:   activity supervised   clutter free environment maintained   fall prevention program maintained   nonskid shoes/slippers when out of bed   room organization consistent   safety round/check completed  Taken 11/19/2023 1400 by Celia Mora, RN  Safety Promotion/Fall Prevention:   activity supervised   clutter free environment maintained   fall prevention program maintained   nonskid shoes/slippers when out of bed   room organization consistent   safety round/check completed  Taken 11/19/2023 1200 by Celia Mora, RN  Safety Promotion/Fall Prevention:   activity supervised   clutter free environment maintained   fall prevention program maintained   nonskid shoes/slippers when out of bed   room organization consistent   safety round/check completed     Problem: Tissue Perfusion Altered  Goal: Improved Tissue Perfusion  Outcome: Ongoing, Progressing  Intervention: Optimize Tissue Perfusion  Recent Flowsheet Documentation  Taken 11/19/2023 1600 by Celia Mora, RN  Pressure Reduction Techniques:   frequent weight shift encouraged   heels elevated off bed   pressure points protected   weight shift assistance provided  Taken 11/19/2023 1400 by Celia Mora, RN  Pressure Reduction Techniques:   frequent weight shift encouraged   heels elevated off bed   pressure points protected   weight shift assistance provided  Taken 11/19/2023 1200 by Celia Mora, RN  Pressure Reduction Techniques:    frequent weight shift encouraged   heels elevated off bed   pressure points protected   weight shift assistance provided   Goal Outcome Evaluation:              Outcome Evaluation: Midodrine intitated today, BP's improving, SBP between  today.  Amiodarone gtt initiated per cardiology orders, plan to infuse over 24 hours.  Adequate UOP.  Pt started on fluid restriction today, pt states he understands and has been on one before.  No BM.

## 2023-11-19 NOTE — PROGRESS NOTES
Cardiology Progress Note      Reason for visit:    Atrial fibrillation with RVR  Acute on chronic systolic heart failure  Elevated troponin    IDENTIFICATION: 53-year-old gentleman who resides in [place], [place]    Active Hospital Problems    Diagnosis  POA    **Acute on chronic systolic congestive heart failure [I50.23]  Yes     Reported history of systolic heart failure dating back for 20 years.  Reportedly last echocardiogram 1 year ago had normal LVEF.  Data deficit  Echo (11/8/2023): LVEF less than 20%.  Severe MR.  RVSP less than 35 mmHg left ventricular ejection fraction appears to be less than 20%.          Ventricular fibrillation [I49.01]  No    Elevated troponin level not due myocardial infarction [R79.89]  Yes     Troponin elevated but flat in the setting of A-fib with RVR and acute systolic heart failure      Severe mitral regurgitation [I34.0]  Yes     Echo (11/8/2023): Severe MR.  LVEF less than 20%      Hyponatremia [E87.1]  Yes    STUART (acute kidney injury) [N17.9]  Yes    Atrial fibrillation with RVR [I48.91]  Yes     Reported history of atrial fibrillation for 10 years  CHA2DS2-VASc=3  Presentation [facility] 11/8/2023 in A-fib with RVR in the setting of hyponatremia and STUART  External cardioversion (11/10/2023): Successfully restored NSR.  Return of atrial fibrillation 11/11/2023 but now returned to NSR on IV amiodarone      Essential hypertension [I10]  Yes    Anxiety associated with depression [F41.8]  Yes    Hypoalbuminemia [E88.09]  Yes    Hyperbilirubinemia [E80.6]  Yes    Elevated liver enzymes [R74.8]  Yes            Patient sitting up in the bed breathing easy on room air.  He reports feeling much better than when he was admitted.  He denies any chest pain.  He reports improvement in his lower extremity edema.  The patient had cardioversion earlier this admission that converted him to normal sinus rhythm but he went back into atrial fibrillation and IV amiodarone was  restarted yesterday.  He is currently on IV amiodarone drip and in normal sinus rhythm with frequent PVCs    Update 11/14/23  Feeling quite weak.  BP remains borderline.  Overall volume much better.  Still somewhat overloaded with low albumin.      Update 11/15/23  About the same.  Really no change    Update 11/16/23  His edema is all but resolved.  Minimal JVD.  His fluid status is the best it has been while here even though no diuretics today.  He apparently (dont have telemetry) and had R on T with polymorphic Vt then VF.  One round of CPR and desynchronized cardioversion.  Was then in afib with RVR and has converted to NSR.  Kidney function stable.  QT mildly prolonged.      Update 11/17/23  Uneventful night.  Cr little worse.  Edema little worse.  He feels fine except rib pain    UPDATE 11/18/23  Pt seen and examined today  S/p CRT-D implantation  Feeling much better  Minor incisional site discomfort    UPDATE 11/19/23  Pt seen and examined  No acute changes. No acute distress  Feeling better           Vital Sign Min/Max for last 24 hours  Temp  Min: 97.8 °F (36.6 °C)  Max: 98.2 °F (36.8 °C)   BP  Min: 67/50  Max: 102/63   Pulse  Min: 73  Max: 125   Resp  Min: 14  Max: 18   SpO2  Min: 90 %  Max: 100 %   Flow (L/min)  Min: 1  Max: 2      Intake/Output Summary (Last 24 hours) at 11/19/2023 0714  Last data filed at 11/19/2023 0200  Gross per 24 hour   Intake 960 ml   Output 2350 ml   Net -1390 ml               Physical exam  General: alert and oriented  Card: RRR, minimal JVD  Chest: CTAB  Abd: soft, non distended  Extremities: mild edema  MK: moves all extremities  Neuro: non focal exam  Psych: cooperative    Results from last 7 days   Lab Units 11/19/23  0417 11/18/23  1054 11/17/23  0327 11/16/23  0332 11/15/23  2151 11/15/23  0433 11/15/23  0433 11/14/23  1223   SODIUM mmol/L 128* 128* 122* 127* 124*  --  124* 126*   POTASSIUM mmol/L 4.0 4.1 4.4 3.8 3.6  --  3.5 3.8   CHLORIDE mmol/L 86* 85* 81* 83* 80*  --   80* 82*   BUN mg/dL 83* 85* 104* 82* 82*  --  83* 79*   CREATININE mg/dL 1.81* 2.13* 2.73* 2.41* 2.32*  --  2.29* 2.27*   MAGNESIUM mg/dL  --  2.4 2.8* 2.9* 3.6*   < >  --   --     < > = values in this interval not displayed.           Results from last 7 days   Lab Units 11/19/23  0417 11/18/23  1054 11/17/23  0327   WBC 10*3/mm3 14.72* 15.47* 22.22*   HEMOGLOBIN g/dL 10.2* 11.7* 12.4*   HEMATOCRIT % 31.0* 35.1* 37.4*   PLATELETS 10*3/mm3 124* 163 219       Lab Results   Component Value Date    HGBA1C 6.00 (H) 11/09/2023       Lab Results   Component Value Date    CHOL 81 11/09/2023    TRIG 60 11/09/2023    HDL 22 (L) 11/09/2023    LDL 45 11/09/2023       Assessment  -VF arrest  -chronic systolic CHF, hx of NICM, no CAC on recent CT or atherosclrosis of aorta  -PAF  -Severe MR  -STUART  -LBBB    Plan  - Cr improved from yesterday  - restarted on Jardiance  - restarted on apixaban  - volume management with Nephrology service; appreciate recommendation  - continue amiodarone; recommend DCCV prior to discharge  - as BP improves will titrate GDMT as able; currently remains tenuous     Sundar Bhagat MD  [facility]          UPDATE:  To better control his AF and to optimize his possible DCCV prior to discharge, will start him on amio gtt for 24hr along with his current PO amiodarone regimen. Hopeful, that better rate/rhythm control would help his recovery.

## 2023-11-19 NOTE — PROGRESS NOTES
"INTENSIVIST NOTE     Hospital Day: 11    Mr. Sundar Reeder, 53 y.o. male is followed for:   Acute on chronic heart failure with in-hospital ventricular fibrillation and atrial fibrillation       SUBJECTIVE     Interval history:    Awake alert and oriented.  Pain is significant issue yesterday in transitioning from IV to oral medications but now he is doing well on his oral regimen.  No ICD discharges.  Afebrile.  Blood pressure remains marginal.  Fluid balance even.    The patient's relevant past medical, surgical and social history were reviewed and updated in Epic as appropriate.       OBJECTIVE     Vital Sign Min/Max for last 24 hours  Temp  Min: 97.9 °F (36.6 °C)  Max: 98.5 °F (36.9 °C)   BP  Min: 67/50  Max: 119/82   Pulse  Min: 73  Max: 117   Resp  Min: 14  Max: 18   SpO2  Min: 87 %  Max: 100 %   No data recorded   No data recorded      Intake/Output Summary (Last 24 hours) at 11/19/2023 1446  Last data filed at 11/19/2023 1303  Gross per 24 hour   Intake 2160 ml   Output 2050 ml   Net 110 ml      Flowsheet Rows      Flowsheet Row First Filed Value   Admission Height 180.3 cm (71\") Documented at 11/08/2023 0552   Admission Weight 88.5 kg (195 lb) Documented at 11/08/2023 0552               11/15/23  0807 11/17/23  0400 11/18/23  0400   Weight: 80.2 kg (176 lb 12.8 oz) 83.2 kg (183 lb 6.8 oz) 84.4 kg (186 lb 1.1 oz)            Objective:  General Appearance:  In no acute distress.    Vital signs: (most recent): Blood pressure 94/81, pulse 97, temperature 98 °F (36.7 °C), temperature source Oral, resp. rate 16, height 180.3 cm (71\"), weight 84.4 kg (186 lb 1.1 oz), SpO2 96%.    HEENT: Normal HEENT exam.  (Nasal cannula O2)    Lungs:  Normal effort and normal respiratory rate.  He is not in respiratory distress.  There are rales.  No wheezes or rhonchi.    Heart: Normal rate.  Irregular rhythm.  S1 normal and S2 normal.  No murmur, gallop or friction rub.   Chest: Symmetric chest wall expansion.   Abdomen: Abdomen " is soft and non-distended.  Bowel sounds are normal.   There is no abdominal tenderness.   There is no mass. There is no splenomegaly. There is no hepatomegaly.   Extremities: There is dependent edema.  There is no deformity.  (Lower extremities wrapped)  Neurological: Patient is alert and oriented to person, place and time.    Pupils:  Pupils are equal, round, and reactive to light.    Skin:  Warm and dry.                I reviewed the patient's new clinical results.  I reviewed the patient's new imaging results/reports including actual images and agree with reports.    XR Chest PA & Lateral    Result Date: 11/18/2023  Impression: 1. Interval placement of left subclavian transvenous pacemaker. No evidence of pneumothorax. 2. Patchy bilateral airspace disease favored to be secondary pulmonary edema. Electronically Signed: Chapin Murillo MD  11/18/2023 11:03 AM EST  Workstation ID: [id number]      INFUSIONS         Results from last 7 days   Lab Units 11/19/23 0417 11/18/23  1054 11/17/23  0327   WBC 10*3/mm3 14.72* 15.47* 22.22*   HEMOGLOBIN g/dL 10.2* 11.7* 12.4*   HEMATOCRIT % 31.0* 35.1* 37.4*   PLATELETS 10*3/mm3 124* 163 219     Results from last 7 days   Lab Units 11/19/23 0417 11/18/23  1054 11/17/23  0327 11/16/23  0332   SODIUM mmol/L 128* 128* 122* 127*   POTASSIUM mmol/L 4.0 4.1 4.4 3.8   CHLORIDE mmol/L 86* 85* 81* 83*   CO2 mmol/L 30.0* 31.0* 27.0 25.0   BUN mg/dL 83* 85* 104* 82*   GLUCOSE mg/dL 95 112* 118* 118*   CREATININE mg/dL 1.81* 2.13* 2.73* 2.41*   MAGNESIUM mg/dL  --  2.4 2.8* 2.9*   CALCIUM mg/dL 8.9 8.7 10.0 9.9   ALBUMIN g/dL 2.8* 2.9*  --  3.0*         Results from last 7 days   Lab Units 11/15/23  3329   FIO2 % 80       Patient isn't on Tube Feeding   /h  Patient doesn't have any tube feeding modular orders    Mechanical Ventilator:   Settings: Observed:                                                I reviewed the patient's medications.    Assessment & Plan   ASSESSMENT/PLAN     Active  Hospital Problems    Diagnosis     **Acute on chronic systolic congestive heart failure     Ventricular fibrillation     Hyponatremia     STUART (acute kidney injury)     Elevated troponin level not due myocardial infarction     Severe mitral regurgitation     Atrial fibrillation with RVR     Essential hypertension     Anxiety associated with depression     Hypoalbuminemia     Hyperbilirubinemia     Elevated liver enzymes        53-year-old male with a past medical history of CHF, CKD, hypertension, and atrial fibrillation.  He presented to the ER and was admitted on 11/8 with atrial fibrillation with RVR, shortness of breath, and worsening lower extremity edema.    He was admitted to telemetry and placed on an amiodarone drip and underwent ECV on 11/10.  Subsequent to that he has been primarily in sinus rhythm but with intermittent atrial fibrillation noted.  His ejection fraction by her most recent echo is less than 20% with severe MR.  While in the hospital he had worsening acute kidney injury associate with diuresis and this has been held.  He has ongoing hyponatremia and elevated liver enzymes felt to be related to congestive hepatopathy.      On the evening of 11/15, he developed the acute onset of ventricular fibrillation after what appeared to be an R-on-T phenomenon.  He required defibrillation and received CPR as well as epinephrine and bicarbonate.  Postcode he woke up and was awake alert and oriented.  He was transferred to ICU.     He has had no further episodes of ventricular fibrillation.  He had an ICD placed on 11/17.      Pain a significant issue yesterday when transitioning from IV to oral narcotics due to his CPR.  Now well controlled with oral medications.  Sodium stabilized at 128.  Creatinine continues to decrease.  Platelet count lower at 124,000 for unclear reasons.  Will continue to monitor.  Blood pressure remains marginal and will add midodrine on a trial basis    Plan:    Observe in ICU  another 24 hours due to borderline low blood pressure though I suspect this will be the case going forward given his poor LV function and he is asymptomatic.  Will add midodrine on a trial basis  Oral amiodarone  Percocet for pain  Monitor sodium and renal function as well as platelet count     I discussed the patient's findings and my recommendations with patient and nursing staff     Plan of care and goals reviewed with multidisciplinary team at daily rounds.    .    Franko Noland MD  Pulmonary and Critical Care Medicine  11/19/23 14:46 EST

## 2023-11-19 NOTE — PLAN OF CARE
Goal Outcome Evaluation:  Plan of Care Reviewed With: patient        Progress: no change  Outcome Evaluation: Pain much better controlled with administration of oxycodone.  By end of evening pt demonstrated an increased ability to move in bed.  UOP adequate.  Albumin 25% 25g given for hypotension.  Systolic pressure remains low, though mean diastolic pressure is within goal.  No distress has been appreciated.

## 2023-11-19 NOTE — PLAN OF CARE
Goal Outcome Evaluation:  Plan of Care Reviewed With: patient        Progress: no change (PT re-eval)  Outcome Evaluation: PT re-eval complete. Pt presents with generalized weakness, balance deficits, and decreased functional activity tolerance warranting skilled IPPT services. Pt required CGA-Felicita for functional mobility with cues for sequencing and safety awareness. Based on lack of assitance at home and home layout, in addition to current functional deficits, PT rec IRF at d/c for best outcome.      Anticipated Discharge Disposition (PT): inpatient rehabilitation facility

## 2023-11-19 NOTE — THERAPY RE-EVALUATION
Patient Name: Sundar Reeder  : 1970    MRN: [id number]                              Today's Date: 2023       Admit Date: 2023    Visit Dx:     ICD-10-CM ICD-9-CM   1. Acute on chronic systolic congestive heart failure  I50.23 428.23     428.0   2. Chronic atrial fibrillation  I48.20 427.31   3. Hyperbilirubinemia  E80.6 782.4   4. Atrial fibrillation with RVR  I48.91 427.31   5. Hyponatremia  E87.1 276.1   6. Hypochloremia  E87.8 276.9   7. Elevated serum creatinine  R79.89 790.99   8. Physical deconditioning  R53.81 799.3   9. Elevated brain natriuretic peptide (BNP) level  R79.89 790.99   10. Acute pulmonary edema  J81.0 518.4   11. Leukocytosis, unspecified type  D72.829 288.60   12. Elevated transaminase level  R74.01 790.4     Patient Active Problem List   Diagnosis    Hyponatremia    STUART (acute kidney injury)    Atrial fibrillation with RVR    Essential hypertension    Anxiety associated with depression    Hypoalbuminemia    Hyperbilirubinemia    Elevated liver enzymes    Acute on chronic systolic congestive heart failure    Elevated troponin level not due myocardial infarction    Severe mitral regurgitation    Ventricular fibrillation     History reviewed. No pertinent past medical history.  History reviewed. No pertinent surgical history.   General Information       Row Name 23 1438          Physical Therapy Time and Intention    Document Type re-evaluation  -ES     Mode of Treatment physical therapy  -ES       Row Name 23 1438          General Information    Patient Profile Reviewed yes  -ES     Prior Level of Function --  see IE  -ES     Existing Precautions/Restrictions fall;oxygen therapy device and L/min;other (see comments)  LE edema, ICD  -ES     Barriers to Rehab medically complex;previous functional deficit  -ES       Row Name 23 1438          Living Environment    People in Home alone  -ES       Row Name 23 1438          Home Main Entrance    Number of  Stairs, Main Entrance other (see comments)  25  -ES     Stair Railings, Main Entrance railings on both sides of stairs  -ES       Row Name 11/19/23 1438          Stairs Within Home, Primary    Number of Stairs, Within Home, Primary other (see comments)  18  -ES     Stair Railings, Within Home, Primary railings on both sides of stairs  -ES       Row Name 11/19/23 1438          Cognition    Orientation Status (Cognition) oriented x 4  -ES       Row Name 11/19/23 1438          Safety Issues, Functional Mobility    Safety Issues Affecting Function (Mobility) awareness of need for assistance;insight into deficits/self-awareness;safety precaution awareness;safety precautions follow-through/compliance;sequencing abilities;judgment  -ES     Impairments Affecting Function (Mobility) balance;cognition;endurance/activity tolerance;strength;pain  -ES     Cognitive Impairments, Mobility Safety/Performance awareness, need for assistance;safety precaution awareness;safety precaution follow-through;sequencing abilities;problem-solving/reasoning  -ES     Comment, Safety Issues/Impairments (Mobility) monitor HR  -ES               User Key  (r) = Recorded By, (t) = Taken By, (c) = Cosigned By      Initials Name Provider Type    ES Stefany Brantley PT Physical Therapist                   Mobility       Row Name 11/19/23 1440          Bed Mobility    Bed Mobility supine-sit  -ES     Supine-Sit [place] (Bed Mobility) contact guard;1 person assist  -ES     Assistive Device (Bed Mobility) head of bed elevated;bed rails  -ES     Comment, (Bed Mobility) v/c for hand placement. No c/o dizziness with transition to upright posture  -ES       Row Name 11/19/23 1440          Bed-Chair Transfer    Bed-Chair [place] (Transfers) contact guard;verbal cues  -ES     Assistive Device (Bed-Chair Transfers) other (see comments)  UE support  -ES     Comment, (Bed-Chair Transfer) v/c for sequencing  -ES       Row Name 11/19/23 1440           Sit-Stand Transfer    Sit-Stand [place] (Transfers) contact guard;verbal cues  -ES     Assistive Device (Sit-Stand Transfers) other (see comments)  UE support  -ES     Comment, (Sit-Stand Transfer) v/c for sequencing  -ES       Row Name 11/19/23 1440          Gait/Stairs (Locomotion)    [place] Level (Gait) minimum assist (75% patient effort);verbal cues  -ES     Assistive Device (Gait) other (see comments)  BUE support  -ES     Distance in Feet (Gait) 50+50  -ES     Deviations/Abnormal Patterns (Gait) bilateral deviations;base of support, narrow;jesus[place] decreased;gait speed decreased;stride length decreased  -ES     Bilateral Gait Deviations forward flexed posture;heel strike decreased  -ES     Comment, (Gait/Stairs) Pt ambulated 2 short bouts with Felicita and BUE support. Demo'd forward flexed posture with decreased stride length and step-through gait pattern. Required v/c for safety awareness and redirection to task. No c/o dizziness or increased pain with ambulation. Further mobility limited by increased HR (max: 140). RN notified.  -ES               User Key  (r) = Recorded By, (t) = Taken By, (c) = Cosigned By      Initials Name Provider Type    ES Stefany Brantley PT Physical Therapist                   Obj/Interventions       Row Name 11/19/23 1443          Range of Motion Comprehensive    General Range of Motion bilateral lower extremity ROM WFL  -ES       Row Name 11/19/23 1443          Strength Comprehensive (MMT)    General Manual Muscle Testing (MMT) Assessment lower extremity strength deficits identified  -ES     Comment, General Manual Muscle Testing (MMT) Assessment BLE grossly 4/5  -ES       Row Name 11/19/23 1449          Balance    Balance Assessment sitting static balance;sitting dynamic balance;sit to stand dynamic balance;standing static balance;standing dynamic balance  -ES     Static Sitting Balance standby assist  -ES     Dynamic Sitting Balance contact guard  -ES     Position,  Sitting Balance unsupported;sitting in chair;sitting edge of bed  -ES     Sit to Stand Dynamic Balance contact guard;verbal cues  -ES     Static Standing Balance contact guard  -ES     Dynamic Standing Balance minimal assist;verbal cues  -ES     Position/Device Used, Standing Balance supported;other (see comments)  BUE support  -ES     Balance Interventions sitting;standing;sit to stand;supported;static;dynamic;occupation based/functional task  -ES     Comment, Balance No LOB  -ES       Row Name 11/19/23 1449          Sensory Assessment (Somatosensory)    Sensory Assessment (Somatosensory) LE sensation intact  -ES               User Key  (r) = Recorded By, (t) = Taken By, (c) = Cosigned By      Initials Name Provider Type    ES Stefany Brantley, PT Physical Therapist                   Goals/Plan       Row Name 11/19/23 1447          Bed Mobility Goal 1 (PT)    Activity/Assistive Device (Bed Mobility Goal 1, PT) sit to supine/supine to sit  -ES     [place] Level/Cues Needed (Bed Mobility Goal 1, PT) modified independence  -ES     Time Frame (Bed Mobility Goal 1, PT) long term goal (LTG);10 days  -ES     Progress/Outcomes (Bed Mobility Goal 1, PT) goal ongoing  -ES       Row Name 11/19/23 1027          Transfer Goal 1 (PT)    Activity/Assistive Device (Transfer Goal 1, PT) sit-to-stand/stand-to-sit;bed-to-chair/chair-to-bed  -ES     [place] Level/Cues Needed (Transfer Goal 1, PT) supervision required  -ES     Time Frame (Transfer Goal 1, PT) long term goal (LTG);10 days  -ES     Progress/Outcome (Transfer Goal 1, PT) goal ongoing  -ES       Row Name 11/19/23 8319          Gait Training Goal 1 (PT)    Activity/Assistive Device (Gait Training Goal 1, PT) gait (walking locomotion);decrease fall risk;increase endurance/gait distance  -ES     [place] Level (Gait Training Goal 1, PT) supervision required  -ES     Distance (Gait Training Goal 1, PT) 150  -ES     Time Frame (Gait Training Goal 1, PT) long term  goal (LTG);10 days  -ES     Progress/Outcome (Gait Training Goal 1, PT) goal ongoing  -ES       Row Name 11/19/23 1447          Therapy Assessment/Plan (PT)    Planned Therapy Interventions (PT) balance training;bed mobility training;gait training;patient/family education;neuromuscular re-education;strengthening;stair training;transfer training;postural re-education  -ES               User Key  (r) = Recorded By, (t) = Taken By, (c) = Cosigned By      Initials Name Provider Type    ES Stefany Brantley, PT Physical Therapist                   Clinical Impression       Row Name 11/19/23 1445          Pain    Pretreatment Pain Rating 0/10 - no pain  -ES     Posttreatment Pain Rating 0/10 - no pain  -ES     Pre/Posttreatment Pain Comment tolerated, denied pain throughout session  -ES     Pain Intervention(s) Repositioned;Ambulation/increased activity  -ES       Row Name 11/19/23 1441          Plan of Care Review    Plan of Care Reviewed With patient  -ES     Progress no change  PT re-eval  -ES     Outcome Evaluation PT re-eval complete. Pt presents with generalized weakness, balance deficits, and decreased functional activity tolerance warranting skilled IPPT services. Pt required CGA-Felicita for functional mobility with cues for sequencing and safety awareness. Based on lack of assitance at home and home layout, in addition to current functional deficits, PT rec IRF at d/c for best outcome.  -ES       Row Name 11/19/23 1449          Therapy Assessment/Plan (PT)    Rehab Potential (PT) good, to achieve stated therapy goals  -ES     Criteria for Skilled Interventions Met (PT) yes  -ES     Therapy Frequency (PT) daily  -ES       Row Name 11/19/23 1441          Vital Signs    Pre Systolic BP Rehab 93  -ES     Pre Treatment Diastolic BP 65  -ES     Post Systolic BP Rehab 105  -ES     Post Treatment Diastolic BP 84  -ES     Pretreatment Heart Rate (beats/min) 112  -ES     Posttreatment Heart Rate (beats/min) 113  -ES     Pre  SpO2 (%) 96  -ES     O2 Delivery Pre Treatment nasal cannula  -ES     O2 Delivery Intra Treatment nasal cannula  -ES     Post SpO2 (%) 95  -ES     O2 Delivery Post Treatment nasal cannula  -ES     Pre Patient Position Supine  -ES     Intra Patient Position Standing  -ES     Post Patient Position Sitting  -ES       Row Name 11/19/23 1444          Positioning and Restraints    Pre-Treatment Position in bed  -ES     Post Treatment Position chair  -ES     In Chair notified nsg;reclined;sitting;call light within reach;encouraged to call for assist;exit alarm on;waffle cushion;legs elevated;heels elevated  -ES               User Key  (r) = Recorded By, (t) = Taken By, (c) = Cosigned By      Initials Name Provider Type    Stefany Pineda PT Physical Therapist                   Outcome Measures       Row Name 11/19/23 1447          How much help from another person do you currently need...    Turning from your back to your side while in flat bed without using bedrails? 3  -ES     Moving from lying on back to sitting on the side of a flat bed without bedrails? 3  -ES     Moving to and from a bed to a chair (including a wheelchair)? 3  -ES     Standing up from a chair using your arms (e.g., wheelchair, bedside chair)? 3  -ES     Climbing 3-5 steps with a railing? 2  -ES     To walk in hospital room? 3  -ES     AM-PAC 6 Clicks Score (PT) 17  -ES     Highest Level of Mobility Goal 5 --> Static standing  -ES       Row Name 11/19/23 1447          Functional Assessment    Outcome Measure Options AM-PAC 6 Clicks Basic Mobility (PT)  -ES               User Key  (r) = Recorded By, (t) = Taken By, (c) = Cosigned By      Initials Name Provider Type    Stefany Pineda PT Physical Therapist                                 Physical Therapy Education       Title: PT OT SLP Therapies (In Progress)       Topic: Physical Therapy (In Progress)       Point: Mobility training (In Progress)       Learning Progress Summary              Patient Acceptance, E,TB, NR by ES at 11/19/2023 1448    Acceptance, E, NR by AE at 11/15/2023 0904                         Point: Home exercise program (In Progress)       Learning Progress Summary             Patient Acceptance, E, NR by AE at 11/15/2023 0904                         Point: Body mechanics (In Progress)       Learning Progress Summary             Patient Acceptance, E,TB, NR by ES at 11/19/2023 1448    Acceptance, E, NR by AE at 11/15/2023 0904                         Point: Precautions (In Progress)       Learning Progress Summary             Patient Acceptance, E,TB, NR by ES at 11/19/2023 1448    Acceptance, E, NR by AE at 11/15/2023 0904                                         User Key       Initials Effective Dates Name Provider Type Discipline    AE 09/21/21 -  Kenroy Guzman, PT Physical Therapist PT    ES 08/11/22 -  Stefany Brantley PT Physical Therapist PT                  PT Recommendation and Plan  Planned Therapy Interventions (PT): balance training, bed mobility training, gait training, patient/family education, neuromuscular re-education, strengthening, stair training, transfer training, postural re-education  Plan of Care Reviewed With: patient  Progress: no change (PT re-eval)  Outcome Evaluation: PT re-eval complete. Pt presents with generalized weakness, balance deficits, and decreased functional activity tolerance warranting skilled IPPT services. Pt required CGA-Felicita for functional mobility with cues for sequencing and safety awareness. Based on lack of assitance at home and home layout, in addition to current functional deficits, PT rec IRF at d/c for best outcome.     Time Calculation:         PT Charges       Row Name 11/19/23 1448             Time Calculation    Start Time 1302  -ES      PT Received On 11/19/23  -ES      PT Goal Re-Cert Due Date 11/29/23  -ES         Time Calculation- PT    Total Timed Code Minutes- PT 11 minute(s)  -ES         Timed Charges    70577 -  Gait Training Minutes  11  -ES         Untimed Charges    PT Eval/Re-eval Minutes 15  -ES         Total Minutes    Timed Charges Total Minutes 11  -ES      Untimed Charges Total Minutes 15  -ES       Total Minutes 26  -ES                User Key  (r) = Recorded By, (t) = Taken By, (c) = Cosigned By      Initials Name Provider Type    Stefany Pineda, PT Physical Therapist                  Therapy Charges for Today       Code Description Service Date Service Provider Modifiers Qty    03288711235 HC YENNY DEBRIDE OPEN WOUND UP TO 20CM 11/18/2023 Stefany Brantley, PT GP 1    59057535986 HC PT YENNY DEBRIDE OPEN WOUND EA ADD 20CM 11/18/2023 Stefany Brantley, PT GP 1    [id number] HC PT MULTI LAYER COMP SYS BELOW KNEE 11/18/2023 Stefany Brantley, PT GP 1    05477466264 HC GAIT TRAINING EA 15 MIN 11/19/2023 Stefany Brantley, PT GP 1    [id number]  PT RE-EVAL ESTABLISHED PLAN 2 11/19/2023 Stefany Brantley, PT GP 1            PT G-Codes  Outcome Measure Options: AM-PAC 6 Clicks Basic Mobility (PT)  AM-PAC 6 Clicks Score (PT): 17  AM-PAC 6 Clicks Score (OT): 24  Zeng Total Score: 55  PT Discharge Summary  Anticipated Discharge Disposition (PT): inpatient rehabilitation facility    Stefany Brantley PT  11/19/2023

## 2023-11-19 NOTE — PLAN OF CARE
Goal Outcome Evaluation:      SBP in 80-90's map >65, afebrile  A&O x4, up to chair today,PT consulted  UOP 1100, No bm

## 2023-11-20 PROBLEM — I44.7 LBBB (LEFT BUNDLE BRANCH BLOCK): Status: ACTIVE | Noted: 2023-01-01

## 2023-11-20 PROBLEM — Z95.810 PRESENCE OF BIVENTRICULAR IMPLANTABLE CARDIOVERTER-DEFIBRILLATOR (ICD): Status: ACTIVE | Noted: 2023-01-01

## 2023-11-20 NOTE — THERAPY WOUND CARE TREATMENT
Acute Care - Wound/Debridement Treatment Note   [place]     Patient Name: Sundar Reeder  : 1970  MRN: [id number]  Today's Date: 2023                Admit Date: 2023    Visit Dx:    ICD-10-CM ICD-9-CM   1. Acute on chronic systolic congestive heart failure  I50.23 428.23     428.0   2. Chronic atrial fibrillation  I48.20 427.31   3. Hyperbilirubinemia  E80.6 782.4   4. Atrial fibrillation with RVR  I48.91 427.31   5. Hyponatremia  E87.1 276.1   6. Hypochloremia  E87.8 276.9   7. Elevated serum creatinine  R79.89 790.99   8. Physical deconditioning  R53.81 799.3   9. Elevated brain natriuretic peptide (BNP) level  R79.89 790.99   10. Acute pulmonary edema  J81.0 518.4   11. Leukocytosis, unspecified type  D72.829 288.60   12. Elevated transaminase level  R74.01 790.4       Patient Active Problem List   Diagnosis    Hyponatremia    STUART (acute kidney injury)    Atrial fibrillation with RVR    Essential hypertension    Anxiety associated with depression    Hypoalbuminemia    Hyperbilirubinemia    Elevated liver enzymes    Acute on chronic systolic congestive heart failure    Elevated troponin level not due myocardial infarction    Severe mitral regurgitation    Ventricular fibrillation    LBBB (left bundle branch block)    Presence of biventricular implantable cardioverter-defibrillator (ICD)        History reviewed. No pertinent past medical history.     Past Surgical History:   Procedure Laterality Date    BIVENTRICULLAR IMPLANTABLE CARDIOVERTER DEFIBRILLATOR PLACEMENT N/A 2023    Procedure: Implant ICD - bi ventricular;  Surgeon: Sundar Bhagat MD;  Location: [facility];  Service: Cardiovascular;  Laterality: N/A;          LDA Wound       Row Name               Wound 23 1200 Bilateral lower leg Blisters    Wound - Properties Group Placement Date: 23  -AH Placement Time: 1200  -AH Side: Bilateral  -AH Orientation: lower  -AH Location: leg  -AH Primary Wound Type:  Blisters  -AH    Retired Wound - Properties Group Placement Date: 11/08/23  -AH Placement Time: 1200  -AH Side: Bilateral  -AH Orientation: lower  -AH Location: leg  -AH Primary Wound Type: Blisters  -AH    Retired Wound - Properties Group Date first assessed: 11/08/23  -AH Time first assessed: 1200  -AH Side: Bilateral  -AH Location: leg  -AH Primary Wound Type: Blisters  -AH       Wound 11/15/23 2200 Bilateral perineum Pressure Injury    Wound - Properties Group Placement Date: 11/15/23  -IE Placement Time: 2200  -IE Side: Bilateral  -IE Location: perineum  -IE Primary Wound Type: Pressure inj  -IE    Retired Wound - Properties Group Placement Date: 11/15/23  -IE Placement Time: 2200  -IE Side: Bilateral  -IE Location: perineum  -IE Primary Wound Type: Pressure inj  -IE    Retired Wound - Properties Group Date first assessed: 11/15/23  -IE Time first assessed: 2200  -IE Side: Bilateral  -IE Location: perineum  -IE Primary Wound Type: Pressure inj  -IE       Wound 11/17/23 1232 Left lateral clavicle Incision    Wound - Properties Group Placement Date: 11/17/23  -BB Placement Time: 1232  -BB Side: Left  -BB Orientation: lateral  -BB Location: clavicle  -BB Primary Wound Type: Incision  -BB    Retired Wound - Properties Group Placement Date: 11/17/23  -BB Placement Time: 1232  -BB Side: Left  -BB Orientation: lateral  -BB Location: clavicle  -BB Primary Wound Type: Incision  -BB    Retired Wound - Properties Group Date first assessed: 11/17/23  -BB Time first assessed: 1232  -BB Side: Left  -BB Location: clavicle  -BB Primary Wound Type: Incision  -BB       Wound 11/19/23 0900 Left upper thoracic spine Abrasion    Wound - Properties Group Placement Date: 11/19/23  -EW Placement Time: 0900  -EW Side: Left  -EW Orientation: upper  -EW Location: thoracic spine  -EW Primary Wound Type: Abrasion  -EW    Retired Wound - Properties Group Placement Date: 11/19/23  -EW Placement Time: 0900  -EW Side: Left  -EW Orientation:  upper  -EW Location: thoracic spine  -EW Primary Wound Type: Abrasion  -EW    Retired Wound - Properties Group Date first assessed: 11/19/23  -EW Time first assessed: 0900  -EW Side: Left  -EW Location: thoracic spine  -EW Primary Wound Type: Abrasion  -EW              User Key  (r) = Recorded By, (t) = Taken By, (c) = Cosigned By      Initials Name Provider Type    Yessenia Muhammad, RN Registered Nurse    Celia Ortega, RN Registered Nurse    Becky Guzman Technologist    Negin Rocha RN Registered Nurse                  Wound 11/08/23 1200 Bilateral lower leg Blisters (Active)   Dressing Appearance dry;intact 11/20/23 0836   Closure None 11/20/23 0836   Base purple;pink;red;scab;slough 11/20/23 0900   Periwound ecchymotic 11/20/23 0900   Periwound Temperature warm 11/20/23 0900   Periwound Skin Turgor soft 11/20/23 0900   Edges irregular 11/20/23 0900   Drainage Characteristics/Odor serosanguineous 11/20/23 0836   Drainage Amount none 11/20/23 0836   Care, Wound cleansed with;soap and water;debrided 11/20/23 0836   Dressing Care dressing changed;dressing applied;foam;low-adherent;silver impregnated;other (see comments) 11/20/23 0836   Periwound Care dry periwound area maintained 11/20/23 0836       Wound 11/15/23 2200 Bilateral perineum Pressure Injury (Active)   Dressing Appearance dried drainage 11/20/23 0800   Closure None 11/20/23 0800   Base red;bleeding 11/20/23 0800   Periwound blanchable;pink;dry 11/20/23 0800   Periwound Temperature warm 11/20/23 0800   Periwound Skin Turgor soft 11/20/23 0800   Drainage Amount small 11/20/23 0800   Care, Wound other (see comments) 11/20/23 0800   Dressing Care dressing changed 11/20/23 0800       Wound 11/17/23 1232 Left lateral clavicle Incision (Active)   Dressing Appearance dry;intact 11/20/23 0800   Closure Unable to assess 11/20/23 0800   Base dressing in place, unable to visualize 11/20/23 0800   Periwound dry;intact 11/20/23 0800    Periwound Temperature warm 11/20/23 0800   Periwound Skin Turgor soft 11/20/23 0800       Wound 11/19/23 0900 Left upper thoracic spine Abrasion (Active)   Dressing Appearance intact;dry 11/20/23 0800   Base dressing in place, unable to visualize 11/20/23 0800   Periwound intact;blanchable 11/20/23 0800   Periwound Temperature warm 11/20/23 0800   Periwound Skin Turgor soft 11/20/23 0800      Lymphedema       Row Name 11/20/23 0836             Subjective Pain    Able to rate subjective pain? yes  -ES      Pre-Treatment Pain Level 0  -ES      Post-Treatment Pain Level 0  -ES         Lymphedema Edema Assessment    Ptting Edema Category By severity  -ES      Pitting Edema Moderate  -ES         Skin Changes/Observations    Location/Assessment Lower Extremity  -ES      Lower Extremity Conditions bilateral:;dry;scaly;scab(s)  -ES      Lower Extremity Color/Pigment bilateral:;erythema  -ES         Lymphedema Pulses/Capillary Refill    Lymphedema Pulses/Capillary Refill lower extremity pulses;capillary refill  -ES      Dorsalis Pedis Pulse right:;left:;+1 diminished  -ES      Capillary Refill lower extremity capillary refill  -ES      Lower Extremity Capillary Refill right:;left:;less than 3 seconds  -ES         Compression/Skin Care    Compression/Skin Care skin care;wrapping location;bandaging  -ES      Skin Care washed/dried;lotion applied  -ES      Wrapping Location lower extremity  -ES      Wrapping Location LE bilateral:;foot to knee  -ES      Wrapping Comments BLE MLW size 5 applied in overlapping fashion to allow for gradient compression  -ES                User Key  (r) = Recorded By, (t) = Taken By, (c) = Cosigned By      Initials Name Provider Type    Stefany Pineda PT Physical Therapist                    WOUND DEBRIDEMENT  Total area of Debridement: ~30cm2  Debridement Site 1  Location- Site 1: BLE  Selective Debridement- Site 1: Wound Surface >20cmsq  Instruments- Site 1: tweezers, scissors  Excised  Tissue Description- Site 1: moderate, other (comment) (dry, nonviable tissue)  Bleeding- Site 1: scant               PT Assessment (last 12 hours)       PT Evaluation and Treatment       Row Name 11/20/23 [facility]          Physical Therapy Time and Intention    Subjective Information no complaints  -ES     Document Type wound care;therapy note (daily note)  -ES     Mode of Treatment physical therapy  -ES     Patient Effort good  -ES     Symptoms Noted During/After Treatment none  -ES       Row Name 11/20/23 [facility]          General Information    Patient Profile Reviewed yes  -ES     Risks Reviewed patient:;increased discomfort  -ES     Benefits Reviewed patient:;improve function;decrease pain;improve skin integrity  -ES     Barriers to Rehab medically complex;previous functional deficit  -ES       Row Name 11/20/23 [facility]          Pain    Pretreatment Pain Rating 0/10 - no pain  -ES     Posttreatment Pain Rating 0/10 - no pain  -ES     Pre/Posttreatment Pain Comment denied pain throughout session  -ES       Row Name 11/20/23 [facility]          Cognition    Orientation Status (Cognition) oriented x 4  -ES       Row Name 11/20/23 [facility]          Wound 11/08/23 1200 Bilateral lower leg Blisters    Wound - Properties Group Placement Date: 11/08/23  -AH Placement Time: 1200  -AH Side: Bilateral  -AH Orientation: lower  -AH Location: leg  -AH Primary Wound Type: Blisters  -AH    Dressing Appearance dry;intact  -ES     Closure None  -ES     Base dry;pink;red;scab  -ES     Periwound dry  -ES     Periwound Temperature warm  -ES     Periwound Skin Turgor soft  -ES     Edges irregular  -ES     Drainage Characteristics/Odor serosanguineous  -ES     Drainage Amount none  -ES     Care, Wound cleansed with;soap and water;debrided  -ES     Dressing Care dressing changed;dressing applied;foam;low-adherent;silver impregnated;other (see comments)  size 5 MLW to BLE  -ES     Periwound Care dry periwound area maintained  -ES     Retired Wound -  Properties Group Placement Date: 11/08/23  -AH Placement Time: 1200  -AH Side: Bilateral  -AH Orientation: lower  -AH Location: leg  -AH Primary Wound Type: Blisters  -AH    Retired Wound - Properties Group Date first assessed: 11/08/23  -AH Time first assessed: 1200  -AH Side: Bilateral  -AH Location: leg  -AH Primary Wound Type: Blisters  -AH      Row Name             Wound 11/15/23 2200 Bilateral perineum Pressure Injury    Wound - Properties Group Placement Date: 11/15/23  -IE Placement Time: 2200  -IE Side: Bilateral  -IE Location: perineum  -IE Primary Wound Type: Pressure inj  -IE    Retired Wound - Properties Group Placement Date: 11/15/23  -IE Placement Time: 2200  -IE Side: Bilateral  -IE Location: perineum  -IE Primary Wound Type: Pressure inj  -IE    Retired Wound - Properties Group Date first assessed: 11/15/23  -IE Time first assessed: 2200  -IE Side: Bilateral  -IE Location: perineum  -IE Primary Wound Type: Pressure inj  -IE      Row Name             Wound 11/17/23 1232 Left lateral clavicle Incision    Wound - Properties Group Placement Date: 11/17/23  -BB Placement Time: 1232  -BB Side: Left  -BB Orientation: lateral  -BB Location: clavicle  -BB Primary Wound Type: Incision  -BB    Retired Wound - Properties Group Placement Date: 11/17/23  -BB Placement Time: 1232  -BB Side: Left  -BB Orientation: lateral  -BB Location: clavicle  -BB Primary Wound Type: Incision  -BB    Retired Wound - Properties Group Date first assessed: 11/17/23  -BB Time first assessed: 1232  -BB Side: Left  -BB Location: clavicle  -BB Primary Wound Type: Incision  -BB      Row Name             Wound 11/19/23 0900 Left upper thoracic spine Abrasion    Wound - Properties Group Placement Date: 11/19/23  -EW Placement Time: 0900  -EW Side: Left  -EW Orientation: upper  -EW Location: thoracic spine  -EW Primary Wound Type: Abrasion  -EW    Retired Wound - Properties Group Placement Date: 11/19/23  -EW Placement Time: 0900  -EW  Side: Left  -EW Orientation: upper  -EW Location: thoracic spine  -EW Primary Wound Type: Abrasion  -EW    Retired Wound - Properties Group Date first assessed: 11/19/23  -EW Time first assessed: 0900  -EW Side: Left  -EW Location: thoracic spine  -EW Primary Wound Type: Abrasion  -EW      Row Name 11/20/23 0836          Coping    Observed Emotional State calm;cooperative  -ES     Verbalized Emotional State acceptance  -ES     Trust Relationship/Rapport care explained;choices provided;emotional support provided  -ES     Family/Support Persons family  -ES     Involvement in Care not present at bedside  -ES       Row Name 11/20/23 0836          Plan of Care Review    Plan of Care Reviewed With patient  -ES     Progress improving  -ES     Outcome Evaluation BLE with decreased redness and edema this date. PT able to debride moderate amounts of dry, nonviable tissue to promote healing and decrease bioburden. BLE MLW size 5 applied to continue to improve venous return and overall function. Will f/u in 2-3 days.  -ES       Row Name 11/20/23 0836          Positioning and Restraints    Pre-Treatment Position in bed  -ES     Post Treatment Position bed  -ES     In Bed notified nsg;fowlers;call light within reach;encouraged to call for assist;exit alarm on;legs elevated;heels elevated  -ES               User Key  (r) = Recorded By, (t) = Taken By, (c) = Cosigned By      Initials Name Provider Type    Yessenia Muhammad, RN Registered Nurse    Celia Ortega, RN Registered Nurse    Becky Guzman Technologist    Stefany Pineda, FELICIANO Physical Therapist    Negin Rocha RN Registered Nurse                  Physical Therapy Education       Title: PT OT SLP Therapies (In Progress)       Topic: Physical Therapy (In Progress)       Point: Mobility training (In Progress)       Learning Progress Summary             Patient Acceptance, E,TB, NR by ES at 11/19/2023 1448    Acceptance, E, NR by AE at  11/15/2023 0904                         Point: Home exercise program (In Progress)       Learning Progress Summary             Patient Acceptance, E, NR by AE at 11/15/2023 0904                         Point: Body mechanics (In Progress)       Learning Progress Summary             Patient Acceptance, E,TB, NR by ES at 11/19/2023 1448    Acceptance, E, NR by AE at 11/15/2023 0904                         Point: Precautions (In Progress)       Learning Progress Summary             Patient Acceptance, E,TB, NR by ES at 11/19/2023 1448    Acceptance, E, NR by AE at 11/15/2023 0904                                         User Key       Initials Effective Dates Name Provider Type Discipline    AE 09/21/21 -  Kenroy Guzman, PT Physical Therapist PT    ES 08/11/22 -  Stefany Brantley PT Physical Therapist PT                    Recommendation and Plan  Anticipated Discharge Disposition (PT): inpatient rehabilitation facility  Planned Therapy Interventions (PT): balance training, bed mobility training, gait training, patient/family education, neuromuscular re-education, strengthening, stair training, transfer training, postural re-education  Therapy Frequency (PT): daily  Plan of Care Reviewed With: patient   Progress: improving       Progress: improving  Outcome Evaluation: BLE with decreased redness and edema this date. PT able to debride moderate amounts of dry, nonviable tissue to promote healing and decrease bioburden. BLE MLW size 5 applied to continue to improve venous return and overall function. Will f/u in 2-3 days.  Plan of Care Reviewed With: patient            Time Calculation   PT Charges       Row Name 11/20/23 1054             Time Calculation    Start Time 0836  -ES      PT Goal Re-Cert Due Date 11/29/23  -ES         Untimed Charges    Wound Care 89192 Multilayer comp below knee;19666 Selective debridement;61432 Add't debridement ea 20 cm  -ES      63905-Sziagkfrmj comp below knee 20  -ES       41619-Udjykkcxz debridement 20  -ES      60504-Uit't debridement ea 20 cm 15  -ES         Total Minutes    Untimed Charges Total Minutes 55  -ES       Total Minutes 55  -ES                User Key  (r) = Recorded By, (t) = Taken By, (c) = Cosigned By      Initials Name Provider Type    Stefany Pineda, PT Physical Therapist                      Therapy Charges for Today       Code Description Service Date Service Provider Modifiers Qty    46153896715 HC GAIT TRAINING EA 15 MIN 11/19/2023 Stefany Brantley, PT GP 1    26457937481 HC PT RE-EVAL ESTABLISHED PLAN 2 11/19/2023 Stefany Brantley, PT GP 1    34895108492 HC YENNY DEBRIDE OPEN WOUND UP TO 20CM 11/20/2023 Stefany Brantley, PT GP 1    64001489723 HC PT YENNY DEBRIDE OPEN WOUND EA ADD 20CM 11/20/2023 Stefany Brantley, PT GP 1    52289263653 HC PT MULTI LAYER COMP SYS BELOW KNEE 11/20/2023 Stefany Brantley, PT GP 1              PT G-Codes  Outcome Measure Options: AM-PAC 6 Clicks Basic Mobility (PT)  AM-PAC 6 Clicks Score (PT): 17  AM-PAC 6 Clicks Score (OT): 24  Zeng Total Score: 55       Stefany Brantley PT  11/20/2023

## 2023-11-20 NOTE — PROGRESS NOTES
"   LOS: 12 days    Patient Care Team:  Laura Summers PA-C as PCP - General (Family Medicine)    Subjective     Seen and examined at bedside, report no complaints .. denies chest pain or shortness of breath.       Objective     Vital Signs:  Blood pressure 90/77, pulse 84, temperature 98.6 °F (37 °C), temperature source Oral, resp. rate 18, height 180.3 cm (71\"), weight 84.4 kg (186 lb 1.1 oz), SpO2 97%.      Intake/Output Summary (Last 24 hours) at 11/20/2023 1553  Last data filed at 11/20/2023 1200  Gross per 24 hour   Intake 2509.78 ml   Output 2300 ml   Net 209.78 ml        11/19 0701 - 11/20 0700  In: 3131.8 [P.O.:2690; I.V.:441.8]  Out: 2400 [Urine:2400]    Physical Exam:    GENERAL: WD WM NAD  NEURO: Awake and alert, oriented. No focal deficit  PSYCHIATRIC: NMA. Cooperative with PE  EYE: PE, no icterus, no conjunctivitis  ENT: ommm, dentition intact,  Hearing intact  NECK: Supple , No JVD discernable,  Trachea midline  CV: + Edema, RRR  LUNGS:  Quiet,  Nonlabored resp.  Symmetrical expansion  ABDOMEN: Nondistended, soft nontender.  : No Lazaro, no palp bladder  SKIN: Warm and dry without rash      Labs:  Results from last 7 days   Lab Units 11/20/23  0410 11/19/23  0417 11/18/23  1054   WBC 10*3/mm3 12.53* 14.72* 15.47*   HEMOGLOBIN g/dL 10.9* 10.2* 11.7*   PLATELETS 10*3/mm3 124* 124* 163     Results from last 7 days   Lab Units 11/20/23  0410 11/19/23  0417 11/18/23  1054 11/17/23  0327 11/16/23  0332 11/15/23  2151 11/15/23  0433   SODIUM mmol/L 126* 128* 128* 122* 127* 124* 124*   POTASSIUM mmol/L 3.5 4.0 4.1 4.4 3.8 3.6 3.5   CHLORIDE mmol/L 84* 86* 85* 81* 83* 80* 80*   CO2 mmol/L 32.0* 30.0* 31.0* 27.0 25.0 26.0 26.0   BUN mg/dL 83* 83* 85* 104* 82* 82* 83*   CREATININE mg/dL 1.76* 1.81* 2.13* 2.73* 2.41* 2.32* 2.29*   CALCIUM mg/dL 9.1 8.9 8.7 10.0 9.9 11.3* 9.6   PHOSPHORUS mg/dL  --   --  3.0 4.9* 5.5*  --  4.3   MAGNESIUM mg/dL  --   --  2.4 2.8* 2.9* 3.6*  --    ALBUMIN g/dL 2.9* 2.8* 2.9*  " --  3.0*  --  3.3*     Results from last 7 days   Lab Units 11/20/23  0410   ALK PHOS U/L 128*   BILIRUBIN mg/dL 2.2*   ALT (SGPT) U/L 72*   AST (SGOT) U/L 42*         Estimated Creatinine Clearance: 59.3 mL/min (A) (by C-G formula based on SCr of 1.76 mg/dL (H)).         A/P:  ARF: Urine output remains nonoliguric.  Initially creatinine up to 2.5 with improvement down to 1.7 with volume reduction. Blood pressure remains low but stable.  FeNa remains quite low consistent with prerenal azotemia in setting of hypotension and poor cardiac function. For now would continue supportive care.   Strict I's and O's.  Monitor renal function closely for recovery.  Given patient's low cardiac function and hypotension, dialysis will be difficult if needed.      CKD: Unsure of baseline creatinine.     Hypotension: Likely with contributions from low cardiac output.  May have component of intravascular volume depletion despite mild lower extremity edema.  Started on midodrine >> will recommend increasing to 10 mg TID.      Hyponatremia: Likely hypervolemic..  Need to resume diuretics .      Volume: Volume up on exam..  monitor strict I's and O's for now.    CHF: Very low EF <20%.  Strict I's and O's. Will give a dose of low dose Bumex 0.5 mg today.       High risk and complexity patient.      Rohan Corrigan MD  11/20/23  15:53 EST

## 2023-11-20 NOTE — PLAN OF CARE
Goal Outcome Evaluation:              Outcome Evaluation: VSS overnight, BP's soft but MAP >65. Pain controlled with prn medications. Adequate uop. No BM.

## 2023-11-20 NOTE — PROGRESS NOTES
Cardiology Progress Note      Reason for visit:     Atrial fibrillation with RVR  Acute on chronic systolic heart failure  Elevated troponin not due to MI    IDENTIFICATION: 53-year-old gentleman who resides in [place], [place]    Active Hospital Problems    Diagnosis  POA    **Acute on chronic systolic congestive heart failure [I50.23]  Yes     Priority: High     Reported history of systolic heart failure dating back for 20 years.  Reportedly last echocardiogram 1 year ago had normal LVEF.  Data deficit  Echo (11/8/2023): LVEF less than 20%.  Severe MR.  RVSP less than 35 mmHg left ventricular ejection fraction appears to be less than 20%.          Ventricular fibrillation [I49.01]  No     Priority: High     Biventricular ICD placed by Dr. Sundar Bhagat 11/17/2023 for V-fib arrest, LBBB      Elevated troponin level not due myocardial infarction [R79.89]  Yes     Priority: High     Troponin elevated but flat in the setting of A-fib with RVR and acute systolic heart failure      Severe mitral regurgitation [I34.0]  Yes     Priority: High     Echo (11/8/2023): Severe MR.  LVEF less than 20%      Hyponatremia [E87.1]  Yes     Priority: High    STAURT (acute kidney injury) [N17.9]  Yes     Priority: High    Atrial fibrillation with RVR [I48.91]  Yes     Priority: High     Reported history of atrial fibrillation for 10 years  CHA2DS2-VASc=3  Presentation [facility] 11/8/2023 in A-fib with RVR in the setting of hyponatremia and STUART  External cardioversion (11/10/2023): Successfully restored NSR.  Return of atrial fibrillation 11/11/2023 but now returned to NSR on IV amiodarone      Essential hypertension [I10]  Yes     Priority: Medium    Hyperbilirubinemia [E80.6]  Yes     Priority: Medium    Elevated liver enzymes [R74.8]  Yes     Priority: Medium    Anxiety associated with depression [F41.8]  Yes     Priority: Low    Hypoalbuminemia [E88.09]  Yes     Priority: Low            Patient is sitting up in the bed  without complaints.  He denies any chest pain.  He remains on IV amiodarone drip.  No further ventricular arrhythmias noted at.  EP is following since post biventricular ICD placement.           Vital Sign Min/Max for last 24 hours  Temp  Min: 97.8 °F (36.6 °C)  Max: 98 °F (36.7 °C)   BP  Min: 76/64  Max: 119/82   Pulse  Min: 73  Max: 117   Resp  Min: 14  Max: 18   SpO2  Min: 87 %  Max: 99 %   Flow (L/min)  Min: 1  Max: 1      Intake/Output Summary (Last 24 hours) at 2023 0817  Last data filed at 2023 0645  Gross per 24 hour   Intake 3131.78 ml   Output 2400 ml   Net 731.78 ml           Physical Exam  Constitutional:       General: He is awake.   Cardiovascular:      Heart sounds: Murmur heard.   Pulmonary:      Effort: Pulmonary effort is normal.      Breath sounds: Normal breath sounds.   Musculoskeletal:      Right lower le+ Pitting Edema present.      Left lower le+ Pitting Edema present.   Skin:     General: Skin is warm and dry.   Neurological:      Mental Status: He is alert and oriented to person, place, and time.   Psychiatric:         Behavior: Behavior is cooperative.         Tele: Paced    Results Review (reviewed the patient's recent labs in the electronic medical record):     EKG (2023): Ventricular paced    CXR (2023): No pneumothorax.  Patchy bilateral airspace disease favored to be secondary to pulmonary edema    ECHO (2023): LVEF less than 20%.  Severe MR.  RVSP less than 35 mmHg    Results from last 7 days   Lab Units 23  0410 23  0417 23  1054 23  0327 23  0332 11/15/23  2151 11/15/23  0433 11/15/23  0433   SODIUM mmol/L 126* 128* 128* 122* 127* 124*  --  124*   POTASSIUM mmol/L 3.5 4.0 4.1 4.4 3.8 3.6  --  3.5   CHLORIDE mmol/L 84* 86* 85* 81* 83* 80*  --  80*   BUN mg/dL 83* 83* 85* 104* 82* 82*  --  83*   CREATININE mg/dL 1.76* 1.81* 2.13* 2.73* 2.41* 2.32*  --  2.29*   MAGNESIUM mg/dL  --   --  2.4 2.8* 2.9* 3.6*   < >  --      < > = values in this interval not displayed.         Results from last 7 days   Lab Units 11/20/23  0410 11/19/23  0417 11/18/23  1054   WBC 10*3/mm3 12.53* 14.72* 15.47*   HEMOGLOBIN g/dL 10.9* 10.2* 11.7*   HEMATOCRIT % 33.6* 31.0* 35.1*   PLATELETS 10*3/mm3 124* 124* 163       Lab Results   Component Value Date    HGBA1C 6.00 (H) 11/09/2023       Lab Results   Component Value Date    CHOL 81 11/09/2023    TRIG 60 11/09/2023    HDL 22 (L) 11/09/2023    LDL 45 11/09/2023                Acute on chronic systolic heart failure  History of systolic heart failure dating back 20 years  Prior to admission has been off heart failure medications due to loss of health insurance  Echo this admission shows LVEF less than 20%  Jardiance 10 mg daily       Polymorphic VT/ventricular fibrillation  Occurred during this hospitalization 11/15 requiring defibrillation and ACLS with return of ROSC  Biventricular ICD placed 11/17/2023  IV amiodarone drip infusing at 1 mg/minute for 24 hours per EP  Amiodarone 4 mg p.o. twice daily     Atrial fibrillation with RVR  History of A-fib for last 10 years.  Patient asymptomatic  Chronically anticoagulated with Eliquis and currently on 5 mg twice daily  Status post external cardioversion this admission on 11/10  Eliquis 5 mg twice daily for stroke prophylaxis  IV amiodarone drip infusing at 1 mg/minute and 400 mg p.o. twice daily  Currently in atrial fibrillation        Elevated troponin not due to myocardial infarction but likely due to exacerbation of acute CHF and A-fib with RVR  Troponins elevated and flat and EKG without acute ischemic changes  Reportedly had heart cath over 10 years ago with no intervention  Currently chest pain-free        Severe mitral regurgitation  Severe MR noted on recent echo  Bumex 2 mg IV twice daily     Hypertension/hypotension  BP has been low to borderline low  Current BP 94/65  Midodrine 5 mg 3 times daily     STUART on CKD  Nephrology following  Current  creatinine 1.76     Elevated LFTs  Likely due to hepatic congestion from acute CHF  LFTs improving        Hyponatremia  Sodium 126  Nephrology following                     Would defer ischemic evaluation for now given lack of anginal symptoms and long history of nonischemic cardiomyopathy.  However if intervention on mitral valve is planned will need future ischemic eval at some point  Continue Eliquis for stroke prophylaxis  Continue amiodarone for VF/VT and atrial fibrillation  May benefit from repeat external cardioversion.  Is currently ventricular paced but appears to be underlying A-fib  Defer Entresto/ACE/ARB/beta-blocker due to low blood pressure requiring midodrine    Electronically signed by NOELLE Parra, 11/20/23, 8:15 AM EST.

## 2023-11-20 NOTE — PROGRESS NOTES
"Intensive Care Follow-up     Hospital:  LOS: 12 days   Mr. Sundar Reeder, 53 y.o. male is followed for:   Acute on chronic systolic congestive heart failure        Subjective   Interval History:  The chart has been reviewed.  The patient has done well overnight.  He states that his chest is sore from his previous CPR.  Denies any headache or visual change.  He denies chest pain other than the sore ribs.    The patient's past medical, surgical and social history were reviewed and updated in Epic as appropriate.        Objective     Infusions:  amiodarone, 1 mg/min, Last Rate: 1 mg/min (11/20/23 0848)      Medications:  amiodarone, 400 mg, Oral, BID With Meals  [START ON 11/27/2023] amiodarone, 400 mg, Oral, Q24H  apixaban, 5 mg, Oral, Q12H  castor oil-balsam peru, 1 application , Topical, Q12H  empagliflozin, 10 mg, Oral, Daily  midodrine, 5 mg, Oral, TID   pharmacy consult - [facility], , Does not apply, Daily  senna-docusate sodium, 2 tablet, Oral, BID  sodium chloride, 10 mL, Intravenous, Q12H  sodium chloride, 10 mL, Intravenous, Q12H        Vital Sign Min/Max for last 24 hours  Temp  Min: 97.8 °F (36.6 °C)  Max: 98.6 °F (37 °C)   BP  Min: 76/64  Max: 116/76   Pulse  Min: 73  Max: 107   Resp  Min: 14  Max: 18   SpO2  Min: 83 %  Max: 98 %   Flow (L/min)  Min: 1  Max: 1       Input/Output for last 24 hour shift  11/19 0701 - 11/20 0700  In: 3131.8 [P.O.:2690; I.V.:441.8]  Out: 2400 [Urine:2400]      Objective:  General Appearance:  In no acute distress, uncomfortable and well-appearing.    Vital signs: (most recent): Blood pressure 98/74, pulse 92, temperature (P) 98.6 °F (37 °C), temperature source (P) Oral, resp. rate 18, height 180.3 cm (71\"), weight 84.4 kg (186 lb 1.1 oz), SpO2 95%.    HEENT: Normal HEENT exam.  (Nasal cannula O2)    Lungs:  Normal effort and normal respiratory rate.  He is not in respiratory distress.  No rales, wheezes or rhonchi.    Heart: Normal rate.  Irregular rhythm.  S1 normal and S2 normal.  " No murmur, gallop or friction rub.   Chest: Symmetric chest wall expansion. (Pacer wound over left precordium dressed and clean)  Abdomen: Abdomen is soft and non-distended.  Bowel sounds are normal.   There is no abdominal tenderness.   There is no mass. There is no splenomegaly. There is no hepatomegaly.   Extremities: There is dependent edema.  There is no deformity.    Neurological: Patient is alert and oriented to person, place and time.    Pupils:  Pupils are equal, round, and reactive to light.    Skin:  Warm and dry.                Results from last 7 days   Lab Units 11/20/23 0410 11/19/23 0417 11/18/23  1054   WBC 10*3/mm3 12.53* 14.72* 15.47*   HEMOGLOBIN g/dL 10.9* 10.2* 11.7*   PLATELETS 10*3/mm3 124* 124* 163     Results from last 7 days   Lab Units 11/20/23  0410 11/19/23  0417 11/18/23  1054 11/17/23  0327 11/16/23  0332   SODIUM mmol/L 126* 128* 128* 122* 127*   POTASSIUM mmol/L 3.5 4.0 4.1 4.4 3.8   CO2 mmol/L 32.0* 30.0* 31.0* 27.0 25.0   BUN mg/dL 83* 83* 85* 104* 82*   CREATININE mg/dL 1.76* 1.81* 2.13* 2.73* 2.41*   MAGNESIUM mg/dL  --   --  2.4 2.8* 2.9*   PHOSPHORUS mg/dL  --   --  3.0 4.9* 5.5*   GLUCOSE mg/dL 127* 95 112* 118* 118*     Estimated Creatinine Clearance: 59.3 mL/min (A) (by C-G formula based on SCr of 1.76 mg/dL (H)).            I reviewed the patient's results and images.     Assessment & Plan   Impression        Acute on chronic systolic congestive heart failure    Hyponatremia    STUART (acute kidney injury)    Atrial fibrillation with RVR    Essential hypertension    Anxiety associated with depression    Hypoalbuminemia    Hyperbilirubinemia    Elevated liver enzymes    Elevated troponin level not due myocardial infarction    Severe mitral regurgitation    Ventricular fibrillation    LBBB (left bundle branch block)    Presence of biventricular implantable cardioverter-defibrillator (ICD)       Plan        Mobilize as tolerated.  Continue on with physical therapy.  Amiodarone  per cardiology.  Continue with Eliquis.  Continue to monitor sodium closely.  Repeat labs been placed for tomorrow morning.  Pain control as necessary.  Plan to transition to telemetry.    Plan of care and goals reviewed with mulitdisciplinary/antibiotic stewardship team during rounds.   I discussed the patient's findings and my recommendations with patient and nursing staff       Billy Xie MD, [facility]  Pulmonology and Critical Care Medicine

## 2023-11-20 NOTE — PROGRESS NOTES
"   LOS: 11 days    Patient Care Team:  Laura Summers PA-C as PCP - General (Family Medicine)    Subjective     Seen and examined at bedside, reports feeling better, pain well controlled now.  Denies chest pain or shortness of breath.     Excellent urine output, creatinine down trending.     Objective     Vital Signs:  Blood pressure (!) 78/65, pulse 89, temperature 97.9 °F (36.6 °C), temperature source Oral, resp. rate 14, height 180.3 cm (71\"), weight 84.4 kg (186 lb 1.1 oz), SpO2 97%.      Intake/Output Summary (Last 24 hours) at 11/19/2023 1920  Last data filed at 11/19/2023 1800  Gross per 24 hour   Intake 2400 ml   Output 2500 ml   Net -100 ml        11/18 0701 - 11/19 0700  In: 960 [P.O.:960]  Out: 3050 [Urine:3050]    Physical Exam:        GENERAL: WD WM NAD  NEURO: Awake and alert, oriented. No focal deficit  PSYCHIATRIC: NMA. Cooperative with PE  EYE: PE, no icterus, no conjunctivitis  ENT: ommm, dentition intact,  Hearing intact  NECK: Supple , No JVD discernable,  Trachea midline  CV: + Edema, RRR  LUNGS:  Quiet,  Nonlabored resp.  Symmetrical expansion  ABDOMEN: Nondistended, soft nontender.  : No Lazaro, no palp bladder  SKIN: Warm and dry without rash      Labs:  Results from last 7 days   Lab Units 11/19/23  0417 11/18/23  1054 11/17/23  0327   WBC 10*3/mm3 14.72* 15.47* 22.22*   HEMOGLOBIN g/dL 10.2* 11.7* 12.4*   PLATELETS 10*3/mm3 124* 163 219     Results from last 7 days   Lab Units 11/19/23  0417 11/18/23  1054 11/17/23  0327 11/16/23  0332 11/15/23  2151 11/15/23  0433   SODIUM mmol/L 128* 128* 122* 127* 124* 124*   POTASSIUM mmol/L 4.0 4.1 4.4 3.8 3.6 3.5   CHLORIDE mmol/L 86* 85* 81* 83* 80* 80*   CO2 mmol/L 30.0* 31.0* 27.0 25.0 26.0 26.0   BUN mg/dL 83* 85* 104* 82* 82* 83*   CREATININE mg/dL 1.81* 2.13* 2.73* 2.41* 2.32* 2.29*   CALCIUM mg/dL 8.9 8.7 10.0 9.9 11.3* 9.6   PHOSPHORUS mg/dL  --  3.0 4.9* 5.5*  --  4.3   MAGNESIUM mg/dL  --  2.4 2.8* 2.9* 3.6*  --    ALBUMIN g/dL 2.8* " 2.9*  --  3.0*  --  3.3*     Results from last 7 days   Lab Units 11/19/23  0417   ALK PHOS U/L 118*   BILIRUBIN mg/dL 2.8*   ALT (SGPT) U/L 89*   AST (SGOT) U/L 55*         Estimated Creatinine Clearance: 57.6 mL/min (A) (by C-G formula based on SCr of 1.81 mg/dL (H)).         A/P:  ARF: Urine output remains nonoliguric.  Initially creatinine up to 2.5 with improvement down to 1.7 with volume reduction. Blood pressure remains low but stable.  FeNa remains quite low consistent with prerenal azotemia in setting of hypotension and poor cardiac function. For now would continue supportive care.   Strict I's and O's.  Monitor renal function closely for recovery.  Given patient's low cardiac function and hypotension, dialysis will be difficult if needed.      CKD: Unsure of baseline creatinine.     Hypotension: Blood pressure remains low.  Can support blood pressure with albumin as needed. Likely with contributions from low cardiac output.  May have component of intravascular volume depletion despite mild lower extremity edema.  Started on midodrine.      Hyponatremia: Likely hypervolemic..  Need to resume diuretics .      Volume: Volume up on exam..  monitor strict I's and O's for now.    CHF: Very low EF <20%.  Strict I's and O's.  Redosed diuretics as indicated. Would hold on restarting Jardiance until creatinine improved.    High risk and complexity patient.      Rohan Corrigan MD  11/19/23  19:20 EST

## 2023-11-20 NOTE — CASE MANAGEMENT/SOCIAL WORK
Continued Stay Note   Peggy     Patient Name: Sundar Reeder  MRN: [id number]  Today's Date: 11/20/2023    Admit Date: 11/8/2023    Plan: Rehab   Discharge Plan       Row Name 11/20/23 1151       Plan    Plan Rehab    Patient/Family in Agreement with Plan yes    Plan Comments Mr. Reeder wants to go to rehab at the time of discharge. His insurance only has acute rehab benefits his only rehab option in [facility] will be [facility]. He will need updated therapy notes. CM continues to follow.    Final Discharge Disposition Code 62 - inpatient rehab facility                   Discharge Codes    No documentation.                 Expected Discharge Date and Time       Expected Discharge Date Expected Discharge Time    Nov 24, 2023               Alexandro Aguilera RN

## 2023-11-20 NOTE — PLAN OF CARE
Goal Outcome Evaluation:  Plan of Care Reviewed With: patient        Progress: improving  Outcome Evaluation: BLE with decreased redness and edema this date. PT able to debride moderate amounts of dry, nonviable tissue to promote healing and decrease bioburden. BLE MLW size 5 applied to continue to improve venous return and overall function. Will f/u in 2-3 days.      Anticipated Discharge Disposition (PT): inpatient rehabilitation facility

## 2023-11-21 NOTE — THERAPY TREATMENT NOTE
Patient Name: Sundar Reeder  : 1970    MRN: [id number]                              Today's Date: 2023       Admit Date: 2023    Visit Dx:     ICD-10-CM ICD-9-CM   1. Acute on chronic systolic congestive heart failure  I50.23 428.23     428.0   2. Chronic atrial fibrillation  I48.20 427.31   3. Hyperbilirubinemia  E80.6 782.4   4. Atrial fibrillation with RVR  I48.91 427.31   5. Hyponatremia  E87.1 276.1   6. Hypochloremia  E87.8 276.9   7. Elevated serum creatinine  R79.89 790.99   8. Physical deconditioning  R53.81 799.3   9. Elevated brain natriuretic peptide (BNP) level  R79.89 790.99   10. Acute pulmonary edema  J81.0 518.4   11. Leukocytosis, unspecified type  D72.829 288.60   12. Elevated transaminase level  R74.01 790.4     Patient Active Problem List   Diagnosis    Hyponatremia    STUART (acute kidney injury)    Atrial fibrillation with RVR    Essential hypertension    Anxiety associated with depression    Hypoalbuminemia    Hyperbilirubinemia    Elevated liver enzymes    Acute on chronic systolic congestive heart failure    Elevated troponin level not due myocardial infarction    Severe mitral regurgitation    Ventricular fibrillation    LBBB (left bundle branch block)    Presence of biventricular implantable cardioverter-defibrillator (ICD)     History reviewed. No pertinent past medical history.  Past Surgical History:   Procedure Laterality Date    BIVENTRICULLAR IMPLANTABLE CARDIOVERTER DEFIBRILLATOR PLACEMENT N/A 2023    Procedure: Implant ICD - bi ventricular;  Surgeon: Sundar Bhagat MD;  Location: [facility] INVASIVE LOCATION;  Service: Cardiovascular;  Laterality: N/A;      General Information       Row Name 23 1156          Physical Therapy Time and Intention    Document Type therapy note (daily note)  -AY     Mode of Treatment physical therapy  -AY       Row Name 23 1156          General Information    Patient Profile Reviewed yes  -AY     Existing  Precautions/Restrictions fall;oxygen therapy device and L/min;pacemaker  -AY     Barriers to Rehab medically complex;previous functional deficit  -AY       Row Name 11/21/23 1156          Cognition    Orientation Status (Cognition) oriented x 4  -AY       Row Name 11/21/23 1156          Safety Issues, Functional Mobility    Safety Issues Affecting Function (Mobility) insight into deficits/self-awareness;sequencing abilities;awareness of need for assistance  -AY     Impairments Affecting Function (Mobility) balance;cognition;endurance/activity tolerance;strength;pain  -AY     Cognitive Impairments, Mobility Safety/Performance awareness, need for assistance;safety precaution awareness;safety precaution follow-through;insight into deficits/self-awareness  -AY               User Key  (r) = Recorded By, (t) = Taken By, (c) = Cosigned By      Initials Name Provider Type    AY Yessenia Carbajal PT Physical Therapist                   Mobility       Row Name 11/21/23 1329          Bed Mobility    Bed Mobility supine-sit  -AY     Supine-Sit [place] (Bed Mobility) contact guard;1 person assist  -AY     Assistive Device (Bed Mobility) bed rails;head of bed elevated  -AY     Comment, (Bed Mobility) increased time required d/t trunk pain/soreness  -AY       Row Name 11/21/23 1329          Sit-Stand Transfer    Sit-Stand [place] (Transfers) contact guard;verbal cues  -AY     Assistive Device (Sit-Stand Transfers) other (see comments)  UE support  -AY       Row Name 11/21/23 1329          Gait/Stairs (Locomotion)    [place] Level (Gait) verbal cues;contact guard  -AY     Assistive Device (Gait) other (see comments)  -AY     Distance in Feet (Gait) 60  -AY     Deviations/Abnormal Patterns (Gait) bilateral deviations;base of support, narrow;jesusita decreased;gait speed decreased;stride length decreased  -AY     Bilateral Gait Deviations forward flexed posture;heel strike decreased  -AY     Comment, (Gait/Stairs) cueing  for PLB and posture required throughout. pt refused to attempt further mobility d/t c/o rib soreness from fx.  -AY               User Key  (r) = Recorded By, (t) = Taken By, (c) = Cosigned By      Initials Name Provider Type    Yessenia Byrnes PT Physical Therapist                   Obj/Interventions       Row Name 11/21/23 1332          Motor Skills    Therapeutic Exercise hip;knee;ankle  -AY       Row Name 11/21/23 1332          Hip (Therapeutic Exercise)    Hip (Therapeutic Exercise) strengthening exercise  -AY     Hip Strengthening (Therapeutic Exercise) bilateral;aBduction;aDduction;10 repetitions;sitting  -AY       Row Name 11/21/23 1332          Knee (Therapeutic Exercise)    Knee (Therapeutic Exercise) strengthening exercise  -AY     Knee Strengthening (Therapeutic Exercise) LAQ (long arc quad);bilateral;SLR (straight leg raise);10 repetitions;sitting  -AY       Row Name 11/21/23 1332          Ankle (Therapeutic Exercise)    Ankle (Therapeutic Exercise) AROM (active range of motion)  -AY     Ankle AROM (Therapeutic Exercise) bilateral;dorsiflexion;plantarflexion;10 repetitions;sitting  -AY       Row Name 11/21/23 1332          Balance    Balance Assessment sitting static balance;sitting dynamic balance;sit to stand dynamic balance;standing static balance;standing dynamic balance  -AY     Static Sitting Balance standby assist  -AY     Dynamic Sitting Balance contact guard  -AY     Position, Sitting Balance unsupported;sitting edge of bed  -AY     Sit to Stand Dynamic Balance contact guard  -AY     Static Standing Balance contact guard  -AY     Dynamic Standing Balance contact guard  -AY     Position/Device Used, Standing Balance supported  -AY               User Key  (r) = Recorded By, (t) = Taken By, (c) = Cosigned By      Initials Name Provider Type    Yessenia Byrnes PT Physical Therapist                   Goals/Plan    No documentation.                  Clinical Impression       Row Name 11/21/23 1332           Pain    Pretreatment Pain Rating 0/10 - no pain  -AY     Posttreatment Pain Rating 0/10 - no pain  -AY     Pain Intervention(s) Ambulation/increased activity;Repositioned  -AY     Additional Documentation Pain Scale: Numbers Pre/Post-Treatment (Group)  -AY       Row Name 11/21/23 8176          Plan of Care Review    Plan of Care Reviewed With patient  -AY     Progress improving  -AY     Outcome Evaluation pt showing improvement as he required decreased assistance for mobility, but ambulated 50ft with CGA and UE support. Limited by c/o pain. d/c rec for IRF, based on current function.  -AY       Row Name 11/21/23 1332          Vital Signs    Pre Systolic BP Rehab 95  -AY     Pre Treatment Diastolic BP 76  -AY     Post Systolic BP Rehab 107  -AY     Post Treatment Diastolic BP 63  -AY     Pretreatment Heart Rate (beats/min) 108  -AY     Posttreatment Heart Rate (beats/min) 106  -AY     Pre SpO2 (%) 92  -AY     O2 Delivery Pre Treatment nasal cannula  -AY     O2 Delivery Intra Treatment nasal cannula  -AY     Post SpO2 (%) 99  -AY     O2 Delivery Post Treatment nasal cannula  -AY     Pre Patient Position Supine  -AY     Intra Patient Position Standing  -AY     Post Patient Position Sitting  -AY       Row Name 11/21/23 0058          Positioning and Restraints    Pre-Treatment Position in bed  -AY     Post Treatment Position chair  -AY     In Chair notified nsg;reclined;sitting;call light within reach;encouraged to call for assist;exit alarm on;legs elevated;waffle cushion  -AY               User Key  (r) = Recorded By, (t) = Taken By, (c) = Cosigned By      Initials Name Provider Type    AY Yessenia Carbajal, PT Physical Therapist                   Outcome Measures       Row Name 11/21/23 8393          How much help from another person do you currently need...    Turning from your back to your side while in flat bed without using bedrails? 4  -AY     Moving from lying on back to sitting on the side of a flat bed  without bedrails? 3  -AY     Moving to and from a bed to a chair (including a wheelchair)? 3  -AY     Standing up from a chair using your arms (e.g., wheelchair, bedside chair)? 3  -AY     Climbing 3-5 steps with a railing? 2  -AY     To walk in hospital room? 3  -AY     AM-PAC 6 Clicks Score (PT) 18  -AY     Highest Level of Mobility Goal 6 --> Walk 10 steps or more  -AY       Row Name 11/21/23 1334          Functional Assessment    Outcome Measure Options AM-PAC 6 Clicks Basic Mobility (PT)  -AY               User Key  (r) = Recorded By, (t) = Taken By, (c) = Cosigned By      Initials Name Provider Type    AY Yessenia Carbajal PT Physical Therapist                                 Physical Therapy Education       Title: PT OT SLP Therapies (In Progress)       Topic: Physical Therapy (In Progress)       Point: Mobility training (In Progress)       Learning Progress Summary             Patient Acceptance, E,TB, NR by AY at 11/21/2023 1334    Acceptance, E,TB, NR by ES at 11/19/2023 1448    Acceptance, E, NR by AE at 11/15/2023 0904                         Point: Home exercise program (In Progress)       Learning Progress Summary             Patient Acceptance, E,TB, NR by AY at 11/21/2023 1334    Acceptance, E, NR by AE at 11/15/2023 0904                         Point: Body mechanics (In Progress)       Learning Progress Summary             Patient Acceptance, E,TB, NR by AY at 11/21/2023 1334    Acceptance, E,TB, NR by ES at 11/19/2023 1448    Acceptance, E, NR by AE at 11/15/2023 0904                         Point: Precautions (In Progress)       Learning Progress Summary             Patient Acceptance, E,TB, NR by AY at 11/21/2023 1334    Acceptance, E,TB, NR by ES at 11/19/2023 1448    Acceptance, E, NR by AE at 11/15/2023 0904                                         User Key       Initials Effective Dates Name Provider Type Discipline    AY 11/10/20 -  Yessenia Carbajal, FELICIANO Physical Therapist PT    AE 09/21/21 -   Kenroy Guzman, PT Physical Therapist PT    ES 08/11/22 -  Stefany Brantley, PT Physical Therapist PT                  PT Recommendation and Plan     Plan of Care Reviewed With: patient  Progress: improving  Outcome Evaluation: pt showing improvement as he required decreased assistance for mobility, but ambulated 50ft with CGA and UE support. Limited by c/o pain. d/c rec for IRF, based on current function.     Time Calculation:         PT Charges       Row Name 11/21/23 1335             Time Calculation    Start Time 1100  -AY      PT Received On 11/21/23  -AY         Timed Charges    94966 - PT Therapeutic Exercise Minutes 10  -AY      37047 - PT Therapeutic Activity Minutes 13  -AY         Total Minutes    Timed Charges Total Minutes 23  -AY       Total Minutes 23  -AY                User Key  (r) = Recorded By, (t) = Taken By, (c) = Cosigned By      Initials Name Provider Type    AY Yessenia Carbajal, PT Physical Therapist                  Therapy Charges for Today       Code Description Service Date Service Provider Modifiers Qty    34124433508 HC PT THER PROC EA 15 MIN 11/21/2023 Yessenia Carbajal, PT GP 1    33390005341 HC PT THERAPEUTIC ACT EA 15 MIN 11/21/2023 Yessenia Carbajal, PT GP 1            PT G-Codes  Outcome Measure Options: AM-PAC 6 Clicks Basic Mobility (PT)  AM-PAC 6 Clicks Score (PT): 18  AM-PAC 6 Clicks Score (OT): 24  Zeng Total Score: 55  PT Discharge Summary  Anticipated Discharge Disposition (PT): inpatient rehabilitation facility    Yessenia Carbajal PT  11/21/2023

## 2023-11-21 NOTE — PAYOR COMM NOTE
"Barbie Lennon (53 y.o. Male)       Date of Birth   1970    Social Security Number       Address   [address] [place] [address]    Home Phone   [phone]    MRN   [id number]       Anglican   Sikh    Marital Status   Single                            Admission Date   11/8/23    Admission Type   Emergency    Admitting Provider   Franko Noland MD    Attending Provider   Jatin Olmedo DO    Department, Room/Bed   [facility] 2A ICU, N216/1       Discharge Date       Discharge Disposition       Discharge Destination                                 Attending Provider: Jatin Olmedo DO    Allergies: Sulfa Antibiotics    Isolation: None   Infection: None   Code Status: CPR    Ht: 180.3 cm (71\")   Wt: 84.4 kg (186 lb 1.1 oz)    Admission Cmt: None   Principal Problem: Acute on chronic systolic congestive heart failure [I50.23]                   Active Insurance as of 11/8/2023       Primary Coverage       Payor Plan Insurance Group Employer/Plan Group    [facility] PLAN [facility] [id number]       Payor Plan Address Payor Plan Phone Number Payor Plan Fax Number Effective Dates    [address]   11/1/2023 - None Entered    [facility] [id number]         Subscriber Name Subscriber Birth Date Member ID       BARBIE LENNON 1970 [id number]                     Emergency Contacts        (Rel.) Home Phone Work Phone Mobile Phone    Vera Ruvalcaba (Sister) -- -- [phone]    Chapin Ruvalcaba (Brother) -- -- [phone]    Cynthia Hutchins (Other) -- -- [phone]              [place]: [facility] [id number] Tax ID [id number]  Insurance Information                  [facility] [place]: --    Subscriber: Barbie Lennon Subscriber#: [id number]    Group#: [id number] Precert#: [id number]          Current Facility-Administered Medications   Medication Dose Route Frequency Provider Last Rate Last Admin    " acetaminophen (TYLENOL) tablet 650 mg  650 mg Oral Q4H PRN Sundar Bagley III, MD   650 mg at 11/18/23 1312    Or    acetaminophen (TYLENOL) 160 MG/5ML oral solution 650 mg  650 mg Oral Q4H PRN Sundar Bagley III, MD        Or    acetaminophen (TYLENOL) suppository 650 mg  650 mg Rectal Q4H PRN Sundar Bagley III, MD        amiodarone (PACERONE) tablet 400 mg  400 mg Oral BID With Meals Sundar Bhagat MD   400 mg at 11/21/23 0838    [START ON 11/27/2023] amiodarone (PACERONE) tablet 400 mg  400 mg Oral Q24H Sundar Bhagat MD        apixaban (ELIQUIS) tablet 5 mg  5 mg Oral Q12H Sundar Bhagat MD   5 mg at 11/21/23 0839    sennosides-docusate (PERICOLACE) 8.6-50 MG per tablet 2 tablet  2 tablet Oral BID Sundar Bagley III, MD   2 tablet at 11/20/23 0822    And    polyethylene glycol (MIRALAX) packet 17 g  17 g Oral Daily PRN Sundar Bagley III, MD        And    bisacodyl (DULCOLAX) EC tablet 5 mg  5 mg Oral Daily PRN Sundar Bagley III, MD        And    bisacodyl (DULCOLAX) suppository 10 mg  10 mg Rectal Daily PRN Sundar Bagley III, MD        bumetanide (BUMEX) injection 0.5 mg  0.5 mg Intravenous Daily Rohan Corrigan MD        Calcium Replacement - Follow Nurse / BPA Driven Protocol   Does not apply PRN Sundar Bagley III, MD        castor oil-balsam peru (VENELEX) ointment 1 application   1 application  Topical Q12H Jatin Olmedo, DO   1 application  at 11/21/23 0839    empagliflozin (JARDIANCE) tablet 10 mg  10 mg Oral Daily Sundar Bhagat MD   10 mg at 11/21/23 0839    influenza vac split quad (FLUZONE,FLUARIX,AFLURIA,FLULAVAL) injection 0.5 mL  0.5 mL Intramuscular During Hospitalization Sundar Bagley III, MD        Magnesium Low Dose Replacement - Follow Nurse / BPA Driven Protocol   Does not apply PRN Sundar Bagley III, MD        midodrine (PROAMATINE) tablet 5 mg  5 mg Oral TID AC Franko Noland MD   5 mg at 11/21/23 0838    ondansetron (ZOFRAN) tablet 4 mg  4 mg Oral Q6H PRN Sundar Bagley III, MD         Or    ondansetron (ZOFRAN) injection 4 mg  4 mg Intravenous Q6H PRN Sundar Bagley III, MD        oxyCODONE-acetaminophen (PERCOCET) 5-325 MG per tablet 1 tablet  1 tablet Oral Q4H PRN Billy Xie MD   1 tablet at 11/21/23 1012    Pharmacy Consult - MTM   Does not apply Daily Sundar Bagley III, MD   Given at 11/18/23 2117    Phosphorus Replacement - Follow Nurse / BPA Driven Protocol   Does not apply PRN Sundar Bagley III, MD        Potassium Replacement - Follow Nurse / BPA Driven Protocol   Does not apply PRN Sundar Bagley III, MD        sodium chloride 0.9 % flush 10 mL  10 mL Intravenous PRN Sundar Bagley III, MD        sodium chloride 0.9 % flush 10 mL  10 mL Intravenous Q12H Sundar Bagley III, MD   10 mL at 11/20/23 2031    sodium chloride 0.9 % flush 10 mL  10 mL Intravenous PRN Sundar Bagley III, MD        sodium chloride 0.9 % flush 10 mL  10 mL Intravenous Q12H Sundar Bhagat MD   10 mL at 11/20/23 2031    sodium chloride 0.9 % flush 10 mL  10 mL Intravenous PRN Sundar Bhagat MD        sodium chloride 0.9 % infusion 40 mL  40 mL Intravenous PRN Sundar Bagley III, MD        sodium chloride 0.9 % infusion 40 mL  40 mL Intravenous PRN Sundar Bhagat MD         Lab Results (last 24 hours)       Procedure Component Value Units Date/Time    Basic Metabolic Panel [[id number]]  (Abnormal) Collected: 11/21/23 0336    Specimen: Blood Updated: 11/21/23 0413     Glucose 106 mg/dL      BUN 79 mg/dL      Creatinine 1.47 mg/dL      Sodium 129 mmol/L      Potassium 4.4 mmol/L      Chloride 90 mmol/L      CO2 30.0 mmol/L      Calcium 8.8 mg/dL      BUN/Creatinine Ratio 53.7     Anion Gap 9.0 mmol/L      eGFR 57.4 mL/min/1.73     Narrative:      GFR Normal >60  Chronic Kidney Disease <60  Kidney Failure <15      Magnesium [[id number]]  (Normal) Collected: 11/21/23 0336    Specimen: Blood Updated: 11/21/23 0413     Magnesium 2.1 mg/dL     CBC & Differential [[id number]]  (Abnormal) Collected: 11/21/23 0336     Specimen: Blood Updated: 11/21/23 0355    Narrative:      The following orders were created for panel order CBC & Differential.  Procedure                               Abnormality         Status                     ---------                               -----------         ------                     CBC Auto Differential[[id number]]        Abnormal            Final result                 Please view results for these tests on the individual orders.    CBC Auto Differential [[id number]]  (Abnormal) Collected: 11/21/23 0336    Specimen: Blood Updated: 11/21/23 0355     WBC 10.25 10*3/mm3      RBC 4.16 10*6/mm3      Hemoglobin 10.9 g/dL      Hematocrit 33.5 %      MCV 80.5 fL      MCH 26.2 pg      MCHC 32.5 g/dL      RDW 14.9 %      RDW-SD 43.7 fl      MPV 11.9 fL      Platelets 149 10*3/mm3      Neutrophil % 78.5 %      Lymphocyte % 10.5 %      Monocyte % 6.3 %      Eosinophil % 2.3 %      Basophil % 0.3 %      Immature Grans % 2.1 %      Neutrophils, Absolute 8.03 10*3/mm3      Lymphocytes, Absolute 1.08 10*3/mm3      Monocytes, Absolute 0.65 10*3/mm3      Eosinophils, Absolute 0.24 10*3/mm3      Basophils, Absolute 0.03 10*3/mm3      Immature Grans, Absolute 0.22 10*3/mm3      nRBC 1.6 /100 WBC     Potassium [[id number]]  (Normal) Collected: 11/20/23 1502    Specimen: Blood Updated: 11/20/23 1602     Potassium 4.1 mmol/L           Imaging Results (Last 24 Hours)       ** No results found for the last 24 hours. **          Orders (last 24 hrs)        Start     Ordered    11/30/23 1400  amiodarone (PACERONE) tablet 200 mg  Daily,   Status:  Discontinued        See Hyperspace for full Linked Orders Report.    11/09/23 1000    11/27/23 0900  amiodarone (PACERONE) tablet 400 mg  Every 24 Hours Scheduled         11/20/23 1211    11/21/23 1600  bumetanide (BUMEX) injection 0.5 mg  Daily         11/21/23 0923    11/21/23 0907  OT Consult: Eval & Treat  Once         11/21/23 0906    11/21/23 0906  oxyCODONE-acetaminophen  (PERCOCET) 5-325 MG per tablet 1 tablet  Every 4 Hours PRN         11/21/23 0906    11/21/23 0600  Basic Metabolic Panel  Morning Draw         11/20/23 1243    11/21/23 0600  Magnesium  Morning Draw         11/20/23 1243    11/21/23 0600  CBC & Differential  Morning Draw         11/20/23 1243    11/21/23 0600  CBC Auto Differential  PROCEDURE ONCE         11/20/23 2202    11/20/23 1800  amiodarone (PACERONE) tablet 400 mg  2 Times Daily With Meals         11/20/23 1211    11/20/23 1800  DIET MESSAGE Only vanilla or strawberry premier protein  Daily With Breakfast, Lunch & Dinner      Comments: Only vanilla or strawberry premier protein    11/20/23 1659    11/20/23 1700  bumetanide (BUMEX) injection 0.5 mg  Once         11/20/23 1602    11/20/23 1600  Potassium  Timed,   Status:  Canceled         11/20/23 0503    11/20/23 1555  Diet: Cardiac Diets, Fluid Restriction (240 mL/tray) Diets; Healthy Heart (2-3 Na+); 1000 mL/day; Texture: Regular Texture (IDDSI 7); Fluid Consistency: Thin (IDDSI 0)  Diet Effective Now         11/20/23 1554    11/20/23 1410  Potassium  Timed         11/20/23 0510    11/20/23 1244  Transfer Patient  Once         11/20/23 1243    11/20/23 0845  amiodarone (PACERONE) tablet 400 mg  2 Times Daily With Meals,   Status:  Discontinued         11/20/23 0747    11/20/23 0521  Potassium Replacement - Follow Nurse / BPA Driven Protocol  As Needed         11/20/23 0521    11/19/23 1615  amiodarone 360 mg in 200 mL D5W infusion  Continuous         11/19/23 1515    11/19/23 1130  midodrine (PROAMATINE) tablet 5 mg  3 Times Daily Before Meals         11/19/23 0955    11/19/23 0000  Discharge Follow-up with Specified Provider: Sundar Bhagat MD         11/19/23 1512    11/18/23 1815  oxyCODONE-acetaminophen (PERCOCET)  MG per tablet 1 tablet  Every 4 Hours PRN,   Status:  Discontinued         11/18/23 1815    11/18/23 1200  DIET MESSAGE Please send up an ensure w/ patient meals. Any flavor is fine. Thank  you.  Daily With Breakfast, Lunch & Dinner      Comments: Please send up an ensure w/ patient meals. Any flavor is fine. Thank you.    11/18/23 0931    11/18/23 0930  empagliflozin (JARDIANCE) tablet 10 mg  Daily         11/18/23 0837    11/17/23 2100  sodium chloride 0.9 % flush 10 mL  Every 12 Hours Scheduled         11/17/23 1520    11/17/23 1520  sodium chloride 0.9 % flush 10 mL  As Needed         11/17/23 1520    11/17/23 1520  sodium chloride 0.9 % infusion 40 mL  As Needed         11/17/23 1520    11/16/23 1800  Dietary Nutrition Supplements Other (see comment); Vanilla ENsure  Daily With Lunch & Dinner       11/16/23 1522    11/16/23 1415  castor oil-balsam peru (VENELEX) ointment 1 application   Every 12 Hours Scheduled         11/16/23 1321    11/10/23 0742  Magnesium Low Dose Replacement - Follow Nurse / BPA Driven Protocol  As Needed         11/10/23 0742    11/10/23 0742  Phosphorus Replacement - Follow Nurse / BPA Driven Protocol  As Needed         11/10/23 0742    11/10/23 0742  Calcium Replacement - Follow Nurse / BPA Driven Protocol  As Needed         11/10/23 0742    11/09/23 0907  Monitor QTc  Every Shift       11/09/23 1000    11/08/23 1800  Oral Care  2 Times Daily       11/08/23 1130    11/08/23 1400  Incentive Spirometry  Every 4 Hours While Awake       11/08/23 1130    11/08/23 1200  Vital Signs  Every 4 Hours       11/08/23 1130    11/08/23 1200  Strict Intake & Output  Every Hour       11/08/23 1130    11/08/23 1154  influenza vac split quad (FLUZONE,FLUARIX,AFLURIA,FLULAVAL) injection 0.5 mL  During Hospitalization         11/08/23 1154    11/08/23 1146  sodium chloride 0.9 % flush 10 mL  Every 12 Hours Scheduled         11/08/23 1130    11/08/23 1146  sennosides-docusate (PERICOLACE) 8.6-50 MG per tablet 2 tablet  2 Times Daily        See Hyperspace for full Linked Orders Report.    11/08/23 1130    11/08/23 1132  Pharmacy Consult - MTM  Daily         11/08/23 1116    11/08/23 1139   Intake & Output  Every Shift       11/08/23 1130    11/08/23 1131  Daily Weights  Daily       11/08/23 1130    11/08/23 1130  polyethylene glycol (MIRALAX) packet 17 g  Daily PRN        See Hyperspace for full Linked Orders Report.    11/08/23 1130    11/08/23 1130  bisacodyl (DULCOLAX) EC tablet 5 mg  Daily PRN        See Hyperspace for full Linked Orders Report.    11/08/23 1130    11/08/23 1130  bisacodyl (DULCOLAX) suppository 10 mg  Daily PRN        See Hyperspace for full Linked Orders Report.    11/08/23 1130    11/08/23 1130  acetaminophen (TYLENOL) tablet 650 mg  Every 4 Hours PRN        See Hyperspace for full Linked Orders Report.    11/08/23 1130    11/08/23 1130  acetaminophen (TYLENOL) 160 MG/5ML oral solution 650 mg  Every 4 Hours PRN        See Hyperspace for full Linked Orders Report.    11/08/23 1130    11/08/23 1130  acetaminophen (TYLENOL) suppository 650 mg  Every 4 Hours PRN        See Hyperspace for full Linked Orders Report.    11/08/23 1130    11/08/23 1130  ondansetron (ZOFRAN) tablet 4 mg  Every 6 Hours PRN        See Hyperspace for full Linked Orders Report.    11/08/23 1130    11/08/23 1130  ondansetron (ZOFRAN) injection 4 mg  Every 6 Hours PRN        See Hyperspace for full Linked Orders Report.    11/08/23 1130    11/08/23 1130  sodium chloride 0.9 % flush 10 mL  As Needed         11/08/23 1130    11/08/23 1130  sodium chloride 0.9 % infusion 40 mL  As Needed         11/08/23 1130    11/08/23 1016  apixaban (ELIQUIS) tablet 5 mg  Every 12 Hours Scheduled         11/08/23 1000    11/08/23 0548  sodium chloride 0.9 % flush 10 mL  As Needed         11/08/23 0549    Unscheduled  Up With Assistance  As Needed       11/08/23 1130    Unscheduled  Oxygen Therapy- Nasal Cannula; Titrate 1-6 LPM Per SpO2; 90 - 95%  Continuous PRN       11/08/23 1130    --  metoprolol succinate XL (TOPROL-XL) 50 MG 24 hr tablet  Daily         11/08/23 0951    --  lisinopril (PRINIVIL,ZESTRIL) 20 MG tablet  Daily  "        11/08/23 0951    --  apixaban (ELIQUIS) 5 MG tablet tablet  2 Times Daily         11/08/23 0951    Signed and Held  metoprolol succinate XL (TOPROL-XL) 24 hr tablet 50 mg  Daily,   Status:  Canceled         Signed and Held    --  SCANNED - TELEMETRY           11/08/23 0000    --  SCANNED - TELEMETRY           11/08/23 0000    --  SCANNED - TELEMETRY           11/08/23 0000    Signed and Held  sodium chloride 0.9 % flush 3 mL  Every 12 Hours Scheduled,   Status:  Canceled         Signed and Held    Signed and Held  sodium chloride 0.9 % flush 3-10 mL  As Needed,   Status:  Canceled         Signed and Held    Signed and Held  sodium chloride 0.9 % infusion 40 mL  As Needed,   Status:  Canceled         Signed and Held    Signed and Held  lactated ringers bolus 250 mL  Once As Needed,   Status:  Canceled         Signed and Held    --  SCANNED - TELEMETRY           11/08/23 0000    --  SCANNED - TELEMETRY           11/08/23 0000    --  SCANNED - TELEMETRY           11/08/23 0000    --  SCANNED - TELEMETRY           11/08/23 0000    --  SCANNED - TELEMETRY           11/08/23 0000                     Physician Progress Notes (last 24 hours)        Rohan Corrigan MD at 11/20/23 1553             LOS: 12 days    Patient Care Team:  Laura Summers PA-C as PCP - General (Family Medicine)    Subjective     Seen and examined at bedside, report no complaints .. denies chest pain or shortness of breath.       Objective     Vital Signs:  Blood pressure 90/77, pulse 84, temperature 98.6 °F (37 °C), temperature source Oral, resp. rate 18, height 180.3 cm (71\"), weight 84.4 kg (186 lb 1.1 oz), SpO2 97%.      Intake/Output Summary (Last 24 hours) at 11/20/2023 1553  Last data filed at 11/20/2023 1200  Gross per 24 hour   Intake 2509.78 ml   Output 2300 ml   Net 209.78 ml        11/19 0701 - 11/20 0700  In: 3131.8 [P.O.:2690; I.V.:441.8]  Out: 2400 [Urine:2400]    Physical Exam:    GENERAL: WD WM NAD  NEURO: Awake and " alert, oriented. No focal deficit  PSYCHIATRIC: NMA. Cooperative with PE  EYE: PE, no icterus, no conjunctivitis  ENT: ommm, dentition intact,  Hearing intact  NECK: Supple , No JVD discernable,  Trachea midline  CV: + Edema, RRR  LUNGS:  Quiet,  Nonlabored resp.  Symmetrical expansion  ABDOMEN: Nondistended, soft nontender.  : No Lazaro, no palp bladder  SKIN: Warm and dry without rash      Labs:  Results from last 7 days   Lab Units 11/20/23  0410 11/19/23  0417 11/18/23  1054   WBC 10*3/mm3 12.53* 14.72* 15.47*   HEMOGLOBIN g/dL 10.9* 10.2* 11.7*   PLATELETS 10*3/mm3 124* 124* 163     Results from last 7 days   Lab Units 11/20/23  0410 11/19/23  0417 11/18/23  1054 11/17/23  0327 11/16/23  0332 11/15/23  2151 11/15/23  0433   SODIUM mmol/L 126* 128* 128* 122* 127* 124* 124*   POTASSIUM mmol/L 3.5 4.0 4.1 4.4 3.8 3.6 3.5   CHLORIDE mmol/L 84* 86* 85* 81* 83* 80* 80*   CO2 mmol/L 32.0* 30.0* 31.0* 27.0 25.0 26.0 26.0   BUN mg/dL 83* 83* 85* 104* 82* 82* 83*   CREATININE mg/dL 1.76* 1.81* 2.13* 2.73* 2.41* 2.32* 2.29*   CALCIUM mg/dL 9.1 8.9 8.7 10.0 9.9 11.3* 9.6   PHOSPHORUS mg/dL  --   --  3.0 4.9* 5.5*  --  4.3   MAGNESIUM mg/dL  --   --  2.4 2.8* 2.9* 3.6*  --    ALBUMIN g/dL 2.9* 2.8* 2.9*  --  3.0*  --  3.3*     Results from last 7 days   Lab Units 11/20/23 0410   ALK PHOS U/L 128*   BILIRUBIN mg/dL 2.2*   ALT (SGPT) U/L 72*   AST (SGOT) U/L 42*         Estimated Creatinine Clearance: 59.3 mL/min (A) (by C-G formula based on SCr of 1.76 mg/dL (H)).        A/P:  ARF: Urine output remains nonoliguric.  Initially creatinine up to 2.5 with improvement down to 1.7 with volume reduction. Blood pressure remains low but stable.  FeNa remains quite low consistent with prerenal azotemia in setting of hypotension and poor cardiac function. For now would continue supportive care.   Strict I's and O's.  Monitor renal function closely for recovery.  Given patient's low cardiac function and hypotension, dialysis will be  difficult if needed.      CKD: Unsure of baseline creatinine.     Hypotension: Likely with contributions from low cardiac output.  May have component of intravascular volume depletion despite mild lower extremity edema.  Started on midodrine >> will recommend increasing to 10 mg TID.      Hyponatremia: Likely hypervolemic..  Need to resume diuretics .      Volume: Volume up on exam..  monitor strict I's and O's for now.    CHF: Very low EF <20%.  Strict I's and O's. Will give a dose of low dose Bumex 0.5 mg today.       High risk and complexity patient.      Rohan Corrigan MD  11/20/23  15:53 EST          Electronically signed by Rohan Corrigan MD at 11/20/23 1601       Billy Xie MD at 11/20/23 1239            Intensive Care Follow-up     Hospital:  LOS: 12 days   Mr. Sundar Reeder, 53 y.o. male is followed for:   Acute on chronic systolic congestive heart failure     Subjective   Subjective   Interval History:  The chart has been reviewed.  The patient has done well overnight.  He states that his chest is sore from his previous CPR.  Denies any headache or visual change.  He denies chest pain other than the sore ribs.    The patient's past medical, surgical and social history were reviewed and updated in Epic as appropriate.     Objective   Objective     Infusions:  amiodarone, 1 mg/min, Last Rate: 1 mg/min (11/20/23 0848)      Medications:  amiodarone, 400 mg, Oral, BID With Meals  [START ON 11/27/2023] amiodarone, 400 mg, Oral, Q24H  apixaban, 5 mg, Oral, Q12H  castor oil-balsam peru, 1 application , Topical, Q12H  empagliflozin, 10 mg, Oral, Daily  midodrine, 5 mg, Oral, TID   pharmacy consult - [facility], , Does not apply, Daily  senna-docusate sodium, 2 tablet, Oral, BID  sodium chloride, 10 mL, Intravenous, Q12H  sodium chloride, 10 mL, Intravenous, Q12H        Vital Sign Min/Max for last 24 hours  Temp  Min: 97.8 °F (36.6 °C)  Max: 98.6 °F (37 °C)   BP  Min: 76/64  Max: 116/76   Pulse  Min: 73  Max:  "107   Resp  Min: 14  Max: 18   SpO2  Min: 83 %  Max: 98 %   Flow (L/min)  Min: 1  Max: 1       Input/Output for last 24 hour shift  11/19 0701 - 11/20 0700  In: 3131.8 [P.O.:2690; I.V.:441.8]  Out: 2400 [Urine:2400]      Objective:  General Appearance:  In no acute distress, uncomfortable and well-appearing.    Vital signs: (most recent): Blood pressure 98/74, pulse 92, temperature (P) 98.6 °F (37 °C), temperature source (P) Oral, resp. rate 18, height 180.3 cm (71\"), weight 84.4 kg (186 lb 1.1 oz), SpO2 95%.    HEENT: Normal HEENT exam.  (Nasal cannula O2)    Lungs:  Normal effort and normal respiratory rate.  He is not in respiratory distress.  No rales, wheezes or rhonchi.    Heart: Normal rate.  Irregular rhythm.  S1 normal and S2 normal.  No murmur, gallop or friction rub.   Chest: Symmetric chest wall expansion. (Pacer wound over left precordium dressed and clean)  Abdomen: Abdomen is soft and non-distended.  Bowel sounds are normal.   There is no abdominal tenderness.   There is no mass. There is no splenomegaly. There is no hepatomegaly.   Extremities: There is dependent edema.  There is no deformity.    Neurological: Patient is alert and oriented to person, place and time.    Pupils:  Pupils are equal, round, and reactive to light.    Skin:  Warm and dry.                Results from last 7 days   Lab Units 11/20/23 0410 11/19/23 0417 11/18/23  1054   WBC 10*3/mm3 12.53* 14.72* 15.47*   HEMOGLOBIN g/dL 10.9* 10.2* 11.7*   PLATELETS 10*3/mm3 124* 124* 163     Results from last 7 days   Lab Units 11/20/23  0410 11/19/23  0417 11/18/23  1054 11/17/23  0327 11/16/23  0332   SODIUM mmol/L 126* 128* 128* 122* 127*   POTASSIUM mmol/L 3.5 4.0 4.1 4.4 3.8   CO2 mmol/L 32.0* 30.0* 31.0* 27.0 25.0   BUN mg/dL 83* 83* 85* 104* 82*   CREATININE mg/dL 1.76* 1.81* 2.13* 2.73* 2.41*   MAGNESIUM mg/dL  --   --  2.4 2.8* 2.9*   PHOSPHORUS mg/dL  --   --  3.0 4.9* 5.5*   GLUCOSE mg/dL 127* 95 112* 118* 118*     Estimated " Creatinine Clearance: 59.3 mL/min (A) (by C-G formula based on SCr of 1.76 mg/dL (H)).            I reviewed the patient's results and images.     Assessment & Plan   Impression        Acute on chronic systolic congestive heart failure    Hyponatremia    STUART (acute kidney injury)    Atrial fibrillation with RVR    Essential hypertension    Anxiety associated with depression    Hypoalbuminemia    Hyperbilirubinemia    Elevated liver enzymes    Elevated troponin level not due myocardial infarction    Severe mitral regurgitation    Ventricular fibrillation    LBBB (left bundle branch block)    Presence of biventricular implantable cardioverter-defibrillator (ICD)       Plan        Mobilize as tolerated.  Continue on with physical therapy.  Amiodarone per cardiology.  Continue with Eliquis.  Continue to monitor sodium closely.  Repeat labs been placed for tomorrow morning.  Pain control as necessary.  Plan to transition to telemetry.    Plan of care and goals reviewed with mulitdisciplinary/antibiotic stewardship team during rounds.   I discussed the patient's findings and my recommendations with patient and nursing staff       Billy Xie MD, [facility]  Pulmonology and Critical Care Medicine       Electronically signed by Billy Xie MD at 11/20/23 1243       Consult Notes (last 24 hours)  Notes from 11/20/23 1014 through 11/21/23 1014   No notes of this type exist for this encounter.

## 2023-11-21 NOTE — PLAN OF CARE
Goal Outcome Evaluation:                      Pt on room air to 2L, VSS. Amio gtt off. PRN pain medication given throughout shift. UOP 1,200mls.

## 2023-11-21 NOTE — PLAN OF CARE
Goal Outcome Evaluation:  Plan of Care Reviewed With: patient        Progress: improving  Outcome Evaluation: pt showing improvement as he required decreased assistance for mobility, but ambulated 50ft with CGA and UE support. Limited by c/o pain. d/c rec for IRF, based on current function.      Anticipated Discharge Disposition (PT): inpatient rehabilitation facility

## 2023-11-21 NOTE — PROGRESS NOTES
Intensive Care Follow-up     Hospital:  LOS: 13 days   Mr. Sundar Reeder, 53 y.o. male is followed for:   Acute on chronic systolic congestive heart failure        Subjective     53-year-old male with a past medical history of CHF, CKD, hypertension, and atrial fibrillation.  He presented to the ER and was admitted on 11/8 with atrial fibrillation with RVR, shortness of breath, and worsening lower extremity edema.     He was admitted to telemetry and placed on an amiodarone drip and underwent ECV on 11/10.  Subsequent to that he has been primarily in sinus rhythm but with intermittent atrial fibrillation noted.  His ejection fraction by her most recent echo is less than 20% with severe MR.  While in the hospital he had worsening acute kidney injury associate with diuresis and this has been held.  He has ongoing hyponatremia and elevated liver enzymes felt to be related to congestive hepatopathy.       On the evening of 11/15, he developed the acute onset of ventricular fibrillation after what appeared to be an R-on-T phenomenon.  He required defibrillation and received CPR as well as epinephrine and bicarbonate.  Postcode he woke up and was awake alert and oriented.  He was transferred to ICU.      He has had no further episodes of ventricular fibrillation.  He had an ICD placed on 11/17.    Interval History:  The chart has been reviewed.  The patient continues to complain of pain across to his chest which he attributes to his previous CPR.  Occasionally is having some breathing difficulty.  His current narcotics seem to be helping him.    The patient's past medical, surgical and social history were reviewed and updated in Epic as appropriate.        Objective     Infusions:     Medications:  amiodarone, 400 mg, Oral, BID With Meals  [START ON 11/27/2023] amiodarone, 400 mg, Oral, Q24H  apixaban, 5 mg, Oral, Q12H  bumetanide, 0.5 mg, Intravenous, Daily  castor oil-balsam peru, 1 application , Topical,  "Q12H  empagliflozin, 10 mg, Oral, Daily  midodrine, 5 mg, Oral, TID AC  pharmacy consult - [facility], , Does not apply, Daily  senna-docusate sodium, 2 tablet, Oral, BID  sodium chloride, 10 mL, Intravenous, Q12H  sodium chloride, 10 mL, Intravenous, Q12H        Vital Sign Min/Max for last 24 hours  Temp  Min: 97.7 °F (36.5 °C)  Max: 99 °F (37.2 °C)   BP  Min: 83/67  Max: 116/52   Pulse  Min: 72  Max: 126   Resp  Min: 14  Max: 18   SpO2  Min: 80 %  Max: 100 %   Flow (L/min)  Min: 2  Max: 2       Input/Output for last 24 hour shift  11/20 0701 - 11/21 0700  In: 1562 [P.O.:1220; I.V.:342]  Out: 2850 [Urine:2850]      Objective:  General Appearance:  In no acute distress, uncomfortable, ill-appearing and in pain.    Vital signs: (most recent): Blood pressure 107/63, pulse 96, temperature 98.1 °F (36.7 °C), temperature source Oral, resp. rate 16, height 180.3 cm (71\"), weight 84.4 kg (186 lb 1.1 oz), SpO2 (!) 89%.    HEENT: Normal HEENT exam.  (Nasal cannula O2)    Lungs:  Normal effort and normal respiratory rate.  He is not in respiratory distress.  No rales, wheezes or rhonchi.    Heart: Normal rate.  Irregular rhythm.  S1 normal and S2 normal.  No murmur, gallop or friction rub.   Chest: Symmetric chest wall expansion. (Pacer wound over left precordium dressed and clean)  Abdomen: Abdomen is soft and non-distended.  Bowel sounds are normal.   There is no abdominal tenderness.   There is no mass. There is no splenomegaly. There is no hepatomegaly.   Extremities: There is dependent edema.  There is no deformity.    Neurological: Patient is alert and oriented to person, place and time.  Normal strength.    Pupils:  Pupils are equal, round, and reactive to light.    Skin:  Warm and dry.                Results from last 7 days   Lab Units 11/21/23  0336 11/20/23  0410 11/19/23  0417   WBC 10*3/mm3 10.25 12.53* 14.72*   HEMOGLOBIN g/dL 10.9* 10.9* 10.2*   PLATELETS 10*3/mm3 149 124* 124*     Results from last 7 days   Lab Units " 11/21/23  0336 11/20/23  1502 11/20/23  0410 11/19/23  0417 11/18/23  1054 11/17/23  0327 11/16/23  0332   SODIUM mmol/L 129*  --  126* 128* 128* 122* 127*   POTASSIUM mmol/L 4.4 4.1 3.5 4.0 4.1 4.4 3.8   CO2 mmol/L 30.0*  --  32.0* 30.0* 31.0* 27.0 25.0   BUN mg/dL 79*  --  83* 83* 85* 104* 82*   CREATININE mg/dL 1.47*  --  1.76* 1.81* 2.13* 2.73* 2.41*   MAGNESIUM mg/dL 2.1  --   --   --  2.4 2.8* 2.9*   PHOSPHORUS mg/dL  --   --   --   --  3.0 4.9* 5.5*   GLUCOSE mg/dL 106*  --  127* 95 112* 118* 118*     Estimated Creatinine Clearance: 71 mL/min (A) (by C-G formula based on SCr of 1.47 mg/dL (H)).            I reviewed the patient's results and images.     Assessment & Plan   Impression        Acute on chronic systolic congestive heart failure    Hyponatremia    STUART (acute kidney injury)    Atrial fibrillation with RVR    Essential hypertension    Anxiety associated with depression    Hypoalbuminemia    Hyperbilirubinemia    Elevated liver enzymes    Elevated troponin level not due myocardial infarction    Severe mitral regurgitation    Ventricular fibrillation    LBBB (left bundle branch block)    Presence of biventricular implantable cardioverter-defibrillator (ICD)       Plan        Continue with pain control as needed.  Continue to monitor renal function closely and we will avoid nephrotoxins and NSAIDs for now.  Mobilize with physical and Occupational Therapy.  Will continue to monitor his hyponatremia closely.  Plan to transition to telemetry today.    Plan of care and goals reviewed with mulitdisciplinary/antibiotic stewardship team during rounds.   I discussed the patient's findings and my recommendations with patient, family, and nursing staff       Billy Xie MD, [facility]  Pulmonology and Critical Care Medicine

## 2023-11-21 NOTE — PROGRESS NOTES
"   LOS: 13 days    Patient Care Team:  Laura Summers PA-C as PCP - General (Family Medicine)    Subjective     Seen and examined at bedside, asking for pain medication.   No overnight issues per nurse.  Excellent urine output after Bumex yesterday.     Objective     Vital Signs:  Blood pressure 93/63, pulse 102, temperature 97.7 °F (36.5 °C), temperature source Oral, resp. rate 18, height 180.3 cm (71\"), weight 84.4 kg (186 lb 1.1 oz), SpO2 95%.      Intake/Output Summary (Last 24 hours) at 11/21/2023 1716  Last data filed at 11/21/2023 1300  Gross per 24 hour   Intake 777 ml   Output 3050 ml   Net -2273 ml        11/20 0701 - 11/21 0700  In: 1562 [P.O.:1220; I.V.:342]  Out: 2850 [Urine:2850]    Physical Exam:    GENERAL: WD WM NAD  NEURO: Awake and alert, oriented. No focal deficit  PSYCHIATRIC: NMA. Cooperative with PE  EYE: PE, no icterus, no conjunctivitis  ENT: ommm, dentition intact,  Hearing intact  NECK: Supple , No JVD discernable,  Trachea midline  CV: + Edema, RRR  LUNGS:  Quiet,  Nonlabored resp.  Symmetrical expansion  ABDOMEN: Nondistended, soft nontender.  : No Lazaro, no palp bladder  SKIN: Warm and dry without rash      Labs:  Results from last 7 days   Lab Units 11/21/23  0336 11/20/23  0410 11/19/23  0417   WBC 10*3/mm3 10.25 12.53* 14.72*   HEMOGLOBIN g/dL 10.9* 10.9* 10.2*   PLATELETS 10*3/mm3 149 124* 124*     Results from last 7 days   Lab Units 11/21/23  0336 11/20/23  1502 11/20/23  0410 11/19/23  0417 11/18/23  1054 11/17/23  0327 11/16/23  0332 11/15/23  2151 11/15/23  0433   SODIUM mmol/L 129*  --  126* 128* 128* 122* 127*   < > 124*   POTASSIUM mmol/L 4.4 4.1 3.5 4.0 4.1 4.4 3.8   < > 3.5   CHLORIDE mmol/L 90*  --  84* 86* 85* 81* 83*   < > 80*   CO2 mmol/L 30.0*  --  32.0* 30.0* 31.0* 27.0 25.0   < > 26.0   BUN mg/dL 79*  --  83* 83* 85* 104* 82*   < > 83*   CREATININE mg/dL 1.47*  --  1.76* 1.81* 2.13* 2.73* 2.41*   < > 2.29*   CALCIUM mg/dL 8.8  --  9.1 8.9 8.7 10.0 9.9   < > " 9.6   PHOSPHORUS mg/dL  --   --   --   --  3.0 4.9* 5.5*  --  4.3   MAGNESIUM mg/dL 2.1  --   --   --  2.4 2.8* 2.9*   < >  --    ALBUMIN g/dL  --   --  2.9* 2.8* 2.9*  --  3.0*  --  3.3*    < > = values in this interval not displayed.     Results from last 7 days   Lab Units 11/20/23  0410   ALK PHOS U/L 128*   BILIRUBIN mg/dL 2.2*   ALT (SGPT) U/L 72*   AST (SGOT) U/L 42*         Estimated Creatinine Clearance: 71 mL/min (A) (by C-G formula based on SCr of 1.47 mg/dL (H)).         A/P:  ARF: Urine output remains nonoliguric.  Initially creatinine up to 2.5 with improvement down to 1.7 with volume reduction. Blood pressure remains low but stable.  FeNa remains quite low consistent with prerenal azotemia in setting of hypotension and poor cardiac function. For now would continue supportive care.   Strict I's and O's.  Monitor renal function closely for recovery.  Creatinine improving with diuresis.  Started on Bumex 0.5 mg yesterday.     CKD: Unsure of baseline creatinine.     Hypotension: Likely with contributions from low cardiac output.  May have component of intravascular volume depletion despite mild lower extremity edema.  Started on midodrine >> will increase to 10 mg TID.      Hyponatremia: Likely hypervolemic..  Improving with diuretics .      Volume: Volume up on exam..  monitor strict I's and O's for now.    CHF: Very low EF <20%.  Strict I's and O's. Will give another dose of Bumex 0.5 mg today.     High risk and complexity patient.      Rohan Corrigan MD  11/21/23  17:16 EST

## 2023-11-21 NOTE — PROGRESS NOTES
Clinical Nutrition     Reason for Visit: MDR, Follow-up protocol      Patient Name: Sundar Reeder  YOB: 1970  MRN: [id number]  Date of Encounter: 11/21/23 16:26 EST  Admission date: 11/8/2023      Nutrition Assessment   Admission Diagnosis:  Hyponatremia [E87.1]  Acute exacerbation of congestive heart failure [I50.9]      Problem List:    Acute on chronic systolic congestive heart failure    Hyponatremia    STUART (acute kidney injury)    Atrial fibrillation with RVR    Essential hypertension    Anxiety associated with depression    Hypoalbuminemia    Hyperbilirubinemia    Elevated liver enzymes    Elevated troponin level not due myocardial infarction    Severe mitral regurgitation    Ventricular fibrillation    LBBB (left bundle branch block)    Presence of biventricular implantable cardioverter-defibrillator (ICD)        PMH:   He  has no past medical history on file.    PSH:  He  has a past surgical history that includes biventricullar implantable cardioverter defibrillator placement (N/A, 11/17/2023).    Applicable Nutrition Concerns:   Skin:BLE blisters, B, gluteal ulcers, surgical site  GI: nausea, vomiting, last bm 11-16    Applicable Interval History:     V. Fib s/p Code Blue 11-15-23    s/p ICD 11-17-23    Reported/Observed/Food/Nutrition Related History:     11-21-23: pt sitting up in chair, on room air, reports appetite has been improving although he has problems with pain control and vomiting, vomited phlegm about 3 hours ago, his Aunt brought him a case of ensure and he is drinking those, does not want premier protein    11-16-23: pt resting in bed, on room air, reports not much appetite, is very thirsty, feels dry, noticed his urine was very dark, concentrated today    Per RN: fluid restriction has been lifted, plan for possible ICD tomorrow    11-9-23:Spoke w/pt at bedside, very pleasant. Pt reports decreased po intake x 2 days PTA, eating <50% of usual 2/2 fluid retention  "and breathing difficulty. Pt reports doing better now, states he ate well for breakfast this am. Pt reports (dry) UBW of 186-190lbs. Pt declines need for ONS at this time. NKFA.     Anthropometrics     Flowsheet Rows      Flowsheet Row First Filed Value   Admission Height 180.3 cm (71\") Documented at 11/08/2023 0552   Admission Weight 88.5 kg (195 lb) Documented at 11/08/2023 0552          Height: Height: 180.3 cm (71\")  Last Filed Weight: Weight: 84.4 kg (186 lb 1.1 oz) (11/18/23 0400)  Method: Weight Method: Bed scale  BMI: BMI (Calculated): 26  BMI classification: Overweight: 25.0-29.9kg/m2   IBW:  75kg    UBW:  186-190lbs  Weight change: ?10-14lb wt loss     Weight      Weight (kg) Weight (lbs) Weight Method   11/8/2023 82.509 kg  181 lb 14.4 oz  Bed scale     85.957 kg  189 lb 8 oz  Bed scale     88.451 kg  195 lb  Estimated        Nutrition Focused Physical Exam     Date:     11/9    NFPE completed, patient does not meet criteria for MSA at this time. Some moderate UE muscle wasting, suspect body habitus?    Current Nutrition Prescription   PO: Diet: Cardiac Diets, Fluid Restriction (240 mL/tray) Diets; Healthy Heart (2-3 Na+); 1000 mL/day; Texture: Regular Texture (IDDSI 7); Fluid Consistency: Thin (IDDSI 0)  Oral Nutrition Supplement: Premier Protein 3x/day  Intake: 40% of 10 meals      Nutrition Diagnosis   Date:  11/ 16         Updated:  11-21  Problem Inadequate oral intake   Etiology Per Clinical Status   Signs/Symptoms Po intake 40%   Status: ongoing    Goal:   General: Maintain nutrition  PO: Increase intake  EN/PN: N/A    Nutrition Intervention      Follow treatment progress, Care plan reviewed, Advise alternate selection, Advised available snacks, Interview for preferences, Menu provided, Menu adjusted, Encourage intake    Hospital currently out of stock of Ensure, pt is drinking his own supply    Continue to encourage intake    Monitoring/Evaluation:   Per protocol, I&O, PO intake, Supplement " intake, Pertinent labs, Weight, Skin status, GI status, Symptoms      Laura Del Valle, RD  Time Spent: 30min

## 2023-11-21 NOTE — PLAN OF CARE
Goal Outcome Evaluation:                        Pt on room air to 2L, paced rhythm. PRN pain medication given. Poor oral intake, drinking Ensure shakes at meals. UOP adequate. Bumex given x1. No bowel movement, stool softener refused.

## 2023-11-21 NOTE — PLAN OF CARE
Goal Outcome Evaluation:      -VSS on RA-2L; remains V Paced  -Adequate UOP, no BM  -Pt has not asked for pain medicine tonight, no complaints of pain  -Ordered to telemetry

## 2023-11-21 NOTE — PROGRESS NOTES
Cardiology Progress Note      Reason for visit:    Atrial fibrillation with RVR  Acute on chronic systolic heart failure  Elevated troponin    IDENTIFICATION: 53-year-old gentleman who resides in [place], [place]    Active Hospital Problems    Diagnosis  POA    **Acute on chronic systolic congestive heart failure [I50.23]  Yes     Reported history of systolic heart failure dating back for 20 years.  Reportedly last echocardiogram 1 year ago had normal LVEF.  Data deficit  Echo (11/8/2023): LVEF less than 20%.  Severe MR.  RVSP less than 35 mmHg left ventricular ejection fraction appears to be less than 20%.          LBBB (left bundle branch block) [I44.7]  Yes    Presence of biventricular implantable cardioverter-defibrillator (ICD) [Z95.810]  No     Biventricular ICD placed by Dr. Sundar Bhagat 11/17/2023      Ventricular fibrillation [I49.01]  Yes     V-fib arrest occurred 11/15/2023   Biventricular ICD placed by Dr. Sundar Bhagat 11/17/2023 for V-fib arrest, LBBB      Elevated troponin level not due myocardial infarction [R79.89]  Yes     Troponin elevated but flat in the setting of A-fib with RVR and acute systolic heart failure      Severe mitral regurgitation [I34.0]  Yes     Echo (11/8/2023): Severe MR.  LVEF less than 20%      Hyponatremia [E87.1]  Yes    STUART (acute kidney injury) [N17.9]  Yes    Atrial fibrillation with RVR [I48.91]  Yes     Reported history of atrial fibrillation for 10 years  CHA2DS2-VASc=3  Presentation [facility] 11/8/2023 in A-fib with RVR in the setting of hyponatremia and STUART  External cardioversion (11/10/2023): Successfully restored NSR.  Return of atrial fibrillation 11/11/2023 but now returned to NSR on IV amiodarone      Essential hypertension [I10]  Yes    Anxiety associated with depression [F41.8]  Yes    Hypoalbuminemia [E88.09]  Yes    Hyperbilirubinemia [E80.6]  Yes    Elevated liver enzymes [R74.8]  Yes            Patient sitting up in the bed breathing easy on room  air.  He reports feeling much better than when he was admitted.  He denies any chest pain.  He reports improvement in his lower extremity edema.  The patient had cardioversion earlier this admission that converted him to normal sinus rhythm but he went back into atrial fibrillation and IV amiodarone was restarted yesterday.  He is currently on IV amiodarone drip and in normal sinus rhythm with frequent PVCs    Update 11/14/23  Feeling quite weak.  BP remains borderline.  Overall volume much better.  Still somewhat overloaded with low albumin.      Update 11/15/23  About the same.  Really no change      Update 11/16/23  His edema is all but resolved.  Minimal JVD.  His fluid status is the best it has been while here even though no diuretics today.  He apparently (dont have telemetry) and had R on T with polymorphic Vt then VF.  One round of CPR and desynchronized cardioversion.  Was then in afib with RVR and has converted to NSR.  Kidney function stable.  QT mildly prolonged.      Update 11/17/23  Uneventful night.  Cr little worse.  Edema little worse.  He feels fine except rib pain    Update 11/24/23  BP remains low.  Edema about the same.  Kidney function much, much better after BIVICD.   In afib today         Vital Sign Min/Max for last 24 hours  Temp  Min: 97.7 °F (36.5 °C)  Max: 99 °F (37.2 °C)   BP  Min: 83/67  Max: 116/52   Pulse  Min: 72  Max: 126   Resp  Min: 14  Max: 18   SpO2  Min: 80 %  Max: 100 %   Flow (L/min)  Min: 2  Max: 2      Intake/Output Summary (Last 24 hours) at 11/21/2023 1144  Last data filed at 11/21/2023 0800  Gross per 24 hour   Intake 744 ml   Output 3050 ml   Net -2306 ml               Physical exam  General: alert and oriented  Card: RRR, minimal JVD  Chest: CTAB  Abd: soft, non distended  Extremities: mild edema  MK: moves all extremities  Neuro: non focal exam  Psych: cooperative    Results from last 7 days   Lab Units 11/21/23  0336 11/20/23  1502 11/20/23  0410 11/19/23  0417  11/18/23  1054 11/17/23  0327 11/16/23  0332 11/15/23  2151   SODIUM mmol/L 129*  --  126* 128* 128* 122* 127* 124*   POTASSIUM mmol/L 4.4 4.1 3.5 4.0 4.1 4.4 3.8 3.6   CHLORIDE mmol/L 90*  --  84* 86* 85* 81* 83* 80*   BUN mg/dL 79*  --  83* 83* 85* 104* 82* 82*   CREATININE mg/dL 1.47*  --  1.76* 1.81* 2.13* 2.73* 2.41* 2.32*   MAGNESIUM mg/dL 2.1  --   --   --  2.4 2.8* 2.9* 3.6*           Results from last 7 days   Lab Units 11/21/23  0336 11/20/23  0410 11/19/23 0417   WBC 10*3/mm3 10.25 12.53* 14.72*   HEMOGLOBIN g/dL 10.9* 10.9* 10.2*   HEMATOCRIT % 33.5* 33.6* 31.0*   PLATELETS 10*3/mm3 149 124* 124*       Lab Results   Component Value Date    HGBA1C 6.00 (H) 11/09/2023       Lab Results   Component Value Date    CHOL 81 11/09/2023    TRIG 60 11/09/2023    HDL 22 (L) 11/09/2023    LDL 45 11/09/2023       Assessment  -chronic systolic CHF, hx of NICM, no CAC on recent CT or atherosclrosis of aorta s/p BIVICD  -PAF  -Severe MR  -STUART  -LBBB    Plan  - Appreciate nephrology assistance regarding diuresis.  Thankfully moving right direction.    -continue oral amiodarone  -continue eliquis  -cont. Jardiance  -BP too low to titrate meds today but will try tomorrow.  -on midodrine      Romaine Basurto

## 2023-11-22 NOTE — NURSING NOTE
[facility] follow-up for worsening moisture associated skin damage.    Picture reviewed with my partner at this time the coccyx is just a vertical split it is superficial and bright red this is ITD.  Most concerning is what we were originally thinking was just kissing lesions on the buttocks because if you let go they are matched perfectly on each side however on the left buttock distally going laterally there is an area purple and maroon that match well they are kind of diagonal on dermatomes but they are very very irregular in shape and not consistent with deep tissue pressure injury as there is no bony prominence here.  This wound originally came up on the 16th actually the LDA was originally placed on the 15th and it must be noted that around 11:00 10:00 on the 15th p.m. the patient had a CODE BLUE and had to be resuscitated with CPR.  We have since then and initiated Venelex therapy to bring blood flow to the area to help heal it and there is not really any change.  We originally assessed on the 16th and is today the 22nd so that is 6 days and it is only slightly worse so it is not matching the evolution of a deep tissue injury or rather moisture associated kissing lesions.    The only evidence that I have to go off of will marked this as like an acute vascular compromise area.  There is well-documented evidence about vascular occlusion although this is usually up around the upper buttocks and sacrum.  However it is well-known that subcutaneous tissue and adipose tissue are not well-vascularized so that could be visual resultant from his cardiac arrest.      It must also be noted however that when I removed the dressing there was a black stain on the outside when I opened it up it was kind of a clearish gray drainage almost watery weeping but it had a very foul odor to it not quite feculent more necrotic in nature however the clinical presentation of the wound does not match what we normally see draining necrotic  smelling clear exudate.    I have ordered calazime paste to treat this because I do not think Venelex is working and our sacral dressings or silicone foam dressings are blocking the anus.  So we will use a calazime paste however if this is like surface infection with some kind of bacteria then we may need to go to like a Xeroform or some other type of antibiotic thing in which case we will have to come up with a better dressing that are silicone foam.    Another concern of mine is that the patient has not had a bowel movement in 5 days and I have not seen anything in the MD notes addressing this is only seen and nursing notes that patient has refused stool softener a few times.    In times of increased stool burden all areas of the buttocks and sacrum will start to get a pressure injury from the outside read from the inside.  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    Woc will need to reassess it is a holiday weekend we will try to get back to him at the beginning of next week sometime.

## 2023-11-22 NOTE — CASE MANAGEMENT/SOCIAL WORK
Continued Stay Note   Peggy     Patient Name: Sundar Reeder  MRN: [id number]  Today's Date: 11/22/2023    Admit Date: 11/8/2023    Plan: [facility]   Discharge Plan       Row Name 11/22/23 1114       Plan    Plan [facility]    Patient/Family in Agreement with Plan yes    Plan Comments Therapy has worked with Mr. Reeder and they are recommending rehab. Due to Mr. Reeder' insurance, his only option for rehab will be acute rehab. He wants a referral to [facility]. I have sent this referral to Beverly, admissions liaison with [facility], and they will follow for medical readiness. CM continues to follow.    Final Discharge Disposition Code 62 - inpatient rehab facility                   Discharge Codes    No documentation.                 Expected Discharge Date and Time       Expected Discharge Date Expected Discharge Time    Nov 24, 2023               Alexandro Aguilera RN

## 2023-11-22 NOTE — PLAN OF CARE
Goal Outcome Evaluation:     -Pt restless and requesting pain medication frequently  -This AM, afib -140s with SOA. Cardiology called, digoxin 250mcg IV given x1.   -UOP 1550ml  -Ordered to tele

## 2023-11-22 NOTE — PLAN OF CARE
Goal Outcome Evaluation:  Plan of Care Reviewed With: patient        Progress: no change  Outcome Evaluation: Pt presents w/ increased pain, BLE edema, generalized weakenss, decreased functional endurance, and mild balance deficits limiting his ADL independence. Skilled IPOT re-eval limited d/t pt nausea w/ activity. Pt would benefit from continued skilled IPOT services to address current functional deficits. Rec IRF at d/c for best functional outcomes.      Anticipated Discharge Disposition (OT): inpatient rehabilitation facility

## 2023-11-22 NOTE — PROGRESS NOTES
"   LOS: 14 days    Patient Care Team:  Laura Summers PA-C as PCP - General (Family Medicine)    Subjective     Seen and examined at bedside, report no complaints .. denies chest pain or shortness of breath.       Objective     Vital Signs:  Blood pressure 117/85, pulse 95, temperature 98.6 °F (37 °C), temperature source Oral, resp. rate 18, height 180.3 cm (71\"), weight 84.4 kg (186 lb 1.1 oz), SpO2 93%.      Intake/Output Summary (Last 24 hours) at 11/22/2023 1402  Last data filed at 11/22/2023 0800  Gross per 24 hour   Intake 997 ml   Output 1550 ml   Net -553 ml        11/21 0701 - 11/22 0700  In: 1124 [P.O.:1124]  Out: 2250 [Urine:2250]    Physical Exam:    GENERAL: WD WM NAD  NEURO: Awake and alert, oriented. No focal deficit  PSYCHIATRIC: NMA. Cooperative with PE  EYE: PE, no icterus, no conjunctivitis  ENT: ommm, dentition intact,  Hearing intact  NECK: Supple , No JVD discernable,  Trachea midline  CV: + Edema, RRR  LUNGS:  Quiet,  Nonlabored resp.  Symmetrical expansion  ABDOMEN: Nondistended, soft nontender.  : No Lazaro, no palp bladder  SKIN: Warm and dry without rash      Labs:  Results from last 7 days   Lab Units 11/22/23  0425 11/21/23  0336 11/20/23  0410   WBC 10*3/mm3 17.65* 10.25 12.53*   HEMOGLOBIN g/dL 12.1* 10.9* 10.9*   PLATELETS 10*3/mm3 268 149 124*     Results from last 7 days   Lab Units 11/22/23  0639 11/21/23  0336 11/20/23  1502 11/20/23  0410 11/19/23  0417 11/18/23  1054 11/17/23  0327 11/16/23  0332   SODIUM mmol/L 130* 129*  --  126* 128* 128* 122* 127*   POTASSIUM mmol/L 5.3* 4.4 4.1 3.5 4.0 4.1 4.4 3.8   CHLORIDE mmol/L 88* 90*  --  84* 86* 85* 81* 83*   CO2 mmol/L 19.0* 30.0*  --  32.0* 30.0* 31.0* 27.0 25.0   BUN mg/dL 83* 79*  --  83* 83* 85* 104* 82*   CREATININE mg/dL 1.92* 1.47*  --  1.76* 1.81* 2.13* 2.73* 2.41*   CALCIUM mg/dL 9.3 8.8  --  9.1 8.9 8.7 10.0 9.9   PHOSPHORUS mg/dL  --   --   --   --   --  3.0 4.9* 5.5*   MAGNESIUM mg/dL 2.4 2.1  --   --   --  2.4 2.8* " 2.9*   ALBUMIN g/dL  --   --   --  2.9* 2.8* 2.9*  --  3.0*     Results from last 7 days   Lab Units 11/20/23  0410   ALK PHOS U/L 128*   BILIRUBIN mg/dL 2.2*   ALT (SGPT) U/L 72*   AST (SGOT) U/L 42*         Estimated Creatinine Clearance: 54.3 mL/min (A) (by C-G formula based on SCr of 1.92 mg/dL (H)).         A/P:  STUART: Unclear baseline cr. Cr on this admission 2.7 mg/dl. Thought to be hemodynamic variation in the setting of advance CHF. Cr around 1.4-1.9mg/dl on recent testing.     CKD: Unsure of baseline creatinine     Hyponatremia: No significant edema noted. Suspect component of excessive fluid intake    Hyperkalemia: Worsening.      Volume status stable.     Hypotension: On midodrine.     CHF: Very low EF <20%.  Started on Jardiance on this admission 11/18/23. Unable to add ACEi/ARB/MRA due to low bp at present.    Recs  Variation in renal function noted. I do expect some fluctuation in GFR after initiation of SGLT-2inh.  D/C bumex. Will continue to monitor for now. Lokelma 10 gm x 1. Strict I/O. Daily labs. Wants to only restrict fluid intake to 1.5 liter.        Giovany Lopez MD  11/22/23  14:02 EST

## 2023-11-22 NOTE — THERAPY RE-EVALUATION
Patient Name: Sundar Reeder  : 1970    MRN: [id number]                              Today's Date: 2023       Admit Date: 2023    Visit Dx:     ICD-10-CM ICD-9-CM   1. Acute on chronic systolic congestive heart failure  I50.23 428.23     428.0   2. Chronic atrial fibrillation  I48.20 427.31   3. Hyperbilirubinemia  E80.6 782.4   4. Atrial fibrillation with RVR  I48.91 427.31   5. Hyponatremia  E87.1 276.1   6. Hypochloremia  E87.8 276.9   7. Elevated serum creatinine  R79.89 790.99   8. Physical deconditioning  R53.81 799.3   9. Elevated brain natriuretic peptide (BNP) level  R79.89 790.99   10. Acute pulmonary edema  J81.0 518.4   11. Leukocytosis, unspecified type  D72.829 288.60   12. Elevated transaminase level  R74.01 790.4     Patient Active Problem List   Diagnosis    Hyponatremia    STUART (acute kidney injury)    Atrial fibrillation with RVR    Essential hypertension    Anxiety associated with depression    Hypoalbuminemia    Hyperbilirubinemia    Elevated liver enzymes    Acute on chronic systolic congestive heart failure    Elevated troponin level not due myocardial infarction    Severe mitral regurgitation    Ventricular fibrillation    LBBB (left bundle branch block)    Presence of biventricular implantable cardioverter-defibrillator (ICD)     History reviewed. No pertinent past medical history.  Past Surgical History:   Procedure Laterality Date    BIVENTRICULLAR IMPLANTABLE CARDIOVERTER DEFIBRILLATOR PLACEMENT N/A 2023    Procedure: Implant ICD - bi ventricular;  Surgeon: Sundar Bhagat MD;  Location: [facility] INVASIVE LOCATION;  Service: Cardiovascular;  Laterality: N/A;      General Information       Row Name 23 0956          OT Time and Intention    Document Type re-evaluation  -MC     Mode of Treatment occupational therapy  -       Row Name 23 0956          General Information    Patient Profile Reviewed yes  -MC     Prior Level of Function --  Please refer to IE for  PLOF  -     Existing Precautions/Restrictions fall;pacemaker  -     Barriers to Rehab medically complex;previous functional deficit  -       Row Name 11/22/23 0956          Living Environment    People in Home --  Please refer to  for home info  -       Row Name 11/22/23 0956          Cognition    Orientation Status (Cognition) oriented x 4  -       Row Name 11/22/23 0956          Safety Issues, Functional Mobility    Safety Issues Affecting Function (Mobility) awareness of need for assistance;insight into deficits/self-awareness;safety precaution awareness;safety precautions follow-through/compliance;sequencing abilities  -     Impairments Affecting Function (Mobility) balance;cognition;endurance/activity tolerance;strength;pain  -     Cognitive Impairments, Mobility Safety/Performance awareness, need for assistance;insight into deficits/self-awareness;safety precaution awareness;safety precaution follow-through;sequencing abilities  -               User Key  (r) = Recorded By, (t) = Taken By, (c) = Cosigned By      Initials Name Provider Type     Alyce Orellana OT Occupational Therapist                   Lymphedema       Row Name 11/20/23 0836             Subjective Pain    Able to rate subjective pain? yes  -ES      Pre-Treatment Pain Level 0  -ES      Post-Treatment Pain Level 0  -ES      Recorded by [ES] Stefany Brantley, PT              Lymphedema Edema Assessment    Ptting Edema Category By severity  -ES      Pitting Edema Moderate  -ES      Recorded by [ES] Stefany Brantley, PT              Skin Changes/Observations    Location/Assessment Lower Extremity  -ES      Lower Extremity Conditions bilateral:;dry;scaly;scab(s)  -ES      Lower Extremity Color/Pigment bilateral:;erythema  -ES      Recorded by [ES] Stefany Brantley, PT              Lymphedema Pulses/Capillary Refill    Lymphedema Pulses/Capillary Refill lower extremity pulses;capillary refill  -ES      Dorsalis Pedis Pulse  right:;left:;+1 diminished  -ES      Capillary Refill lower extremity capillary refill  -ES      Lower Extremity Capillary Refill right:;left:;less than 3 seconds  -ES      Recorded by [ES] Stefany Brantley PT              Compression/Skin Care    Compression/Skin Care skin care;wrapping location;bandaging  -ES      Skin Care washed/dried;lotion applied  -ES      Wrapping Location lower extremity  -ES      Wrapping Location LE bilateral:;foot to knee  -ES      Wrapping Comments BLE MLW size 5 applied in overlapping fashion to allow for gradient compression  -ES      Recorded by [ES] Stefany Brantley PT                User Key  (r) = Recorded By, (t) = Taken By, (c) = Cosigned By      Initials Name Effective Dates    ES Stefany Brantley PT 08/11/22 -                    Mobility/ADL's       Row Name 11/22/23 0957          Bed Mobility    Bed Mobility supine-sit  -     Supine-Sit Ira (Bed Mobility) modified independence  -     Assistive Device (Bed Mobility) bed rails;head of bed elevated  -       Row Name 11/22/23 0957          Transfers    Transfers sit-stand transfer;stand-sit transfer  -       Row Name 11/22/23 0957          Sit-Stand Transfer    Sit-Stand Ira (Transfers) contact guard;verbal cues  -     Assistive Device (Sit-Stand Transfers) other (see comments)  BUE support on tele monitor  -       Row Name 11/22/23 0957          Stand-Sit Transfer    Stand-Sit Ira (Transfers) contact guard;verbal cues  -     Assistive Device (Stand-Sit Transfers) other (see comments)  -       Row Name 11/22/23 0957          Functional Mobility    Functional Mobility- Ind. Level contact guard assist;verbal cues required  -     Functional Mobility- Device other (see comments)  BUE support on tele monitor  -     Functional Mobility-Distance (Feet) 40  -     Functional Mobility- Safety Issues balance decreased during turns;sequencing ability decreased;step length decreased  -      Functional Mobility- Comment Pt w/ noted wide RACHEL, unsteadiness noted but no overt LOB  -[facility] Name 11/22/23 0957          Activities of Daily Living    BADL Assessment/Intervention lower body dressing;upper body dressing  -[facility] 11/22/23 0957          Lower Body Dressing Assessment/Training    [place] Level (Lower Body Dressing) don;socks;dependent (less than 25% patient effort);verbal cues  -     Comment, (Lower Body Dressing) Pt attempted to reach feet to simulate donning/doffing socks, however, unable to reach. Pt educated on AE for increased independence and safety w/ LB ADLs, however, pt declined need at this time.  -[facility] Name 11/22/23 0957          Upper Body Dressing Assessment/Training    [place] Level (Upper Body Dressing) don;other (see comments);moderate assist (50% patient effort);verbal cues  hosp gown  -     Position (Upper Body Dressing) edge of bed sitting  -               User Key  (r) = Recorded By, (t) = Taken By, (c) = Cosigned By      Initials Name Provider Type    Alyce Ontiveros OT Occupational Therapist                   Obj/Interventions       [facility] Name 11/22/23 1000          Sensory Assessment (Somatosensory)    Sensory Assessment (Somatosensory) UE sensation intact  -[facility] 11/22/23 1000          Vision Assessment/Intervention    Visual Impairment/Limitations WFL  -[facility] 11/22/23 1000          Range of Motion Comprehensive    General Range of Motion bilateral upper extremity ROM WFL  -[facility] 11/22/23 1000          Strength Comprehensive (MMT)    General Manual Muscle Testing (MMT) Assessment upper extremity strength deficits identified  -     Comment, General Manual Muscle Testing (MMT) Assessment BUE grossly 4/5  -[facility] Name 11/22/23 1000          Balance    Balance Assessment sitting static balance;sitting dynamic balance;sit to stand dynamic balance;standing static balance;standing dynamic  balance  -     Static Sitting Balance standby assist  -     Dynamic Sitting Balance standby assist  -     Position, Sitting Balance unsupported;sitting edge of bed;sitting in chair  -     Sit to Stand Dynamic Balance contact guard;verbal cues  -     Static Standing Balance contact guard;verbal cues  -     Dynamic Standing Balance contact guard;verbal cues  -     Position/Device Used, Standing Balance supported  -     Balance Interventions sitting;sit to stand;occupation based/functional task  -               User Key  (r) = Recorded By, (t) = Taken By, (c) = Cosigned By      Initials Name Provider Type     Alyce Orellana, OT Occupational Therapist                   Goals/Plan       Row Name 11/22/23 1002          Transfer Goal 1 (OT)    Activity/Assistive Device (Transfer Goal 1, OT) bed-to-chair/chair-to-bed;toilet  -     [place] Level/Cues Needed (Transfer Goal 1, OT) standby assist  -     Time Frame (Transfer Goal 1, OT) long term goal (LTG);10 days  -     Progress/Outcome (Transfer Goal 1, OT) goal ongoing  -       Row Name 11/22/23 1002          Dressing Goal 1 (OT)    Activity/Device (Dressing Goal 1, OT) lower body dressing  don/doff socks, pants w/ AAD  -     [place]/Cues Needed (Dressing Goal 1, OT) standby assist  -     Time Frame (Dressing Goal 1, OT) long term goal (LTG);10 days  -     Progress/Outcome (Dressing Goal 1, OT) goal ongoing  -       Row Name 11/22/23 1002          Toileting Goal 1 (OT)    Activity/Device (Toileting Goal 1, OT) adjust/manage clothing;perform perineal hygiene;commode;grab bar/safety frame  -     [place] Level/Cues Needed (Toileting Goal 1, OT) standby assist  -     Time Frame (Toileting Goal 1, OT) long term goal (LTG);10 days  -     Progress/Outcome (Toileting Goal 1, OT) goal ongoing  -       Row Name 11/22/23 1002          Therapy Assessment/Plan (OT)    Planned Therapy Interventions (OT) activity tolerance  training;adaptive equipment training;BADL retraining;functional balance retraining;edema control/reduction;IADL retraining;occupation/activity based interventions;patient/caregiver education/training;ROM/therapeutic exercise;strengthening exercise;transfer/mobility retraining  -               User Key  (r) = Recorded By, (t) = Taken By, (c) = Cosigned By      Initials Name Provider Type     Alyce Orellana, OT Occupational Therapist                   Clinical Impression       Row Name 11/22/23 1000          Pain Assessment    Pretreatment Pain Rating 1/10  -     Posttreatment Pain Rating 2/10  -     Pain Location - Side/Orientation Right  -     Pain Location generalized  -     Pain Location - flank  -     Pain Intervention(s) Repositioned;Ambulation/increased activity  -       Row Name 11/22/23 1000          Plan of Care Review    Plan of Care Reviewed With patient  -     Progress no change  -     Outcome Evaluation Pt presents w/ increased pain, BLE edema, generalized weakenss, decreased functional endurance, and mild balance deficits limiting his ADL independence. Skilled IPOT re-eval limited d/t pt nausea w/ activity. Pt would benefit from continued skilled IPOT services to address current functional deficits. Rec IRF at d/c for best functional outcomes.  -       Row Name 11/22/23 1000          Therapy Assessment/Plan (OT)    Rehab Potential (OT) good, to achieve stated therapy goals  -     Criteria for Skilled Therapeutic Interventions Met (OT) yes;skilled treatment is necessary  -     Therapy Frequency (OT) daily  -       Row Name 11/22/23 1000          Therapy Plan Review/Discharge Plan (OT)    Anticipated Discharge Disposition (OT) inpatient rehabilitation facility  -[facility] Name 11/22/23 1000          Vital Signs    Pretreatment Heart Rate (beats/min) 106  -     Intratreatment Heart Rate (beats/min) 117  -     Posttreatment Heart Rate (beats/min) 103  -MC     O2  Delivery Pre Treatment room air  -     O2 Delivery Intra Treatment room air  -     O2 Delivery Post Treatment room air  -     Pre Patient Position Supine  -     Intra Patient Position Standing  -     Post Patient Position Sitting  -       Row Name 11/22/23 1000          Positioning and Restraints    Pre-Treatment Position in bed  -     Post Treatment Position chair  -     In Chair notified nsg;reclined;call light within reach;encouraged to call for assist;exit alarm on;waffle cushion;legs elevated;heels elevated;RUE elevated;LUE elevated  -               User Key  (r) = Recorded By, (t) = Taken By, (c) = Cosigned By      Initials Name Provider Type    Alyce Ontiveros OT Occupational Therapist                   Outcome Measures       Row Name 11/22/23 1003          How much help from another is currently needed...    Putting on and taking off regular lower body clothing? 2  -MC     Bathing (including washing, rinsing, and drying) 2  -MC     Toileting (which includes using toilet bed pan or urinal) 3  -MC     Putting on and taking off regular upper body clothing 3  -MC     Taking care of personal grooming (such as brushing teeth) 4  -MC     Eating meals 4  -     AM-PAC 6 Clicks Score (OT) 18  -       Row Name 11/22/23 1003          Functional Assessment    Outcome Measure Options AM-PAC 6 Clicks Daily Activity (OT)  -               User Key  (r) = Recorded By, (t) = Taken By, (c) = Cosigned By      Initials Name Provider Type    Alyce Ontiveros OT Occupational Therapist                    Occupational Therapy Education       Title: PT OT SLP Therapies (In Progress)       Topic: Occupational Therapy (In Progress)       Point: ADL training (In Progress)       Description:   Instruct learner(s) on proper safety adaptation and remediation techniques during self care or transfers.   Instruct in proper use of assistive devices.                  Learning Progress Summary              Patient Acceptance, E, NR by  at 11/22/2023 1004    Acceptance, E, VU by  at 11/9/2023 0913                         Point: Home exercise program (Not Started)       Description:   Instruct learner(s) on appropriate technique for monitoring, assisting and/or progressing therapeutic exercises/activities.                  Learner Progress:  Not documented in this visit.              Point: Precautions (In Progress)       Description:   Instruct learner(s) on prescribed precautions during self-care and functional transfers.                  Learning Progress Summary             Patient Acceptance, E, NR by  at 11/22/2023 1004    Acceptance, E, VU by  at 11/16/2023 0122                         Point: Body mechanics (In Progress)       Description:   Instruct learner(s) on proper positioning and spine alignment during self-care, functional mobility activities and/or exercises.                  Learning Progress Summary             Patient Acceptance, E, NR by  at 11/22/2023 1004                                         User Key       Initials Effective Dates Name Provider Type Discipline     02/03/23 -  Charla Rausch, OT Occupational Therapist OT     10/14/22 -  Alyce Orellana OT Occupational Therapist OT     01/16/23 -  Negin Monson RN Registered Nurse Nurse                  OT Recommendation and Plan  Planned Therapy Interventions (OT): activity tolerance training, adaptive equipment training, BADL retraining, functional balance retraining, edema control/reduction, IADL retraining, occupation/activity based interventions, patient/caregiver education/training, ROM/therapeutic exercise, strengthening exercise, transfer/mobility retraining  Therapy Frequency (OT): daily  Plan of Care Review  Plan of Care Reviewed With: patient  Progress: no change  Outcome Evaluation: Pt presents w/ increased pain, BLE edema, generalized weakenss, decreased functional endurance, and mild balance deficits  limiting his ADL independence. Skilled IPOT re-eval limited d/t pt nausea w/ activity. Pt would benefit from continued skilled IPOT services to address current functional deficits. Rec IRF at d/c for best functional outcomes.     Time Calculation:         Time Calculation- OT       Row Name 11/22/23 1004             Time Calculation- OT    OT Start Time 0917  -      OT Received On 11/22/23  -      OT Goal Re-Cert Due Date 12/02/23  -         Timed Charges    55089 - OT Therapeutic Activity Minutes 15  -MC      97372 - OT Self Care/Mgmt Minutes 10  -MC         Untimed Charges    OT Eval/Re-eval Minutes 25  -MC         Total Minutes    Timed Charges Total Minutes 25  -MC      Untimed Charges Total Minutes 25  -MC       Total Minutes 50  -MC                User Key  (r) = Recorded By, (t) = Taken By, (c) = Cosigned By      Initials Name Provider Type     Alyce Orellana OT Occupational Therapist                  Therapy Charges for Today       Code Description Service Date Service Provider Modifiers Qty    08072131052 HC OT THERAPEUTIC ACT EA 15 MIN 11/22/2023 Alyce Orellana OT GO 1    64754150979 HC OT SELF CARE/MGMT/TRAIN EA 15 MIN 11/22/2023 Alyce Orellana OT GO 1    86985152775 HC OT RE-EVAL 2 11/22/2023 Alyce Orellana OT GO 1                 Alyce Orellana OT  11/22/2023

## 2023-11-22 NOTE — PROGRESS NOTES
Cardiology Progress Note      Reason for visit:    Atrial fibrillation with RVR  Acute on chronic systolic heart failure  Elevated troponin    IDENTIFICATION: 53-year-old gentleman who resides in [place], [place]    Active Hospital Problems    Diagnosis  POA    **Acute on chronic systolic congestive heart failure [I50.23]  Yes     Reported history of systolic heart failure dating back for 20 years.  Reportedly last echocardiogram 1 year ago had normal LVEF.  Data deficit  Echo (11/8/2023): LVEF less than 20%.  Severe MR.  RVSP less than 35 mmHg left ventricular ejection fraction appears to be less than 20%.          LBBB (left bundle branch block) [I44.7]  Yes    Presence of biventricular implantable cardioverter-defibrillator (ICD) [Z95.810]  No     Biventricular ICD placed by Dr. Sundar Bhagat 11/17/2023      Ventricular fibrillation [I49.01]  Yes     V-fib arrest occurred 11/15/2023   Biventricular ICD placed by Dr. Sundar Bhagat 11/17/2023 for V-fib arrest, LBBB      Elevated troponin level not due myocardial infarction [R79.89]  Yes     Troponin elevated but flat in the setting of A-fib with RVR and acute systolic heart failure      Severe mitral regurgitation [I34.0]  Yes     Echo (11/8/2023): Severe MR.  LVEF less than 20%      Hyponatremia [E87.1]  Yes    STUART (acute kidney injury) [N17.9]  Yes    Atrial fibrillation with RVR [I48.91]  Yes     Reported history of atrial fibrillation for 10 years  CHA2DS2-VASc=3  Presentation [facility] 11/8/2023 in A-fib with RVR in the setting of hyponatremia and STUART  External cardioversion (11/10/2023): Successfully restored NSR.  Return of atrial fibrillation 11/11/2023 but now returned to NSR on IV amiodarone      Essential hypertension [I10]  Yes    Anxiety associated with depression [F41.8]  Yes    Hypoalbuminemia [E88.09]  Yes    Hyperbilirubinemia [E80.6]  Yes    Elevated liver enzymes [R74.8]  Yes            Patient sitting up in the bed breathing easy on room  air.  He reports feeling much better than when he was admitted.  He denies any chest pain.  He reports improvement in his lower extremity edema.  The patient had cardioversion earlier this admission that converted him to normal sinus rhythm but he went back into atrial fibrillation and IV amiodarone was restarted yesterday.  He is currently on IV amiodarone drip and in normal sinus rhythm with frequent PVCs    Update 11/14/23  Feeling quite weak.  BP remains borderline.  Overall volume much better.  Still somewhat overloaded with low albumin.      Update 11/15/23  About the same.  Really no change      Update 11/16/23  His edema is all but resolved.  Minimal JVD.  His fluid status is the best it has been while here even though no diuretics today.  He apparently (dont have telemetry) and had R on T with polymorphic Vt then VF.  One round of CPR and desynchronized cardioversion.  Was then in afib with RVR and has converted to NSR.  Kidney function stable.  QT mildly prolonged.      Update 11/17/23  Uneventful night.  Cr little worse.  Edema little worse.  He feels fine except rib pain    Update 11/24/23  BP remains low.  Edema about the same.  Kidney function much, much better after BIVICD.   In afib today    Update 11/22/23  No significant changes.  In afib with RVR.           Vital Sign Min/Max for last 24 hours  Temp  Min: 97.6 °F (36.4 °C)  Max: 98.6 °F (37 °C)   BP  Min: 82/68  Max: 123/106   Pulse  Min: 86  Max: 138   Resp  Min: 16  Max: 26   SpO2  Min: 89 %  Max: 98 %   Flow (L/min)  Min: 2  Max: 2      Intake/Output Summary (Last 24 hours) at 11/22/2023 1003  Last data filed at 11/22/2023 0800  Gross per 24 hour   Intake 1484 ml   Output 1550 ml   Net -66 ml               Physical exam  General: alert and oriented  Card: RRR, minimal JVD  Chest: CTAB  Abd: soft, non distended  Extremities: mild edema  MK: moves all extremities  Neuro: non focal exam  Psych: cooperative    Results from last 7 days   Lab Units  11/22/23  0639 11/21/23  0336 11/20/23  1502 11/20/23  0410 11/19/23  0417 11/18/23  1054 11/17/23  0327 11/16/23  0332   SODIUM mmol/L 130* 129*  --  126* 128* 128* 122* 127*   POTASSIUM mmol/L 5.3* 4.4 4.1 3.5 4.0 4.1 4.4 3.8   CHLORIDE mmol/L 88* 90*  --  84* 86* 85* 81* 83*   BUN mg/dL 83* 79*  --  83* 83* 85* 104* 82*   CREATININE mg/dL 1.92* 1.47*  --  1.76* 1.81* 2.13* 2.73* 2.41*   MAGNESIUM mg/dL 2.4 2.1  --   --   --  2.4 2.8* 2.9*           Results from last 7 days   Lab Units 11/22/23  0425 11/21/23  0336 11/20/23  0410   WBC 10*3/mm3 17.65* 10.25 12.53*   HEMOGLOBIN g/dL 12.1* 10.9* 10.9*   HEMATOCRIT % 37.4* 33.5* 33.6*   PLATELETS 10*3/mm3 268 149 124*       Lab Results   Component Value Date    HGBA1C 6.00 (H) 11/09/2023       Lab Results   Component Value Date    CHOL 81 11/09/2023    TRIG 60 11/09/2023    HDL 22 (L) 11/09/2023    LDL 45 11/09/2023       Assessment  -chronic systolic CHF, hx of NICM, no CAC on recent CT or atherosclrosis of aorta s/p BIVICD  -PAF  -Severe MR  -STUART  -LBBB    Plan  - Appreciate nephrology assistance regarding diuresis.  Kidney function little worse likely from more atrial fibrillation out of control.    -continue oral amiodarone, on 400mg bid and will go to daily 400mg next week.    -digoxin overnight given, will start on daily digoxin, will need to monitor, will give another bolus today.  Was hopeful to get on some GDMT today but too tenuous.  If the afib cotinues to be more problematic will likely do cardioversion bedside.  Will make NPO after midnight tonight just in case will try tomorrow.  (I'm rounding tomorrow)  -continue eliquis  -cont. Jardiance  -on regla Basurto

## 2023-11-22 NOTE — PROGRESS NOTES
Intensive Care Follow-up     Hospital:  LOS: 14 days   Mr. Sundar Reeder, 53 y.o. male is followed for:   Acute on chronic systolic congestive heart failure        Subjective     53-year-old male with a past medical history of CHF, CKD, hypertension, and atrial fibrillation.  He presented to the ER and was admitted on 11/8 with atrial fibrillation with RVR, shortness of breath, and worsening lower extremity edema.     He was admitted to telemetry and placed on an amiodarone drip and underwent ECV on 11/10.  Subsequent to that he has been primarily in sinus rhythm but with intermittent atrial fibrillation noted.  His ejection fraction by her most recent echo is less than 20% with severe MR.  While in the hospital he had worsening acute kidney injury associate with diuresis and this has been held.  He has ongoing hyponatremia and elevated liver enzymes felt to be related to congestive hepatopathy.       On the evening of 11/15, he developed the acute onset of ventricular fibrillation after what appeared to be an R-on-T phenomenon.  He required defibrillation and received CPR as well as epinephrine and bicarbonate.  Postcode he woke up and was awake alert and oriented.  He was transferred to ICU.      He has had no further episodes of ventricular fibrillation.  He had an ICD placed on 11/17.    Interval History:  The chart has been reviewed.  The patient did go into atrial fibrillation again last night.  He he is a bit better controlled currently.  His chest pain is under good control and he states that this is a 1 or 2 compared to the high levels of was yesterday.  He is concerned about his current fluid restriction.    The patient's past medical, surgical and social history were reviewed and updated in Epic as appropriate.        Objective     Infusions:     Medications:  amiodarone, 400 mg, Oral, BID With Meals  [START ON 11/27/2023] amiodarone, 400 mg, Oral, Q24H  apixaban, 5 mg, Oral, Q12H  castor oil-balsam peru, 1  "application , Topical, Q12H  digoxin, 500 mcg, Intravenous, Once  [START ON 11/23/2023] digoxin, 125 mcg, Oral, Daily  empagliflozin, 10 mg, Oral, Daily  midodrine, 10 mg, Oral, TID   pharmacy consult - [facility], , Does not apply, Daily  senna-docusate sodium, 2 tablet, Oral, BID  sodium chloride, 10 mL, Intravenous, Q12H        Vital Sign Min/Max for last 24 hours  Temp  Min: 97.6 °F (36.4 °C)  Max: 98.6 °F (37 °C)   BP  Min: 82/68  Max: 123/106   Pulse  Min: 86  Max: 138   Resp  Min: 16  Max: 26   SpO2  Min: 89 %  Max: 98 %   Flow (L/min)  Min: 2  Max: 2       Input/Output for last 24 hour shift  11/21 0701 - 11/22 0700  In: 1124 [P.O.:1124]  Out: 2250 [Urine:2250]      Objective:  General Appearance:  In no acute distress and uncomfortable.    Vital signs: (most recent): Blood pressure 116/78, pulse 93, temperature 98.6 °F (37 °C), temperature source Oral, resp. rate 18, height 180.3 cm (71\"), weight 84.4 kg (186 lb 1.1 oz), SpO2 93%.    HEENT: Normal HEENT exam.  (Nasal cannula O2)    Lungs:  Normal effort and normal respiratory rate.  He is not in respiratory distress.  No rales, wheezes or rhonchi.    Heart: Normal rate.  Irregular rhythm.  S1 normal and S2 normal.  No murmur, gallop or friction rub.   Chest: Symmetric chest wall expansion. (Pacer wound over left precordium dressed and clean)  Abdomen: Abdomen is soft and non-distended.  Bowel sounds are normal.   There is no abdominal tenderness.   There is no mass. There is no splenomegaly. There is no hepatomegaly.   Extremities: Normal range of motion.  There is no deformity or dependent edema.    Neurological: Patient is alert and oriented to person, place and time.  Normal strength.    Pupils:  Pupils are equal, round, and reactive to light.    Skin:  Warm and dry.                Results from last 7 days   Lab Units 11/22/23  0425 11/21/23  0336 11/20/23  0410   WBC 10*3/mm3 17.65* 10.25 12.53*   HEMOGLOBIN g/dL 12.1* 10.9* 10.9*   PLATELETS 10*3/mm3 268 " 149 124*     Results from last 7 days   Lab Units 11/22/23  0639 11/21/23  0336 11/20/23  1502 11/20/23  0410 11/19/23  0417 11/18/23  1054 11/17/23  0327 11/16/23  0332   SODIUM mmol/L 130* 129*  --  126*   < > 128* 122* 127*   POTASSIUM mmol/L 5.3* 4.4 4.1 3.5   < > 4.1 4.4 3.8   CO2 mmol/L 19.0* 30.0*  --  32.0*   < > 31.0* 27.0 25.0   BUN mg/dL 83* 79*  --  83*   < > 85* 104* 82*   CREATININE mg/dL 1.92* 1.47*  --  1.76*   < > 2.13* 2.73* 2.41*   MAGNESIUM mg/dL 2.4 2.1  --   --   --  2.4 2.8* 2.9*   PHOSPHORUS mg/dL  --   --   --   --   --  3.0 4.9* 5.5*   GLUCOSE mg/dL 90 106*  --  127*   < > 112* 118* 118*    < > = values in this interval not displayed.     Estimated Creatinine Clearance: 54.3 mL/min (A) (by C-G formula based on SCr of 1.92 mg/dL (H)).            I reviewed the patient's results and images.     Assessment & Plan   Impression        Acute on chronic systolic congestive heart failure    Hyponatremia    STUART (acute kidney injury)    Atrial fibrillation with RVR    Essential hypertension    Anxiety associated with depression    Hypoalbuminemia    Hyperbilirubinemia    Elevated liver enzymes    Elevated troponin level not due myocardial infarction    Severe mitral regurgitation    Ventricular fibrillation    LBBB (left bundle branch block)    Presence of biventricular implantable cardioverter-defibrillator (ICD)       Plan        Continue with anticoagulation and antiarrhythmics as before.  Possibility of cardioversion tomorrow.  Further plans on fluid restriction per nephrology.  Mobilize with physical and Occupational Therapy.  Transition to telemetry.  Orders been placed for tomorrow.    Plan of care and goals reviewed with mulitdisciplinary/antibiotic stewardship team during rounds.   I discussed the patient's findings and my recommendations with patient and nursing staff         Billy Xie MD, [facility]  Pulmonology and Critical Care Medicine

## 2023-11-23 NOTE — PROGRESS NOTES
Cardiology Progress Note      Reason for visit:    Atrial fibrillation with RVR  Acute on chronic systolic heart failure  Elevated troponin    IDENTIFICATION: 53-year-old gentleman who resides in [place], [place]    Active Hospital Problems    Diagnosis  POA    **Acute on chronic systolic congestive heart failure [I50.23]  Yes     Reported history of systolic heart failure dating back for 20 years.  Reportedly last echocardiogram 1 year ago had normal LVEF.  Data deficit  Echo (11/8/2023): LVEF less than 20%.  Severe MR.  RVSP less than 35 mmHg left ventricular ejection fraction appears to be less than 20%.          LBBB (left bundle branch block) [I44.7]  Yes    Presence of biventricular implantable cardioverter-defibrillator (ICD) [Z95.810]  No     Biventricular ICD placed by Dr. Sundar Bhagat 11/17/2023      Ventricular fibrillation [I49.01]  Yes     V-fib arrest occurred 11/15/2023   Biventricular ICD placed by Dr. Sundar Bhagat 11/17/2023 for V-fib arrest, LBBB      Elevated troponin level not due myocardial infarction [R79.89]  Yes     Troponin elevated but flat in the setting of A-fib with RVR and acute systolic heart failure      Severe mitral regurgitation [I34.0]  Yes     Echo (11/8/2023): Severe MR.  LVEF less than 20%      Hyponatremia [E87.1]  Yes    STUART (acute kidney injury) [N17.9]  Yes    Atrial fibrillation with RVR [I48.91]  Yes     Reported history of atrial fibrillation for 10 years  CHA2DS2-VASc=3  Presentation [facility] 11/8/2023 in A-fib with RVR in the setting of hyponatremia and STUART  External cardioversion (11/10/2023): Successfully restored NSR.  Return of atrial fibrillation 11/11/2023 but now returned to NSR on IV amiodarone      Essential hypertension [I10]  Yes    Anxiety associated with depression [F41.8]  Yes    Hypoalbuminemia [E88.09]  Yes    Hyperbilirubinemia [E80.6]  Yes    Elevated liver enzymes [R74.8]  Yes            Patient sitting up in the bed breathing easy on room  air.  He reports feeling much better than when he was admitted.  He denies any chest pain.  He reports improvement in his lower extremity edema.  The patient had cardioversion earlier this admission that converted him to normal sinus rhythm but he went back into atrial fibrillation and IV amiodarone was restarted yesterday.  He is currently on IV amiodarone drip and in normal sinus rhythm with frequent PVCs    Update 11/14/23  Feeling quite weak.  BP remains borderline.  Overall volume much better.  Still somewhat overloaded with low albumin.      Update 11/15/23  About the same.  Really no change      Update 11/16/23  His edema is all but resolved.  Minimal JVD.  His fluid status is the best it has been while here even though no diuretics today.  He apparently (dont have telemetry) and had R on T with polymorphic Vt then VF.  One round of CPR and desynchronized cardioversion.  Was then in afib with RVR and has converted to NSR.  Kidney function stable.  QT mildly prolonged.      Update 11/17/23  Uneventful night.  Cr little worse.  Edema little worse.  He feels fine except rib pain    Update 11/24/23  BP remains low.  Edema about the same.  Kidney function much, much better after BIVICD.   In afib today    Update 11/22/23  No significant changes.  In afib with RVR.      Update 11/23/23  Converted to NSR.  BP is stable.  Kidney function improved         Vital Sign Min/Max for last 24 hours  Temp  Min: 97.5 °F (36.4 °C)  Max: 98 °F (36.7 °C)   BP  Min: 93/73  Max: 128/79   Pulse  Min: 70  Max: 108   Resp  Min: 16  Max: 19   SpO2  Min: 84 %  Max: 96 %   Flow (L/min)  Min: 2  Max: 2      Intake/Output Summary (Last 24 hours) at 11/23/2023 0936  Last data filed at 11/23/2023 0315  Gross per 24 hour   Intake 840 ml   Output 1475 ml   Net -635 ml               Physical exam  General: alert and oriented  Card: RRR, minimal JVD  Chest: CTAB  Abd: soft, non distended  Extremities: mild edema  MK: moves all  extremities  Neuro: non focal exam  Psych: cooperative    Results from last 7 days   Lab Units 11/23/23  0357 11/22/23  0639 11/21/23  0336 11/20/23  1502 11/20/23  0410 11/19/23  0417 11/18/23  1054 11/17/23  0327   SODIUM mmol/L 131* 130* 129*  --  126* 128* 128* 122*   POTASSIUM mmol/L 4.0 5.3* 4.4   < > 3.5 4.0 4.1 4.4   CHLORIDE mmol/L 92* 88* 90*  --  84* 86* 85* 81*   BUN mg/dL 72* 83* 79*  --  83* 83* 85* 104*   CREATININE mg/dL 1.54* 1.92* 1.47*  --  1.76* 1.81* 2.13* 2.73*   MAGNESIUM mg/dL 2.2 2.4 2.1  --   --   --  2.4 2.8*    < > = values in this interval not displayed.           Results from last 7 days   Lab Units 11/23/23  0357 11/22/23  0425 11/21/23 0336   WBC 10*3/mm3 25.57* 17.65* 10.25   HEMOGLOBIN g/dL 11.6* 12.1* 10.9*   HEMATOCRIT % 35.8* 37.4* 33.5*   PLATELETS 10*3/mm3 278 268 149       Lab Results   Component Value Date    HGBA1C 6.00 (H) 11/09/2023       Lab Results   Component Value Date    CHOL 81 11/09/2023    TRIG 60 11/09/2023    HDL 22 (L) 11/09/2023    LDL 45 11/09/2023       Assessment  -chronic systolic CHF, hx of NICM, no CAC on recent CT or atherosclrosis of aorta s/p BIVICD  -PAF  -Severe MR  -STUART  -LBBB    Plan  - Appreciate nephrology assistance regarding diuresis.    -continue oral amiodarone, on 400mg bid and will go to daily 400mg next week.  -seems to do much better in NSR  -cont. Digoxin for now    -will add low dose enalapril  -continue eliquis  -cont. Jardiance  -on midodrine      Romaine Basurto

## 2023-11-23 NOTE — THERAPY TREATMENT NOTE
Patient Name: Sundar Reeder  : 1970    MRN: [id number]                              Today's Date: 2023       Admit Date: 2023    Visit Dx:     ICD-10-CM ICD-9-CM   1. Acute on chronic systolic congestive heart failure  I50.23 428.23     428.0   2. Chronic atrial fibrillation  I48.20 427.31   3. Hyperbilirubinemia  E80.6 782.4   4. Atrial fibrillation with RVR  I48.91 427.31   5. Hyponatremia  E87.1 276.1   6. Hypochloremia  E87.8 276.9   7. Elevated serum creatinine  R79.89 790.99   8. Physical deconditioning  R53.81 799.3   9. Elevated brain natriuretic peptide (BNP) level  R79.89 790.99   10. Acute pulmonary edema  J81.0 518.4   11. Leukocytosis, unspecified type  D72.829 288.60   12. Elevated transaminase level  R74.01 790.4     Patient Active Problem List   Diagnosis    Hyponatremia    STUART (acute kidney injury)    Atrial fibrillation with RVR    Essential hypertension    Anxiety associated with depression    Hypoalbuminemia    Hyperbilirubinemia    Elevated liver enzymes    Acute on chronic systolic congestive heart failure    Elevated troponin level not due myocardial infarction    Severe mitral regurgitation    Ventricular fibrillation    LBBB (left bundle branch block)    Presence of biventricular implantable cardioverter-defibrillator (ICD)     History reviewed. No pertinent past medical history.  Past Surgical History:   Procedure Laterality Date    BIVENTRICULLAR IMPLANTABLE CARDIOVERTER DEFIBRILLATOR PLACEMENT N/A 2023    Procedure: Implant ICD - bi ventricular;  Surgeon: Sundar Bhagat MD;  Location: [facility] INVASIVE LOCATION;  Service: Cardiovascular;  Laterality: N/A;      General Information       Row Name 23 1011          Physical Therapy Time and Intention    Document Type therapy note (daily note)  -ES     Mode of Treatment physical therapy  -ES       Row Name 23 1011          General Information    Patient Profile Reviewed yes  -ES     Existing  Precautions/Restrictions fall;pacemaker  -ES     Barriers to Rehab medically complex;previous functional deficit  -ES       Row Name 11/23/23 1011          Cognition    Orientation Status (Cognition) oriented x 4  -ES       Row Name 11/23/23 1011          Safety Issues, Functional Mobility    Safety Issues Affecting Function (Mobility) awareness of need for assistance;insight into deficits/self-awareness;safety precaution awareness;safety precautions follow-through/compliance;sequencing abilities  -ES     Impairments Affecting Function (Mobility) balance;endurance/activity tolerance;strength;pain  -ES               User Key  (r) = Recorded By, (t) = Taken By, (c) = Cosigned By      Initials Name Provider Type    ES Stefany Brantley, PT Physical Therapist                   Mobility       Row Name 11/23/23 1012          Bed Mobility    Bed Mobility supine-sit  -ES     Supine-Sit Gallia (Bed Mobility) supervision  -ES     Assistive Device (Bed Mobility) bed rails;head of bed elevated  -ES     Comment, (Bed Mobility) No c/o dizziness/LH with transition to upright  -ES       Row Name 11/23/23 1012          Sit-Stand Transfer    Sit-Stand Gallia (Transfers) contact guard;verbal cues  -ES     Assistive Device (Sit-Stand Transfers) other (see comments)  UE support  -ES     Comment, (Sit-Stand Transfer) v/c for hand placement  -ES       Row Name 11/23/23 1012          Gait/Stairs (Locomotion)    Gallia Level (Gait) verbal cues;contact guard  -ES     Assistive Device (Gait) other (see comments)  BUE support  -ES     Distance in Feet (Gait) 60+60  -ES     Deviations/Abnormal Patterns (Gait) bilateral deviations;base of support, narrow;jesusita decreased;gait speed decreased;stride length decreased  -ES     Bilateral Gait Deviations forward flexed posture;heel strike decreased  -ES     Comment, (Gait/Stairs) Pt ambulated with CGA and UE support. Demo'd forward flexed posture with decreased stride length and  step-through gait pattern. No LOB, further mobility limited by fatigue and nausea.  -ES               User Key  (r) = Recorded By, (t) = Taken By, (c) = Cosigned By      Initials Name Provider Type    Stefany Pineda PT Physical Therapist                   Obj/Interventions       Row Name 11/23/23 1014          Balance    Balance Assessment sitting static balance;sitting dynamic balance;sit to stand dynamic balance;standing static balance;standing dynamic balance  -ES     Static Sitting Balance standby assist  -ES     Dynamic Sitting Balance standby assist  -ES     Position, Sitting Balance unsupported;sitting in chair;sitting edge of bed  -ES     Sit to Stand Dynamic Balance contact guard  -ES     Static Standing Balance contact guard  -ES     Dynamic Standing Balance contact guard;verbal cues  -ES     Position/Device Used, Standing Balance supported  -ES     Balance Interventions sitting;standing;sit to stand;supported;static;dynamic;occupation based/functional task  -ES     Comment, Balance no LOB  -ES               User Key  (r) = Recorded By, (t) = Taken By, (c) = Cosigned By      Initials Name Provider Type    Stefany Pineda PT Physical Therapist                   Goals/Plan    No documentation.                  Clinical Impression       Row Name 11/23/23 1015          Pain    Pretreatment Pain Rating 0/10 - no pain  -ES     Posttreatment Pain Rating 0/10 - no pain  -ES     Pre/Posttreatment Pain Comment tolerated  -ES     Pain Intervention(s) Repositioned;Ambulation/increased activity  -ES       Row Name 11/23/23 1015          Plan of Care Review    Plan of Care Reviewed With patient  -ES     Progress improving  -ES     Outcome Evaluation Pt continues to be limited by generalized weakness and decreased activity tolerance but demonstrated good effort with therapy. Will continue to progress as able to address functional deficits and promote return to PLOF. PT rec IRF at d/c for best functional  outcome.  -ES       Row Name 11/23/23 1015          Therapy Assessment/Plan (PT)    Rehab Potential (PT) good, to achieve stated therapy goals  -ES     Criteria for Skilled Interventions Met (PT) yes  -ES     Therapy Frequency (PT) daily  -ES       Row Name 11/23/23 1015          Vital Signs    Pre Systolic BP Rehab 110  -ES     Pre Treatment Diastolic BP 70  -ES     Post Systolic BP Rehab 126  -ES     Post Treatment Diastolic BP 83  -ES     Pretreatment Heart Rate (beats/min) 77  -ES     Posttreatment Heart Rate (beats/min) 93  -ES     Pre SpO2 (%) 94  -ES     O2 Delivery Pre Treatment room air  -ES     O2 Delivery Intra Treatment room air  -ES     Post SpO2 (%) 95  -ES     O2 Delivery Post Treatment room air  -ES     Pre Patient Position Supine  -ES     Intra Patient Position Standing  -ES     Post Patient Position Sitting  -ES       Row Name 11/23/23 1015          Positioning and Restraints    Pre-Treatment Position in bed  -ES     Post Treatment Position chair  -ES     In Chair notified nsg;reclined;sitting;call light within reach;encouraged to call for assist;exit alarm on;waffle cushion;legs elevated;heels elevated;RUE elevated;LUE elevated  -ES               User Key  (r) = Recorded By, (t) = Taken By, (c) = Cosigned By      Initials Name Provider Type    ES Stefany Brantley, PT Physical Therapist                   Outcome Measures       Row Name 11/23/23 1018 11/23/23 0800       How much help from another person do you currently need...    Turning from your back to your side while in flat bed without using bedrails? 3  -ES 3  -AS    Moving from lying on back to sitting on the side of a flat bed without bedrails? 3  -ES 3  -AS    Moving to and from a bed to a chair (including a wheelchair)? 3  -ES 3  -AS    Standing up from a chair using your arms (e.g., wheelchair, bedside chair)? 3  -ES 3  -AS    Climbing 3-5 steps with a railing? 2  -ES 2  -AS    To walk in hospital room? 3  -ES 2  -AS    AM-PAC 6 Clicks  Score (PT) 17  -ES 16  -AS    Highest Level of Mobility Goal 5 --> Static standing  -ES 5 --> Static standing  -AS      Row Name 11/23/23 1018          Functional Assessment    Outcome Measure Options AM-PAC 6 Clicks Basic Mobility (PT)  -ES               User Key  (r) = Recorded By, (t) = Taken By, (c) = Cosigned By      Initials Name Provider Type    AS Charla Huston, RN Registered Nurse    Stefany Pineda, FELICIANO Physical Therapist                                 Physical Therapy Education       Title: PT OT SLP Therapies (Done)       Topic: Physical Therapy (Done)       Point: Mobility training (Done)       Learning Progress Summary             Patient Acceptance, E,TB, VU by EB at 11/22/2023 1109    Acceptance, E,TB, NR by AY at 11/21/2023 1334    Acceptance, E,TB, NR by ES at 11/19/2023 1448    Acceptance, E, NR by AE at 11/15/2023 0904   Family Acceptance, E,TB, VU by EB at 11/22/2023 1109                         Point: Home exercise program (Done)       Learning Progress Summary             Patient Acceptance, E,TB, VU by EB at 11/22/2023 1109    Acceptance, E,TB, NR by AY at 11/21/2023 1334    Acceptance, E, NR by AE at 11/15/2023 0904   Family Acceptance, E,TB, VU by EB at 11/22/2023 1109                         Point: Body mechanics (Done)       Learning Progress Summary             Patient Acceptance, E,TB, VU by EB at 11/22/2023 1109    Acceptance, E,TB, NR by AY at 11/21/2023 1334    Acceptance, E,TB, NR by ES at 11/19/2023 1448    Acceptance, E, NR by AE at 11/15/2023 0904   Family Acceptance, E,TB, VU by EB at 11/22/2023 1109                         Point: Precautions (Done)       Learning Progress Summary             Patient Acceptance, E,TB, VU by EB at 11/22/2023 1109    Acceptance, E,TB, NR by AY at 11/21/2023 1334    Acceptance, E,TB, NR by ES at 11/19/2023 1448    Acceptance, E, NR by AE at 11/15/2023 0904   Family Acceptance, E,TB, VU by EB at 11/22/2023 1109                                          User Key       Initials Effective Dates Name Provider Type Discipline    EB 09/22/22 -  Enrike Davis RN Registered Nurse Nurse    AY 11/10/20 -  Yessenia Carbajal, PT Physical Therapist PT    AE 09/21/21 -  Kenroy Guzman, FELICIANO Physical Therapist PT    ES 08/11/22 -  Stefany Brantley PT Physical Therapist PT                  PT Recommendation and Plan  Planned Therapy Interventions (PT): balance training, bed mobility training, gait training, patient/family education, neuromuscular re-education, strengthening, stair training, transfer training, postural re-education  Plan of Care Reviewed With: patient  Progress: improving  Outcome Evaluation: Pt continues to be limited by generalized weakness and decreased activity tolerance but demonstrated good effort with therapy. Will continue to progress as able to address functional deficits and promote return to PLOF. PT rec IRF at d/c for best functional outcome.     Time Calculation:         PT Charges       Row Name 11/23/23 1018 11/23/23 0802          Time Calculation    Start Time 0946  - 0802  -     PT Received On 11/23/23  -ES --     PT Goal Re-Cert Due Date 11/29/23  -ES 11/29/23  -        Time Calculation- PT    Total Timed Code Minutes- PT 24 minute(s)  -ES --        Timed Charges    48823 - Gait Training Minutes  15  -ES --     09668 - PT Therapeutic Activity Minutes 9  -ES --        Untimed Charges    95692-Ymbcpmikyh comp below knee -- 10  -MC     41053-Iaynqmome debridement -- 10  -MC        Total Minutes    Timed Charges Total Minutes 24  -ES --     Untimed Charges Total Minutes -- 20  -MC      Total Minutes 24  -ES 20  -MC               User Key  (r) = Recorded By, (t) = Taken By, (c) = Cosigned By      Initials Name Provider Type    Susana Swan, PT Physical Therapist    ES Stefany Brantley, PT Physical Therapist                  Therapy Charges for Today       Code Description Service Date Service Provider Modifiers Qty    [id number]   GAIT TRAINING EA 15 MIN 11/23/2023 Stefany Brantley, PT GP 1    [id number]  PT THERAPEUTIC ACT EA 15 MIN 11/23/2023 Stefany Brantley, PT GP 1            PT G-Codes  Outcome Measure Options: AM-PAC 6 Clicks Basic Mobility (PT)  AM-PAC 6 Clicks Score (PT): 17  AM-PAC 6 Clicks Score (OT): 18  Zeng Total Score: 55  PT Discharge Summary  Anticipated Discharge Disposition (PT): inpatient rehabilitation facility    Stefany Brantley PT  11/23/2023

## 2023-11-23 NOTE — PLAN OF CARE
Goal Outcome Evaluation:                        No acute events overnight. VSS, afebrile. HR stable throughout night, 70s-80s. Pressures stable; MAP >65. RA - 2L NC while sleeping. NPO since midnight. Rhino rocket remains in place for right nostril bleeding per pt request. UOP adequate. No BM; bowel regimen administered. PRN pain meds given x1.

## 2023-11-23 NOTE — THERAPY WOUND CARE TREATMENT
Acute Care - Wound/Debridement Treatment Note   [employer]     Patient Name: Sundar Reeder  : 1970  MRN: [id number]  Today's Date: 2023                Admit Date: 2023    Visit Dx:    ICD-10-CM ICD-9-CM   1. Acute on chronic systolic congestive heart failure  I50.23 428.23     428.0   2. Chronic atrial fibrillation  I48.20 427.31   3. Hyperbilirubinemia  E80.6 782.4   4. Atrial fibrillation with RVR  I48.91 427.31   5. Hyponatremia  E87.1 276.1   6. Hypochloremia  E87.8 276.9   7. Elevated serum creatinine  R79.89 790.99   8. Physical deconditioning  R53.81 799.3   9. Elevated brain natriuretic peptide (BNP) level  R79.89 790.99   10. Acute pulmonary edema  J81.0 518.4   11. Leukocytosis, unspecified type  D72.829 288.60   12. Elevated transaminase level  R74.01 790.4       Patient Active Problem List   Diagnosis    Hyponatremia    STUART (acute kidney injury)    Atrial fibrillation with RVR    Essential hypertension    Anxiety associated with depression    Hypoalbuminemia    Hyperbilirubinemia    Elevated liver enzymes    Acute on chronic systolic congestive heart failure    Elevated troponin level not due myocardial infarction    Severe mitral regurgitation    Ventricular fibrillation    LBBB (left bundle branch block)    Presence of biventricular implantable cardioverter-defibrillator (ICD)        History reviewed. No pertinent past medical history.     Past Surgical History:   Procedure Laterality Date    BIVENTRICULLAR IMPLANTABLE CARDIOVERTER DEFIBRILLATOR PLACEMENT N/A 2023    Procedure: Implant ICD - bi ventricular;  Surgeon: Sundar Bhagat MD;  Location: [facility] INVASIVE LOCATION;  Service: Cardiovascular;  Laterality: N/A;          LDA Wound       Row Name               Wound 23 1200 Bilateral lower leg Blisters    Wound - Properties Group Placement Date: 23  -AH Placement Time: 1200  -AH Side: Bilateral  -AH Orientation: lower  -AH Location: leg  -AH Primary Wound Type:  Blisters  -AH    Retired Wound - Properties Group Placement Date: 11/08/23  -AH Placement Time: 1200  -AH Side: Bilateral  -AH Orientation: lower  -AH Location: leg  -AH Primary Wound Type: Blisters  -AH    Retired Wound - Properties Group Date first assessed: 11/08/23  -AH Time first assessed: 1200  -AH Side: Bilateral  -AH Location: leg  -AH Primary Wound Type: Blisters  -AH       [REMOVED] Wound 11/15/23 2200 Bilateral perineum Pressure Injury    Wound - Properties Group Placement Date: 11/15/23  -IE Placement Time: 2200  -IE Side: Bilateral  -IE Location: perineum  -IE Primary Wound Type: Pressure inj  -IE Removal Date: 11/22/23  -TG Removal Time: 1411  -TG    Retired Wound - Properties Group Placement Date: 11/15/23  -IE Placement Time: 2200  -IE Side: Bilateral  -IE Location: perineum  -IE Primary Wound Type: Pressure inj  -IE Removal Date: 11/22/23  -TG Removal Time: 1411  -TG    Retired Wound - Properties Group Date first assessed: 11/15/23  -IE Time first assessed: 2200  -IE Side: Bilateral  -IE Location: perineum  -IE Primary Wound Type: Pressure inj  -IE Resolution Date: 11/22/23  -TG Resolution Time: 1411  -TG       Wound 11/17/23 1232 Left lateral clavicle Incision    Wound - Properties Group Placement Date: 11/17/23  -BB Placement Time: 1232  -BB Side: Left  -BB Orientation: lateral  -BB Location: clavicle  -BB Primary Wound Type: Incision  -BB    Retired Wound - Properties Group Placement Date: 11/17/23  -BB Placement Time: 1232  -BB Side: Left  -BB Orientation: lateral  -BB Location: clavicle  -BB Primary Wound Type: Incision  -BB    Retired Wound - Properties Group Date first assessed: 11/17/23  -BB Time first assessed: 1232  -BB Side: Left  -BB Location: clavicle  -BB Primary Wound Type: Incision  -BB       Wound 11/19/23 0900 Left upper thoracic spine Abrasion    Wound - Properties Group Placement Date: 11/19/23  -EW Placement Time: 0900  -EW Side: Left  -EW Orientation: upper  -EW Location:  thoracic spine  -EW Primary Wound Type: Abrasion  -EW    Retired Wound - Properties Group Placement Date: 11/19/23  -EW Placement Time: 0900  -EW Side: Left  -EW Orientation: upper  -EW Location: thoracic spine  -EW Primary Wound Type: Abrasion  -EW    Retired Wound - Properties Group Date first assessed: 11/19/23  -EW Time first assessed: 0900  -EW Side: Left  -EW Location: thoracic spine  -EW Primary Wound Type: Abrasion  -EW       Wound 11/15/23 2300 Bilateral medial gluteal Other (comment)    Wound - Properties Group Placement Date: 11/15/23  -TG Placement Time: 2300  -TG Present on Original Admission: N  -TG Side: Bilateral  -TG Orientation: medial  -TG Location: gluteal  -TG Primary Wound Type: Other  -TG, acute vascular compromise with masd     Retired Wound - Properties Group Placement Date: 11/15/23  -TG Placement Time: 2300  -TG Present on Original Admission: N  -TG Side: Bilateral  -TG Orientation: medial  -TG Location: gluteal  -TG Primary Wound Type: Other  -TG, acute vascular compromise with masd     Retired Wound - Properties Group Date first assessed: 11/15/23  -TG Time first assessed: 2300  -TG Present on Original Admission: N  -TG Side: Bilateral  -TG Location: gluteal  -TG Primary Wound Type: Other  -TG, acute vascular compromise with masd               User Key  (r) = Recorded By, (t) = Taken By, (c) = Cosigned By      Initials Name Provider Type    Yessenia Muhammad, RN Registered Nurse    Celia Ortega, RN Registered Nurse    TG Everett Braxton RN Registered Nurse    Becky Guzman Technologist    Negin Rocha RN Registered Nurse                  Wound 11/08/23 1200 Bilateral lower leg Blisters (Active)   Dressing Appearance dry;intact;no drainage 11/23/23 0952   Closure None 11/23/23 0800   Base scab;maroon/purple;dry 11/23/23 0952   Periwound edematous;redness;swelling 11/23/23 0952   Periwound Temperature warm 11/23/23 0952   Periwound Skin Turgor soft 11/23/23  0952   Edges irregular 11/23/23 0952   Drainage Amount none 11/23/23 0952   Care, Wound cleansed with;wound cleanser;debrided 11/23/23 0952   Dressing Care dressing applied;silver impregnated;low-adherent;foam;multi-layer wrap 11/23/23 0952   Periwound Care cleansed with pH balanced cleanser;dry periwound area maintained 11/23/23 0952       Wound 11/17/23 1232 Left lateral clavicle Incision (Active)   Dressing Appearance dry;intact 11/23/23 0600   Closure Adhesive bandage 11/23/23 0800   Base dressing in place, unable to visualize 11/23/23 0800   Periwound dry;intact 11/23/23 0800   Periwound Temperature warm 11/23/23 0800   Periwound Skin Turgor soft 11/23/23 0800   Drainage Amount none 11/22/23 2000   Periwound Care dry periwound area maintained 11/23/23 0600       Wound 11/19/23 0900 Left upper thoracic spine Abrasion (Active)   Dressing Appearance open to air 11/23/23 0400   Closure Open to air 11/23/23 0800   Base red;scab 11/23/23 0800   Periwound intact 11/23/23 0800   Periwound Temperature warm 11/23/23 0800   Periwound Skin Turgor firm 11/23/23 0800   Periwound Care dry periwound area maintained 11/23/23 0600       Wound 11/15/23 2300 Bilateral medial gluteal Other (comment) (Active)   Wound Image   11/22/23 1315   Dressing Appearance dry;intact 11/23/23 0400   Closure None 11/23/23 0800   Base maroon/purple;non-blanchable;red;blanchable 11/23/23 0800   Periwound blanchable;redness 11/23/23 0800   Periwound Temperature warm 11/23/23 0800   Periwound Skin Turgor soft 11/23/23 0800   Edges irregular 11/22/23 1315   Wound Length (cm) 7 cm 11/22/23 1315   Wound Width (cm) 6 cm 11/22/23 1315   Wound Depth (cm) 0.01 cm 11/22/23 1315   Wound Surface Area (cm^2) 42 cm^2 11/22/23 1315   Wound Volume (cm^3) 0.42 cm^3 11/22/23 1315   Drainage Characteristics/Odor malodorous;clear;other (see comments) 11/22/23 1315   Drainage Amount small 11/22/23 1315   Care, Wound cleansed with;soap and water 11/23/23 0800   Dressing  Care dressing applied;silicone;foam 11/22/23 1315   Periwound Care dry periwound area maintained 11/23/23 0800      Lymphedema       Row Name 11/23/23 0955             Lymphedema Edema Assessment    Pitting Edema Mild  -         Skin Changes/Observations    Lower Extremity Conditions bilateral:;dry;scaly;scab(s)  -      Lower Extremity Color/Pigment bilateral:;erythema  -         Lymphedema Pulses/Capillary Refill    Lower Extremity Capillary Refill right:;left:;less than 3 seconds  -         Compression/Skin Care    Compression/Skin Care skin care;wrapping location;bandaging  -      Skin Care washed/dried  -      Wrapping Location lower extremity  -      Wrapping Location LE bilateral:;foot to knee  -      Wrapping Comments size 5 compressogrip doubled and overlapping for gradient compression.  -                User Key  (r) = Recorded By, (t) = Taken By, (c) = Cosigned By      Initials Name Provider Type     Susana Huerta, PT Physical Therapist                    WOUND DEBRIDEMENT  Total area of Debridement: 15 cm2  Debridement Site 1  Location- Site 1: BLE  Selective Debridement- Site 1: Wound Surface <20cmsq  Instruments- Site 1: tweezers  Excised Tissue Description- Site 1: moderate, other (comment) (crusting, scabbing over fragile skin)  Bleeding- Site 1: none               PT Assessment (last 12 hours)       PT Evaluation and Treatment       Row Name 11/23/23 0952          Physical Therapy Time and Intention    Subjective Information no complaints  -     Document Type wound care;therapy note (daily note)  -     Mode of Treatment physical therapy;individual therapy  -       Row Name 11/23/23 0952          General Information    Patient Observations alert;cooperative;agree to therapy  -       Row Name 11/23/23 0952          Pain    Pretreatment Pain Rating 0/10 - no pain  -     Posttreatment Pain Rating 0/10 - no pain  -       Row Name 11/23/23 0952          Cognition     Orientation Status (Cognition) oriented x 4  -MC       Row Name 11/23/23 0952          Wound 11/08/23 1200 Bilateral lower leg Blisters    Wound - Properties Group Placement Date: 11/08/23  - Placement Time: 1200  -AH Side: Bilateral  -AH Orientation: lower  -AH Location: leg  -AH Primary Wound Type: Blisters  -AH    Dressing Appearance dry;intact;no drainage  -     Base scab;maroon/purple;dry  -     Periwound edematous;redness;swelling  -     Periwound Temperature warm  -     Periwound Skin Turgor soft  -     Edges irregular  -     Drainage Amount none  -     Care, Wound cleansed with;wound cleanser;debrided  -     Dressing Care dressing applied;silver impregnated;low-adherent;foam;multi-layer wrap  optifoam Ag, MLW  -     Periwound Care cleansed with pH balanced cleanser;dry periwound area maintained  -     Retired Wound - Properties Group Placement Date: 11/08/23  - Placement Time: 1200  -AH Side: Bilateral  -AH Orientation: lower  -AH Location: leg  -AH Primary Wound Type: Blisters  -AH    Retired Wound - Properties Group Date first assessed: 11/08/23  - Time first assessed: 1200  -AH Side: Bilateral  - Location: leg  -AH Primary Wound Type: Blisters  -AH      Row Name             Wound 11/17/23 1232 Left lateral clavicle Incision    Wound - Properties Group Placement Date: 11/17/23  -BB Placement Time: 1232  -BB Side: Left  -BB Orientation: lateral  -BB Location: clavicle  -BB Primary Wound Type: Incision  -BB    Retired Wound - Properties Group Placement Date: 11/17/23  -BB Placement Time: 1232  -BB Side: Left  -BB Orientation: lateral  -BB Location: clavicle  -BB Primary Wound Type: Incision  -BB    Retired Wound - Properties Group Date first assessed: 11/17/23  -BB Time first assessed: 1232  -BB Side: Left  -BB Location: clavicle  -BB Primary Wound Type: Incision  -BB      Row Name             Wound 11/19/23 0900 Left upper thoracic spine Abrasion    Wound - Properties Group  "Placement Date: 11/19/23 -EW Placement Time: 0900  -EW Side: Left  -EW Orientation: upper  -EW Location: thoracic spine  -EW Primary Wound Type: Abrasion  -EW    Retired Wound - Properties Group Placement Date: 11/19/23 -EW Placement Time: 0900  -EW Side: Left  -EW Orientation: upper  -EW Location: thoracic spine  -EW Primary Wound Type: Abrasion  -EW    Retired Wound - Properties Group Date first assessed: 11/19/23 -EW Time first assessed: 0900 -EW Side: Left  -EW Location: thoracic spine  -EW Primary Wound Type: Abrasion  -EW      Row Name             Wound 11/15/23 2300 Bilateral medial gluteal Other (comment)    Wound - Properties Group Placement Date: 11/15/23  -TG Placement Time: 2300  -TG Present on Original Admission: N  -TG Side: Bilateral  -TG Orientation: medial  -TG Location: gluteal  -TG Primary Wound Type: Other  -TG, acute vascular compromise with masd     Retired Wound - Properties Group Placement Date: 11/15/23  -TG Placement Time: 2300  -TG Present on Original Admission: N  -TG Side: Bilateral  -TG Orientation: medial  -TG Location: gluteal  -TG Primary Wound Type: Other  -TG, acute vascular compromise with masd     Retired Wound - Properties Group Date first assessed: 11/15/23  -TG Time first assessed: 2300  -TG Present on Original Admission: N  -TG Side: Bilateral  -TG Location: gluteal  -TG Primary Wound Type: Other  -TG, acute vascular compromise with masd       Row Name 11/23/23 0952          Coping    Observed Emotional State calm;cooperative  -     Verbalized Emotional State acceptance  -       Row Name 11/23/23 0952          Plan of Care Review    Plan of Care Reviewed With patient  -MC     Progress improving  -     Outcome Evaluation Pt with improved BLE edema. Still with inflammation and scabbing along the backs of the legs that requires debridement. He would benefit from xeroform or thick lotion to soften the remaining scabs, but he currently declines anything \"wet\" to the legs " due to c/o stinging. Anticipate next change in 2-3 days.  -MC       Row Name 11/23/23 0952          Positioning and Restraints    Pre-Treatment Position in bed  -MC     Post Treatment Position bed  -MC     In Bed supine;call light within reach;encouraged to call for assist  -MC               User Key  (r) = Recorded By, (t) = Taken By, (c) = Cosigned By      Initials Name Provider Type    Susana Swan PT Physical Therapist    Yessenia Muhammad RN Registered Nurse    BETZY Mora, Celia Sanchez, RN Registered Nurse    Everett Pineda RN Registered Nurse    Becky Guzman Technologist                  Physical Therapy Education       Title: PT OT SLP Therapies (Done)       Topic: Physical Therapy (Done)       Point: Mobility training (Done)       Learning Progress Summary             Patient Acceptance, E,TB, VU by EB at 11/22/2023 1109    Acceptance, E,TB, NR by AY at 11/21/2023 1334    Acceptance, E,TB, NR by ES at 11/19/2023 1448    Acceptance, E, NR by AE at 11/15/2023 0904   Family Acceptance, E,TB, VU by EB at 11/22/2023 1109                         Point: Home exercise program (Done)       Learning Progress Summary             Patient Acceptance, E,TB, VU by EB at 11/22/2023 1109    Acceptance, E,TB, NR by AY at 11/21/2023 1334    Acceptance, E, NR by AE at 11/15/2023 0904   Family Acceptance, E,TB, VU by EB at 11/22/2023 1109                         Point: Body mechanics (Done)       Learning Progress Summary             Patient Acceptance, E,TB, VU by EB at 11/22/2023 1109    Acceptance, E,TB, NR by AY at 11/21/2023 1334    Acceptance, E,TB, NR by ES at 11/19/2023 1448    Acceptance, E, NR by AE at 11/15/2023 0904   Family Acceptance, E,TB, VU by EB at 11/22/2023 1109                         Point: Precautions (Done)       Learning Progress Summary             Patient Acceptance, E,TB, VU by EB at 11/22/2023 1109    Acceptance, E,TB, NR by AY at 11/21/2023 1334    Acceptance, E,TB, NR by  "ES at 11/19/2023 1448    Acceptance, E, NR by AE at 11/15/2023 0904   Family Acceptance, E,TB, VU by EB at 11/22/2023 1109                                         User Key       Initials Effective Dates Name Provider Type Discipline    EB 09/22/22 -  Enrike Davis, RN Registered Nurse Nurse    LUCRECIA 11/10/20 -  Yessenia Carbajal, PT Physical Therapist PT    AE 09/21/21 -  Kenroy Guzman, PT Physical Therapist PT    ES 08/11/22 -  Stefany Brantley, PT Physical Therapist PT                    Recommendation and Plan  Anticipated Discharge Disposition (PT): inpatient rehabilitation facility  Planned Therapy Interventions (PT): balance training, bed mobility training, gait training, patient/family education, neuromuscular re-education, strengthening, stair training, transfer training, postural re-education  Therapy Frequency (PT): daily  Plan of Care Reviewed With: patient   Progress: improving       Progress: improving  Outcome Evaluation: Pt with improved BLE edema. Still with inflammation and scabbing along the backs of the legs that requires debridement. He would benefit from xeroform or thick lotion to soften the remaining scabs, but he currently declines anything \"wet\" to the legs due to c/o stinging. Anticipate next change in 2-3 days.  Plan of Care Reviewed With: patient            Time Calculation   PT Charges       Row Name 11/23/23 0802             Time Calculation    Start Time 0802  -MC      PT Goal Re-Cert Due Date 11/29/23  -         Untimed Charges    91824-[place] comp below knee 10  -MC      85074-[place]eguhskxy debridement 10  -MC         Total Minutes    Untimed Charges Total Minutes 20  -MC       Total Minutes 20  -MC                User Key  (r) = Recorded By, (t) = Taken By, (c) = Cosigned By      Initials Name Provider Type    Susana Swan, PT Physical Therapist                            PT G-Codes  Outcome Measure Options: AM-PAC 6 Clicks Daily Activity (OT)  AM-PAC 6 Clicks Score (PT): " 16  AM-PAC 6 Clicks Score (OT): 18  Zeng Total Score: 55       Susana Huerta, PT  11/23/2023

## 2023-11-23 NOTE — PROGRESS NOTES
Intensive Care Follow-up     Hospital:  LOS: 15 days   Mr. Sundar Reeder, 53 y.o. male is followed for:   Acute on chronic systolic congestive heart failure        Subjective     53-year-old male with a past medical history of CHF, CKD, hypertension, and atrial fibrillation.  He presented to the ER and was admitted on 11/8 with atrial fibrillation with RVR, shortness of breath, and worsening lower extremity edema.     He was admitted to telemetry and placed on an amiodarone drip and underwent ECV on 11/10.  Subsequent to that he has been primarily in sinus rhythm but with intermittent atrial fibrillation noted.  His ejection fraction by her most recent echo is less than 20% with severe MR.  While in the hospital he had worsening acute kidney injury associate with diuresis and this has been held.  He has ongoing hyponatremia and elevated liver enzymes felt to be related to congestive hepatopathy.       On the evening of 11/15, he developed the acute onset of ventricular fibrillation after what appeared to be an R-on-T phenomenon.  He required defibrillation and received CPR as well as epinephrine and bicarbonate.  Postcode he woke up and was awake alert and oriented.  He was transferred to ICU.   Interval History:  The chart has been reviewed.  Nosebleed seems to have subsided.  He does remain in rate controlled atrial fibrillation.  Plan is possibly for cardioversion today.    The patient's past medical, surgical and social history were reviewed and updated in Epic as appropriate.        Objective     Infusions:     Medications:  amiodarone, 400 mg, Oral, BID With Meals  [START ON 11/27/2023] amiodarone, 400 mg, Oral, Q24H  apixaban, 5 mg, Oral, Q12H  digoxin, 125 mcg, Oral, Daily  empagliflozin, 10 mg, Oral, Daily  midodrine, 10 mg, Oral, TID   pharmacy consult - [facility], , Does not apply, Daily  senna-docusate sodium, 2 tablet, Oral, BID  sodium chloride, 10 mL, Intravenous, Q12H        Vital Sign Min/Max for last 24  "hours  Temp  Min: 97.5 °F (36.4 °C)  Max: 98 °F (36.7 °C)   BP  Min: 93/73  Max: 128/79   Pulse  Min: 70  Max: 108   Resp  Min: 16  Max: 19   SpO2  Min: 84 %  Max: 96 %   Flow (L/min)  Min: 2  Max: 2       Input/Output for last 24 hour shift  11/22 0701 - 11/23 0700  In: 1200 [P.O.:1200]  Out: 1475 [Urine:1475]      Objective:  General Appearance:  In no acute distress and uncomfortable.    Vital signs: (most recent): Blood pressure 115/66, pulse 72, temperature 97.5 °F (36.4 °C), temperature source Oral, resp. rate 19, height 180.3 cm (71\"), weight 83 kg (182 lb 15.7 oz), SpO2 93%.    HEENT: (Nasal cannula O2  Right naris packing)    Lungs:  Normal effort and normal respiratory rate.  He is not in respiratory distress.  No rales, wheezes or rhonchi.    Heart: Normal rate.  Irregular rhythm.  S1 normal and S2 normal.  No murmur, gallop or friction rub.   Chest: Symmetric chest wall expansion. (Pacer wound over left precordium)  Abdomen: Abdomen is soft and non-distended.  Bowel sounds are normal.   There is no abdominal tenderness.   There is no mass. There is no splenomegaly. There is no hepatomegaly.   Extremities: Normal range of motion.  There is no deformity or dependent edema.    Neurological: Patient is oriented to person, place and time.  (Drowsy this morning but arousable and oriented).    Pupils:  Pupils are equal, round, and reactive to light.    Skin:  Warm and dry.                Results from last 7 days   Lab Units 11/23/23  0357 11/22/23  0425 11/21/23  0336   WBC 10*3/mm3 25.57* 17.65* 10.25   HEMOGLOBIN g/dL 11.6* 12.1* 10.9*   PLATELETS 10*3/mm3 278 268 149     Results from last 7 days   Lab Units 11/23/23  0357 11/22/23  0639 11/21/23  0336 11/19/23  0417 11/18/23  1054 11/17/23  0327   SODIUM mmol/L 131* 130* 129*   < > 128* 122*   POTASSIUM mmol/L 4.0 5.3* 4.4   < > 4.1 4.4   CO2 mmol/L 25.0 19.0* 30.0*   < > 31.0* 27.0   BUN mg/dL 72* 83* 79*   < > 85* 104*   CREATININE mg/dL 1.54* 1.92* 1.47*  "  < > 2.13* 2.73*   MAGNESIUM mg/dL 2.2 2.4 2.1  --  2.4 2.8*   PHOSPHORUS mg/dL  --   --   --   --  3.0 4.9*   GLUCOSE mg/dL 88 90 106*   < > 112* 118*    < > = values in this interval not displayed.     Estimated Creatinine Clearance: 66.6 mL/min (A) (by C-G formula based on SCr of 1.54 mg/dL (H)).              I reviewed the patient's results and images.     Assessment & Plan   Impression        Acute on chronic systolic congestive heart failure    Hyponatremia    STUART (acute kidney injury)    Atrial fibrillation with RVR    Essential hypertension    Anxiety associated with depression    Hypoalbuminemia    Hyperbilirubinemia    Elevated liver enzymes    Elevated troponin level not due myocardial infarction    Severe mitral regurgitation    Ventricular fibrillation    LBBB (left bundle branch block)    Presence of biventricular implantable cardioverter-defibrillator (ICD)       Plan        Maintain current anticoagulation.  Possible plan for cardioversion.  Maintain nasal packing today.  We will remove this tomorrow.  Belies with physical and Occupational Therapy.  Follow-up labs and orders been placed for tomorrow morning.    Plan of care and goals reviewed with mulitdisciplinary/antibiotic stewardship team during rounds.   I discussed the patient's findings and my recommendations with patient and nursing staff         Billy Xie MD, [facility]  Pulmonology and Critical Care Medicine

## 2023-11-23 NOTE — PLAN OF CARE
Goal Outcome Evaluation:      Patient with no issues until 1800 when patient had perfuse bleeding from right nostril; pressure held with no success and APRN notified; rhino rocket without balloon put in with success. Digoxin given today and HR has been in 70-80's since. VSS. Percocet continued PRN. Still no BM; patient offered bowel regimen but refused. NPO am for potential ECV in morning. Family updated at bedside.

## 2023-11-23 NOTE — PLAN OF CARE
"Goal Outcome Evaluation:  Plan of Care Reviewed With: patient        Progress: improving  Outcome Evaluation: Pt with improved BLE edema. Still with inflammation and scabbing along the backs of the legs that requires debridement. He would benefit from xeroform or thick lotion to soften the remaining scabs, but he currently declines anything \"wet\" to the legs due to c/o stinging. Anticipate next change in 2-3 days.         "

## 2023-11-23 NOTE — PROGRESS NOTES
"   LOS: 15 days    Patient Care Team:  Laura Summers PA-C as PCP - General (Family Medicine)    Subjective     Cr 1.5-<1.9 UOP ~ 2.1 liter. Dependent noted. Sitting in chair. Seda any cp or sob.       Objective     Vital Signs:  Blood pressure 104/73, pulse 93, temperature 97.5 °F (36.4 °C), temperature source Oral, resp. rate 20, height 180.3 cm (71\"), weight 83 kg (182 lb 15.7 oz), SpO2 (!) 88%.      Intake/Output Summary (Last 24 hours) at 11/23/2023 1609  Last data filed at 11/23/2023 1131  Gross per 24 hour   Intake 1080 ml   Output 2100 ml   Net -1020 ml        11/22 0701 - 11/23 0700  In: 1200 [P.O.:1200]  Out: 1475 [Urine:1475]    Physical Exam:    GENERAL: WD WM NAD  NEURO: Awake and alert, oriented. No focal deficit  PSYCHIATRIC: NMA. Cooperative with PE  EYE: PE, no icterus, no conjunctivitis  ENT: ommm, dentition intact,  Hearing intact  NECK: Supple , No JVD discernable,  Trachea midline  CV: + Edema, RRR  LUNGS:  Quiet,  Nonlabored resp.  Symmetrical expansion  ABDOMEN: Nondistended, soft nontender.  : No Lazaro, no palp bladder  SKIN: Warm and dry without rash      Labs:  Results from last 7 days   Lab Units 11/23/23  0357 11/22/23  0425 11/21/23  0336   WBC 10*3/mm3 25.57* 17.65* 10.25   HEMOGLOBIN g/dL 11.6* 12.1* 10.9*   PLATELETS 10*3/mm3 278 268 149     Results from last 7 days   Lab Units 11/23/23  0357 11/22/23  0639 11/21/23  0336 11/20/23  1502 11/20/23  0410 11/19/23  0417 11/18/23  1054 11/17/23  0327   SODIUM mmol/L 131* 130* 129*  --  126* 128* 128* 122*   POTASSIUM mmol/L 4.0 5.3* 4.4 4.1 3.5 4.0 4.1 4.4   CHLORIDE mmol/L 92* 88* 90*  --  84* 86* 85* 81*   CO2 mmol/L 25.0 19.0* 30.0*  --  32.0* 30.0* 31.0* 27.0   BUN mg/dL 72* 83* 79*  --  83* 83* 85* 104*   CREATININE mg/dL 1.54* 1.92* 1.47*  --  1.76* 1.81* 2.13* 2.73*   CALCIUM mg/dL 8.9 9.3 8.8  --  9.1 8.9 8.7 10.0   PHOSPHORUS mg/dL  --   --   --   --   --   --  3.0 4.9*   MAGNESIUM mg/dL 2.2 2.4 2.1  --   --   --  2.4 2.8* "   ALBUMIN g/dL  --   --   --   --  2.9* 2.8* 2.9*  --      Results from last 7 days   Lab Units 11/20/23  0410   ALK PHOS U/L 128*   BILIRUBIN mg/dL 2.2*   ALT (SGPT) U/L 72*   AST (SGOT) U/L 42*         Estimated Creatinine Clearance: 66.6 mL/min (A) (by C-G formula based on SCr of 1.54 mg/dL (H)).         A/P:  STUART: Unclear baseline cr. Cr on this admission 2.7 mg/dl. Thought to be hemodynamic variation in the setting of advance CHF. Cr around 1.4-1.9mg/dl on recent testing.     CKD: Unsure of baseline creatinine     Hyponatremia: Total body volume overload. Suspect component of excessive fluid intake    Hyperkalemia: Worsening.      Volume status: Significant dependent edema noted.     Hypotension: On midodrine.     CHF: Very low EF <20%.  Started on Jardiance on this admission 11/18/23. Unable to add ACEi/ARB/MRA due to low bp at present.    Recs  Variation in renal function noted. I do expect some fluctuation in GFR after initiation of SGLT-2inh. Now started on ACE I at low dose per cardiology. Patient still has significant dependent edema. Will continue bumex 2mg daily.Strict I/O. Daily labs. Wants to only restrict fluid intake to 1.5 liter. Check standing weight to determine response to diuretics.         Giovany Lopez MD  11/23/23  16:09 EST

## 2023-11-23 NOTE — PLAN OF CARE
Goal Outcome Evaluation:  Plan of Care Reviewed With: patient        Progress: improving  Outcome Evaluation: Pt continues to be limited by generalized weakness and decreased activity tolerance but demonstrated good effort with therapy. Will continue to progress as able to address functional deficits and promote return to PLOF. PT rec IRF at d/c for best functional outcome.      Anticipated Discharge Disposition (PT): inpatient rehabilitation facility

## 2023-11-23 NOTE — PLAN OF CARE
Problem: Adult Inpatient Plan of Care  Goal: Plan of Care Review  Outcome: Ongoing, Progressing  Flowsheets (Taken 11/23/2023 1750)  Outcome Evaluation: Pt up to the chair with pt and ambulated.  Cardiology to hold off on cardioversion.  UOP adequate.  No BM.  Percocet x 3 for chest/rib pain. Reg diet resumed.  Continue with plan.   Goal Outcome Evaluation:              Outcome Evaluation: Pt up to the chair with pt and ambulated.  Cardiology to hold off on cardioversion.  UOP adequate.  No BM.  Percocet x 3 for chest/rib pain. Reg diet resumed.  Continue with plan.

## 2023-11-24 NOTE — PROGRESS NOTES
Cardiology Progress Note      Reason for visit:    Atrial fibrillation with RVR  Acute on chronic systolic heart failure  Elevated troponin    IDENTIFICATION: 53-year-old gentleman who resides in [place], [place]    Active Hospital Problems    Diagnosis  POA    **Acute on chronic systolic congestive heart failure [I50.23]  Yes     Reported history of systolic heart failure dating back for 20 years.  Reportedly last echocardiogram 1 year ago had normal LVEF.  Data deficit  Echo (11/8/2023): LVEF less than 20%.  Severe MR.  RVSP less than 35 mmHg left ventricular ejection fraction appears to be less than 20%.          LBBB (left bundle branch block) [I44.7]  Yes    Presence of biventricular implantable cardioverter-defibrillator (ICD) [Z95.810]  No     Biventricular ICD placed by Dr. Sundar Bhagat 11/17/2023      Ventricular fibrillation [I49.01]  Yes     V-fib arrest occurred 11/15/2023   Biventricular ICD placed by Dr. Sundar Bhagat 11/17/2023 for V-fib arrest, LBBB      Elevated troponin level not due myocardial infarction [R79.89]  Yes     Troponin elevated but flat in the setting of A-fib with RVR and acute systolic heart failure      Severe mitral regurgitation [I34.0]  Yes     Echo (11/8/2023): Severe MR.  LVEF less than 20%      Hyponatremia [E87.1]  Yes    STUART (acute kidney injury) [N17.9]  Yes    Atrial fibrillation with RVR [I48.91]  Yes     Reported history of atrial fibrillation for 10 years  CHA2DS2-VASc=3  Presentation [facility] 11/8/2023 in A-fib with RVR in the setting of hyponatremia and STUART  External cardioversion (11/10/2023): Successfully restored NSR.  Return of atrial fibrillation 11/11/2023 but now returned to NSR on IV amiodarone      Essential hypertension [I10]  Yes    Anxiety associated with depression [F41.8]  Yes    Hypoalbuminemia [E88.09]  Yes    Hyperbilirubinemia [E80.6]  Yes    Elevated liver enzymes [R74.8]  Yes            Patient sitting up in the bed breathing easy on room  air.  He reports feeling much better than when he was admitted.  He denies any chest pain.  He reports improvement in his lower extremity edema.  The patient had cardioversion earlier this admission that converted him to normal sinus rhythm but he went back into atrial fibrillation and IV amiodarone was restarted yesterday.  He is currently on IV amiodarone drip and in normal sinus rhythm with frequent PVCs    Update 11/14/23  Feeling quite weak.  BP remains borderline.  Overall volume much better.  Still somewhat overloaded with low albumin.      Update 11/15/23  About the same.  Really no change      Update 11/16/23  His edema is all but resolved.  Minimal JVD.  His fluid status is the best it has been while here even though no diuretics today.  He apparently (dont have telemetry) and had R on T with polymorphic Vt then VF.  One round of CPR and desynchronized cardioversion.  Was then in afib with RVR and has converted to NSR.  Kidney function stable.  QT mildly prolonged.      Update 11/17/23  Uneventful night.  Cr little worse.  Edema little worse.  He feels fine except rib pain    Update 11/24/23  BP remains low.  Edema about the same.  Kidney function much, much better after BIVICD.   In afib today    Update 11/22/23  No significant changes.  In afib with RVR.      Update 11/23/23  Converted to NSR.  BP is stable.  Kidney function improved      Update 11/24/2023  Patient currently in and out of being V-paced.  Sitting eating lunch.  He states this morning he did have some soreness to his chest but it was relieved with some Percocet this morning.       Vital Sign Min/Max for last 24 hours  Temp  Min: 97.5 °F (36.4 °C)  Max: 98.2 °F (36.8 °C)   BP  Min: 76/57  Max: 121/91   Pulse  Min: 74  Max: 113   Resp  Min: 18  Max: 22   SpO2  Min: 87 %  Max: 100 %   Flow (L/min)  Min: 2  Max: 2      Intake/Output Summary (Last 24 hours) at 11/24/2023 1320  Last data filed at 11/24/2023 0400  Gross per 24 hour   Intake 720  ml   Output 1200 ml   Net -480 ml           Vitals reviewed.   Neck:      Vascular: JVD normal.   Pulmonary:      Effort: Pulmonary effort is normal.   Cardiovascular:      Normal rate. Intermittent V paced   Edema:     Peripheral edema absent.   Abdominal:      General: There is no distension.   Skin:     General: Skin is warm and dry.   Neurological:      General: No focal deficit present.      Mental Status: Alert.          Results from last 7 days   Lab Units 11/24/23  0342 11/23/23  0357 11/22/23  0639 11/21/23  0336 11/20/23  1502 11/20/23  0410 11/19/23  0417 11/18/23  1054   SODIUM mmol/L 133* 131* 130* 129*  --  126* 128* 128*   POTASSIUM mmol/L 4.6 4.0 5.3* 4.4   < > 3.5 4.0 4.1   CHLORIDE mmol/L 93* 92* 88* 90*  --  84* 86* 85*   BUN mg/dL 59* 72* 83* 79*  --  83* 83* 85*   CREATININE mg/dL 1.45* 1.54* 1.92* 1.47*  --  1.76* 1.81* 2.13*   MAGNESIUM mg/dL 1.9 2.2 2.4 2.1  --   --   --  2.4    < > = values in this interval not displayed.           Results from last 7 days   Lab Units 11/24/23  0342 11/23/23  0357 11/22/23  0425   WBC 10*3/mm3 24.89* 25.57* 17.65*   HEMOGLOBIN g/dL 11.6* 11.6* 12.1*   HEMATOCRIT % 35.9* 35.8* 37.4*   PLATELETS 10*3/mm3 253 278 268       Lab Results   Component Value Date    HGBA1C 6.00 (H) 11/09/2023       Lab Results   Component Value Date    CHOL 81 11/09/2023    TRIG 60 11/09/2023    HDL 22 (L) 11/09/2023    LDL 45 11/09/2023     Results for orders placed during the hospital encounter of 11/08/23    Adult Transthoracic Echo Complete W/ Cont if Necessary Per Protocol    Interpretation Summary    Left ventricular ejection fraction appears to be less than 20%.  consider limited contrasted echo to assess for LV thrombus    The left ventricular cavity is dilated.    The right ventricular cavity is mildly dilated.    The left atrial cavity is mildly dilated.    Left atrial volume is moderately increased.    severe MR present    Estimated right ventricular systolic pressure from  tricuspid regurgitation is normal (<35 mmHg).          Assessment  Chronic systolic CHF, hx of NICM, no CAC on recent CT or atherosclrosis of aorta s/p BIVICD this admission  PAF  Severe MR  STUART on CKD, improving  LBBB    Plan  Due to low blood pressure today Bumex and enalapril were held.  Monitor patient's fluid status.  Does not appear to be fluid overloaded today.  I think patient would benefit more from the Bumex then enalapril if patient remains hypotensive.  Continue Jardiance 10 mg daily  Continue amiodarone 400 mg twice daily through this weekend then transition to 400 mg daily.  Also digoxin 125 mcg was added.  Will check a dig level tomorrow.  If digoxin level remains elevated may need to transition to every other day dosing.  Continue Eliquis 5 mg twice daily for stroke prophylaxis.  Continue midodrine 10 mg, 3 times daily.  Check EKG in the morning for QTc.  Okay to transition to telemetry.    Electronically signed by Tamiko Samson PA-C, 11/24/23, 1:34 PM EST.

## 2023-11-24 NOTE — PLAN OF CARE
Goal Outcome Evaluation:                        Patient pleasant throughout night. Afebrile. Pressures soft; SBP 80s-90s with MAP >65. UOP 1.2L. No BM, though patient states he is passing flatus. Nasal packing remains in place. 2L nasal cannula while sleeping.

## 2023-11-24 NOTE — PLAN OF CARE
Problem: Adult Inpatient Plan of Care  Goal: Plan of Care Review  Outcome: Ongoing, Progressing  Flowsheets (Taken 11/24/2023 1714)  Plan of Care Reviewed With:   patient   sibling  Outcome Evaluation: Pt ambulated 220ft.  Up to chair with assist of one. Percocet x 2.  No BM.  UOP 600mlPt pulled out rhinorocket with no complications. Continue plan of care.   Goal Outcome Evaluation:  Plan of Care Reviewed With: patient, sibling           Outcome Evaluation: Pt ambulated 220ft.  Up to chair with assist of one. Percocet x 2.  No BM.  Pt pulled out rhinorocket with no complications. Continue plan of care.

## 2023-11-24 NOTE — PROGRESS NOTES
"   LOS: 16 days    Patient Care Team:  Laura Summers PA-C as PCP - General (Family Medicine)    Subjective     Cr >1.41.5-<1.9 UOP ~ 2.1 liter. Dependent edema noted. Diuretics on hold for low bp. ACE I also held.      Objective     Vital Signs:  Blood pressure 104/75, pulse 102, temperature 97.5 °F (36.4 °C), temperature source Oral, resp. rate 22, height 180.3 cm (71\"), weight 84.5 kg (186 lb 4.6 oz), SpO2 95%.      Intake/Output Summary (Last 24 hours) at 11/24/2023 1629  Last data filed at 11/24/2023 0400  Gross per 24 hour   Intake 720 ml   Output 1200 ml   Net -480 ml        11/23 0701 - 11/24 0700  In: 1560 [P.O.:1560]  Out: 1825 [Urine:1825]    Physical Exam:    GENERAL: WD WM NAD  NEURO: Awake and alert, oriented. No focal deficit  PSYCHIATRIC: NMA. Cooperative with PE  EYE: PE, no icterus, no conjunctivitis  ENT: ommm, dentition intact,  Hearing intact  NECK: Supple , No JVD discernable,  Trachea midline  CV: + Edema, RRR  LUNGS:  Quiet,  Nonlabored resp.  Symmetrical expansion  ABDOMEN: Nondistended, soft nontender.  : No Lazaro, no palp bladder  SKIN: Warm and dry without rash      Labs:  Results from last 7 days   Lab Units 11/24/23  0342 11/23/23  0357 11/22/23  0425   WBC 10*3/mm3 24.89* 25.57* 17.65*   HEMOGLOBIN g/dL 11.6* 11.6* 12.1*   PLATELETS 10*3/mm3 253 278 268     Results from last 7 days   Lab Units 11/24/23  0342 11/23/23  0357 11/22/23  0639 11/21/23  0336 11/20/23  1502 11/20/23  0410 11/19/23  0417 11/18/23  1054   SODIUM mmol/L 133* 131* 130* 129*  --  126* 128* 128*   POTASSIUM mmol/L 4.6 4.0 5.3* 4.4   < > 3.5 4.0 4.1   CHLORIDE mmol/L 93* 92* 88* 90*  --  84* 86* 85*   CO2 mmol/L 30.0* 25.0 19.0* 30.0*  --  32.0* 30.0* 31.0*   BUN mg/dL 59* 72* 83* 79*  --  83* 83* 85*   CREATININE mg/dL 1.45* 1.54* 1.92* 1.47*  --  1.76* 1.81* 2.13*   CALCIUM mg/dL 8.6 8.9 9.3 8.8  --  9.1 8.9 8.7   PHOSPHORUS mg/dL  --   --   --   --   --   --   --  3.0   MAGNESIUM mg/dL 1.9 2.2 2.4 2.1  --   " --   --  2.4   ALBUMIN g/dL  --   --   --   --   --  2.9* 2.8* 2.9*    < > = values in this interval not displayed.     Results from last 7 days   Lab Units 11/20/23  0410   ALK PHOS U/L 128*   BILIRUBIN mg/dL 2.2*   ALT (SGPT) U/L 72*   AST (SGOT) U/L 42*         Estimated Creatinine Clearance: 72 mL/min (A) (by C-G formula based on SCr of 1.45 mg/dL (H)).         A/P:  STUART: Unclear baseline cr. Cr on this admission 2.7 mg/dl. Thought to be hemodynamic variation in the setting of advance CHF. Cr around 1.4-1.9mg/dl on recent testing.     CKD: Unsure of baseline creatinine     Hyponatremia: Total body volume overload. Suspect component of excessive fluid intake    Hyperkalemia: Worsening.      Volume status: Significant dependent edema noted.     Hypotension: On midodrine.     CHF: Very low EF <20%.  Started on Jardiance on this admission 11/18/23. Unable to add ACEi/ARB/MRA due to low bp at present.    Recs  Variation in renal function noted. I do expect some fluctuation in GFR after initiation of SGLT-2inh. So far renal function stable.   ACEI and diuretics on hold due to low bp  Would recommend holding ACE I and continuing with low dose diuretics bumex 1 mg daily   Strict I/O. Limit FW intake to 1.5 liter/day  Daily labs.        Giovany Lopez MD  11/24/23  16:29 EST

## 2023-11-24 NOTE — PROGRESS NOTES
Intensive Care Follow-up     Hospital:  LOS: 16 days   Mr. Sundar Reeder, 53 y.o. male is followed for:   Acute on chronic systolic congestive heart failure        Subjective     3-year-old male with a past medical history of CHF, CKD, hypertension, and atrial fibrillation.  He presented to the ER and was admitted on 11/8 with atrial fibrillation with RVR, shortness of breath, and worsening lower extremity edema.     He was admitted to telemetry and placed on an amiodarone drip and underwent ECV on 11/10.  Subsequent to that he has been primarily in sinus rhythm but with intermittent atrial fibrillation noted.  His ejection fraction by her most recent echo is less than 20% with severe MR.  While in the hospital he had worsening acute kidney injury associate with diuresis and this has been held.  He has ongoing hyponatremia and elevated liver enzymes felt to be related to congestive hepatopathy.       On the evening of 11/15, he developed the acute onset of ventricular fibrillation after what appeared to be an R-on-T phenomenon.  He required defibrillation and received CPR as well as epinephrine and bicarbonate.  Postcode he woke up and was awake alert and oriented.  He was transferred to ICU.   Interval History:  The chart has been reviewed.  The patient has had no further bleeding from his nose.  He states he is feeling much better today.  His atrial fibrillation was under better control yesterday so he did not require DC cardioversion.    The patient's past medical, surgical and social history were reviewed and updated in Epic as appropriate.        Objective     Infusions:     Medications:  amiodarone, 400 mg, Oral, BID With Meals  [START ON 11/27/2023] amiodarone, 400 mg, Oral, Q24H  apixaban, 5 mg, Oral, Q12H  bumetanide, 2 mg, Oral, Daily  digoxin, 125 mcg, Oral, Daily  empagliflozin, 10 mg, Oral, Daily  enalapril, 2.5 mg, Oral, Q24H  midodrine, 10 mg, Oral, TID   pharmacy consult - [facility], , Does not apply,  "Daily  senna-docusate sodium, 2 tablet, Oral, BID  sodium chloride, 10 mL, Intravenous, Q12H        Vital Sign Min/Max for last 24 hours  Temp  Min: 97.5 °F (36.4 °C)  Max: 98.2 °F (36.8 °C)   BP  Min: 76/57  Max: 126/82   Pulse  Min: 74  Max: 113   Resp  Min: 18  Max: 22   SpO2  Min: 87 %  Max: 100 %   Flow (L/min)  Min: 2  Max: 2       Input/Output for last 24 hour shift  11/23 0701 - 11/24 0700  In: 1560 [P.O.:1560]  Out: 1825 [Urine:1825]      Objective:  General Appearance:  In no acute distress, uncomfortable and not in pain.    Vital signs: (most recent): Blood pressure 99/60, pulse 91, temperature 97.5 °F (36.4 °C), temperature source Oral, resp. rate 22, height 180.3 cm (71\"), weight 84.5 kg (186 lb 4.6 oz), SpO2 98%.    HEENT: (Nasal cannula O2  Right naris packing)    Lungs:  Normal effort and normal respiratory rate.  He is not in respiratory distress.  No rales, wheezes or rhonchi.    Heart: Normal rate.  Irregular rhythm.  S1 normal and S2 normal.  No murmur, gallop or friction rub.   Chest: Symmetric chest wall expansion. (Pacer wound over left precordium)  Abdomen: Abdomen is soft and non-distended.  Bowel sounds are normal.   There is no abdominal tenderness.   There is no mass. There is no splenomegaly. There is no hepatomegaly.   Extremities: Normal range of motion.  There is no deformity or dependent edema.    Neurological: Patient is alert and oriented to person, place and time.    Pupils:  Pupils are equal, round, and reactive to light.    Skin:  Warm and dry.  No rash.               Results from last 7 days   Lab Units 11/24/23  0342 11/23/23  0357 11/22/23  0425   WBC 10*3/mm3 24.89* 25.57* 17.65*   HEMOGLOBIN g/dL 11.6* 11.6* 12.1*   PLATELETS 10*3/mm3 253 278 268     Results from last 7 days   Lab Units 11/24/23  0342 11/23/23  0357 11/22/23  0639 11/19/23  0417 11/18/23  1054   SODIUM mmol/L 133* 131* 130*   < > 128*   POTASSIUM mmol/L 4.6 4.0 5.3*   < > 4.1   CO2 mmol/L 30.0* 25.0 19.0*   " < > 31.0*   BUN mg/dL 59* 72* 83*   < > 85*   CREATININE mg/dL 1.45* 1.54* 1.92*   < > 2.13*   MAGNESIUM mg/dL 1.9 2.2 2.4   < > 2.4   PHOSPHORUS mg/dL  --   --   --   --  3.0   GLUCOSE mg/dL 113* 88 90   < > 112*    < > = values in this interval not displayed.     Estimated Creatinine Clearance: 72 mL/min (A) (by C-G formula based on SCr of 1.45 mg/dL (H)).              I reviewed the patient's results and images.     Assessment & Plan   Impression        Acute on chronic systolic congestive heart failure    Hyponatremia    STUART (acute kidney injury)    Atrial fibrillation with RVR    Essential hypertension    Anxiety associated with depression    Hypoalbuminemia    Hyperbilirubinemia    Elevated liver enzymes    Elevated troponin level not due myocardial infarction    Severe mitral regurgitation    Ventricular fibrillation    LBBB (left bundle branch block)    Presence of biventricular implantable cardioverter-defibrillator (ICD)       Plan        I would like to continue the nasal packing in the right naris today.  We will see if we can get this removed tomorrow.  Mobilize as tolerated.  Continue to monitor renal function closely.  Antiarrhythmics per cardiology.  Continue with anticoagulation.  Transition to telemetry today.    Plan of care and goals reviewed with mulitdisciplinary/antibiotic stewardship team during rounds.   I discussed the patient's findings and my recommendations with patient and nursing staff       Billy Xie MD, [facility]  Pulmonology and Critical Care Medicine

## 2023-11-25 NOTE — PLAN OF CARE
Goal Outcome Evaluation:  Plan of Care Reviewed With: patient        Progress: no change     -VSS on 2L NC  -Oriented x4  -PRN medications utilized  -No BM  -Ordered to tele         Problem: Adult Inpatient Plan of Care  Goal: Absence of Hospital-Acquired Illness or Injury  Intervention: Identify and Manage Fall Risk  Recent Flowsheet Documentation  Taken 11/25/2023 0400 by Monie Meza, ANANYA  Safety Promotion/Fall Prevention:   activity supervised   clutter free environment maintained   fall prevention program maintained   lighting adjusted   safety round/check completed   room organization consistent  Taken 11/25/2023 0200 by Monie Meza, ANANYA  Safety Promotion/Fall Prevention:   activity supervised   clutter free environment maintained   fall prevention program maintained   lighting adjusted   safety round/check completed   room organization consistent  Taken 11/25/2023 0000 by Moine Meza RN  Safety Promotion/Fall Prevention:   activity supervised   clutter free environment maintained   fall prevention program maintained   lighting adjusted   safety round/check completed   room organization consistent  Taken 11/24/2023 2200 by Monie Meza, ANANYA  Safety Promotion/Fall Prevention:   activity supervised   clutter free environment maintained   fall prevention program maintained   lighting adjusted   safety round/check completed   room organization consistent  Taken 11/24/2023 2000 by Monie Meza, ANANYA  Safety Promotion/Fall Prevention:   activity supervised   clutter free environment maintained   fall prevention program maintained   lighting adjusted   safety round/check completed   room organization consistent     Problem: Adult Inpatient Plan of Care  Goal: Absence of Hospital-Acquired Illness or Injury  Intervention: Prevent Skin Injury  Recent Flowsheet Documentation  Taken 11/25/2023 0400 by Monie Meza, RN  Body Position:   turned   left   neutral body alignment   lower extremity elevated   neutral head position   upper  extremity elevated  Skin Protection:   adhesive use limited   incontinence pads utilized   tubing/devices free from skin contact   transparent dressing maintained   skin-to-skin areas padded   skin-to-device areas padded  Taken 11/25/2023 0200 by Monie Meza RN  Body Position:   turned   supine   lower extremity elevated   neutral body alignment   neutral head position   upper extremity elevated  Skin Protection:   adhesive use limited   incontinence pads utilized   tubing/devices free from skin contact   transparent dressing maintained   skin-to-skin areas padded   skin-to-device areas padded  Taken 11/25/2023 0000 by Monie Meza RN  Body Position:   turned   lower extremity elevated   neutral body alignment   neutral head position   upper extremity elevated   left  Skin Protection:   adhesive use limited   incontinence pads utilized   tubing/devices free from skin contact   transparent dressing maintained   skin-to-skin areas padded   skin-to-device areas padded  Taken 11/24/2023 2200 by Monie Meza RN  Body Position:   turned   lower extremity elevated   neutral body alignment   neutral head position   upper extremity elevated   right  Skin Protection:   adhesive use limited   incontinence pads utilized   tubing/devices free from skin contact   transparent dressing maintained   skin-to-skin areas padded   skin-to-device areas padded  Taken 11/24/2023 2000 by Monie Meza RN  Body Position:   turned   supine   lower extremity elevated   neutral body alignment   neutral head position   upper extremity elevated  Skin Protection:   adhesive use limited   incontinence pads utilized   tubing/devices free from skin contact   transparent dressing maintained   skin-to-skin areas padded   skin-to-device areas padded   skin sealant/moisture barrier applied     Problem: Skin Injury Risk Increased  Goal: Skin Health and Integrity  Intervention: Optimize Skin Protection  Recent Flowsheet Documentation  Taken 11/25/2023 0400 by  Monie Meza RN  Pressure Reduction Techniques:   heels elevated off bed   positioned off wounds   pressure points protected   weight shift assistance provided  Head of Bed ([facility]) Positioning: HOB at 30-45 degrees  Pressure Reduction Devices:   elbow protectors utilized   heel offloading device utilized   positioning supports utilized   specialty bed utilized  Skin Protection:   adhesive use limited   incontinence pads utilized   tubing/devices free from skin contact   transparent dressing maintained   skin-to-skin areas padded   skin-to-device areas padded  Taken 11/25/2023 0200 by Monie Meza RN  Pressure Reduction Techniques:   heels elevated off bed   positioned off wounds   pressure points protected   weight shift assistance provided  Head of Bed ([facility]) Positioning: HOB at 30-45 degrees  Pressure Reduction Devices:   elbow protectors utilized   heel offloading device utilized   foam padding utilized   positioning supports utilized   specialty bed utilized  Skin Protection:   adhesive use limited   incontinence pads utilized   tubing/devices free from skin contact   transparent dressing maintained   skin-to-skin areas padded   skin-to-device areas padded  Taken 11/25/2023 0000 by Monie Meza RN  Pressure Reduction Techniques:   frequent weight shift encouraged   heels elevated off bed   positioned off wounds   pressure points protected   weight shift assistance provided  Head of Bed ([facility]) Positioning: HOB at 60-90 degrees  Pressure Reduction Devices:   specialty bed utilized   positioning supports utilized   heel offloading device utilized   elbow protectors utilized  Skin Protection:   adhesive use limited   incontinence pads utilized   tubing/devices free from skin contact   transparent dressing maintained   skin-to-skin areas padded   skin-to-device areas padded  Taken 11/24/2023 2200 by Monie Meza RN  Pressure Reduction Techniques:   frequent weight shift encouraged   positioned off wounds   pressure points  protected   weight shift assistance provided  Head of Bed ([facility]) Positioning: HOB at 60-90 degrees  Pressure Reduction Devices:   specialty bed utilized   positioning supports utilized   elbow protectors utilized  Skin Protection:   adhesive use limited   incontinence pads utilized   tubing/devices free from skin contact   transparent dressing maintained   skin-to-skin areas padded   skin-to-device areas padded  Taken 11/24/2023 2000 by Monie Meza RN  Pressure Reduction Techniques:   frequent weight shift encouraged   heels elevated off bed   positioned off wounds   pressure points protected   weight shift assistance provided  Head of Bed ([facility]) Positioning: HOB at 60-90 degrees  Pressure Reduction Devices:   elbow protectors utilized   heel offloading device utilized   positioning supports utilized   specialty bed utilized  Skin Protection:   adhesive use limited   incontinence pads utilized   tubing/devices free from skin contact   transparent dressing maintained   skin-to-skin areas padded   skin-to-device areas padded   skin sealant/moisture barrier applied      Partial Purse String (Intermediate) Text: Given the location of the defect and the characteristics of the surrounding skin an intermediate purse string closure was deemed most appropriate.  Undermining was performed circumferentially around the surgical defect.  A purse string suture was then placed and tightened. Wound tension of the circular defect prevented complete closure of the wound.

## 2023-11-25 NOTE — PLAN OF CARE
Problem: Skin Injury Risk Increased  Goal: Skin Health and Integrity  Intervention: Optimize Skin Protection  Recent Flowsheet Documentation  Taken 11/25/2023 1600 by Jacquie Lopez RN  Pressure Reduction Techniques:   frequent weight shift encouraged   heels elevated off bed   weight shift assistance provided  Head of Bed ([facility]) Positioning: HOB elevated  Pressure Reduction Devices:   pressure-redistributing mattress utilized   positioning supports utilized  Skin Protection:   adhesive use limited   incontinence pads utilized   transparent dressing maintained   tubing/devices free from skin contact  Taken 11/25/2023 1500 by Jacquie Lopez RN  Pressure Reduction Techniques:   frequent weight shift encouraged   heels elevated off bed   weight shift assistance provided  Head of Bed ([facility]) Positioning: HOB elevated  Pressure Reduction Devices:   pressure-redistributing mattress utilized   positioning supports utilized  Skin Protection:   adhesive use limited   incontinence pads utilized   transparent dressing maintained   tubing/devices free from skin contact   Plan of care reviewed and ongoing

## 2023-11-25 NOTE — PROGRESS NOTES
"      Cardiac Electrophysiology Inpatient Follow Up Note         [facility]    Progress Note    INITIAL REASON FOR CONSULT: severe MR / NICM / Acute on Chronic Systolic Heart Failure / PAF / Sudden Cardiac Arrest / VF / CRT-D     CHIEF COMPLAINT:  Chief Complaint   Patient presents with    Shortness of Breath    Rapid Heart Rate     severe MR / NICM / Acute on Chronic Systolic Heart Failure / PAF / Sudden Cardiac Arrest / VF / CRT-D     Subjective   Mr. Sundar Reeder denies vomiting, abdominal pain, fussiness, diarrhea, cough, difficulty breathing, and feels that he is mentally clear.  Aunt at bedside.         Objective   VITAL SIGNS  /78   Pulse 105   Temp 98.8 °F (37.1 °C) (Oral)   Resp 16   Ht 180.3 cm (71\")   Wt 84.5 kg (186 lb 4.6 oz)   SpO2 97%   BMI 25.98 kg/m²   [unfilled]  Pulse Ox: SpO2  Av.6 %  Min: 84 %  Max: 98 %  Supplemental O2: O2 Flow Rate (L/min): 3 L/min     Admit Weight  Weight: 88.5 kg (195 lb)  Last 3 Weights  [unfilled]  Body mass index is 25.98 kg/m².    INTAKE/OUTPUT  I/O last 3 completed shifts:  In: 970 [P.O.:970]  Out: 2250 [Urine:2250]    Intake/Output Summary (Last 24 hours) at 2023 1225  Last data filed at 2023 0743  Gross per 24 hour   Intake 250 ml   Output 1600 ml   Net -1350 ml       PHYSICAL EXAM  General appearance: awake, alert, oriented, moves all extremities  Lungs: no rhonchi, no wheezes, no rales  Heart: irregular irregular S1S2  Abdomen: positive bowel sounds, no bruits, no masses  Extremities: warm and dry, no cyanosis, no clubbing    LABS  Results from last 7 days   Lab Units 23  0826 23  0342 23  0357   WBC 10*3/mm3 25.35* 24.89* 25.57*   HEMOGLOBIN g/dL 11.5* 11.6* 11.6*   HEMATOCRIT % 34.9* 35.9* 35.8*   MCV fL 78.3* 79.6 79.9   PLATELETS 10*3/mm3 149 253 278         Results from last 7 days   Lab Units 23  0826 23  0342 23  0357   POTASSIUM mmol/L 4.0 4.6 4.0 " "  CHLORIDE mmol/L 93* 93* 92*   CO2 mmol/L 31.0* 30.0* 25.0   BUN mg/dL 53* 59* 72*   CREATININE mg/dL 1.42* 1.45* 1.54*   GLUCOSE mg/dL 106* 113* 88   CALCIUM mg/dL 8.5* 8.6 8.9   MAGNESIUM mg/dL 1.9 1.9 2.2             Results from last 7 days   Lab Units 11/25/23  0826 11/24/23  0342 11/23/23  0357   MAGNESIUM mg/dL 1.9 1.9 2.2                 No results found for: \"CHOL\", \"TRIG\", \"HDL\"    CURRENT MEDICATIONS  amiodarone, 400 mg, Oral, BID With Meals  [START ON 11/27/2023] amiodarone, 400 mg, Oral, Q24H  apixaban, 5 mg, Oral, Q12H  bumetanide, 1 mg, Oral, Daily  digoxin, 125 mcg, Oral, Daily  empagliflozin, 10 mg, Oral, Daily  enalapril, 2.5 mg, Oral, Q24H  midodrine, 10 mg, Oral, TID AC  pharmacy consult - [facility], , Does not apply, Daily  senna-docusate sodium, 2 tablet, Oral, BID  sodium chloride, 10 mL, Intravenous, Q12H      CONTINUOUS INFUSIONS           EKG: BiV pacing  ECHO:REVIEWED   STRESS: REVIEWED  CATH: REVIEWED  CXR: noted    TELEMETRY:noted         Diagnosis Plan   1. Acute on chronic systolic congestive heart failure  Continue gentle diuresis.   NO MORE DIGOXIN   2. Chronic atrial fibrillation  Eliquis   3. severe MR  Consider percutaneous mitral valve intervention at some point   4. NICM  ACEI  Needs Bblocker at some point   5. Sudden Cardiac Arrest  ICD / secondary to NICM   6. VF As above   7. CRT-D  [facility]     Body mass index is 25.98 kg/m².    I spent 46 minutes in consultation with this patient which included more than 65% of this time in direct face-to-face counseling, physical examination and discussion of my assessment and findings and this shared decision making with the patient.  The remainder of the time not spent face-to-face was performing one, some or all of the following actions: preparing to see the patient (e.g. reviewing tests, prior clinicians' notes, etc), ordering medications, tests or procedures, coordination of care, discussion of the plan with other healthcare providers, " documenting clinical information in epic as well as independently interpreting results and communication of these results to the patient family and/or caregiver(s).  Please note that this explicitly excludes time spent on other separate billable services such as performing procedures or test interpretation, when applicable.        Stiven Pedraza DO, FACC, RS  Cardiac Electrophysiologist  [facility] / [facility]

## 2023-11-25 NOTE — PROGRESS NOTES
Intensive Care Follow-up     Hospital:  LOS: 17 days   Mr. Sundar Reeder, 53 y.o. male is followed for:   Acute on chronic systolic congestive heart failure        Subjective     53-year-old male with a past medical history of CHF, CKD, hypertension, and atrial fibrillation.  He presented to the ER and was admitted on 11/8 with atrial fibrillation with RVR, shortness of breath, and worsening lower extremity edema.     He was admitted to telemetry and placed on an amiodarone drip and underwent ECV on 11/10.  Subsequent to that he has been primarily in sinus rhythm but with intermittent atrial fibrillation noted.  His ejection fraction by her most recent echo is less than 20% with severe MR.  While in the hospital he had worsening acute kidney injury associate with diuresis and this has been held.  He has ongoing hyponatremia and elevated liver enzymes felt to be related to congestive hepatopathy.       On the evening of 11/15, he developed the acute onset of ventricular fibrillation after what appeared to be an R-on-T phenomenon.  He required defibrillation and received CPR as well as epinephrine and bicarbonate.  Postcode he woke up and was awake alert and oriented.  He was transferred to ICU.   Interval History:  The chart has been reviewed.  The patient pulled out his Rhino Rocket yesterday evening.  Very minimal epistaxis.  He has been coughing up some ingested blood.  Hemodynamics have remained stable.  He denies chest pain currently.    The patient's past medical, surgical and social history were reviewed and updated in Epic as appropriate.        Objective     Infusions:     Medications:  amiodarone, 400 mg, Oral, BID With Meals  [START ON 11/27/2023] amiodarone, 400 mg, Oral, Q24H  apixaban, 5 mg, Oral, Q12H  bumetanide, 1 mg, Oral, Daily  empagliflozin, 10 mg, Oral, Daily  enalapril, 2.5 mg, Oral, Q24H  midodrine, 10 mg, Oral, TID   pharmacy consult - [place], , Does not apply, Daily  senna-docusate sodium, 2  "tablet, Oral, BID  sodium chloride, 10 mL, Intravenous, Q12H        Vital Sign Min/Max for last 24 hours  Temp  Min: 98.1 °F (36.7 °C)  Max: 99.1 °F (37.3 °C)   BP  Min: 77/53  Max: 113/80   Pulse  Min: 76  Max: 125   Resp  Min: 16  Max: 22   SpO2  Min: 84 %  Max: 98 %   Flow (L/min)  Min: 2  Max: 2       Input/Output for last 24 hour shift  11/24 0701 - 11/25 0700  In: 250 [P.O.:250]  Out: 1050 [Urine:1050]      Objective:  General Appearance:  In no acute distress, uncomfortable and not in pain.    Vital signs: (most recent): Blood pressure 112/78, pulse 105, temperature 98.8 °F (37.1 °C), temperature source Oral, resp. rate 16, height 180.3 cm (71\"), weight 84.5 kg (186 lb 4.6 oz), SpO2 97%.    HEENT: (Nasal cannula O2  Right naris packing)    Lungs:  Normal effort and normal respiratory rate.  He is not in respiratory distress.  No rales, wheezes or rhonchi.    Heart: Tachycardia.  Irregular rhythm.  S1 normal and S2 normal.  No murmur, gallop or friction rub.   Chest: Symmetric chest wall expansion. (Pacer wound over left precordium)  Abdomen: Abdomen is soft and non-distended.  Bowel sounds are normal.   There is no abdominal tenderness.   There is no mass. There is no splenomegaly. There is no hepatomegaly.   Extremities: Normal range of motion.  There is no deformity or dependent edema.    Neurological: Patient is alert and oriented to person, place and time.    Pupils:  Pupils are equal, round, and reactive to light.    Skin:  Warm and dry.  No rash.               Results from last 7 days   Lab Units 11/25/23  0826 11/24/23  0342 11/23/23  0357   WBC 10*3/mm3 25.35* 24.89* 25.57*   HEMOGLOBIN g/dL 11.5* 11.6* 11.6*   PLATELETS 10*3/mm3 149 253 278     Results from last 7 days   Lab Units 11/25/23  0826 11/24/23  0342 11/23/23  0357   SODIUM mmol/L 133* 133* 131*   POTASSIUM mmol/L 4.0 4.6 4.0   CO2 mmol/L 31.0* 30.0* 25.0   BUN mg/dL 53* 59* 72*   CREATININE mg/dL 1.42* 1.45* 1.54*   MAGNESIUM mg/dL 1.9 1.9 " 2.2   GLUCOSE mg/dL 106* 113* 88     Estimated Creatinine Clearance: 73.6 mL/min (A) (by C-G formula based on SCr of 1.42 mg/dL (H)).            I reviewed the patient's results and images.     Assessment & Plan   Impression        Acute on chronic systolic congestive heart failure    Hyponatremia    STUART (acute kidney injury)    Atrial fibrillation with RVR    Essential hypertension    Anxiety associated with depression    Hypoalbuminemia    Hyperbilirubinemia    Elevated liver enzymes    Elevated troponin level not due myocardial infarction    Severe mitral regurgitation    Ventricular fibrillation    LBBB (left bundle branch block)    Presence of biventricular implantable cardioverter-defibrillator (ICD)       Plan        Continue to mobilize as tolerated.  Physical therapy.  Wean oxygen.  Maintain current antiarrhythmics per the EP service.  Anticoagulation as before.  Renal function currently stable.  Transition to telemetry.    Plan of care and goals reviewed with mulitdisciplinary/antibiotic stewardship team during rounds.   I discussed the patient's findings and my recommendations with patient and nursing staff         Billy Xie MD, [facility]  Pulmonology and Critical Care Medicine

## 2023-11-25 NOTE — PROGRESS NOTES
"   LOS: 17 days    Patient Care Team:  Laura Summers PA-C as PCP - General (Family Medicine)    Subjective     Cr >1.41.5-<1.9 UOP ~ 2.1 liter. Dependent edema noted. ACEi on hold due to low bp. Restarted diuretics at lower dose.       Objective     Vital Signs:  Blood pressure 112/78, pulse 105, temperature 98.8 °F (37.1 °C), temperature source Oral, resp. rate 16, height 180.3 cm (71\"), weight 84.5 kg (186 lb 4.6 oz), SpO2 97%.      Intake/Output Summary (Last 24 hours) at 11/25/2023 1519  Last data filed at 11/25/2023 1300  Gross per 24 hour   Intake 250 ml   Output 2200 ml   Net -1950 ml        11/24 0701 - 11/25 0700  In: 250 [P.O.:250]  Out: 1050 [Urine:1050]    Physical Exam:    GENERAL: WD WM NAD  NEURO: Awake and alert, oriented. No focal deficit  PSYCHIATRIC: NMA. Cooperative with PE  EYE: PE, no icterus, no conjunctivitis  ENT: ommm, dentition intact,  Hearing intact  NECK: Supple , No JVD discernable,  Trachea midline  CV: + Edema b/l LE, RRR  LUNGS:  Quiet,  Nonlabored resp.  Symmetrical expansion  ABDOMEN: Nondistended, soft nontender.  : No Lazaro, no palp bladder  SKIN: Warm and dry without rash      Labs:  Results from last 7 days   Lab Units 11/25/23  0826 11/24/23  0342 11/23/23  0357   WBC 10*3/mm3 25.35* 24.89* 25.57*   HEMOGLOBIN g/dL 11.5* 11.6* 11.6*   PLATELETS 10*3/mm3 149 253 278     Results from last 7 days   Lab Units 11/25/23  0826 11/24/23  0342 11/23/23  0357 11/22/23  0639 11/20/23  1502 11/20/23  0410 11/19/23  0417   SODIUM mmol/L 133* 133* 131* 130*   < > 126* 128*   POTASSIUM mmol/L 4.0 4.6 4.0 5.3*   < > 3.5 4.0   CHLORIDE mmol/L 93* 93* 92* 88*   < > 84* 86*   CO2 mmol/L 31.0* 30.0* 25.0 19.0*   < > 32.0* 30.0*   BUN mg/dL 53* 59* 72* 83*   < > 83* 83*   CREATININE mg/dL 1.42* 1.45* 1.54* 1.92*   < > 1.76* 1.81*   CALCIUM mg/dL 8.5* 8.6 8.9 9.3   < > 9.1 8.9   MAGNESIUM mg/dL 1.9 1.9 2.2 2.4   < >  --   --    ALBUMIN g/dL 2.3*  --   --   --   --  2.9* 2.8*    < > = values " in this interval not displayed.     Results from last 7 days   Lab Units 11/25/23  0826   ALK PHOS U/L 167*   BILIRUBIN mg/dL 3.6*   ALT (SGPT) U/L 25   AST (SGOT) U/L 35         Estimated Creatinine Clearance: 73.6 mL/min (A) (by C-G formula based on SCr of 1.42 mg/dL (H)).         A/P:  STUART: Unclear baseline cr. Cr on this admission 2.7 mg/dl. Thought to be hemodynamic variation in the setting of advance CHF. Cr around 1.4-1.9mg/dl on recent testing.     CKD: Unsure of baseline creatinine     Hyponatremia: Total body volume overload. Suspect component of excessive fluid intake. Improved.    Hyperkalemia: Resolved.      Volume status: Significant dependent edema noted.     Hypotension: On midodrine.     CHF: Very low EF <20%.  Started on Jardiance on this admission 11/18/23. Unable to add ACEi/ARB/MRA due to low bp at present.    Recs  Variation in renal function noted. I do expect some fluctuation in GFR after initiation of SGLT-2inh. So far renal function stable.   Continue w bumex 1 mg daily. Check intermittent standing scale weight if possible.   ACEI On hold.   Strict I/O. Limit FW intake to 1.5 liter/day  Daily labs.        Giovany Lopez MD  11/25/23  15:19 EST

## 2023-11-26 NOTE — PROGRESS NOTES
"   LOS: 18 days    Patient Care Team:  Laura Summers PA-C as PCP - General (Family Medicine)    Subjective     Transferred back to ICU for hypotension and worsening SOB. Elevated lactic acidosis noted. Concern for cardiogenic shock       Objective     Vital Signs:  Blood pressure 102/74, pulse 104, temperature 99.9 °F (37.7 °C), temperature source Oral, resp. rate 20, height 180.3 cm (71\"), weight 79.5 kg (175 lb 6 oz), SpO2 100%.      Intake/Output Summary (Last 24 hours) at 11/26/2023 1622  Last data filed at 11/26/2023 0900  Gross per 24 hour   Intake 360 ml   Output 150 ml   Net 210 ml        11/25 0701 - 11/26 0700  In: -   Out: 1300 [Urine:1300]    Physical Exam:    GENERAL: WD WM NAD  NEURO: Awake and alert, oriented. No focal deficit  PSYCHIATRIC: NMA. Cooperative with PE  EYE: PE, no icterus, no conjunctivitis  ENT: ommm, dentition intact,  Hearing intact  NECK: Supple , No JVD discernable,  Trachea midline  CV: + Edema b/l LE, RRR  LUNGS:  Quiet,  Nonlabored resp.  Symmetrical expansion  ABDOMEN: Nondistended, soft nontender.  : No Lazaro, no palp bladder  SKIN: Warm and dry without rash      Labs:  Results from last 7 days   Lab Units 11/26/23  1525 11/26/23  0324 11/25/23  0826   WBC 10*3/mm3 20.82* 24.06* 25.35*   HEMOGLOBIN g/dL 9.8* 11.0* 11.5*   PLATELETS 10*3/mm3 54* 78* 149     Results from last 7 days   Lab Units 11/26/23  1525 11/26/23  1222 11/26/23  0324 11/25/23  0826 11/24/23  0342 11/20/23  1502 11/20/23  0410   SODIUM mmol/L 134* 131* 130* 133* 133*   < > 126*   POTASSIUM mmol/L 5.7* 5.3* 4.6 4.0 4.6   < > 3.5   CHLORIDE mmol/L 96* 90* 91* 93* 93*   < > 84*   CO2 mmol/L 17.0* 21.0* 25.0 31.0* 30.0*   < > 32.0*   BUN mg/dL 60* 62* 55* 53* 59*   < > 83*   CREATININE mg/dL 2.27* 2.11* 1.20 1.42* 1.45*   < > 1.76*   CALCIUM mg/dL 7.6* 8.6 8.3* 8.5* 8.6   < > 9.1   PHOSPHORUS mg/dL 5.5* 4.7*  --   --   --   --   --    MAGNESIUM mg/dL 2.0 2.2  --  1.9 1.9   < >  --    ALBUMIN g/dL 1.8* 2.2* "  --  2.3*  --   --  2.9*    < > = values in this interval not displayed.     Results from last 7 days   Lab Units 11/26/23  1525   ALK PHOS U/L 160*   BILIRUBIN mg/dL 4.7*   ALT (SGPT) U/L 21   AST (SGOT) U/L 48*     Results from last 7 days   Lab Units 11/26/23  1047   PH, ARTERIAL pH units 7.387   PO2 ART mm Hg 113.0*   PCO2, ARTERIAL mm Hg 34.0*   HCO3 ART mmol/L 20.4     Estimated Creatinine Clearance: 43.3 mL/min (A) (by C-G formula based on SCr of 2.27 mg/dL (H)).         A/P:  STUART: Unclear baseline cr. Cr on this admission 2.7 mg/dl. Thought to be hemodynamic variation in the setting of advance CHF. Cr around 1.4-1.9mg/dl on recent testing.     Cardiogenic shock: c/o SOB. Persistent LE edema. Hypotension and worsening lactic acidosis noted. 11/26/23    CKD: Unsure of baseline creatinine     Hyponatremia: Total body volume overload. Suspect component of excessive fluid intake. Improved.    Hyperkalemia: Resolved.      Volume status: Significant dependent edema noted.     Hypotension: On midodrine.     CHF: Very low EF <20%.  Started on Jardiance on this admission 11/18/23. Unable to add ACEi/ARB/MRA due to low bp at present.    Recs  Patient will need inotropic/pressor support for optimization of BP  Continue with diuretics for now.   Hold ACEI.   Strict I/O. Limit FW intake to 1.5 liter/day  Daily labs.        Giovany Lopez MD  11/26/23  16:22 EST

## 2023-11-26 NOTE — PLAN OF CARE
Goal Outcome Evaluation:  Plan of Care Reviewed With: patient           Outcome Evaluation: BLE compression wraps changed with light debridement of crusted scabs to LEs.  no significant open areas noted today. PT will cont with compression wrapping changes every 2-3 days.

## 2023-11-26 NOTE — SIGNIFICANT NOTE
Exam confirms with auscultation zero audible heart tones and zero audible respirations. Mr.John Reeder was pronounced dead at 1741.  MD notified by Patient's RN.    Billy Bonilla RN  Clinical House Supervisor  11/26/2023 18:29 EST

## 2023-11-26 NOTE — PROCEDURES
Insert Arterial Line    Date/Time: 11/26/2023 3:08 PM    Performed by: Adonis Gerard APRN  Authorized by: Adonis Gerard APRN    Universal Protocol:     Written consent obtained?: Yes      Risks and benefits: Risks, benefits and alternatives were discussed      Consent given by: Sister.    Patient identity confirmed:  Arm band and hospital-assigned identification number    Time out: Immediately prior to the procedure a time out was called    A time out verifies correct patient, procedure, equipment, support staff and site/side marked as required:   Preparation:     Preparation: Patient was prepped and draped in usual sterile fashion    Indications:     Indications: multiple ABGs, respiratory failure and hemodynamic monitoring    Location:     Location:  Right radial  Anesthesia:     Patient sedated: Yes      Sedation:  See MAR for details    Analgesia:  See MAR for details    Vital signs: Vital signs monitored during sedation    Procedure Details:     Ultrasound Guidance: yes  The ultrasound was used for evaluation of possible access sites.  Vessel patency was confirmed with the ultrasound.  Needle entry into vessel was visualized in realtime with the ultrasound.       Inderjit's test normal?: Yes      Needle gauge:  20    Seldinger technique: Seldinger technique used      Number of attempts:  2  Post-procedure:     Post-procedure:  Line sutured and dressing applied    Post-procedure CMS:  Normal and unchanged     patient tolerated the procedure well with no immediate complications     Inserted with ultrasound guidance with bright red pulsatile blood detected. Connected to pressure tubing with arterial waveform present. Suture placed and sterile dressing applied.     Adonis Gerard, MSN, APRN, [facility]  Pulmonary and Critical Care Medicine

## 2023-11-26 NOTE — THERAPY WOUND CARE TREATMENT
Acute Care - Wound/Debridement Treatment Note   [place]     Patient Name: Sundar Reeder  : 1970  MRN: [id number]  Today's Date: 2023                Admit Date: 2023    Visit Dx:    ICD-10-CM ICD-9-CM   1. Acute on chronic systolic congestive heart failure  I50.23 428.23     428.0   2. Chronic atrial fibrillation  I48.20 427.31   3. Hyperbilirubinemia  E80.6 782.4   4. Atrial fibrillation with RVR  I48.91 427.31   5. Hyponatremia  E87.1 276.1   6. Hypochloremia  E87.8 276.9   7. Elevated serum creatinine  R79.89 790.99   8. Physical deconditioning  R53.81 799.3   9. Elevated brain natriuretic peptide (BNP) level  R79.89 790.99   10. Acute pulmonary edema  J81.0 518.4   11. Leukocytosis, unspecified type  D72.829 288.60   12. Elevated transaminase level  R74.01 790.4       Patient Active Problem List   Diagnosis    Hyponatremia    STUART (acute kidney injury)    Atrial fibrillation with RVR    Essential hypertension    Anxiety associated with depression    Hypoalbuminemia    Hyperbilirubinemia    Elevated liver enzymes    Acute on chronic systolic congestive heart failure    Elevated troponin level not due myocardial infarction    Severe mitral regurgitation    Ventricular fibrillation    LBBB (left bundle branch block)    Presence of biventricular implantable cardioverter-defibrillator (ICD)        History reviewed. No pertinent past medical history.     Past Surgical History:   Procedure Laterality Date    BIVENTRICULLAR IMPLANTABLE CARDIOVERTER DEFIBRILLATOR PLACEMENT N/A 2023    Procedure: Implant ICD - bi ventricular;  Surgeon: Sundar Bhagat MD;  Location: [facility] INVASIVE LOCATION;  Service: Cardiovascular;  Laterality: N/A;          LDA Wound       Row Name               Wound 23 1200 Bilateral lower leg Blisters    Wound - Properties Group Placement Date: 23  -AH Placement Time: 1200  -AH Side: Bilateral  -AH Orientation: lower  -AH Location: leg  -AH Primary Wound Type:  Blisters  -AH    Retired Wound - Properties Group Placement Date: 11/08/23  -AH Placement Time: 1200  -AH Side: Bilateral  -AH Orientation: lower  -AH Location: leg  -AH Primary Wound Type: Blisters  -AH    Retired Wound - Properties Group Date first assessed: 11/08/23  -AH Time first assessed: 1200  -AH Side: Bilateral  -AH Location: leg  -AH Primary Wound Type: Blisters  -AH       Wound 11/17/23 1232 Left lateral clavicle Incision    Wound - Properties Group Placement Date: 11/17/23  -BB Placement Time: 1232  -BB Side: Left  -BB Orientation: lateral  -BB Location: clavicle  -BB Primary Wound Type: Incision  -BB    Retired Wound - Properties Group Placement Date: 11/17/23  -BB Placement Time: 1232  -BB Side: Left  -BB Orientation: lateral  -BB Location: clavicle  -BB Primary Wound Type: Incision  -BB    Retired Wound - Properties Group Date first assessed: 11/17/23  -BB Time first assessed: 1232  -BB Side: Left  -BB Location: clavicle  -BB Primary Wound Type: Incision  -BB       Wound 11/19/23 0900 Left upper thoracic spine Abrasion    Wound - Properties Group Placement Date: 11/19/23  -EW Placement Time: 0900  -EW Side: Left  -EW Orientation: upper  -EW Location: thoracic spine  -EW Primary Wound Type: Abrasion  -EW    Retired Wound - Properties Group Placement Date: 11/19/23  -EW Placement Time: 0900  -EW Side: Left  -EW Orientation: upper  -EW Location: thoracic spine  -EW Primary Wound Type: Abrasion  -EW    Retired Wound - Properties Group Date first assessed: 11/19/23  -EW Time first assessed: 0900  -EW Side: Left  -EW Location: thoracic spine  -EW Primary Wound Type: Abrasion  -EW       Wound 11/15/23 2300 Bilateral medial gluteal Other (comment)    Wound - Properties Group Placement Date: 11/15/23  -TG Placement Time: 2300  -TG Present on Original Admission: N  -TG Side: Bilateral  -TG Orientation: medial  -TG Location: gluteal  -TG Primary Wound Type: Other  -TG, acute vascular compromise with masd      Retired Wound - Properties Group Placement Date: 11/15/23  -TG Placement Time: 2300  -TG Present on Original Admission: N  -TG Side: Bilateral  -TG Orientation: medial  -TG Location: gluteal  -TG Primary Wound Type: Other  -TG, acute vascular compromise with masd     Retired Wound - Properties Group Date first assessed: 11/15/23  -TG Time first assessed: 2300  -TG Present on Original Admission: N  -TG Side: Bilateral  -TG Location: gluteal  -TG Primary Wound Type: Other  -TG, acute vascular compromise with masd               User Key  (r) = Recorded By, (t) = Taken By, (c) = Cosigned By      Initials Name Provider Type     Yessenia Barrios, RN Registered Nurse    Celia Ortega, RN Registered Nurse    Everett Pineda RN Registered Nurse    Becky Guzman Technologist                  Wound 11/08/23 1200 Bilateral lower leg Blisters (Active)   Dressing Appearance intact;dry 11/26/23 0900   Closure Unable to assess 11/25/23 2117   Base dry;scab 11/26/23 0900   Periwound redness 11/26/23 0900   Periwound Temperature warm 11/26/23 0900   Periwound Skin Turgor soft 11/26/23 0900   Edges irregular 11/26/23 0900   Drainage Characteristics/Odor serosanguineous 11/26/23 0900   Drainage Amount scant 11/26/23 0900   Care, Wound cleansed with;soap and water;debrided 11/26/23 0900   Dressing Care dressing changed 11/26/23 0900   Periwound Care dry periwound area maintained 11/26/23 0900       Wound 11/17/23 1232 Left lateral clavicle Incision (Active)   Dressing Appearance dry;moist drainage 11/25/23 2117   Closure Unable to assess 11/25/23 2117   Base dressing in place, unable to visualize 11/25/23 2117   Drainage Amount none 11/25/23 1500   Periwound Care dry periwound area maintained 11/25/23 1500       Wound 11/19/23 0900 Left upper thoracic spine Abrasion (Active)   Dressing Appearance open to air 11/25/23 2117   Closure Open to air 11/25/23 2117   Base red;scab 11/25/23 2117   Periwound intact;dry  11/25/23 1500   Periwound Care dry periwound area maintained 11/25/23 1500       Wound 11/15/23 2300 Bilateral medial gluteal Other (comment) (Active)   Dressing Appearance dry;moist drainage 11/25/23 2117   Closure Unable to assess 11/25/23 2117   Base dressing in place, unable to visualize 11/25/23 2117   Periwound Care dry periwound area maintained 11/25/23 1500      Lymphedema       Row Name 11/26/23 0900             Lymphedema Edema Assessment    Ptting Edema Category By severity  -      Pitting Edema Mild  -         Compression/Skin Care    Compression/Skin Care skin care;wrapping location;bandaging  -      Skin Care washed/dried  -      Wrapping Location lower extremity  -      Wrapping Location LE bilateral:;foot to knee  -      Wrapping Comments size 5 compressogrip doubled and overlapping for gradient compression.  -                User Key  (r) = Recorded By, (t) = Taken By, (c) = Cosigned By      Initials Name Provider Type     Fernando Justin, PT Physical Therapist                    WOUND DEBRIDEMENT  Total area of Debridement: ~10cm2  Debridement Site 1  Location- Site 1: BLE  Selective Debridement- Site 1: Wound Surface <20cmsq  Instruments- Site 1: tweezers  Excised Tissue Description- Site 1: minimum, other (comment) (crusted nonviable skin and scabs)  Bleeding- Site 1: none               PT Assessment (last 12 hours)       PT Evaluation and Treatment       Row Name 11/26/23 0900          Physical Therapy Time and Intention    Subjective Information complains of;weakness;fatigue  -     Document Type therapy note (daily note);wound care  -     Mode of Treatment individual therapy;physical therapy  -       Row Name 11/26/23 0900          Pain    Pretreatment Pain Rating 0/10 - no pain  -     Posttreatment Pain Rating 0/10 - no pain  -       Row Name 11/26/23 0900          Wound 11/08/23 1200 Bilateral lower leg Blisters    Wound - Properties Group Placement Date: 11/08/23   -AH Placement Time: 1200  -AH Side: Bilateral  -AH Orientation: lower  -AH Location: leg  -AH Primary Wound Type: Blisters  -AH    Dressing Appearance intact;dry  -MF     Base dry;scab  -MF     Periwound redness  -MF     Periwound Temperature warm  -MF     Periwound Skin Turgor soft  -MF     Edges irregular  -MF     Drainage Characteristics/Odor serosanguineous  -MF     Drainage Amount scant  -MF     Care, Wound cleansed with;soap and water;debrided  -MF     Dressing Care dressing changed  -MF     Periwound Care dry periwound area maintained  -MF     Retired Wound - Properties Group Placement Date: 11/08/23  -AH Placement Time: 1200  -AH Side: Bilateral  -AH Orientation: lower  -AH Location: leg  -AH Primary Wound Type: Blisters  -AH    Retired Wound - Properties Group Date first assessed: 11/08/23  - Time first assessed: 1200  -AH Side: Bilateral  -AH Location: leg  -AH Primary Wound Type: Blisters  -AH      Row Name             Wound 11/17/23 1232 Left lateral clavicle Incision    Wound - Properties Group Placement Date: 11/17/23  -BB Placement Time: 1232  -BB Side: Left  -BB Orientation: lateral  -BB Location: clavicle  -BB Primary Wound Type: Incision  -BB    Retired Wound - Properties Group Placement Date: 11/17/23  -BB Placement Time: 1232  -BB Side: Left  -BB Orientation: lateral  -BB Location: clavicle  -BB Primary Wound Type: Incision  -BB    Retired Wound - Properties Group Date first assessed: 11/17/23  -BB Time first assessed: 1232  -BB Side: Left  -BB Location: clavicle  -BB Primary Wound Type: Incision  -BB      Row Name             Wound 11/19/23 0900 Left upper thoracic spine Abrasion    Wound - Properties Group Placement Date: 11/19/23  -EW Placement Time: 0900  -EW Side: Left  -EW Orientation: upper  -EW Location: thoracic spine  -EW Primary Wound Type: Abrasion  -EW    Retired Wound - Properties Group Placement Date: 11/19/23  -EW Placement Time: 0900  -EW Side: Left  -EW Orientation: upper   -EW Location: thoracic spine  -EW Primary Wound Type: Abrasion  -EW    Retired Wound - Properties Group Date first assessed: 11/19/23  -EW Time first assessed: 0900  -EW Side: Left  -EW Location: thoracic spine  -EW Primary Wound Type: Abrasion  -EW      Row Name             Wound 11/15/23 2300 Bilateral medial gluteal Other (comment)    Wound - Properties Group Placement Date: 11/15/23  -TG Placement Time: 2300  -TG Present on Original Admission: N  -TG Side: Bilateral  -TG Orientation: medial  -TG Location: gluteal  -TG Primary Wound Type: Other  -TG, acute vascular compromise with masd     Retired Wound - Properties Group Placement Date: 11/15/23  -TG Placement Time: 2300  -TG Present on Original Admission: N  -TG Side: Bilateral  -TG Orientation: medial  -TG Location: gluteal  -TG Primary Wound Type: Other  -TG, acute vascular compromise with masd     Retired Wound - Properties Group Date first assessed: 11/15/23  -TG Time first assessed: 2300  -TG Present on Original Admission: N  -TG Side: Bilateral  -TG Location: gluteal  -TG Primary Wound Type: Other  -TG, acute vascular compromise with masd       Row Name 11/26/23 0900          Coping    Observed Emotional State cooperative  -     Verbalized Emotional State acceptance  -     Trust Relationship/Rapport care explained  -       Row Name 11/26/23 0900          Plan of Care Review    Plan of Care Reviewed With patient  -MF     Outcome Evaluation BLE compression wraps changed with light debridement of crusted scabs to LEs.  no significant open areas noted today. PT will cont with compression wrapping changes every 2-3 days.  -       Row Name 11/26/23 0900          Positioning and Restraints    Pre-Treatment Position in bed  -     Post Treatment Position bed  -MF     In Bed supine;call light within reach  -               User Key  (r) = Recorded By, (t) = Taken By, (c) = Cosigned By      Initials Name Provider Type    Fernando Mixon, PT Physical  Therapist    Yessenia Muhammad, RN Registered Nurse    BETZY Mora, Celia Sanchez, RN Registered Nurse    Everett Pineda RN Registered Nurse    Becky Guzman Technologist                  Physical Therapy Education       Title: PT OT SLP Therapies (In Progress)       Topic: Physical Therapy (In Progress)       Point: Mobility training (In Progress)       Learning Progress Summary             Patient Acceptance, E, NR,NL by AS at 11/24/2023 1311    Acceptance, E,TB, VU by EB at 11/22/2023 1109    Acceptance, E,TB, NR by AY at 11/21/2023 1334    Acceptance, E,TB, NR by ES at 11/19/2023 1448    Acceptance, E, NR by AE at 11/15/2023 0904   Family Acceptance, E,TB, VU by EB at 11/22/2023 1109                         Point: Home exercise program (In Progress)       Learning Progress Summary             Patient Acceptance, E, NR,NL by AS at 11/24/2023 1311    Acceptance, E,TB, VU by EB at 11/22/2023 1109    Acceptance, E,TB, NR by AY at 11/21/2023 1334    Acceptance, E, NR by AE at 11/15/2023 0904   Family Acceptance, E,TB, VU by EB at 11/22/2023 1109                         Point: Body mechanics (In Progress)       Learning Progress Summary             Patient Acceptance, E, NR,NL by AS at 11/24/2023 1311    Acceptance, E,TB, VU by EB at 11/22/2023 1109    Acceptance, E,TB, NR by AY at 11/21/2023 1334    Acceptance, E,TB, NR by ES at 11/19/2023 1448    Acceptance, E, NR by AE at 11/15/2023 0904   Family Acceptance, E,TB, VU by EB at 11/22/2023 1109                         Point: Precautions (In Progress)       Learning Progress Summary             Patient Acceptance, E, NR,NL by AS at 11/24/2023 1311    Acceptance, E,TB, VU by EB at 11/22/2023 1109    Acceptance, E,TB, NR by AY at 11/21/2023 1334    Acceptance, E,TB, NR by ES at 11/19/2023 1448    Acceptance, E, NR by AE at 11/15/2023 0904   Family Acceptance, E,TB, VU by EB at 11/22/2023 1458                                         User Key       Initials  Effective Dates Name Provider Type Discipline    AS 06/16/21 -  Charla Huston, RN Registered Nurse Nurse    EB 09/22/22 -  Enrike Davis, RN Registered Nurse Nurse    LUCRECIA 11/10/20 -  Yessenia Carbajal, PT Physical Therapist PT    AE 09/21/21 -  Kenroy Guzman, PT Physical Therapist PT    ES 08/11/22 -  Stefany Brantley, PT Physical Therapist PT                    Recommendation and Plan  Anticipated Discharge Disposition (PT): inpatient rehabilitation facility  Planned Therapy Interventions (PT): balance training, bed mobility training, gait training, patient/family education, neuromuscular re-education, strengthening, stair training, transfer training, postural re-education  Therapy Frequency (PT): daily  Plan of Care Reviewed With: patient           Outcome Evaluation: BLE compression wraps changed with light debridement of crusted scabs to LEs.  no significant open areas noted today. PT will cont with compression wrapping changes every 2-3 days.  Plan of Care Reviewed With: patient            Time Calculation   PT Charges       Row Name 11/26/23 0900             Time Calculation    Start Time 0900  -MF      PT Goal Re-Cert Due Date 11/29/23  -         Untimed Charges    50922-Fmkxyjdvti comp below knee 25  -MF      76768-Ncxhwrsjv debridement 10  -MF         Total Minutes    Untimed Charges Total Minutes 35  -MF       Total Minutes 35  -MF                User Key  (r) = Recorded By, (t) = Taken By, (c) = Cosigned By      Initials Name Provider Type    Fernando Mixon, PT Physical Therapist                            PT G-Codes  Outcome Measure Options: AM-PAC 6 Clicks Basic Mobility (PT)  AM-PAC 6 Clicks Score (PT): 16  AM-PAC 6 Clicks Score (OT): 18  Zeng Total Score: 55       Fernando Justin, PT  11/26/2023

## 2023-11-26 NOTE — PROGRESS NOTES
"      Cardiac Electrophysiology Inpatient Follow Up Note         [facility]    Progress Note    INITIAL REASON FOR CONSULT: severe MR / NICM / Acute on Chronic Systolic Heart Failure / PAF / Sudden Cardiac Arrest / VF / CRT-D      CHIEF COMPLAINT:  Chief Complaint   Patient presents with    Shortness of Breath    Rapid Heart Rate     severe MR / NICM / Acute on Chronic Systolic Heart Failure / PAF / Sudden Cardiac Arrest / VF / CRT-D      Subjective   This patient developed worsening hypoxemia was transferred to the ICU this afternoon.  Saw and examined him in the ICU bed.  At this point patient has become progressively hypotensive and had become intubated.      Objective   VITAL SIGNS  BP (!) 77/43   Pulse 103   Temp 99.9 °F (37.7 °C) (Oral)   Resp 20   Ht 180.3 cm (71\")   Wt 79.5 kg (175 lb 6 oz)   SpO2 100%   BMI 24.46 kg/m²   [unfilled]  Pulse Ox: SpO2  Av.6 %  Min: 88 %  Max: 100 %  Supplemental O2: O2 Flow Rate (L/min): 3 L/min     Admit Weight  Weight: 88.5 kg (195 lb)  Last 3 Weights  [unfilled]  Body mass index is 24.46 kg/m².    INTAKE/OUTPUT  I/O last 3 completed shifts:  In: 250 [P.O.:250]  Out: 1750 [Urine:1750]    Intake/Output Summary (Last 24 hours) at 2023 1751  Last data filed at 2023 1415  Gross per 24 hour   Intake 1360 ml   Output 150 ml   Net 1210 ml       PHYSICAL EXAM  General appearance: intubated  Lungs: rales  Heart: summation gallop / hyperdynamic  Abdomen: scaphoid  Extremities: cold x 4, no edema    LABS  Results from last 7 days   Lab Units 23  1525 23  0324 23  0826   WBC 10*3/mm3 20.82* 24.06* 25.35*   HEMOGLOBIN g/dL 9.8* 11.0* 11.5*   HEMATOCRIT % 31.5* 32.9* 34.9*   MCV fL 81.6 77.6* 78.3*   PLATELETS 10*3/mm3 54* 78* 149         Results from last 7 days   Lab Units 23  1525 23  1222 23  0324 23  0826   POTASSIUM mmol/L 5.7* 5.3* 4.6 4.0   CHLORIDE mmol/L 96* 90* 91* 93*   CO2 " "mmol/L 17.0* 21.0* 25.0 31.0*   BUN mg/dL 60* 62* 55* 53*   CREATININE mg/dL 2.27* 2.11* 1.20 1.42*   GLUCOSE mg/dL 72 78 82 106*   CALCIUM mg/dL 7.6* 8.6 8.3* 8.5*   MAGNESIUM mg/dL 2.0 2.2  --  1.9             Results from last 7 days   Lab Units 11/26/23  1525 11/26/23  1222 11/25/23  0826   MAGNESIUM mg/dL 2.0 2.2 1.9                 No results found for: \"CHOL\", \"TRIG\", \"HDL\"    CURRENT MEDICATIONS  glycopyrrolate, 0.1 mg, Intravenous, Once      CONTINUOUS INFUSIONS  DOBUTamine, 2-20 mcg/kg/min, Last Rate: 20 mcg/kg/min (11/26/23 1700)  EPINEPHrine, 0.02-0.3 mcg/kg/min  fentanyl 10 mcg/mL,  mcg/hr, Last Rate: 50 mcg/hr (11/26/23 1706)  norepinephrine, 0.02-0.3 mcg/kg/min, Last Rate: 0.3 mcg/kg/min (11/26/23 1554)  phenylephrine, 0.5-3 mcg/kg/min, Last Rate: 3 mcg/kg/min (11/26/23 1559)  propofol, 5-50 mcg/kg/min, Last Rate: 10 mcg/kg/min (11/26/23 1645)  vasopressin, 0.03 Units/min, Last Rate: 0.03 Units/min (11/26/23 1640)            EKG: Sinus / BiV paced  ECHO:REVIEWED   STRESS: REVIEWED  CATH: REVIEWED  CXR: noted    TELEMETRY: sinus / BiV paced        Diagnosis Plan   1. Acute on chronic systolic congestive heart failure  This patient developed worsening hypoxemia was transferred to the ICU this afternoon.  Saw and examined him in the ICU bed.  At this point patient has become progressively hypotensive and had become intubated.    Overall picture is highly consistent with end-stage cardiogenic shock in the context of severe mitral insufficiency and severe LV systolic dysfunction with ejection fraction earlier in the week of about 15% by echocardiography.    Patient presently is on 3 pressors.    Long conversation with the family at the bedside about his critical nature.    I discussed with the patient's family namely the sister and brother-in-law that the patient had a small chance of survival and only if we proceed with mechanical support for his cardiogenic shock.  This would involve activating the " Cath Lab bringing him to the Cath Lab for either percutaneous left ventricular assist device such as an Impella or potentially ECMO.  I discussed the case via telephone with Dr. Choudhury as well as Dr. Fernandes of interventional cardiology and cardiac surgery respectively.  The cardiac catheterization laboratory was initially activated such that the staff were coming into the hospital for the above procedures.    Lengthy conversation with the patient's family revealed that patient had been offered cardiac surgery and indeed following valve surgery but later on also cardiac transplantation and declined both.  Patient's sister while quite tearful and overcome by the severity of her brother's illness was very clear and indicating to me that her brother would not want this.  I discussed this with her and was clearly clear that what she meant was that her brother would not want any form of mechanical support.  Additionally her brother would not want any more aggressive therapy.  He would not want a left ventricular assist device.  He would not want ECMO.  He would not want Impella.  He already had declined a heart transplant and would not want to be considered more or 1 in the future.  He had already declined cardiac surgery for what was known to be severe mitral valve disease.    I discussed with the patient family at the bedside that it was likely that in the next few hours the patient would die.  I recommended that they considered they make the patient DO NOT RESUSCITATE specifically no CPR and also I recommended that they turn off the patient's ICD which in my opinion as an electrophysiologist would be futile should he have ventricular tach arrhythmia he would have recurrent shocks which would only serve to increase the patient's suffering.  Patient's sister and brother-in-law agreed with me entirely.  I personally placed a magnet over the ICD.  [employer] will come in later tonight to deactivate the ICD.  Pacing  will be left intact of course.    To clarify:    Comfort care only.  No further pressors.  No CPR.  Patient is already intubated.  No ICD shocks.  No external shocks.    All of this was discussed and agreed upon with the patient's excellent nursing staff, patient sister, patient brother-in-law, critical care intensivist physician Dr. Olmedo   2. Chronic atrial fibrillation  As above   3. severe MR  As above   4. NICM  As above   5. Sudden Cardiac Arrest  ICD shocks off           74 minutes critical care time delivered at bedside  Body mass index is 24.46 kg/m².    I spent 74 minutes in consultation with this patient which included more than 65% of this time in direct face-to-face counseling, physical examination and discussion of my assessment and findings and this shared decision making with the patient.  The remainder of the time not spent face-to-face was performing one, some or all of the following actions: preparing to see the patient (e.g. reviewing tests, prior clinicians' notes, etc), ordering medications, tests or procedures, coordination of care, discussion of the plan with other healthcare providers, documenting clinical information in epic as well as independently interpreting results and communication of these results to the patient family and/or caregiver(s).  Please note that this explicitly excludes time spent on other separate billable services such as performing procedures or test interpretation, when applicable.        Stiven Pedraza DO, [facility], [facility]  Cardiac Electrophysiologist  [facility] / [facility]

## 2023-11-26 NOTE — PROCEDURES
"Insert Central Line At Bedside    Date/Time: 11/26/2023 3:12 PM    Performed by: Cynthia Wu DNP, APRN  Authorized by: Cynthia Wu DNP, APRN  Consent: Verbal consent obtained. Written consent obtained.  Risks and benefits: risks, benefits and alternatives were discussed  Consent given by: Patient's sister.  Patient identity confirmed: arm band and anonymous protocol, patient vented/unresponsive  Time out: Immediately prior to procedure a \"time out\" was called to verify the correct patient, procedure, equipment, support staff and site/side marked as required.  Indications: vascular access    Sedation:  Patient sedated: yes  Vitals: Vital signs were monitored during sedation.    Preparation: skin prepped with ChloraPrep  Skin prep agent dried: skin prep agent completely dried prior to procedure  Sterile barriers: all five maximum sterile barriers used - cap, mask, sterile gown, sterile gloves, and large sterile sheet  Hand hygiene: hand hygiene performed prior to central venous catheter insertion  Location details: left internal jugular  Patient position: flat  Catheter type: triple lumen  Catheter size: 7 Fr  Ultrasound guidance: yes  Sterile ultrasound techniques: sterile gel and sterile probe covers were used  Number of attempts: 1  Successful placement: yes  Post-procedure: line sutured and dressing applied  Assessment: blood return through all ports, free fluid flow, placement verified by x-ray and no pneumothorax on x-ray  Patient tolerance: patient tolerated the procedure well with no immediate complications  Comments: Dark red non-pulsatile blood aspirated from all three ports and flushed back well. Guidewire visualized in LIJ via ultrasound. Sutures x2. Sterile dressing applied.    Cynthia Wu DNP, APRN, [facility]  Pulmonary and Critical Care Medicine           "

## 2023-11-26 NOTE — SIGNIFICANT NOTE
Patient was transferred over the night from the ICU.  Had a met call this morning for increased work of breathing and hypotension.  Blood pressure was in 60s.  Stat chest x-ray, ABG, ordered.  Patient was placed on BiPAP but he could not tolerated.  ICU team reconsulted.  Patient will be transferred to the ICU for further eval.  Plan was discussed with .

## 2023-11-26 NOTE — PROGRESS NOTES
Scans reviewed.  No signs of bleeding.  Urine does have quite a bit of leuk esterases and white blood cells concerning for infection.  Regardless appears to be mainly cardiogenic shock.  Dr. Pedraza was at the bedside with myself talking with the patient's family.  He had discussed as a last ditch effort placing an Impella device and trying to transfer the patient to [facility].  Ultimate long-term goal would be a heart transplant though.  Patient's sister informs that he had actually had that conversation in the past and did not want a heart transplant.  Given those wishes decision was made to make the patient a DNR/DNI.  Family is on the way in with the ultimate plan of going full comfort care once everyone has had the chance to say goodbye.  We are not going to escalate care any further and we will maintain her same level support until family has got to the hospital.    Electronically signed by Jatin Olmedo DO, 11/26/23, 5:12 PM EST.

## 2023-11-26 NOTE — DISCHARGE SUMMARY
Death Summary    Patient name: Sundar Reeder  CSN: [id number]  MRN: [id number]  : 1970    Date of Admission: 2023  Date and Time of Death:  2023 at 1741    Admitting Physician: Jimena Kwok DO     Primary Care Provider: Laura Summers PA-C    Consultations:   Jacques Basurto MD Cardiology   Johnson Galicia MD Nephrology   Sundar Bhagat MD  EP Cardiology     Admission Diagnosis: AoC systolic congestive heart failure      Diagnoses at the Time of Death:     Acute on chronic systolic congestive heart failure    Hyponatremia    STUART (acute kidney injury)    Atrial fibrillation with RVR    Essential hypertension    Anxiety associated with depression    Hypoalbuminemia    Hyperbilirubinemia    Elevated liver enzymes    Elevated troponin level not due myocardial infarction    Severe mitral regurgitation    Ventricular fibrillation    LBBB (left bundle branch block)    Presence of biventricular implantable cardioverter-defibrillator (ICD)    Procedures:  11/10/23: ECV   23: BiV ICD Insertion    1512 Insert Central Line At Bedside   1508 Insert Arterial Line    History of Present Illness (copied from H&P note on 23):  Sundar Reeder is a 53 y.o. male with a past medical history of HTN, atrial fibrillation on Eliquis, CHF and CKD presented to the ED complaining of elevated heart rate, worsened lower extremity swelling and scrotal swelling with shortness of breath. The patient states he is compliant with his Eliquis but hasn't been compliant with most of his other medications. He states he has felt weak and tired for the past year and it made it difficult to work. He worked for [employer] for 19 years and states he had to quit 11 months ago due to his health. He states he sees 2 cardiologists at [place] but would like to transfer his care here. He states he has been very depressed due to his ongoing worsening chronic medical conditions and stopped refilling all his home meds except for  his Eliquis. He states he has no family other than his sister. He states his leg swelling has gotten so bad that his legs are now weeping and he is having skin breakdown with bleeding. He states he cannot walk well on his own. The patient's lab work indicates an exacerbation of CHF with an elevated BNP and cardiomegaly on his chest xray. His heart rate was elevated into the 150s on arrival in the ED and he was found to be in atrial fibrillation, which has improved s/p Cardizem. The patient will be admitted to the hospitalist service for further evaluation and treatment (end of copied text).     Hospital Course:  Patient was admitted to the Hospital Medicine service 2* issues discussed in the above HPI. Cardiology was consulted who stopped diltiazem and initiated on diuretics (Lasix gtt), Amiodarone protocol, and digoxin load. He was also continued on Eliquis. TTE demonstrated LVEF <20% with LV global hypokinesis and moderate MR. Due to persistence of AF he underwent ECV on 11/10 per Dr. Bagley which was initially successful with restoration of NSR, however developed AF recurrence the following day. He was continued on PO Amiodarone. Ischemic evaluation deferred due to lack of angina symptoms and flat troponins.     Nephrology additionally consulted 2* STUART and hyponatremia (Na 126 on admission). Renal US was negative for hydronephrosis with findings consistent with chronic renal disease. Sodium ultimately improved without intervention. His renal function gradually worsened and diuretics were held.     On the evening of 11/15 patient had acute decline with development of ventricular fibrillation (after R-on-T phenomenon) and a CODE BLUE was called. ACLS measures were initiated and patient received desynchronized cardioversion with ROSC achieved and patient alert & responsive to commands. He was subsequently transferred to ICU for further management.     In ICU patient was continued on amiodarone and placed on  Julio César-Synephrine infusion due to hypotension. EP Cardiology was consulted and patient underwent BiV ICD placement on . In efforts to better control his AF, he was placed on Amiodarone infusion + PO Amiodarone (on ).     Over the next few days patient was weaned off of pressors and midodrine was added. He was downgraded to telemetry on .     Early this morning patient experienced acute deterioration with increased FiO2 requirements (from low jayda O2 to HFNC), persistent hypotension, and worsening lactate. He was transferred to ICU where upon arrival mentation became altered on near-maximum HFNC settings and was subsequently intubated / placed on mechanical ventilatory support. Post-intubation blood pressure continued to decline and he ultimately required maximum dosing of multiple pressor agents. CTA chest negative for PE but showed developing ARDS along with sequelae of volume overload. CT A/P with contrast was negative for an acute abnormality.     Patient continued to decline and ultimately following discussion with patient's sister she made the difficult but appropriate decision to transition to comfort measures and proceed with withdrawal of life supportive therapy. Mr. Reeder  on 23 at 1741.     Cynthia Wu, DNP, APRN, AGACNP-BC  Pulmonary and Critical Care Medicine     Please note that portions of this note were completed with a voice recognition program.

## 2023-11-26 NOTE — PROGRESS NOTES
Intensive Care Follow-up     Hospital:  LOS: 18 days   Mr. Sundar Reeder, 53 y.o. male is followed for:   Acute on chronic systolic congestive heart failure            History of present illness:   53-year-old male with a past medical history of CHF, CKD, hypertension, and atrial fibrillation.  He presented to the ER and was admitted on 11/8 with atrial fibrillation with RVR, shortness of breath, and worsening lower extremity edema.     He was admitted to telemetry and placed on an amiodarone drip and underwent ECV on 11/10.  Subsequent to that he has been primarily in sinus rhythm but with intermittent atrial fibrillation noted.  His ejection fraction by her most recent echo is less than 20% with severe MR.  While in the hospital he had worsening acute kidney injury associate with diuresis and this has been held.  He has ongoing hyponatremia and elevated liver enzymes felt to be related to congestive hepatopathy.       On the evening of 11/15, he developed the acute onset of ventricular fibrillation after what appeared to be an R-on-T phenomenon.  He required defibrillation and received CPR as well as epinephrine and bicarbonate.  Postcode he woke up and was awake alert and oriented.  He was transferred to ICU.       Subjective   Interval History:  Patient moved out of the ICU overnight, but this morning found to be encephalopathic hypotensive, having worsening hypoxic respiratory failure.  Lactate checked and was up to 7.  Patient transferred back to the ICU immediately placed on mechanical ventilation and stabilize blood pressure support.  Gastric lavage performed with no signs of blood.             The patient's past medical, surgical and social history were reviewed and updated in Epic as appropriate.       Objective     Infusions:  fentanyl 10 mcg/mL,  mcg/hr, Last Rate: 100 mcg/hr (11/26/23 1508)  norepinephrine, 0.02-0.3 mcg/kg/min, Last Rate: 0.14 mcg/kg/min (11/26/23 1438)  phenylephrine, 0.5-3  mcg/kg/min, Last Rate: 3 mcg/kg/min (11/26/23 1424)  propofol, 5-50 mcg/kg/min, Last Rate: 15 mcg/kg/min (11/26/23 1508)      Medications:  [START ON 11/27/2023] amiodarone, 400 mg, Nasogastric, Q24H  chlorhexidine, 15 mL, Mouth/Throat, Q12H  sennosides, 10 mL, Nasogastric, BID   And  docusate sodium, 100 mg, Nasogastric, BID  etomidate, , ,   etomidate, 30 mg, Intravenous, Once  midazolam, , ,   midazolam, , ,   midazolam, 2 mg, Intravenous, Once  midodrine, 10 mg, Nasogastric, Q8H  Norepinephrine-Sodium Chloride, , ,   pantoprazole, 40 mg, Intravenous, Q AM  pharmacy consult - MTM, , Does not apply, Daily  piperacillin-tazobactam, 3.375 g, Intravenous, Once  piperacillin-tazobactam, 3.375 g, Intravenous, Q8H  rocuronium, 50 mg, Intravenous, Once  sodium chloride, 1,000 mL, Intravenous, Once  sodium chloride, 10 mL, Intravenous, Q12H  [START ON 11/27/2023] vancomycin (dosing per levels), , Does not apply, Daily  vancomycin, 25 mg/kg, Intravenous, Once      I reviewed the patient's medications.    Vital Sign Min/Max for last 24 hours  Temp  Min: 98.1 °F (36.7 °C)  Max: 99.9 °F (37.7 °C)   BP  Min: 65/46  Max: 113/65   Pulse  Min: 99  Max: 117   Resp  Min: 18  Max: 20   SpO2  Min: 88 %  Max: 100 %   Flow (L/min)  Min: 2  Max: 50       Input/Output for last 24 hour shift  11/25 0701 - 11/26 0700  In: -   Out: 1300 [Urine:1300]      GENERAL : Sedated, lying in bed, NAD  RESPIRATORY/THORAX : ET tube in place, Coarse breath sounds bilaterally  CARDIOVASCULAR : Normal S1/S2, RRR. 1+ lower ext edema.  GASTROINTESTINAL : Soft, NT/ND. BS x 4 normoactive. No hepatosplenomegaly.  MUSCULOSKELETAL : No cyanosis, clubbing, or ischemia  NEUROLOGICAL: Sedated, Moving all ext.    Results from last 7 days   Lab Units 11/26/23  0324 11/25/23  0826 11/24/23  0342   WBC 10*3/mm3 24.06* 25.35* 24.89*   HEMOGLOBIN g/dL 11.0* 11.5* 11.6*   PLATELETS 10*3/mm3 78* 149 253     Results from last 7 days   Lab Units 11/26/23  1222 11/26/23  0324  11/25/23  0826 11/24/23  0342   SODIUM mmol/L 131* 130* 133* 133*   POTASSIUM mmol/L 5.3* 4.6 4.0 4.6   CO2 mmol/L 21.0* 25.0 31.0* 30.0*   BUN mg/dL 62* 55* 53* 59*   CREATININE mg/dL 2.11* 1.20 1.42* 1.45*   MAGNESIUM mg/dL 2.2  --  1.9 1.9   PHOSPHORUS mg/dL 4.7*  --   --   --    GLUCOSE mg/dL 78 82 106* 113*     Estimated Creatinine Clearance: 46.6 mL/min (A) (by C-G formula based on SCr of 2.11 mg/dL (H)).    Results from last 7 days   Lab Units 11/26/23  1047   PH, ARTERIAL pH units 7.387   PCO2, ARTERIAL mm Hg 34.0*   PO2 ART mm Hg 113.0*       I reviewed the patient's new clinical results.  I reviewed the patient's new imaging results/reports including actual images and agree with reports.       Imaging Results (Last 24 Hours)       Procedure Component Value Units Date/Time    CT Angiogram Chest [[id number]] Resulted: 11/26/23 1524     Updated: 11/26/23 1524    CT Abdomen Pelvis With Contrast [[id number]] Resulted: 11/26/23 1524     Updated: 11/26/23 1524    XR Abdomen KUB [[id number]] Collected: 11/26/23 1518     Updated: 11/26/23 1522    Narrative:      XR ABDOMEN KUB    Date of Exam: 11/26/2023 2:37 PM EST    Indication: NG placement    Comparison: None available.    Findings:  There is a nasogastric tube present with its tip in the left upper quadrant in the area of the stomach. Leads from cardiac ICD device are suggested.      Impression:      Impression:  1.Nasogastric tube tip appears to be in the area of the stomach      Electronically Signed: Kimani Gambino MD    11/26/2023 3:19 PM EST    Workstation ID: [id number]    XR Chest 1 View [[id number]] Resulted: 11/26/23 1523     Updated: 11/26/23 1514    XR Chest 1 View [[id number]] Collected: 11/26/23 0945     Updated: 11/26/23 0950    Narrative:      XR CHEST 1 VW    Date of Exam: 11/26/2023 5:08 AM EST    Indication: Cough    Comparison: 11/22/2023    Findings:  Stable cardiomegaly with central pulmonary vascular congestion. Persistent multifocal airspace  disease throughout the lungs which may relate to multifocal pneumonia and/or pulmonary edema. Left-sided AICD noted. Small bilateral pleural effusions. No   pneumothorax.      Impression:      Impression:  1. Persistent multifocal airspace disease throughout the lungs which may relate to multifocal pneumonia and/or pulmonary edema.  2. Small bilateral pleural effusions.        Electronically Signed: Gaudencio Haynes MD    11/26/2023 9:47 AM EST    Workstation ID: [id number]            Assessment & Plan   Impression        Acute on chronic systolic congestive heart failure    Hyponatremia    STUART (acute kidney injury)    Atrial fibrillation with RVR    Essential hypertension    Anxiety associated with depression    Hypoalbuminemia    Hyperbilirubinemia    Elevated liver enzymes    Elevated troponin level not due myocardial infarction    Severe mitral regurgitation    Ventricular fibrillation    LBBB (left bundle branch block)    Presence of biventricular implantable cardioverter-defibrillator (ICD)       Plan        53-year-old male with a past medical history significant for CHF, CKD stage III, hypertension, and atrial fibrillation.  Who initially presented to the [facility] ER on 11/8/2023 with atrial fibrillation.  On 11/15/2023 he was admitted to the ICU after having ventricular fibrillation from an R-on-T phenomenon.  EF during this admission was documented being less than 20%.  Patient did not require intubation at the time postcode he woke up and was oriented.  Was transferred out to the floor on 11/25/2023 after pacemaker and ICD was placed and he had been hemodynamically stable with improvement in his overall renal function.  On 11/26/2023 patient had acute decompensation with rising lactate, hypoxemia and severe shock.  Patient was transferred back to the ICU placed on mechanical ventilation, initiated on broad-spectrum antibiotics and pressors were initiated.    -Admit patient to the ICU  -Start mechanical  ventilation, wean oxygen to saturation greater than 88%  -Propofol and fentanyl for sedation  -Trend lactate every 6 hours  -Initiate broad-spectrum antibiotics  -Pressors as needed to maintain a MAP greater than 65  -CT chest/abdomen/pelvis with contrast pending  -Bedside echo performed no large pericardial effusion seen  -Continue amiodarone for atrial fibrillation  -DVT prophylaxis  -GI prophylaxis  -A.M. labs     I conducted multidisciplinary rounds in the plan of care was discussed with the multidisciplinary team at that time. In attendance at multidisciplinary rounds was clinical pharmacist, dietitian, nursing staff, and case management.    I discussed the patient's findings and my recommendations with patient, family, and nursing staff     Critical Care time spent in direct patient care: 35 minutes (excluding procedure time, if applicable) including high complexity decision making to assess, manipulate, and support vital organ system failure in this individual who has impairment of one or more vital organ systems such that there is a high probability of imminent or life threatening deterioration in the patient's condition.      Shun Olmedo, DO  Pulmonary, Critical care and Sleep Medicine

## 2023-11-26 NOTE — PROGRESS NOTES
Pharmacy Consult-Vancomycin Dosing  Sundar Reeder is a  53 y.o. male receiving vancomycin therapy.     Indication: Sepsis  Consulting Provider: Hospitalist  ID Consult: No    Goal AUC: 400 - 600 mg/L*hr    Current Antimicrobial Therapy  Anti-Infectives (From admission, onward)      Ordered     Dose/Rate Route Frequency Start Stop    11/26/23 1147  piperacillin-tazobactam (ZOSYN) 3.375 g in iso-osmotic dextrose 50 ml (premix)        Ordering Provider: Elan Shultz MD    3.375 g  over 4 Hours Intravenous Every 8 Hours 11/26/23 1900 11/29/23 1859    11/26/23 1147  piperacillin-tazobactam (ZOSYN) 3.375 g in iso-osmotic dextrose 50 ml (premix)        Ordering Provider: Elan Shultz MD    3.375 g  over 30 Minutes Intravenous Once 11/26/23 1300      11/26/23 1157  vancomycin 1750 mg/500 mL 0.9% NS IVPB ([facility])        Ordering Provider: Jim Herrmann RPH   See Hyperspace for full Linked Orders Report.    1,750 mg  over 105 Minutes Intravenous Once 11/26/23 1245      11/26/23 1157  Pharmacy to dose vancomycin        Ordering Provider: Jim Herrmann RPH   See Hyperspace for full Linked Orders Report.     Does not apply Continuous PRN 11/26/23 1157 11/29/23 1146    11/17/23 1136  ceFAZolin 2000 mg IVPB in 100 mL NS ([facility])        Note to Pharmacy: Tube to cath lab   Ordering Provider: Sundar Bhagat MD    2,000 mg  over 30 Minutes Intravenous Once 11/17/23 1230 11/17/23 1158            Allergies  Allergies as of 11/08/2023 - Reviewed 11/08/2023   Allergen Reaction Noted    Sulfa antibiotics Unknown - High Severity 11/08/2023     Labs    Results from last 7 days   Lab Units 11/26/23  0324 11/25/23  0826 11/24/23  0342   BUN mg/dL 55* 53* 59*   CREATININE mg/dL 1.20 1.42* 1.45*       Results from last 7 days   Lab Units 11/26/23  0324 11/25/23  0826 11/24/23  0342   WBC 10*3/mm3 24.06* 25.35* 24.89*     Evaluation of Dosing     Last Dose Received in the ED/Outside Facility: Vanc 1750 mg IV ordered for 11/26 @ 1245   Is Patient  "on Dialysis or Renal Replacement: No    Ht - 180.3 cm (71\")  Wt - 79.5 kg (175 lb 6 oz)    Estimated Creatinine Clearance: 82 mL/min (by C-G formula based on SCr of 1.2 mg/dL).    Intake & Output (last 3 days)         11/23 0701 11/24 0700 11/24 0701 11/25 0700 11/25 0701 11/26 0700 11/26 0701 11/27 0700    P.O. 1560 250  360    Total Intake(mL/kg) 1560 (18.8) 250 (3)  360 (4.5)    Urine (mL/kg/hr) 1825 (0.9) 1050 (0.5) 1300 (0.7)     Total Output 1825 1050 1300     Net -265 -800 -1300 +360                    Microbiology and Radiology  Microbiology Results (last 10 days)       ** No results found for the last 240 hours. **          Reported Vancomycin Levels                         [employer] AUC Calculation:    Current AUC:   -- mg/L*hr    Predicted Steady State AUC on Current Dose: -- mg/L*hr  _________________________________    Predicted Steady State AUC on New Dose:   430 mg/L*hr    Assessment/Plan:  1. Pharmacy to dose vancomycin for Sepsis.   2. Patient to receive a loading dose of vancomycin 1750 mg IV x 1 (22 mg/kg).  3. Will start on a maintenance dose of vancomycin 750 mg IV q12h.  4. Vancomycin level scheduled for 11/28 @ 0600 prior to the 4th maintenance dose.  5. Monitor renal function, cultures and sensitivities, and clinical status, and adjust regimen as necessary.  Pharmacy will continue to follow.    Jim Herrmann, PharmD    Addendum 11/26 9343:  Patient received in ICU; emergently intubated and placed on multiple vasoactive medications.  Will pivot on dosing recommendations made by other pharmacist with new information.    Vancomycin 2g IV x 1  Pulse dosing to follow in setting of instability.  Random level to assess dosing in the AM.    Thanks,  Niall Jean, PharmD, BCPS, BCCCP  11/26/23  14:45 EST        "

## 2023-11-27 LAB — BACTERIA SPEC AEROBE CULT: NO GROWTH

## 2023-11-27 NOTE — NURSING NOTE
Patient arrived to ICU around 1320. He was breathing 20-24 times per minute, SpO2 > 90%, MAP was , he was A&Ox4 and resting in bed. Around 1355, the patient's MAP dropped into the 40s, SpO2 < 80, went into reassess patient, he was incoherent, pale, breathing was much more labored and in the 30s. Dr. Olmedo, NOELLE Chester, and NOELLE Morse were at bedside talking with the patient's sister about the patient decompensating and needing to be intubated, a central and arterial line and going to CT w/ and w/o chest abdomen and pelvis. Family was in agreement. Patient quickly ended up maxed on Julio César and Levophed. CT completed, and a vaso gtts was added. Not long after, Dobutamine gtts added as well. Dr. Pedraza came to bedside and discussed the treatment options with family, ultimately discovering the patient had refused a heart transplant and valve replacement in the past. This turned the conversation around to making the patient comfortable, and bringing family in. After all family arrived, they asked to remove everything and the patient  at 1741.

## 2023-11-29 LAB
BACTERIA SPEC AEROBE CULT: ABNORMAL
BACTERIA SPEC AEROBE CULT: ABNORMAL
GRAM STN SPEC: ABNORMAL
ISOLATED FROM: ABNORMAL
ISOLATED FROM: ABNORMAL

## 2025-05-29 NOTE — ANESTHESIA POSTPROCEDURE EVALUATION
Patient: Sundar Reeder    Procedure Summary       Date: 11/17/23 Room / Location: [facility] / [facility]    Anesthesia Start: 1136 Anesthesia Stop: 1432    Procedure: Implant ICD - bi ventricular Diagnosis:     Providers: Sundar Bhagat MD Provider: El Arias MD    Anesthesia Type: general ASA Status: 4            Anesthesia Type: general    Vitals  Vitals Value Taken Time   BP 77/43 11/26/23 17:15   Temp 99.9 °F (37.7 °C) 11/26/23 11:06   Pulse 103 11/26/23 17:15   Resp 20 11/26/23 13:23   SpO2 100 % 11/26/23 17:15           Post Anesthesia Care and Evaluation    Patient location during evaluation: PACU  Patient participation: complete - patient participated  Level of consciousness: awake and alert  Pain management: adequate    Airway patency: patent  Anesthetic complications: No anesthetic complications  PONV Status: none  Cardiovascular status: hemodynamically stable and acceptable  Respiratory status: nonlabored ventilation, acceptable and nasal cannula  Hydration status: acceptable     15-Mar-2023 17:28

## (undated) DEVICE — INTRO TEAR AWAY/LVD W/SD PRT 6F 13CM

## (undated) DEVICE — RADIFOCUS GLIDEWIRE ADVANTAGE GUIDEWIRE: Brand: GLIDEWIRE ADVANTAGE

## (undated) DEVICE — ST INF PRI SMRTSTE 20DRP 2VLV 24ML 117

## (undated) DEVICE — CANN NASL CO2 DIVIDED A/

## (undated) DEVICE — RADIFOCUS GLIDEWIRE: Brand: GLIDEWIRE

## (undated) DEVICE — GUIDE CATHETER: Brand: ACUITY® PRO

## (undated) DEVICE — MEDI-VAC YANKAUER SUCTION HANDLE W/BULBOUS TIP: Brand: CARDINAL HEALTH

## (undated) DEVICE — INTRO TEAR AWAY/LVD W/SD PRT 8F 13CM

## (undated) DEVICE — TUBING, SUCTION, 1/4" X 10', STRAIGHT: Brand: MEDLINE

## (undated) DEVICE — TBG PENCL TELESCP MEGADYNE SMOKE EVAC 10FT

## (undated) DEVICE — DRSNG SURESITE123 4X4.8IN

## (undated) DEVICE — 3M™ STERI-DRAPE™ INSTRUMENT POUCH 1018: Brand: STERI-DRAPE™

## (undated) DEVICE — ST EXT IV SMRTSTE 2VLV FIX M LL 6ML 41

## (undated) DEVICE — LEX ELECTRO PHYSIOLOGY: Brand: MEDLINE INDUSTRIES, INC.

## (undated) DEVICE — TRAP FLD MINIVAC MEGADYNE 100ML

## (undated) DEVICE — DRSNG SURG AQUACEL AG/ADVNTGE 9X15CM 3.5X6IN

## (undated) DEVICE — ADULT, W/LG. BACK PAD, RADIOTRANSPARENT ELEMENT AND LEAD WIRE COMPATIBLE W/: Brand: DEFIBRILLATION ELECTRODES

## (undated) DEVICE — ST ACC MICROPUNCTURE .018 TRANSLSS/SS/TP 5F/10CM 21G/7CM

## (undated) DEVICE — IRRIGATOR BULB ASEPTO 60CC STRL

## (undated) DEVICE — ELECTRD RETRN/GRND MEGADYNE SGL/PLT W/CORD 9FT DISP

## (undated) DEVICE — BALN PRESS WEDGE 6F 110CM

## (undated) DEVICE — LIMB HOLDER, WRIST/ANKLE: Brand: DEROYAL

## (undated) DEVICE — SOL NACL 0.9PCT 1000ML

## (undated) DEVICE — CAUTERY TIP POLISHER: Brand: DEVON

## (undated) DEVICE — INTRO TEAR AWAY/LVD W/SD PRT 9.5F 13CM

## (undated) DEVICE — SET PRIMARY GRVTY 10DP MALE LL 104IN

## (undated) DEVICE — DECANT BG O JET

## (undated) DEVICE — CATH DIAG EXPO .045 AL2 5F 100CM